# Patient Record
Sex: FEMALE | Race: WHITE | NOT HISPANIC OR LATINO | Employment: OTHER | URBAN - METROPOLITAN AREA
[De-identification: names, ages, dates, MRNs, and addresses within clinical notes are randomized per-mention and may not be internally consistent; named-entity substitution may affect disease eponyms.]

---

## 2017-01-05 RX ORDER — METOPROLOL SUCCINATE 50 MG/1
TABLET, EXTENDED RELEASE ORAL
Qty: 180 TABLET | Refills: 0 | Status: SHIPPED | OUTPATIENT
Start: 2017-01-05 | End: 2017-04-04 | Stop reason: SDUPTHER

## 2017-04-04 RX ORDER — METOPROLOL SUCCINATE 50 MG/1
TABLET, EXTENDED RELEASE ORAL
Qty: 180 TABLET | Refills: 0 | Status: SHIPPED | OUTPATIENT
Start: 2017-04-04 | End: 2017-07-13 | Stop reason: SDUPTHER

## 2017-04-04 RX ORDER — WARFARIN SODIUM 5 MG/1
TABLET ORAL
Qty: 45 TABLET | Refills: 1 | Status: SHIPPED | OUTPATIENT
Start: 2017-04-04 | End: 2017-06-07 | Stop reason: SDUPTHER

## 2017-06-07 ENCOUNTER — OFFICE VISIT (OUTPATIENT)
Dept: CARDIOLOGY | Facility: CLINIC | Age: 62
End: 2017-06-07

## 2017-06-07 VITALS
HEART RATE: 81 BPM | HEIGHT: 64 IN | SYSTOLIC BLOOD PRESSURE: 118 MMHG | WEIGHT: 197 LBS | DIASTOLIC BLOOD PRESSURE: 62 MMHG | BODY MASS INDEX: 33.63 KG/M2

## 2017-06-07 DIAGNOSIS — I10 ESSENTIAL HYPERTENSION: ICD-10-CM

## 2017-06-07 DIAGNOSIS — I35.0 NONRHEUMATIC AORTIC VALVE STENOSIS: Primary | ICD-10-CM

## 2017-06-07 PROCEDURE — 99214 OFFICE O/P EST MOD 30 MIN: CPT | Performed by: INTERNAL MEDICINE

## 2017-06-07 PROCEDURE — 93000 ELECTROCARDIOGRAM COMPLETE: CPT | Performed by: INTERNAL MEDICINE

## 2017-06-07 RX ORDER — WARFARIN SODIUM 5 MG/1
7.5 TABLET ORAL
Qty: 45 TABLET | Refills: 1 | Status: SHIPPED | OUTPATIENT
Start: 2017-06-07 | End: 2017-08-11 | Stop reason: SDUPTHER

## 2017-06-07 NOTE — PROGRESS NOTES
Date of Office Visit: 2017  Encounter Provider: Junaid Gunderson MD  Place of Service: [facility] CARDIOLOGY  Patient Name: Jocelin Parra  :1955      Chief Complaint   Patient presents with   • Cardiac Valve Problem   • Hypertension     History of Present Illness  The patient is a 62-year-old white female with a history of aortic valve stenosis.  She is status post aortic valve replacement with a [employer] prosthesis in .  She was seen here about a year ago.  Since her last visit things have gone well.  The been no cardiac issues that she is aware of.  She denies any complaints of palpitations, shortness of breath, lightheadedness nor dizziness.  There've been no hospitalizations.  She remains anticoagulated with warfarin      Past Medical History:   Diagnosis Date   • Aortic valve stenosis    • Diabetes mellitus    • Hyperlipidemia    • Hypertension    • Hypothyroidism          Past Surgical History:   Procedure Laterality Date   • AORTIC VALVE REPAIR/REPLACEMENT      ST BETHEL PROSTHESIS   • HYSTERECTOMY             Current Outpatient Prescriptions:   •  aspirin 81 MG tablet, Take 1 tablet by mouth daily., Disp: , Rfl:   •  B Complex Vitamins (VITAMIN B COMPLEX) tablet, Take by mouth., Disp: , Rfl:   •  ezetimibe (ZETIA) 10 MG tablet, Take 1 tablet by mouth daily., Disp: , Rfl:   •  ferrous sulfate 325 (65 FE) MG tablet, Take by mouth., Disp: , Rfl:   •  furosemide (LASIX) 40 MG tablet, Take 1 tablet by mouth daily., Disp: , Rfl:   •  INVOKANA 300 MG tablet, Take 1 tablet by mouth daily., Disp: , Rfl: 0  •  levothyroxine (SYNTHROID, LEVOTHROID) 150 MCG tablet, Take 150 mcg by mouth daily. \, Disp: , Rfl:   •  Liraglutide (VICTOZA) 18 MG/3ML solution pen-injector, Inject under the skin., Disp: , Rfl:   •  metFORMIN (GLUCOPHAGE) 500 MG tablet, Take 1 tablet by mouth daily with breakfast., Disp: , Rfl:   •  metoprolol succinate XL (TOPROL-XL) 50 MG 24 hr tablet, TAKE  "ONE TABLET BY MOUTH TWICE A DAY, Disp: 180 tablet, Rfl: 0  •  Multiple Vitamin (MULTI-VITAMIN) tablet, Take 1 tablet by mouth daily., Disp: , Rfl:   •  omeprazole (PriLOSEC) 20 MG capsule, Take 1 capsule by mouth daily., Disp: , Rfl:   •  potassium chloride (KLOR-CON) 20 MEQ CR tablet, Take 1 tablet by mouth daily., Disp: , Rfl:   •  simvastatin (ZOCOR) 20 MG tablet, Take 1 tablet by mouth daily., Disp: , Rfl:   •  valsartan (DIOVAN) 80 MG tablet, Take 1 tablet by mouth daily., Disp: , Rfl:   •  warfarin (COUMADIN) 5 MG tablet, TAKE ONE AND ONE-HALF (1  1/2) TABLET BY MOUTH DAILY OR AS DIRECTED BY DOCTOR TARIQ, Disp: 45 tablet, Rfl: 1      Social History     Social History   • Marital status: Single     Spouse name: N/A   • Number of children: N/A   • Years of education: N/A     Occupational History   • Not on file.     Social History Main Topics   • Smoking status: Never Smoker   • Smokeless tobacco: Not on file   • Alcohol use Not on file   • Drug use: Not on file   • Sexual activity: Not on file     Other Topics Concern   • Not on file     Social History Narrative         Review of Systems   Constitution: Negative.   HENT: Negative.    Eyes: Negative.    Cardiovascular: Negative.    Respiratory: Negative.    Endocrine: Negative.    Skin: Negative.    Musculoskeletal: Negative.    Gastrointestinal: Negative.    Neurological: Positive for light-headedness.   Psychiatric/Behavioral: Negative.        Procedures      ECG 12 Lead  Date/Time: 6/7/2017 1:23 PM  Performed by: ASTRID POTTER  Authorized by: ASTRID POTTER   Comparison: compared with previous ECG from 3/30/2016  Similar to previous ECG  Rhythm: sinus rhythm  Rate: normal  Conduction: conduction normal  ST Segments: ST segments normal  QRS axis: normal                 Objective:    /62  Pulse 81  Ht 64\" (162.6 cm)  Wt 197 lb (89.4 kg)  BMI 33.81 kg/m2        Physical Exam   Constitutional: She is oriented to person, place, and time. " She appears well-developed and well-nourished.   HENT:   Head: Normocephalic.   Eyes: Pupils are equal, round, and reactive to light.   Neck: Normal range of motion. No JVD present. Carotid bruit is not present. No thyromegaly present.   Cardiovascular: Normal rate, regular rhythm, S1 normal, S2 normal and intact distal pulses.  Exam reveals no gallop and no friction rub.    Murmur heard.   Systolic murmur is present with a grade of 1/6  Normal mechanical prosthetic sounds  Pulmonary/Chest: Effort normal and breath sounds normal.   Abdominal: Soft. Bowel sounds are normal.   Musculoskeletal: She exhibits no edema.   Neurological: She is alert and oriented to person, place, and time.   Skin: Skin is warm, dry and intact. No erythema.   Psychiatric: She has a normal mood and affect.   Vitals reviewed.          Assessment:       Diagnosis Plan   1. Nonrheumatic aortic valve stenosis     2. Essential hypertension              Plan:       Changes in medication at this time.  I will see her back in a year

## 2017-07-13 RX ORDER — METOPROLOL SUCCINATE 50 MG/1
TABLET, EXTENDED RELEASE ORAL
Qty: 180 TABLET | Refills: 3 | Status: SHIPPED | OUTPATIENT
Start: 2017-07-13 | End: 2018-06-22 | Stop reason: SDUPTHER

## 2017-08-11 RX ORDER — WARFARIN SODIUM 5 MG/1
TABLET ORAL
Qty: 45 TABLET | Refills: 2 | Status: SHIPPED | OUTPATIENT
Start: 2017-08-11 | End: 2017-11-17 | Stop reason: SDUPTHER

## 2017-08-15 ENCOUNTER — TELEPHONE (OUTPATIENT)
Dept: CARDIOLOGY | Facility: CLINIC | Age: 62
End: 2017-08-15

## 2017-08-15 NOTE — TELEPHONE ENCOUNTER
Pt called. She states she has knee surgery scheduled for 9/6. She needs you to advise re clearance and instructions on how to proceed with her coumadin and aspirin.     Pt is aware you are unavailable until 8/21.    Pt can be reached at [phone]

## 2017-08-21 NOTE — TELEPHONE ENCOUNTER
She is cleared.  No ASA for 1 week, warfarin for 3 days before.  If she needs to be off warfarin longer than 3 days I will need to know and bridge with lovenox.  Called her.  rfl

## 2017-11-17 RX ORDER — WARFARIN SODIUM 5 MG/1
TABLET ORAL
Qty: 45 TABLET | Refills: 2 | Status: SHIPPED | OUTPATIENT
Start: 2017-11-17 | End: 2018-02-18 | Stop reason: SDUPTHER

## 2017-12-12 ENCOUNTER — TELEPHONE (OUTPATIENT)
Dept: CARDIOLOGY | Facility: HOSPITAL | Age: 62
End: 2017-12-12

## 2018-02-19 RX ORDER — WARFARIN SODIUM 5 MG/1
TABLET ORAL
Qty: 45 TABLET | Refills: 1 | Status: SHIPPED | OUTPATIENT
Start: 2018-02-19 | End: 2018-04-27 | Stop reason: SDUPTHER

## 2018-04-27 RX ORDER — WARFARIN SODIUM 5 MG/1
TABLET ORAL
Qty: 45 TABLET | Refills: 2 | Status: SHIPPED | OUTPATIENT
Start: 2018-04-27 | End: 2019-06-26 | Stop reason: SDUPTHER

## 2018-05-21 ENCOUNTER — CONVERSION ENCOUNTER (OUTPATIENT)
Dept: CARDIOLOGY | Facility: CLINIC | Age: 63
End: 2018-05-21

## 2018-05-24 ENCOUNTER — CONVERSION ENCOUNTER (OUTPATIENT)
Dept: CARDIOLOGY | Facility: CLINIC | Age: 63
End: 2018-05-24

## 2018-06-22 RX ORDER — METOPROLOL SUCCINATE 50 MG/1
50 TABLET, EXTENDED RELEASE ORAL 2 TIMES DAILY
Qty: 180 TABLET | Refills: 0 | Status: SHIPPED | OUTPATIENT
Start: 2018-06-22 | End: 2018-06-25 | Stop reason: DRUGHIGH

## 2018-06-25 ENCOUNTER — TELEPHONE (OUTPATIENT)
Dept: CARDIOLOGY | Facility: CLINIC | Age: 63
End: 2018-06-25

## 2018-06-25 RX ORDER — METOPROLOL SUCCINATE 100 MG/1
100 TABLET, EXTENDED RELEASE ORAL DAILY
Qty: 90 TABLET | Refills: 1 | Status: SHIPPED | OUTPATIENT
Start: 2018-06-25 | End: 2019-10-21 | Stop reason: SDUPTHER

## 2018-06-25 NOTE — TELEPHONE ENCOUNTER
Received a PA request for pts Metoprolol Succ     This was sent in as 50mg twice daily.  Ins would erin for it to stay 1 tablet daily and increase the strength of medication to 100mg    Is this ok to send in as Metoprolol Succ 100mg daily?

## 2018-07-09 RX ORDER — METOPROLOL SUCCINATE 100 MG/1
100 TABLET, EXTENDED RELEASE ORAL DAILY
Qty: 90 TABLET | Refills: 1 | Status: SHIPPED | OUTPATIENT
Start: 2018-07-09 | End: 2019-10-21 | Stop reason: SDUPTHER

## 2018-07-18 ENCOUNTER — CONVERSION ENCOUNTER (OUTPATIENT)
Dept: CARDIOLOGY | Facility: CLINIC | Age: 63
End: 2018-07-18

## 2018-08-15 ENCOUNTER — CONVERSION ENCOUNTER (OUTPATIENT)
Dept: CARDIOLOGY | Facility: CLINIC | Age: 63
End: 2018-08-15

## 2018-08-31 ENCOUNTER — CONVERSION ENCOUNTER (OUTPATIENT)
Dept: CARDIOLOGY | Facility: CLINIC | Age: 63
End: 2018-08-31

## 2018-09-18 RX ORDER — WARFARIN SODIUM 5 MG/1
TABLET ORAL
Qty: 45 TABLET | Refills: 1 | OUTPATIENT
Start: 2018-09-18

## 2018-09-28 ENCOUNTER — CONVERSION ENCOUNTER (OUTPATIENT)
Dept: CARDIOLOGY | Facility: CLINIC | Age: 63
End: 2018-09-28

## 2018-10-26 ENCOUNTER — CONVERSION ENCOUNTER (OUTPATIENT)
Dept: CARDIOLOGY | Facility: CLINIC | Age: 63
End: 2018-10-26

## 2018-11-29 ENCOUNTER — HOSPITAL ENCOUNTER (OUTPATIENT)
Dept: LAB | Facility: HOSPITAL | Age: 63
Discharge: HOME | End: 2018-11-29
Attending: INTERNAL MEDICINE | Admitting: INTERNAL MEDICINE

## 2018-11-29 LAB
INR PPP: 2.6 (ref 2–3)
PROTHROMBIN TIME: 25.4 SEC (ref 19.4–28.5)

## 2019-01-15 ENCOUNTER — CONVERSION ENCOUNTER (OUTPATIENT)
Dept: CARDIOLOGY | Facility: CLINIC | Age: 64
End: 2019-01-15

## 2019-01-21 ENCOUNTER — HOSPITAL ENCOUNTER (OUTPATIENT)
Dept: LAB | Facility: HOSPITAL | Age: 64
Discharge: HOME | End: 2019-01-21
Attending: INTERNAL MEDICINE | Admitting: INTERNAL MEDICINE

## 2019-01-21 LAB
ANION GAP SERPL CALC-SCNC: 15.3 MMOL/L (ref 10–20)
BASOPHILS # BLD AUTO: 0.1 10*3/UL (ref 0–0.2)
BASOPHILS NFR BLD AUTO: 1 % (ref 0–2)
BUN SERPL-MCNC: 27 MG/DL (ref 8–20)
BUN/CREAT SERPL: 19.3 (ref 5.4–26.2)
CALCIUM SERPL-MCNC: 9.8 MG/DL (ref 8.9–10.3)
CHLORIDE SERPL-SCNC: 101 MMOL/L (ref 101–111)
CONV CO2: 26 MMOL/L (ref 22–32)
CREAT UR-MCNC: 1.4 MG/DL (ref 0.4–1)
DIFFERENTIAL METHOD BLD: (no result)
EOSINOPHIL # BLD AUTO: 0.3 10*3/UL (ref 0–0.3)
EOSINOPHIL # BLD AUTO: 3 % (ref 0–3)
ERYTHROCYTE [DISTWIDTH] IN BLOOD BY AUTOMATED COUNT: 15.5 % (ref 11.5–14.5)
GLUCOSE SERPL-MCNC: 282 MG/DL (ref 65–99)
HCT VFR BLD AUTO: 39.4 % (ref 35–49)
HGB BLD-MCNC: 12.8 G/DL (ref 12–15)
LYMPHOCYTES # BLD AUTO: 1.5 10*3/UL (ref 0.8–4.8)
LYMPHOCYTES NFR BLD AUTO: 15 % (ref 18–42)
MCH RBC QN AUTO: 28.2 PG (ref 26–32)
MCHC RBC AUTO-ENTMCNC: 32.5 G/DL (ref 32–36)
MCV RBC AUTO: 86.7 FL (ref 80–94)
MONOCYTES # BLD AUTO: 0.7 10*3/UL (ref 0.1–1.3)
MONOCYTES NFR BLD AUTO: 7 % (ref 2–11)
NEUTROPHILS # BLD AUTO: 7.7 10*3/UL (ref 2.3–8.6)
NEUTROPHILS NFR BLD AUTO: 74 % (ref 50–75)
NRBC BLD AUTO-RTO: 0 /100{WBCS}
NRBC/RBC NFR BLD MANUAL: 0 10*3/UL
PLATELET # BLD AUTO: 331 10*3/UL (ref 150–450)
PMV BLD AUTO: 8 FL (ref 7.4–10.4)
POTASSIUM SERPL-SCNC: 4.3 MMOL/L (ref 3.6–5.1)
RBC # BLD AUTO: 4.55 10*6/UL (ref 4–5.4)
SODIUM SERPL-SCNC: 138 MMOL/L (ref 136–144)
WBC # BLD AUTO: 10.2 10*3/UL (ref 4.5–11.5)

## 2019-02-14 ENCOUNTER — CONVERSION ENCOUNTER (OUTPATIENT)
Dept: CARDIOLOGY | Facility: CLINIC | Age: 64
End: 2019-02-14

## 2019-02-25 ENCOUNTER — HOSPITAL ENCOUNTER (OUTPATIENT)
Dept: CARDIOLOGY | Facility: HOSPITAL | Age: 64
Discharge: HOME | End: 2019-02-25
Attending: INTERNAL MEDICINE | Admitting: INTERNAL MEDICINE

## 2019-02-25 ENCOUNTER — CONVERSION ENCOUNTER (OUTPATIENT)
Dept: CARDIOLOGY | Facility: CLINIC | Age: 64
End: 2019-02-25

## 2019-03-19 ENCOUNTER — CONVERSION ENCOUNTER (OUTPATIENT)
Dept: CARDIOLOGY | Facility: CLINIC | Age: 64
End: 2019-03-19

## 2019-04-18 ENCOUNTER — TELEPHONE (OUTPATIENT)
Dept: CARDIAC SURGERY | Facility: CLINIC | Age: 64
End: 2019-04-18

## 2019-04-18 NOTE — TELEPHONE ENCOUNTER
Spoke to Mrs. Parra and explained the process for her PAT and surgery. [facility] will contact hers as to when to come in for her PAT and her arrival time for surgery. She will also be contacted as to when to stop her FISH OIL and COUMADIN.  She expressed a verbal understanding of all. She was instructed to call the office with any further questions.

## 2019-04-23 ENCOUNTER — CONVERSION ENCOUNTER (OUTPATIENT)
Dept: CARDIOLOGY | Facility: CLINIC | Age: 64
End: 2019-04-23

## 2019-05-06 ENCOUNTER — TELEPHONE (OUTPATIENT)
Dept: CARDIAC SURGERY | Facility: CLINIC | Age: 64
End: 2019-05-06

## 2019-05-06 NOTE — TELEPHONE ENCOUNTER
After speaking with Kristal DUMONT I called and spoke with patient. She verifies her weight is 197lbs. We discussed the lovenox injections and she verified that she has stopped her coumadin. She will begin lovenox 90mg injections the morning of 5/7/19 and continue them every 12 hours with the last dose to be administered the morning of 5/8/19.This is a total of 3 doses. She repeated these instructions to me. I have also called her pharmacy [facility] in [place] and placed the order for the medication

## 2019-05-09 ENCOUNTER — OUTSIDE FACILITY SERVICE (OUTPATIENT)
Dept: CARDIAC SURGERY | Facility: CLINIC | Age: 64
End: 2019-05-09

## 2019-05-09 PROCEDURE — 33508 ENDOSCOPIC VEIN HARVEST: CPT | Performed by: THORACIC SURGERY (CARDIOTHORACIC VASCULAR SURGERY)

## 2019-05-09 PROCEDURE — 33259 ABLATE ATRIA W/BYPASS ADD-ON: CPT | Performed by: THORACIC SURGERY (CARDIOTHORACIC VASCULAR SURGERY)

## 2019-05-09 PROCEDURE — 33464 VALVULOPLASTY TRICUSPID: CPT | Performed by: THORACIC SURGERY (CARDIOTHORACIC VASCULAR SURGERY)

## 2019-05-09 PROCEDURE — 33533 CABG ARTERIAL SINGLE: CPT | Performed by: THORACIC SURGERY (CARDIOTHORACIC VASCULAR SURGERY)

## 2019-05-09 PROCEDURE — 33517 CABG ARTERY-VEIN SINGLE: CPT | Performed by: THORACIC SURGERY (CARDIOTHORACIC VASCULAR SURGERY)

## 2019-05-09 PROCEDURE — 33430 REPLACEMENT OF MITRAL VALVE: CPT | Performed by: THORACIC SURGERY (CARDIOTHORACIC VASCULAR SURGERY)

## 2019-06-04 VITALS
DIASTOLIC BLOOD PRESSURE: 67 MMHG | WEIGHT: 206 LBS | HEART RATE: 91 BPM | HEART RATE: 77 BPM | BODY MASS INDEX: 34.54 KG/M2 | BODY MASS INDEX: 35.51 KG/M2 | HEART RATE: 74 BPM | BODY MASS INDEX: 34.63 KG/M2 | SYSTOLIC BLOOD PRESSURE: 124 MMHG | SYSTOLIC BLOOD PRESSURE: 106 MMHG | BODY MASS INDEX: 37.51 KG/M2 | BODY MASS INDEX: 35.19 KG/M2 | BODY MASS INDEX: 35.06 KG/M2 | SYSTOLIC BLOOD PRESSURE: 132 MMHG | BODY MASS INDEX: 35.02 KG/M2 | HEART RATE: 75 BPM | HEART RATE: 80 BPM | HEART RATE: 122 BPM | WEIGHT: 201.75 LBS | WEIGHT: 209 LBS | DIASTOLIC BLOOD PRESSURE: 80 MMHG | OXYGEN SATURATION: 95 % | WEIGHT: 223 LBS | SYSTOLIC BLOOD PRESSURE: 90 MMHG | BODY MASS INDEX: 34.37 KG/M2 | BODY MASS INDEX: 34.93 KG/M2 | BODY MASS INDEX: 35.87 KG/M2 | BODY MASS INDEX: 35.36 KG/M2 | DIASTOLIC BLOOD PRESSURE: 72 MMHG | WEIGHT: 201.25 LBS | DIASTOLIC BLOOD PRESSURE: 79 MMHG | DIASTOLIC BLOOD PRESSURE: 58 MMHG | WEIGHT: 205 LBS | WEIGHT: 200.25 LBS | DIASTOLIC BLOOD PRESSURE: 69 MMHG | HEIGHT: 64 IN | HEART RATE: 73 BPM | SYSTOLIC BLOOD PRESSURE: 101 MMHG | SYSTOLIC BLOOD PRESSURE: 94 MMHG | DIASTOLIC BLOOD PRESSURE: 67 MMHG | SYSTOLIC BLOOD PRESSURE: 97 MMHG | SYSTOLIC BLOOD PRESSURE: 160 MMHG | WEIGHT: 218.5 LBS | OXYGEN SATURATION: 100 % | SYSTOLIC BLOOD PRESSURE: 102 MMHG | HEART RATE: 68 BPM | DIASTOLIC BLOOD PRESSURE: 59 MMHG | DIASTOLIC BLOOD PRESSURE: 68 MMHG | DIASTOLIC BLOOD PRESSURE: 80 MMHG | HEART RATE: 128 BPM | SYSTOLIC BLOOD PRESSURE: 111 MMHG | HEART RATE: 64 BPM | WEIGHT: 203.5 LBS | WEIGHT: 204 LBS | DIASTOLIC BLOOD PRESSURE: 84 MMHG | WEIGHT: 204.25 LBS | BODY MASS INDEX: 38.28 KG/M2 | HEART RATE: 70 BPM | WEIGHT: 208 LBS | SYSTOLIC BLOOD PRESSURE: 113 MMHG

## 2019-06-17 ENCOUNTER — OFFICE VISIT (OUTPATIENT)
Dept: CARDIAC SURGERY | Facility: CLINIC | Age: 64
End: 2019-06-17

## 2019-06-17 VITALS
HEART RATE: 112 BPM | WEIGHT: 179.4 LBS | DIASTOLIC BLOOD PRESSURE: 78 MMHG | TEMPERATURE: 98.2 F | BODY MASS INDEX: 30.79 KG/M2 | SYSTOLIC BLOOD PRESSURE: 122 MMHG

## 2019-06-17 DIAGNOSIS — Z86.79 S/P MAZE OPERATION FOR ATRIAL FIBRILLATION: ICD-10-CM

## 2019-06-17 DIAGNOSIS — Z95.2 S/P AVR: ICD-10-CM

## 2019-06-17 DIAGNOSIS — Z95.2 S/P MVR (MITRAL VALVE REPLACEMENT): Primary | ICD-10-CM

## 2019-06-17 DIAGNOSIS — Z95.1 S/P CABG X 2: ICD-10-CM

## 2019-06-17 DIAGNOSIS — Z98.890 S/P TVR (TRICUSPID VALVE REPAIR): ICD-10-CM

## 2019-06-17 DIAGNOSIS — Z98.890 S/P MAZE OPERATION FOR ATRIAL FIBRILLATION: ICD-10-CM

## 2019-06-17 PROCEDURE — 99024 POSTOP FOLLOW-UP VISIT: CPT | Performed by: NURSE PRACTITIONER

## 2019-06-17 NOTE — PROGRESS NOTES
CARDIOVASCULAR SURGERY FOLLOW-UP PROGRESS NOTE  Chief Complaint: Post-op Follow Up        HPI:   Dear Emilie Kern APRN and colleagues:    It was nice to see Jocelin Parra in follow up today after cardiac surgery.  As you know, she is a 64 y.o. female with severe mitral regurgitation, mod to severe tricuspid regurgitation, CAD, persistent atrial fib, HF, and prior mechanical AVR per Dr. Alvarado who underwent CABG, mechanical MVR, tricuspid valve repair, Maze procedure and redo sternotomy at [facility] by Dr. Alvarado on 5/9/2019. She did well postoperatively and continues to do well. She did go to rehab d/t living alone but has returned to home.  Her latest INR was 4 per her report.  She comes in today complaining of occasionally feeling dizzy.  She reports she has lost 30 pounds since surgery.  She is also in atrial fib and Dr. Solano is aware from her last office visit.  She does have a firm hematoma in the left thigh area but no drainage or erythema.  Her activity level has been good.   She is alone for her appt today.    Physical Exam:         /78   Pulse 112   Temp 98.2 °F (36.8 °C) (Oral)   Wt 81.4 kg (179 lb 6.4 oz)   BMI 30.79 kg/m²   Heart:  irregularly irregular rhythm  Lungs:  clear to auscultation bilaterally  Extremities:  trace lower extremity edema bilaterally  Incision(s):  mid chest healing well,sternum stable, chest tube exit sites with thick scabbing, left leg slow healing, hematoma noted in thigh area, stitch removed from incision of LLE.    Assessment/Plan:     S/P reop sternotomy, mechanical MVR, prior mechanical AVR, TV repair, CABG x2, Maze procedure. Overall, she is doing well.  I did discontinue her Bumex/KCL.  Hopefully, that will allow for more beta blockade to control her heart rate better.  She is going to see renal next week and will have labs prior to that appt as well.  She knows to restart her diuretics if increased edema/SOA.    Post-op atrial fibrillation--per   Xiomara, she is on warfarin for her mechanical heart valves.    OK to drive if not taking narcotic pain medicine.    OK to begin cardiac rehab.  I will place order for cardiac rehab.    Follow-up with CT surgery prn.    No restrictions of activity.      Thank you for allowing me to participate in the care of your patient.    Regards,  Kristal Mcpherson, APRN

## 2019-06-18 ENCOUNTER — APPOINTMENT (OUTPATIENT)
Dept: PULMONOLOGY | Facility: HOSPITAL | Age: 64
End: 2019-06-18

## 2019-06-20 ENCOUNTER — LAB (OUTPATIENT)
Dept: LAB | Facility: HOSPITAL | Age: 64
End: 2019-06-20

## 2019-06-20 ENCOUNTER — TRANSCRIBE ORDERS (OUTPATIENT)
Dept: ADMINISTRATIVE | Facility: HOSPITAL | Age: 64
End: 2019-06-20

## 2019-06-20 DIAGNOSIS — N18.30 CHRONIC KIDNEY DISEASE, STAGE III (MODERATE) (HCC): Primary | ICD-10-CM

## 2019-06-20 DIAGNOSIS — N18.30 CHRONIC KIDNEY DISEASE, STAGE III (MODERATE) (HCC): ICD-10-CM

## 2019-06-20 LAB
ANION GAP SERPL CALCULATED.3IONS-SCNC: 10 MMOL/L (ref 10–20)
BUN BLD-MCNC: 21 MG/DL (ref 8–20)
BUN/CREAT SERPL: 16.2 (ref 5.4–26.2)
CALCIUM SPEC-SCNC: 9.7 MG/DL (ref 8.9–10.3)
CHLORIDE SERPL-SCNC: 101 MMOL/L (ref 101–111)
CO2 SERPL-SCNC: 27 MMOL/L (ref 22–32)
CREAT BLD-MCNC: 1.3 MG/DL (ref 0.4–1)
GFR SERPL CREATININE-BSD FRML MDRD: 41 ML/MIN/1.73
GLUCOSE BLD-MCNC: 99 MG/DL (ref 65–99)
POTASSIUM BLD-SCNC: 3.3 MMOL/L (ref 3.6–5.1)
SODIUM BLD-SCNC: 138 MMOL/L (ref 136–144)

## 2019-06-20 PROCEDURE — 80048 BASIC METABOLIC PNL TOTAL CA: CPT

## 2019-06-20 PROCEDURE — 36415 COLL VENOUS BLD VENIPUNCTURE: CPT

## 2019-06-26 ENCOUNTER — TELEPHONE (OUTPATIENT)
Dept: CARDIAC REHAB | Facility: HOSPITAL | Age: 64
End: 2019-06-26

## 2019-06-26 DIAGNOSIS — Z95.2 S/P AVR: Primary | ICD-10-CM

## 2019-06-26 DIAGNOSIS — Z79.01 LONG TERM (CURRENT) USE OF ANTICOAGULANTS: ICD-10-CM

## 2019-06-26 RX ORDER — WARFARIN SODIUM 5 MG/1
TABLET ORAL
Qty: 36 TABLET | Refills: 0 | Status: SHIPPED | OUTPATIENT
Start: 2019-06-26 | End: 2019-07-23 | Stop reason: SDUPTHER

## 2019-06-27 ENCOUNTER — ANTICOAGULATION VISIT (OUTPATIENT)
Dept: CARDIOLOGY | Facility: CLINIC | Age: 64
End: 2019-06-27

## 2019-06-27 VITALS
WEIGHT: 186 LBS | HEART RATE: 108 BPM | DIASTOLIC BLOOD PRESSURE: 66 MMHG | SYSTOLIC BLOOD PRESSURE: 111 MMHG | BODY MASS INDEX: 31.93 KG/M2

## 2019-06-27 DIAGNOSIS — I48.21 PERMANENT ATRIAL FIBRILLATION (HCC): ICD-10-CM

## 2019-06-27 DIAGNOSIS — Z95.3 HISTORY OF MITRAL VALVE REPLACEMENT WITH BIOPROSTHETIC VALVE: ICD-10-CM

## 2019-06-27 DIAGNOSIS — Z79.01 LONG TERM (CURRENT) USE OF ANTICOAGULANTS: ICD-10-CM

## 2019-06-27 DIAGNOSIS — Z95.3 HISTORY OF AORTIC VALVE REPLACEMENT WITH BIOPROSTHETIC VALVE: ICD-10-CM

## 2019-06-27 PROBLEM — I48.91 ATRIAL FIBRILLATION (HCC): Status: ACTIVE | Noted: 2019-06-27

## 2019-06-27 LAB — INR PPP: 3.3 (ref 2.5–3.5)

## 2019-06-27 PROCEDURE — 36416 COLLJ CAPILLARY BLOOD SPEC: CPT | Performed by: INTERNAL MEDICINE

## 2019-06-27 PROCEDURE — 85610 PROTHROMBIN TIME: CPT | Performed by: INTERNAL MEDICINE

## 2019-06-28 ENCOUNTER — TELEPHONE (OUTPATIENT)
Dept: CARDIAC REHAB | Facility: HOSPITAL | Age: 64
End: 2019-06-28

## 2019-07-02 ENCOUNTER — HOSPITAL ENCOUNTER (EMERGENCY)
Facility: HOSPITAL | Age: 64
Discharge: HOME | End: 2019-07-02
Attending: EMERGENCY MEDICINE | Admitting: EMERGENCY MEDICINE

## 2019-07-02 ENCOUNTER — APPOINTMENT (OUTPATIENT)
Dept: GENERAL RADIOLOGY | Facility: HOSPITAL | Age: 64
End: 2019-07-02

## 2019-07-02 ENCOUNTER — TELEPHONE (OUTPATIENT)
Dept: CARDIAC SURGERY | Facility: CLINIC | Age: 64
End: 2019-07-02

## 2019-07-02 VITALS
SYSTOLIC BLOOD PRESSURE: 115 MMHG | OXYGEN SATURATION: 100 % | HEART RATE: 108 BPM | WEIGHT: 188.49 LBS | RESPIRATION RATE: 16 BRPM | HEIGHT: 64 IN | DIASTOLIC BLOOD PRESSURE: 51 MMHG | TEMPERATURE: 98.4 F | BODY MASS INDEX: 32.18 KG/M2

## 2019-07-02 DIAGNOSIS — R06.00 DYSPNEA, UNSPECIFIED TYPE: Primary | ICD-10-CM

## 2019-07-02 DIAGNOSIS — J06.9 UPPER RESPIRATORY TRACT INFECTION, UNSPECIFIED TYPE: ICD-10-CM

## 2019-07-02 DIAGNOSIS — R04.0 EPISTAXIS: ICD-10-CM

## 2019-07-02 LAB
INR PPP: 3.79 (ref 2–3)
PROTHROMBIN TIME: 36.9 SECONDS (ref 19.4–28.5)

## 2019-07-02 PROCEDURE — 71045 X-RAY EXAM CHEST 1 VIEW: CPT

## 2019-07-02 PROCEDURE — 99284 EMERGENCY DEPT VISIT MOD MDM: CPT

## 2019-07-02 PROCEDURE — 85610 PROTHROMBIN TIME: CPT | Performed by: EMERGENCY MEDICINE

## 2019-07-02 NOTE — TELEPHONE ENCOUNTER
"Ms. Parra called stating that her b/p has been running high (156/93) and that she is very SOA, \"blood clots coming out of her nose\" and her incisions are still bleeding. She is concerned about possible infection and her b/p being so high. She states that even 30minutes after taking b/p medication her b/p was still around 154/90. Advised patient that we were not in the office today in [place] and she agreed to have her neighbor take her to the ER today.   "

## 2019-07-02 NOTE — ED PROVIDER NOTES
Subjective   Patient is a 64-year-old female clinic cough congestion shortness of breath and a nosebleed today.  She states the cough is nonproductive.  She states that shortness of breath is mild.  She denies fever chest pain vomiting or other associated complaints.            Review of Systems   Constitutional: Negative for activity change, appetite change, diaphoresis and fever.   HENT: Positive for nosebleeds. Negative for ear pain, sinus pressure, sinus pain and sore throat.    Respiratory: Positive for cough and shortness of breath. Negative for chest tightness.    Cardiovascular: Negative for chest pain and palpitations.   Gastrointestinal: Negative for abdominal pain, diarrhea, nausea and vomiting.   Genitourinary: Negative for dysuria and flank pain.   Musculoskeletal: Negative for back pain and myalgias.   Skin: Negative for rash.   Neurological: Negative for dizziness, weakness and headaches.   All other systems reviewed and are negative.      Past Medical History:   Diagnosis Date   • A-fib (CMS/[facility]) Dx 2018    S/p Cardioversion by Dr. Spencer on 04/10/19   • Acute renal failure syndrome (CMS/[facility]) 4/2019-[facility] IP   • Biatrial enlargement 04/10/2019    Noted on SHERIE   • Calcified granuloma of lung (CMS/[facility]) 01/04/2019    Noted on Chest XR   • Cardiomegaly 01/04/2019    Borderline--Noted on Chest XR   • CHF (congestive heart failure) (CMS/[facility])    • CKD (chronic kidney disease), stage III (CMS/[facility])     Does Not See a Nephrologist   • Diabetes mellitus (CMS/[facility])    • DM (diabetes mellitus) (CMS/[facility])     T2   • Dysphagia 08/15-[facility] IP    Due to Pharyngitis   • GERD (gastroesophageal reflux disease)    • History of chest x-ray 8/28/15-[facility]    Normal   • History of chest x-ray 01/19/2019    Mild Pulmonary Edema w/Congestive Failure Noted   • History of chest x-ray 01/04/2019    Borderline Cardiomegaly    • History of EKG 2014/2018/2019-[facility]   • Hx of diabetic neuropathy    • Hx of dizziness     w/Lightheadedness   • Hx  of echocardiogram 4/16/18-[facility]    EF 55-60%; Mild MVR; Mild TVR; Normal Root/No Effusion   • Hyperlipidemia     Controlled w/Meds   • Hypertension     Controlled w/Meds   • Hypokalemia 4/11/19-[facility] IP   • Hypothyroidism     Controlled w/Meds   • Iron deficiency anemia    • Left posterior fascicular block 04/2018 & 4/19    Noted on EKG   • Lower extremity edema 03/2019   • Mild mitral valve regurgitation 04/16/2018    Noted on Echo   • Mild tricuspid valve regurgitation 04/16/2018    Noted on Echo   • Moderate mitral valve regurgitation 2008    S/p MVR in 08'   • Moderate tricuspid insufficiency 04/10/2019    Noted on SHERIE   • Osteoarthritis     Knees w/Hx Knee Repl   • Pulmonary edema 01/19/2019    Mild--Noted on Chest XR   • Rapid palpitations 04/15/18 & 8/9/18-[facility] ER   • Renal dysfunction    • Right axis deviation 08/2018 & 4/19    Noted on EKG   • Severe aortic valve stenosis Since 2008    Hx AVR on 12/11/08 by Dr. Alvarado   • Severe mitral insufficiency 04/10/2019    Noted on SHERIE   • Sinus tachycardia 08/2018    Noted on EKG   • SOB (shortness of breath) chronic   • Torticollis Chronic   • Valvular heart disease     S/p AVR & MVR in 08'       No Known Allergies    Past Surgical History:   Procedure Laterality Date   • AORTIC VALVE REPAIR/REPLACEMENT  12/11/08-[facility]    Using a #20 Eight Yr Mechanical [employer] Prosthesis--Dewey Alvarado MD   • CARDIOVERSION  4/10/19-[facility]    Dr. Spencer--For A-Fib   • HYSTERECTOMY     • MITRAL VALVE REPLACEMENT  2008   • MITRAL VALVE REPLACEMENT  05/09/2019    Dr Alvarado   • SHERIE  4/10/19-[facility]    EF 40%; Moderate TVR; Severe Eccentric MI; Biatrial Enlargement   • TOTAL KNEE ARTHROPLASTY  2017   • TRICUSPID VALVE SURGERY  05/09/2019    Dr Alvarado       Family History   Problem Relation Age of Onset   • Heart disease Father    • Hypertension Father    • Stroke Father    • Diabetes Father    • Lung cancer Mother        Social History     Socioeconomic History   • Marital status: Single     Spouse  name: Not on file   • Number of children: Not on file   • Years of education: Not on file   • Highest education level: Not on file   Tobacco Use   • Smoking status: Never Smoker   • Smokeless tobacco: Never Used   Substance and Sexual Activity   • Alcohol use: No   • Drug use: No   • Sexual activity: Defer     Birth control/protection: Surgical     Comment: Hyst           Objective   Physical Exam  HEENT exam shows the patient have dried blood in both sides of her nose.  TMs are clear.  Oropharynx clear.  Neck has no adenopathy JVD or bruits.  Lungs are clear.  Heart is regular rhythm without murmur gallop.  Chest is nontender.  M soft nontender.  Patient has normal loss of the finger.  Back has no tenderness.  Procedures           ED Course                  MDM  Number of Diagnoses or Management Options  Diagnosis management comments: Patient has no evidence of infectious process though she may have an upper respiratory viral infection.  Patient had no active nasal bleeding while in the ED.  Her INR is approximately 3.6.  She does have multiple valves replaced and she states they are trying to keep her INR at 3.5.  Patient will be discharged and will follow with me for recheck.  No evidence of a significant or severe infectious process or metabolic abnormality.    Risk of Complications, Morbidity, and/or Mortality  Presenting problems: moderate  Diagnostic procedures: moderate  Management options: moderate    Patient Progress  Patient progress: stable        Final diagnoses:   Dyspnea, unspecified type   Upper respiratory tract infection, unspecified type   Epistaxis            Timi Montgomery MD  07/02/19 [id number]

## 2019-07-15 ENCOUNTER — LAB (OUTPATIENT)
Dept: LAB | Facility: HOSPITAL | Age: 64
End: 2019-07-15

## 2019-07-15 ENCOUNTER — TRANSCRIBE ORDERS (OUTPATIENT)
Dept: ADMINISTRATIVE | Facility: HOSPITAL | Age: 64
End: 2019-07-15

## 2019-07-15 DIAGNOSIS — N18.30 CHRONIC KIDNEY DISEASE, STAGE III (MODERATE) (HCC): Primary | ICD-10-CM

## 2019-07-15 DIAGNOSIS — N18.30 CHRONIC KIDNEY DISEASE, STAGE III (MODERATE) (HCC): ICD-10-CM

## 2019-07-15 LAB
ANION GAP SERPL CALCULATED.3IONS-SCNC: 13.2 MMOL/L (ref 5–15)
BUN BLD-MCNC: 23 MG/DL (ref 8–20)
BUN/CREAT SERPL: 16.4 (ref 5.4–26.2)
CALCIUM SPEC-SCNC: 9.2 MG/DL (ref 8.9–10.3)
CHLORIDE SERPL-SCNC: 105 MMOL/L (ref 101–111)
CO2 SERPL-SCNC: 24 MMOL/L (ref 22–32)
CREAT BLD-MCNC: 1.4 MG/DL (ref 0.4–1)
GFR SERPL CREATININE-BSD FRML MDRD: 38 ML/MIN/1.73
GLUCOSE BLD-MCNC: 130 MG/DL (ref 65–99)
POTASSIUM BLD-SCNC: 4.2 MMOL/L (ref 3.6–5.1)
SODIUM BLD-SCNC: 138 MMOL/L (ref 136–144)

## 2019-07-15 PROCEDURE — 36415 COLL VENOUS BLD VENIPUNCTURE: CPT

## 2019-07-15 PROCEDURE — 80048 BASIC METABOLIC PNL TOTAL CA: CPT

## 2019-07-23 DIAGNOSIS — Z95.2 S/P AVR: ICD-10-CM

## 2019-07-23 DIAGNOSIS — Z79.01 LONG TERM (CURRENT) USE OF ANTICOAGULANTS: ICD-10-CM

## 2019-07-23 RX ORDER — WARFARIN SODIUM 5 MG/1
TABLET ORAL
Qty: 36 TABLET | Refills: 0 | Status: SHIPPED | OUTPATIENT
Start: 2019-07-23 | End: 2019-08-17 | Stop reason: SDUPTHER

## 2019-07-25 ENCOUNTER — ANTICOAGULATION VISIT (OUTPATIENT)
Dept: CARDIOLOGY | Facility: CLINIC | Age: 64
End: 2019-07-25

## 2019-07-25 VITALS
BODY MASS INDEX: 31.76 KG/M2 | HEART RATE: 105 BPM | WEIGHT: 185 LBS | DIASTOLIC BLOOD PRESSURE: 62 MMHG | SYSTOLIC BLOOD PRESSURE: 100 MMHG

## 2019-07-25 DIAGNOSIS — Z95.3 HISTORY OF MITRAL VALVE REPLACEMENT WITH BIOPROSTHETIC VALVE: ICD-10-CM

## 2019-07-25 DIAGNOSIS — Z79.01 LONG TERM (CURRENT) USE OF ANTICOAGULANTS: ICD-10-CM

## 2019-07-25 DIAGNOSIS — I48.20 CHRONIC ATRIAL FIBRILLATION (HCC): ICD-10-CM

## 2019-07-25 DIAGNOSIS — Z95.3 HISTORY OF AORTIC VALVE REPLACEMENT WITH BIOPROSTHETIC VALVE: ICD-10-CM

## 2019-07-25 LAB — INR PPP: 2.5 (ref 2.5–3.5)

## 2019-07-25 PROCEDURE — 36416 COLLJ CAPILLARY BLOOD SPEC: CPT | Performed by: INTERNAL MEDICINE

## 2019-07-25 PROCEDURE — 85610 PROTHROMBIN TIME: CPT | Performed by: INTERNAL MEDICINE

## 2019-08-17 DIAGNOSIS — Z79.01 LONG TERM (CURRENT) USE OF ANTICOAGULANTS: ICD-10-CM

## 2019-08-17 DIAGNOSIS — Z95.2 S/P AVR: ICD-10-CM

## 2019-08-19 RX ORDER — WARFARIN SODIUM 5 MG/1
TABLET ORAL
Qty: 36 TABLET | Refills: 0 | Status: SHIPPED | OUTPATIENT
Start: 2019-08-19 | End: 2019-09-13 | Stop reason: SDUPTHER

## 2019-08-29 ENCOUNTER — ANTICOAGULATION VISIT (OUTPATIENT)
Dept: CARDIOLOGY | Facility: CLINIC | Age: 64
End: 2019-08-29

## 2019-08-29 VITALS
HEART RATE: 100 BPM | WEIGHT: 184 LBS | SYSTOLIC BLOOD PRESSURE: 117 MMHG | BODY MASS INDEX: 31.58 KG/M2 | DIASTOLIC BLOOD PRESSURE: 67 MMHG

## 2019-08-29 DIAGNOSIS — Z79.01 LONG TERM (CURRENT) USE OF ANTICOAGULANTS: ICD-10-CM

## 2019-08-29 DIAGNOSIS — Z95.3 HISTORY OF AORTIC VALVE REPLACEMENT WITH BIOPROSTHETIC VALVE: ICD-10-CM

## 2019-08-29 DIAGNOSIS — I48.20 CHRONIC ATRIAL FIBRILLATION (HCC): ICD-10-CM

## 2019-08-29 DIAGNOSIS — Z95.3 HISTORY OF MITRAL VALVE REPLACEMENT WITH BIOPROSTHETIC VALVE: ICD-10-CM

## 2019-08-29 LAB — INR PPP: 2.3 (ref 0.9–1.1)

## 2019-08-29 PROCEDURE — 36416 COLLJ CAPILLARY BLOOD SPEC: CPT | Performed by: INTERNAL MEDICINE

## 2019-08-29 PROCEDURE — 85610 PROTHROMBIN TIME: CPT | Performed by: INTERNAL MEDICINE

## 2019-09-09 ENCOUNTER — TELEPHONE (OUTPATIENT)
Dept: CARDIOLOGY | Facility: CLINIC | Age: 64
End: 2019-09-09

## 2019-09-10 ENCOUNTER — TELEPHONE (OUTPATIENT)
Dept: CARDIOLOGY | Facility: CLINIC | Age: 64
End: 2019-09-10

## 2019-09-10 NOTE — TELEPHONE ENCOUNTER
Per Dr. Solano, patient can have her teeth cleaned, she should take Amox 1,000 mg prior to procedure.     The patient was contacted and made aware. Her dentist sent in Amox with the correct dosing.

## 2019-09-13 DIAGNOSIS — Z79.01 LONG TERM (CURRENT) USE OF ANTICOAGULANTS: ICD-10-CM

## 2019-09-13 DIAGNOSIS — Z95.2 S/P AVR: ICD-10-CM

## 2019-09-13 RX ORDER — WARFARIN SODIUM 5 MG/1
TABLET ORAL
Qty: 36 TABLET | Refills: 2 | Status: SHIPPED | OUTPATIENT
Start: 2019-09-13 | End: 2019-12-03 | Stop reason: SDUPTHER

## 2019-10-01 ENCOUNTER — ANTICOAGULATION VISIT (OUTPATIENT)
Dept: CARDIOLOGY | Facility: CLINIC | Age: 64
End: 2019-10-01

## 2019-10-01 VITALS
BODY MASS INDEX: 32.44 KG/M2 | SYSTOLIC BLOOD PRESSURE: 111 MMHG | HEART RATE: 101 BPM | WEIGHT: 189 LBS | DIASTOLIC BLOOD PRESSURE: 68 MMHG

## 2019-10-01 DIAGNOSIS — Z95.3 HISTORY OF AORTIC VALVE REPLACEMENT WITH BIOPROSTHETIC VALVE: ICD-10-CM

## 2019-10-01 DIAGNOSIS — Z95.3 HISTORY OF MITRAL VALVE REPLACEMENT WITH BIOPROSTHETIC VALVE: ICD-10-CM

## 2019-10-01 DIAGNOSIS — I48.21 PERMANENT ATRIAL FIBRILLATION (HCC): ICD-10-CM

## 2019-10-01 DIAGNOSIS — Z79.01 LONG TERM (CURRENT) USE OF ANTICOAGULANTS: ICD-10-CM

## 2019-10-01 LAB — INR PPP: 2.9 (ref 0.9–1.1)

## 2019-10-01 PROCEDURE — 36416 COLLJ CAPILLARY BLOOD SPEC: CPT | Performed by: INTERNAL MEDICINE

## 2019-10-01 PROCEDURE — 85610 PROTHROMBIN TIME: CPT | Performed by: INTERNAL MEDICINE

## 2019-10-09 ENCOUNTER — TELEPHONE (OUTPATIENT)
Dept: CARDIOLOGY | Facility: CLINIC | Age: 64
End: 2019-10-09

## 2019-10-09 NOTE — TELEPHONE ENCOUNTER
Copied from [employer] message:   I take my bp every morning & evening using a wrist monitor. My bp normally runs below the 120/80. The last 10 days or so it has been running higher, especially the top number. The highest reading has been 163/85. Several other readings have been in the 130-150/70's-low 80's range. While I know these #s  aren't too extreme, I don't want to wait until they are or until something happens before I get advice on what I need to do, if anything at this time. I have not had any other symptoms, such as chest pains, numbness or tingling. I do get a little short of breath with exertion sometimes.  If you could just let me know if I have cause for concern and what needs to be done about it, I would appreciate it.  Helen Parra      TOPROL XL 50 MG,one a day    CARTIA  MG, 1 capsule by mouth daily    Valsartan 80 mg, 1 daily

## 2019-10-11 DIAGNOSIS — I10 ESSENTIAL HYPERTENSION: Primary | ICD-10-CM

## 2019-10-17 ENCOUNTER — LAB (OUTPATIENT)
Dept: LAB | Facility: HOSPITAL | Age: 64
End: 2019-10-17

## 2019-10-17 DIAGNOSIS — I10 ESSENTIAL HYPERTENSION: ICD-10-CM

## 2019-10-17 LAB
ANION GAP SERPL CALCULATED.3IONS-SCNC: 13.4 MMOL/L (ref 5–15)
BUN BLD-MCNC: 40 MG/DL (ref 8–23)
BUN/CREAT SERPL: 22.1 (ref 7–25)
CALCIUM SPEC-SCNC: 9.5 MG/DL (ref 8.6–10.5)
CHLORIDE SERPL-SCNC: 102 MMOL/L (ref 98–107)
CO2 SERPL-SCNC: 23.6 MMOL/L (ref 22–29)
CREAT BLD-MCNC: 1.81 MG/DL (ref 0.57–1)
GFR SERPL CREATININE-BSD FRML MDRD: 28 ML/MIN/1.73
GLUCOSE BLD-MCNC: 128 MG/DL (ref 65–99)
POTASSIUM BLD-SCNC: 4.7 MMOL/L (ref 3.5–5.2)
SODIUM BLD-SCNC: 139 MMOL/L (ref 136–145)

## 2019-10-17 PROCEDURE — 80048 BASIC METABOLIC PNL TOTAL CA: CPT

## 2019-10-17 PROCEDURE — 36415 COLL VENOUS BLD VENIPUNCTURE: CPT

## 2019-10-21 RX ORDER — METOPROLOL SUCCINATE 100 MG/1
100 TABLET, EXTENDED RELEASE ORAL DAILY
Qty: 45 TABLET | Refills: 1 | Status: SHIPPED | OUTPATIENT
Start: 2019-10-21 | End: 2019-10-22 | Stop reason: SDUPTHER

## 2019-10-22 RX ORDER — METOPROLOL SUCCINATE 100 MG/1
TABLET, EXTENDED RELEASE ORAL
Qty: 45 TABLET | Refills: 1 | Status: SHIPPED | OUTPATIENT
Start: 2019-10-22 | End: 2019-10-23

## 2019-10-23 ENCOUNTER — TELEPHONE (OUTPATIENT)
Dept: CARDIOLOGY | Facility: CLINIC | Age: 64
End: 2019-10-23

## 2019-10-23 RX ORDER — METOPROLOL SUCCINATE 25 MG/1
TABLET, EXTENDED RELEASE ORAL
Qty: 45 TABLET | Refills: 1 | Status: SHIPPED | OUTPATIENT
Start: 2019-10-23 | End: 2019-12-03

## 2019-10-23 NOTE — TELEPHONE ENCOUNTER
Pt called stating that while in the hospital she was advised to take 25 mg 1/2 tablet daily. This has been corrected in her chart.

## 2019-11-05 ENCOUNTER — ANTICOAGULATION VISIT (OUTPATIENT)
Dept: CARDIOLOGY | Facility: CLINIC | Age: 64
End: 2019-11-05

## 2019-11-05 VITALS
HEART RATE: 100 BPM | WEIGHT: 185 LBS | SYSTOLIC BLOOD PRESSURE: 91 MMHG | BODY MASS INDEX: 31.76 KG/M2 | DIASTOLIC BLOOD PRESSURE: 55 MMHG

## 2019-11-05 DIAGNOSIS — Z95.3 HISTORY OF AORTIC VALVE REPLACEMENT WITH BIOPROSTHETIC VALVE: ICD-10-CM

## 2019-11-05 DIAGNOSIS — Z95.3 HISTORY OF MITRAL VALVE REPLACEMENT WITH BIOPROSTHETIC VALVE: ICD-10-CM

## 2019-11-05 DIAGNOSIS — Z79.01 LONG TERM (CURRENT) USE OF ANTICOAGULANTS: ICD-10-CM

## 2019-11-05 DIAGNOSIS — I48.21 PERMANENT ATRIAL FIBRILLATION (HCC): ICD-10-CM

## 2019-11-05 LAB — INR PPP: 3.1 (ref 0.9–1.1)

## 2019-11-05 PROCEDURE — 85610 PROTHROMBIN TIME: CPT | Performed by: INTERNAL MEDICINE

## 2019-11-05 PROCEDURE — 36416 COLLJ CAPILLARY BLOOD SPEC: CPT | Performed by: INTERNAL MEDICINE

## 2019-11-25 ENCOUNTER — TELEPHONE (OUTPATIENT)
Dept: CARDIOLOGY | Facility: CLINIC | Age: 64
End: 2019-11-25

## 2019-11-25 DIAGNOSIS — R60.0 BILATERAL LEG EDEMA: ICD-10-CM

## 2019-11-25 DIAGNOSIS — R42 DIZZINESS: Primary | ICD-10-CM

## 2019-11-25 DIAGNOSIS — R06.02 SOB (SHORTNESS OF BREATH): ICD-10-CM

## 2019-11-25 NOTE — TELEPHONE ENCOUNTER
Patient called she is having SOB, dizziness, swelling in her feet and ABD, and a cough. She denies CP and tightness. She states she has had the symptoms the last week but they are worsening in the past couple of days. She states her B/p and Oxygen has been good and that she has hx of afib. She is taking 40 mg of Valsartan because when it was increased to 150mg she wasn't previously taking it. She states her b/p has dropped below 100 systolic and in the 50's diastolic. She is also taking 40mg of lasix daily. She is scheduled to be seen as a follow up 12/13/19.       Please advise.

## 2019-11-26 RX ORDER — FUROSEMIDE 40 MG/1
40 TABLET ORAL DAILY
COMMUNITY

## 2019-11-26 NOTE — TELEPHONE ENCOUNTER
Called spoke to pt ,   She said she is feeling better.    She will go to [facility] sometime after tomorrow to get labs.   Lab orders need to be put in still.     Dr Solano is out of the office this week.    I advised pt take bp everyday same time and bring in readings to her appt.   I advised ER if she needs to be evaluated or feels worse.   She understood.

## 2019-12-03 ENCOUNTER — LAB (OUTPATIENT)
Dept: LAB | Facility: HOSPITAL | Age: 64
End: 2019-12-03

## 2019-12-03 ENCOUNTER — HOSPITAL ENCOUNTER (INPATIENT)
Facility: HOSPITAL | Age: 64
LOS: 2 days | Discharge: HOME | End: 2019-12-06
Attending: INTERNAL MEDICINE | Admitting: INTERNAL MEDICINE

## 2019-12-03 ENCOUNTER — TRANSCRIBE ORDERS (OUTPATIENT)
Dept: ADMINISTRATIVE | Facility: HOSPITAL | Age: 64
End: 2019-12-03

## 2019-12-03 ENCOUNTER — APPOINTMENT (OUTPATIENT)
Dept: GENERAL RADIOLOGY | Facility: HOSPITAL | Age: 64
End: 2019-12-03

## 2019-12-03 DIAGNOSIS — R06.02 SOB (SHORTNESS OF BREATH): ICD-10-CM

## 2019-12-03 DIAGNOSIS — R42 DIZZINESS: ICD-10-CM

## 2019-12-03 DIAGNOSIS — Z79.01 LONG TERM (CURRENT) USE OF ANTICOAGULANTS: ICD-10-CM

## 2019-12-03 DIAGNOSIS — R60.0 BILATERAL LEG EDEMA: ICD-10-CM

## 2019-12-03 DIAGNOSIS — J90 PLEURAL EFFUSION ON LEFT: ICD-10-CM

## 2019-12-03 DIAGNOSIS — N18.30 ANEMIA DUE TO STAGE 3 CHRONIC KIDNEY DISEASE (HCC): ICD-10-CM

## 2019-12-03 DIAGNOSIS — N17.9 ACUTE RENAL FAILURE, UNSPECIFIED ACUTE RENAL FAILURE TYPE (HCC): Primary | ICD-10-CM

## 2019-12-03 DIAGNOSIS — D64.9 ANEMIA, UNSPECIFIED TYPE: Primary | ICD-10-CM

## 2019-12-03 DIAGNOSIS — Z95.2 S/P AVR: ICD-10-CM

## 2019-12-03 DIAGNOSIS — N17.9 ACUTE RENAL FAILURE, UNSPECIFIED ACUTE RENAL FAILURE TYPE (HCC): ICD-10-CM

## 2019-12-03 DIAGNOSIS — D63.1 ANEMIA DUE TO STAGE 3 CHRONIC KIDNEY DISEASE (HCC): ICD-10-CM

## 2019-12-03 DIAGNOSIS — R05.9 COUGH: ICD-10-CM

## 2019-12-03 DIAGNOSIS — D50.0 IRON DEFICIENCY ANEMIA DUE TO CHRONIC BLOOD LOSS: ICD-10-CM

## 2019-12-03 DIAGNOSIS — R53.1 WEAKNESS: ICD-10-CM

## 2019-12-03 LAB
ABO GROUP BLD: NORMAL
ACANTHOCYTES BLD QL SMEAR: ABNORMAL
ACANTHOCYTES BLD QL SMEAR: ABNORMAL
ALBUMIN SERPL-MCNC: 4 G/DL (ref 3.5–5.2)
ALBUMIN/GLOB SERPL: 1.4 G/DL
ALP SERPL-CCNC: 129 U/L (ref 39–117)
ALT SERPL W P-5'-P-CCNC: 19 U/L (ref 1–33)
ANION GAP SERPL CALCULATED.3IONS-SCNC: 13.5 MMOL/L (ref 5–15)
ANISOCYTOSIS BLD QL: ABNORMAL
ANISOCYTOSIS BLD QL: ABNORMAL
AST SERPL-CCNC: 24 U/L (ref 1–32)
B PERT DNA SPEC QL NAA+PROBE: NOT DETECTED
BACTERIA UR QL AUTO: NORMAL /HPF
BASOPHILS # BLD MANUAL: 0.18 10*3/MM3 (ref 0–0.2)
BASOPHILS NFR BLD AUTO: 3 % (ref 0–1.5)
BILIRUB SERPL-MCNC: 1.2 MG/DL (ref 0.2–1.2)
BILIRUB UR QL STRIP: NEGATIVE
BLD GP AB SCN SERPL QL: NEGATIVE
BUN BLD-MCNC: 40 MG/DL (ref 8–23)
BUN/CREAT SERPL: 25.5 (ref 7–25)
C PNEUM DNA NPH QL NAA+NON-PROBE: NOT DETECTED
C3 FRG RBC-MCNC: ABNORMAL
CALCIUM SPEC-SCNC: 9.6 MG/DL (ref 8.6–10.5)
CHLORIDE SERPL-SCNC: 101 MMOL/L (ref 98–107)
CHOLEST SERPL-MCNC: 70 MG/DL (ref 0–200)
CLARITY UR: CLEAR
CO2 SERPL-SCNC: 24.5 MMOL/L (ref 22–29)
COLOR UR: YELLOW
CREAT BLD-MCNC: 1.57 MG/DL (ref 0.57–1)
DEPRECATED RDW RBC AUTO: 42.2 FL (ref 37–54)
DEPRECATED RDW RBC AUTO: 44.6 FL (ref 37–54)
ELLIPTOCYTES BLD QL SMEAR: ABNORMAL
EOSINOPHIL # BLD MANUAL: 0.15 10*3/MM3 (ref 0–0.4)
EOSINOPHIL # BLD MANUAL: 0.3 10*3/MM3 (ref 0–0.4)
EOSINOPHIL NFR BLD MANUAL: 2 % (ref 0.3–6.2)
EOSINOPHIL NFR BLD MANUAL: 5 % (ref 0.3–6.2)
ERYTHROCYTE [DISTWIDTH] IN BLOOD BY AUTOMATED COUNT: 18.6 % (ref 12.3–15.4)
ERYTHROCYTE [DISTWIDTH] IN BLOOD BY AUTOMATED COUNT: 20.5 % (ref 12.3–15.4)
FLUAV H1 2009 PAND RNA NPH QL NAA+PROBE: NOT DETECTED
FLUAV H1 HA GENE NPH QL NAA+PROBE: NOT DETECTED
FLUAV H3 RNA NPH QL NAA+PROBE: NOT DETECTED
FLUAV SUBTYP SPEC NAA+PROBE: NOT DETECTED
FLUBV RNA ISLT QL NAA+PROBE: NOT DETECTED
GFR SERPL CREATININE-BSD FRML MDRD: 33 ML/MIN/1.73
GIANT PLATELETS: ABNORMAL
GLOBULIN UR ELPH-MCNC: 2.8 GM/DL
GLUCOSE BLD-MCNC: 112 MG/DL (ref 65–99)
GLUCOSE UR STRIP-MCNC: NEGATIVE MG/DL
HADV DNA SPEC NAA+PROBE: NOT DETECTED
HCOV 229E RNA SPEC QL NAA+PROBE: NOT DETECTED
HCOV HKU1 RNA SPEC QL NAA+PROBE: DETECTED
HCOV NL63 RNA SPEC QL NAA+PROBE: NOT DETECTED
HCOV OC43 RNA SPEC QL NAA+PROBE: NOT DETECTED
HCT VFR BLD AUTO: 21.3 % (ref 34–46.6)
HCT VFR BLD AUTO: 22.2 % (ref 34–46.6)
HDLC SERPL-MCNC: 34 MG/DL (ref 40–60)
HGB BLD-MCNC: 6.1 G/DL (ref 12–15.9)
HGB BLD-MCNC: 6.3 G/DL (ref 12–15.9)
HGB UR QL STRIP.AUTO: NEGATIVE
HMPV RNA NPH QL NAA+NON-PROBE: NOT DETECTED
HPIV1 RNA SPEC QL NAA+PROBE: NOT DETECTED
HPIV2 RNA SPEC QL NAA+PROBE: NOT DETECTED
HPIV3 RNA NPH QL NAA+PROBE: NOT DETECTED
HPIV4 P GENE NPH QL NAA+PROBE: NOT DETECTED
HYALINE CASTS UR QL AUTO: NORMAL /LPF
HYPOCHROMIA BLD QL: ABNORMAL
HYPOCHROMIA BLD QL: ABNORMAL
INR PPP: 2.74 (ref 2–3)
KETONES UR QL STRIP: NEGATIVE
LDLC SERPL CALC-MCNC: 23 MG/DL (ref 0–100)
LDLC/HDLC SERPL: 0.68 {RATIO}
LEUKOCYTE ESTERASE UR QL STRIP.AUTO: ABNORMAL
LYMPHOCYTES # BLD MANUAL: 0.66 10*3/MM3 (ref 0.7–3.1)
LYMPHOCYTES # BLD MANUAL: 1.08 10*3/MM3 (ref 0.7–3.1)
LYMPHOCYTES NFR BLD MANUAL: 18 % (ref 19.6–45.3)
LYMPHOCYTES NFR BLD MANUAL: 2 % (ref 5–12)
LYMPHOCYTES NFR BLD MANUAL: 5 % (ref 5–12)
LYMPHOCYTES NFR BLD MANUAL: 8.9 % (ref 19.6–45.3)
M PNEUMO IGG SER IA-ACNC: NOT DETECTED
MCH RBC QN AUTO: 17.4 PG (ref 26.6–33)
MCH RBC QN AUTO: 18.3 PG (ref 26.6–33)
MCHC RBC AUTO-ENTMCNC: 27.5 G/DL (ref 31.5–35.7)
MCHC RBC AUTO-ENTMCNC: 29.7 G/DL (ref 31.5–35.7)
MCV RBC AUTO: 61.8 FL (ref 79–97)
MCV RBC AUTO: 63.4 FL (ref 79–97)
MICROCYTES BLD QL: ABNORMAL
MICROCYTES BLD QL: ABNORMAL
MONOCYTES # BLD AUTO: 0.12 10*3/MM3 (ref 0.1–0.9)
MONOCYTES # BLD AUTO: 0.37 10*3/MM3 (ref 0.1–0.9)
NEUTROPHILS # BLD AUTO: 4.32 10*3/MM3 (ref 1.7–7)
NEUTROPHILS # BLD AUTO: 6.27 10*3/MM3 (ref 1.7–7)
NEUTROPHILS NFR BLD MANUAL: 72 % (ref 42.7–76)
NEUTROPHILS NFR BLD MANUAL: 84.2 % (ref 42.7–76)
NITRITE UR QL STRIP: NEGATIVE
NRBC SPEC MANUAL: 1 /100 WBC (ref 0–0.2)
NT-PROBNP SERPL-MCNC: 2821 PG/ML (ref 5–900)
OVALOCYTES BLD QL SMEAR: ABNORMAL
PH UR STRIP.AUTO: 6.5 [PH] (ref 5–8)
PLAT MORPH BLD: NORMAL
PLATELET # BLD AUTO: 339 10*3/MM3 (ref 140–450)
PLATELET # BLD AUTO: 343 10*3/MM3 (ref 140–450)
PMV BLD AUTO: 10 FL (ref 6–12)
PMV BLD AUTO: 8.5 FL (ref 6–12)
POIKILOCYTOSIS BLD QL SMEAR: ABNORMAL
POLYCHROMASIA BLD QL SMEAR: ABNORMAL
POTASSIUM BLD-SCNC: 5 MMOL/L (ref 3.5–5.2)
PROT SERPL-MCNC: 6.8 G/DL (ref 6–8.5)
PROT UR QL STRIP: NEGATIVE
PROTHROMBIN TIME: 25.9 SECONDS (ref 19.4–28.5)
RBC # BLD AUTO: 3.44 10*6/MM3 (ref 3.77–5.28)
RBC # BLD AUTO: 3.5 10*6/MM3 (ref 3.77–5.28)
RBC # UR: NORMAL /HPF
REF LAB TEST METHOD: NORMAL
RH BLD: POSITIVE
RHINOVIRUS RNA SPEC NAA+PROBE: NOT DETECTED
RSV RNA NPH QL NAA+NON-PROBE: NOT DETECTED
SCAN SLIDE: NORMAL
SCHISTOCYTES BLD QL SMEAR: ABNORMAL
SODIUM BLD-SCNC: 139 MMOL/L (ref 136–145)
SP GR UR STRIP: 1.01 (ref 1–1.03)
SQUAMOUS #/AREA URNS HPF: NORMAL /HPF
T&S EXPIRATION DATE: NORMAL
TRIGL SERPL-MCNC: 64 MG/DL (ref 0–150)
UROBILINOGEN UR QL STRIP: ABNORMAL
VLDLC SERPL-MCNC: 12.8 MG/DL
WBC MORPH BLD: NORMAL
WBC MORPH BLD: NORMAL
WBC NRBC COR # BLD: 6 10*3/MM3 (ref 3.4–10.8)
WBC NRBC COR # BLD: 7.45 10*3/MM3 (ref 3.4–10.8)
WBC UR QL AUTO: NORMAL /HPF

## 2019-12-03 PROCEDURE — 86923 COMPATIBILITY TEST ELECTRIC: CPT

## 2019-12-03 PROCEDURE — 85025 COMPLETE CBC W/AUTO DIFF WBC: CPT | Performed by: NURSE PRACTITIONER

## 2019-12-03 PROCEDURE — 93005 ELECTROCARDIOGRAM TRACING: CPT | Performed by: EMERGENCY MEDICINE

## 2019-12-03 PROCEDURE — 80053 COMPREHEN METABOLIC PANEL: CPT

## 2019-12-03 PROCEDURE — 0099U HC BIOFIRE FILMARRAY RESP PANEL 1: CPT | Performed by: NURSE PRACTITIONER

## 2019-12-03 PROCEDURE — G0378 HOSPITAL OBSERVATION PER HR: HCPCS

## 2019-12-03 PROCEDURE — 93005 ELECTROCARDIOGRAM TRACING: CPT | Performed by: INTERNAL MEDICINE

## 2019-12-03 PROCEDURE — 99222 1ST HOSP IP/OBS MODERATE 55: CPT | Performed by: PHYSICIAN ASSISTANT

## 2019-12-03 PROCEDURE — P9016 RBC LEUKOCYTES REDUCED: HCPCS

## 2019-12-03 PROCEDURE — 81001 URINALYSIS AUTO W/SCOPE: CPT

## 2019-12-03 PROCEDURE — 85610 PROTHROMBIN TIME: CPT | Performed by: NURSE PRACTITIONER

## 2019-12-03 PROCEDURE — 86900 BLOOD TYPING SEROLOGIC ABO: CPT | Performed by: NURSE PRACTITIONER

## 2019-12-03 PROCEDURE — 85007 BL SMEAR W/DIFF WBC COUNT: CPT | Performed by: NURSE PRACTITIONER

## 2019-12-03 PROCEDURE — 86900 BLOOD TYPING SEROLOGIC ABO: CPT

## 2019-12-03 PROCEDURE — 83880 ASSAY OF NATRIURETIC PEPTIDE: CPT

## 2019-12-03 PROCEDURE — 83010 ASSAY OF HAPTOGLOBIN QUANT: CPT | Performed by: INTERNAL MEDICINE

## 2019-12-03 PROCEDURE — 86901 BLOOD TYPING SEROLOGIC RH(D): CPT | Performed by: NURSE PRACTITIONER

## 2019-12-03 PROCEDURE — 99284 EMERGENCY DEPT VISIT MOD MDM: CPT

## 2019-12-03 PROCEDURE — 86901 BLOOD TYPING SEROLOGIC RH(D): CPT

## 2019-12-03 PROCEDURE — 80061 LIPID PANEL: CPT

## 2019-12-03 PROCEDURE — 36415 COLL VENOUS BLD VENIPUNCTURE: CPT

## 2019-12-03 PROCEDURE — 86850 RBC ANTIBODY SCREEN: CPT | Performed by: NURSE PRACTITIONER

## 2019-12-03 PROCEDURE — 36430 TRANSFUSION BLD/BLD COMPNT: CPT

## 2019-12-03 PROCEDURE — 71045 X-RAY EXAM CHEST 1 VIEW: CPT

## 2019-12-03 PROCEDURE — 85007 BL SMEAR W/DIFF WBC COUNT: CPT

## 2019-12-03 PROCEDURE — 85025 COMPLETE CBC W/AUTO DIFF WBC: CPT

## 2019-12-03 RX ORDER — WARFARIN SODIUM 5 MG/1
TABLET ORAL
Qty: 36 TABLET | Refills: 1 | Status: SHIPPED | OUTPATIENT
Start: 2019-12-03 | End: 2019-12-03

## 2019-12-03 RX ORDER — POTASSIUM CHLORIDE 20 MEQ/1
20 TABLET, EXTENDED RELEASE ORAL DAILY
COMMUNITY

## 2019-12-03 RX ORDER — METOPROLOL SUCCINATE 25 MG/1
12.5 TABLET, EXTENDED RELEASE ORAL DAILY
COMMUNITY
End: 2020-02-18

## 2019-12-03 RX ORDER — WARFARIN SODIUM 7.5 MG/1
7.5 TABLET ORAL
COMMUNITY
End: 2020-04-07 | Stop reason: DRUGHIGH

## 2019-12-03 RX ORDER — WARFARIN SODIUM 5 MG/1
5 TABLET ORAL 3 TIMES WEEKLY
COMMUNITY
End: 2020-01-29

## 2019-12-03 RX ORDER — SODIUM CHLORIDE 0.9 % (FLUSH) 0.9 %
10 SYRINGE (ML) INJECTION AS NEEDED
Status: DISCONTINUED | OUTPATIENT
Start: 2019-12-03 | End: 2019-12-06 | Stop reason: HOSPADM

## 2019-12-03 RX ORDER — ATORVASTATIN CALCIUM 40 MG/1
40 TABLET, FILM COATED ORAL DAILY
COMMUNITY

## 2019-12-03 RX ORDER — ASCORBIC ACID 500 MG
500 TABLET ORAL DAILY
COMMUNITY

## 2019-12-03 RX ORDER — LEVOTHYROXINE SODIUM 112 UG/1
112 TABLET ORAL DAILY
COMMUNITY

## 2019-12-03 RX ORDER — OMEGA-3S/DHA/EPA/FISH OIL/D3 300MG-1000
2000 CAPSULE ORAL DAILY
COMMUNITY

## 2019-12-04 ENCOUNTER — INPATIENT HOSPITAL (OUTPATIENT)
Dept: URBAN - METROPOLITAN AREA HOSPITAL 84 | Facility: HOSPITAL | Age: 64
End: 2019-12-04
Payer: MEDICARE

## 2019-12-04 DIAGNOSIS — N17.9 ACUTE KIDNEY FAILURE, UNSPECIFIED: ICD-10-CM

## 2019-12-04 DIAGNOSIS — I50.9 HEART FAILURE, UNSPECIFIED: ICD-10-CM

## 2019-12-04 DIAGNOSIS — D50.0 IRON DEFICIENCY ANEMIA SECONDARY TO BLOOD LOSS (CHRONIC): ICD-10-CM

## 2019-12-04 PROBLEM — Z95.2 PRESENCE OF PROSTHETIC HEART VALVE: Status: ACTIVE | Noted: 2019-04-29

## 2019-12-04 PROBLEM — I48.91 ATRIAL FIBRILLATION (HCC): Chronic | Status: ACTIVE | Noted: 2019-06-27

## 2019-12-04 PROBLEM — R60.9 EDEMA: Status: ACTIVE | Noted: 2019-06-27

## 2019-12-04 PROBLEM — I25.10 ATHEROSCLEROTIC HEART DISEASE OF NATIVE CORONARY ARTERY WITHOUT ANGINA PECTORIS: Chronic | Status: ACTIVE | Noted: 2019-06-27

## 2019-12-04 PROBLEM — Z95.1 S/P CABG X 2: Chronic | Status: ACTIVE | Noted: 2019-06-17

## 2019-12-04 PROBLEM — E87.6 HYPOKALEMIA: Status: ACTIVE | Noted: 2019-06-27

## 2019-12-04 PROBLEM — J32.9 SINUSITIS: Status: ACTIVE | Noted: 2019-12-04

## 2019-12-04 PROBLEM — N18.30 CHRONIC KIDNEY DISEASE, STAGE 3 (MODERATE): Chronic | Status: ACTIVE | Noted: 2019-06-27

## 2019-12-04 PROBLEM — I50.22 CHRONIC SYSTOLIC HEART FAILURE (HCC): Status: ACTIVE | Noted: 2019-06-27

## 2019-12-04 PROBLEM — E66.9 OBESITY (BMI 30-39.9): Chronic | Status: ACTIVE | Noted: 2019-12-04

## 2019-12-04 PROBLEM — I50.22 CHRONIC SYSTOLIC HEART FAILURE (HCC): Chronic | Status: ACTIVE | Noted: 2019-06-27

## 2019-12-04 PROBLEM — I38 ENDOCARDITIS: Status: ACTIVE | Noted: 2019-06-27

## 2019-12-04 PROBLEM — I48.0 PAROXYSMAL ATRIAL FIBRILLATION (HCC): Status: ACTIVE | Noted: 2018-05-21

## 2019-12-04 PROBLEM — Z79.01 LONG TERM (CURRENT) USE OF ANTICOAGULANTS: Chronic | Status: ACTIVE | Noted: 2019-06-27

## 2019-12-04 PROBLEM — I25.10 ATHEROSCLEROTIC HEART DISEASE OF NATIVE CORONARY ARTERY WITHOUT ANGINA PECTORIS: Status: ACTIVE | Noted: 2019-06-27

## 2019-12-04 PROBLEM — N18.30 ANEMIA DUE TO STAGE 3 CHRONIC KIDNEY DISEASE (HCC): Status: ACTIVE | Noted: 2019-12-03

## 2019-12-04 PROBLEM — R63.5 WEIGHT GAIN: Status: ACTIVE | Noted: 2019-02-25

## 2019-12-04 PROBLEM — D63.1 ANEMIA DUE TO STAGE 3 CHRONIC KIDNEY DISEASE (HCC): Status: ACTIVE | Noted: 2019-12-03

## 2019-12-04 PROBLEM — R06.00 DYSPNEA: Status: ACTIVE | Noted: 2019-02-14

## 2019-12-04 PROBLEM — N18.30 CHRONIC KIDNEY DISEASE, STAGE 3 (MODERATE): Status: ACTIVE | Noted: 2019-06-27

## 2019-12-04 LAB
ALBUMIN SERPL-MCNC: 3.8 G/DL (ref 3.5–5.2)
ALBUMIN/GLOB SERPL: 1.4 G/DL
ALP SERPL-CCNC: 130 U/L (ref 39–117)
ALT SERPL W P-5'-P-CCNC: 18 U/L (ref 1–33)
ANION GAP SERPL CALCULATED.3IONS-SCNC: 13 MMOL/L (ref 5–15)
ANION GAP SERPL CALCULATED.3IONS-SCNC: 16 MMOL/L (ref 5–15)
AST SERPL-CCNC: 26 U/L (ref 1–32)
BASOPHILS # BLD AUTO: 0.1 10*3/MM3 (ref 0–0.2)
BASOPHILS NFR BLD AUTO: 0.8 % (ref 0–1.5)
BILIRUB CONJ SERPL-MCNC: 0.4 MG/DL (ref 0.2–0.3)
BILIRUB INDIRECT SERPL-MCNC: 0.7 MG/DL
BILIRUB SERPL-MCNC: 1.1 MG/DL (ref 0.2–1.2)
BILIRUB SERPL-MCNC: 1.1 MG/DL (ref 0.2–1.2)
BUN BLD-MCNC: 40 MG/DL (ref 8–23)
BUN BLD-MCNC: 40 MG/DL (ref 8–23)
BUN/CREAT SERPL: 21.9 (ref 7–25)
BUN/CREAT SERPL: 22.7 (ref 7–25)
CALCIUM SPEC-SCNC: 9 MG/DL (ref 8.6–10.5)
CALCIUM SPEC-SCNC: 9.3 MG/DL (ref 8.6–10.5)
CHLORIDE SERPL-SCNC: 103 MMOL/L (ref 98–107)
CHLORIDE SERPL-SCNC: 104 MMOL/L (ref 98–107)
CO2 SERPL-SCNC: 22 MMOL/L (ref 22–29)
CO2 SERPL-SCNC: 24 MMOL/L (ref 22–29)
CREAT BLD-MCNC: 1.76 MG/DL (ref 0.57–1)
CREAT BLD-MCNC: 1.83 MG/DL (ref 0.57–1)
DEPRECATED RDW RBC AUTO: 49 FL (ref 37–54)
EOSINOPHIL # BLD AUTO: 0.2 10*3/MM3 (ref 0–0.4)
EOSINOPHIL NFR BLD AUTO: 3 % (ref 0.3–6.2)
ERYTHROCYTE [DISTWIDTH] IN BLOOD BY AUTOMATED COUNT: 21.7 % (ref 12.3–15.4)
FERRITIN SERPL-MCNC: 21.2 NG/ML (ref 13–150)
FOLATE SERPL-MCNC: 18.6 NG/ML (ref 4.78–24.2)
GFR SERPL CREATININE-BSD FRML MDRD: 28 ML/MIN/1.73
GFR SERPL CREATININE-BSD FRML MDRD: 29 ML/MIN/1.73
GLOBULIN UR ELPH-MCNC: 2.8 GM/DL
GLUCOSE BLD-MCNC: 188 MG/DL (ref 65–99)
GLUCOSE BLD-MCNC: 191 MG/DL (ref 65–99)
HAPTOGLOB SERPL-MCNC: 70 MG/DL (ref 30–200)
HCT VFR BLD AUTO: 24.6 % (ref 34–46.6)
HGB BLD-MCNC: 7.3 G/DL (ref 12–15.9)
INR PPP: 2.43 (ref 2–3)
IRON 24H UR-MRATE: 24 MCG/DL (ref 37–145)
LDH SERPL-CCNC: 353 U/L (ref 135–214)
LYMPHOCYTES # BLD AUTO: 0.7 10*3/MM3 (ref 0.7–3.1)
LYMPHOCYTES NFR BLD AUTO: 10.8 % (ref 19.6–45.3)
MCH RBC QN AUTO: 18.7 PG (ref 26.6–33)
MCHC RBC AUTO-ENTMCNC: 29.5 G/DL (ref 31.5–35.7)
MCV RBC AUTO: 63.3 FL (ref 79–97)
MONOCYTES # BLD AUTO: 0.7 10*3/MM3 (ref 0.1–0.9)
MONOCYTES NFR BLD AUTO: 10.6 % (ref 5–12)
NEUTROPHILS # BLD AUTO: 4.8 10*3/MM3 (ref 1.7–7)
NEUTROPHILS NFR BLD AUTO: 74.8 % (ref 42.7–76)
NRBC BLD AUTO-RTO: 0.1 /100 WBC (ref 0–0.2)
PLATELET # BLD AUTO: 306 10*3/MM3 (ref 140–450)
PMV BLD AUTO: 8.6 FL (ref 6–12)
POTASSIUM BLD-SCNC: 4.2 MMOL/L (ref 3.5–5.2)
POTASSIUM BLD-SCNC: 4.3 MMOL/L (ref 3.5–5.2)
PROT SERPL-MCNC: 6.6 G/DL (ref 6–8.5)
PROTHROMBIN TIME: 23 SECONDS (ref 19.4–28.5)
RBC # BLD AUTO: 3.89 10*6/MM3 (ref 3.77–5.28)
RETICS # AUTO: 0.09 10*6/MM3 (ref 0.02–0.13)
RETICS/RBC NFR AUTO: 2.33 % (ref 0.7–1.9)
SODIUM BLD-SCNC: 141 MMOL/L (ref 136–145)
SODIUM BLD-SCNC: 141 MMOL/L (ref 136–145)
VIT B12 BLD-MCNC: 947 PG/ML (ref 211–946)
WBC NRBC COR # BLD: 6.4 10*3/MM3 (ref 3.4–10.8)

## 2019-12-04 PROCEDURE — 82248 BILIRUBIN DIRECT: CPT | Performed by: INTERNAL MEDICINE

## 2019-12-04 PROCEDURE — 80048 BASIC METABOLIC PNL TOTAL CA: CPT | Performed by: PHYSICIAN ASSISTANT

## 2019-12-04 PROCEDURE — 99255 IP/OBS CONSLTJ NEW/EST HI 80: CPT | Performed by: INTERNAL MEDICINE

## 2019-12-04 PROCEDURE — G0378 HOSPITAL OBSERVATION PER HR: HCPCS

## 2019-12-04 PROCEDURE — 82247 BILIRUBIN TOTAL: CPT | Performed by: INTERNAL MEDICINE

## 2019-12-04 PROCEDURE — 99222 1ST HOSP IP/OBS MODERATE 55: CPT | Performed by: NURSE PRACTITIONER

## 2019-12-04 PROCEDURE — 83615 LACTATE (LD) (LDH) ENZYME: CPT | Performed by: INTERNAL MEDICINE

## 2019-12-04 PROCEDURE — 25010000002 IRON SUCROSE PER 1 MG: Performed by: NURSE PRACTITIONER

## 2019-12-04 PROCEDURE — 84165 PROTEIN E-PHORESIS SERUM: CPT | Performed by: INTERNAL MEDICINE

## 2019-12-04 PROCEDURE — 82728 ASSAY OF FERRITIN: CPT | Performed by: PHYSICIAN ASSISTANT

## 2019-12-04 PROCEDURE — 99232 SBSQ HOSP IP/OBS MODERATE 35: CPT | Performed by: INTERNAL MEDICINE

## 2019-12-04 PROCEDURE — 82746 ASSAY OF FOLIC ACID SERUM: CPT | Performed by: PHYSICIAN ASSISTANT

## 2019-12-04 PROCEDURE — 80053 COMPREHEN METABOLIC PANEL: CPT | Performed by: INTERNAL MEDICINE

## 2019-12-04 PROCEDURE — 85025 COMPLETE CBC W/AUTO DIFF WBC: CPT | Performed by: PHYSICIAN ASSISTANT

## 2019-12-04 PROCEDURE — 85045 AUTOMATED RETICULOCYTE COUNT: CPT | Performed by: INTERNAL MEDICINE

## 2019-12-04 PROCEDURE — 83540 ASSAY OF IRON: CPT | Performed by: PHYSICIAN ASSISTANT

## 2019-12-04 PROCEDURE — 82607 VITAMIN B-12: CPT | Performed by: PHYSICIAN ASSISTANT

## 2019-12-04 PROCEDURE — 85610 PROTHROMBIN TIME: CPT | Performed by: PHYSICIAN ASSISTANT

## 2019-12-04 RX ORDER — FUROSEMIDE 40 MG/1
40 TABLET ORAL DAILY
Status: DISCONTINUED | OUTPATIENT
Start: 2019-12-04 | End: 2019-12-04

## 2019-12-04 RX ORDER — PEG-3350, SODIUM SULFATE, SODIUM CHLORIDE, POTASSIUM CHLORIDE, SODIUM ASCORBATE AND ASCORBIC ACID 7.5-2.691G
1000 KIT ORAL 2 TIMES DAILY
Status: COMPLETED | OUTPATIENT
Start: 2019-12-04 | End: 2019-12-05

## 2019-12-04 RX ORDER — WARFARIN SODIUM 5 MG/1
5 TABLET ORAL 3 TIMES WEEKLY
Status: DISCONTINUED | OUTPATIENT
Start: 2019-12-04 | End: 2019-12-04

## 2019-12-04 RX ORDER — SODIUM CHLORIDE 0.9 % (FLUSH) 0.9 %
10 SYRINGE (ML) INJECTION AS NEEDED
Status: DISCONTINUED | OUTPATIENT
Start: 2019-12-04 | End: 2019-12-06 | Stop reason: HOSPADM

## 2019-12-04 RX ORDER — BISACODYL 10 MG
10 SUPPOSITORY, RECTAL RECTAL DAILY PRN
Status: DISCONTINUED | OUTPATIENT
Start: 2019-12-04 | End: 2019-12-06 | Stop reason: HOSPADM

## 2019-12-04 RX ORDER — LOSARTAN POTASSIUM 50 MG/1
50 TABLET ORAL
Status: DISCONTINUED | OUTPATIENT
Start: 2019-12-04 | End: 2019-12-06 | Stop reason: HOSPADM

## 2019-12-04 RX ORDER — ALUMINA, MAGNESIA, AND SIMETHICONE 2400; 2400; 240 MG/30ML; MG/30ML; MG/30ML
15 SUSPENSION ORAL EVERY 6 HOURS PRN
Status: DISCONTINUED | OUTPATIENT
Start: 2019-12-04 | End: 2019-12-06 | Stop reason: HOSPADM

## 2019-12-04 RX ORDER — ONDANSETRON 4 MG/1
4 TABLET, FILM COATED ORAL EVERY 6 HOURS PRN
Status: DISCONTINUED | OUTPATIENT
Start: 2019-12-04 | End: 2019-12-06 | Stop reason: HOSPADM

## 2019-12-04 RX ORDER — DOCUSATE SODIUM 100 MG/1
100 CAPSULE, LIQUID FILLED ORAL 2 TIMES DAILY PRN
Status: DISCONTINUED | OUTPATIENT
Start: 2019-12-04 | End: 2019-12-06 | Stop reason: HOSPADM

## 2019-12-04 RX ORDER — ATORVASTATIN CALCIUM 40 MG/1
40 TABLET, FILM COATED ORAL NIGHTLY
Status: DISCONTINUED | OUTPATIENT
Start: 2019-12-04 | End: 2019-12-06 | Stop reason: HOSPADM

## 2019-12-04 RX ORDER — POTASSIUM CHLORIDE 20 MEQ/1
20 TABLET, EXTENDED RELEASE ORAL
Status: DISCONTINUED | OUTPATIENT
Start: 2019-12-04 | End: 2019-12-04

## 2019-12-04 RX ORDER — CALCIUM CARBONATE 200(500)MG
1 TABLET,CHEWABLE ORAL 2 TIMES DAILY PRN
Status: DISCONTINUED | OUTPATIENT
Start: 2019-12-04 | End: 2019-12-06 | Stop reason: HOSPADM

## 2019-12-04 RX ORDER — LEVOTHYROXINE SODIUM 112 UG/1
112 TABLET ORAL
Status: DISCONTINUED | OUTPATIENT
Start: 2019-12-04 | End: 2019-12-06 | Stop reason: HOSPADM

## 2019-12-04 RX ORDER — CHOLECALCIFEROL (VITAMIN D3) 125 MCG
5 CAPSULE ORAL NIGHTLY PRN
Status: DISCONTINUED | OUTPATIENT
Start: 2019-12-04 | End: 2019-12-06 | Stop reason: HOSPADM

## 2019-12-04 RX ORDER — SODIUM CHLORIDE 0.9 % (FLUSH) 0.9 %
10 SYRINGE (ML) INJECTION EVERY 12 HOURS SCHEDULED
Status: DISCONTINUED | OUTPATIENT
Start: 2019-12-04 | End: 2019-12-06 | Stop reason: HOSPADM

## 2019-12-04 RX ORDER — PHYTONADIONE 2 MG/ML
2.5 INJECTION, EMULSION INTRAMUSCULAR; INTRAVENOUS; SUBCUTANEOUS ONCE
Status: COMPLETED | OUTPATIENT
Start: 2019-12-04 | End: 2019-12-04

## 2019-12-04 RX ORDER — METOPROLOL SUCCINATE 25 MG/1
12.5 TABLET, EXTENDED RELEASE ORAL DAILY
Status: DISCONTINUED | OUTPATIENT
Start: 2019-12-04 | End: 2019-12-06 | Stop reason: HOSPADM

## 2019-12-04 RX ORDER — WARFARIN SODIUM 7.5 MG/1
7.5 TABLET ORAL
Status: DISCONTINUED | OUTPATIENT
Start: 2019-12-05 | End: 2019-12-04

## 2019-12-04 RX ORDER — ACETAMINOPHEN 650 MG/1
650 SUPPOSITORY RECTAL EVERY 4 HOURS PRN
Status: DISCONTINUED | OUTPATIENT
Start: 2019-12-04 | End: 2019-12-06 | Stop reason: HOSPADM

## 2019-12-04 RX ORDER — ACETAMINOPHEN 325 MG/1
650 TABLET ORAL EVERY 4 HOURS PRN
Status: DISCONTINUED | OUTPATIENT
Start: 2019-12-04 | End: 2019-12-06 | Stop reason: HOSPADM

## 2019-12-04 RX ORDER — ASPIRIN 81 MG/1
81 TABLET ORAL DAILY
Status: DISCONTINUED | OUTPATIENT
Start: 2019-12-04 | End: 2019-12-06 | Stop reason: HOSPADM

## 2019-12-04 RX ORDER — PANTOPRAZOLE SODIUM 40 MG/1
40 TABLET, DELAYED RELEASE ORAL
Status: DISCONTINUED | OUTPATIENT
Start: 2019-12-04 | End: 2019-12-06 | Stop reason: HOSPADM

## 2019-12-04 RX ORDER — ACETAMINOPHEN 160 MG/5ML
650 SOLUTION ORAL EVERY 4 HOURS PRN
Status: DISCONTINUED | OUTPATIENT
Start: 2019-12-04 | End: 2019-12-06 | Stop reason: HOSPADM

## 2019-12-04 RX ORDER — ONDANSETRON 2 MG/ML
4 INJECTION INTRAMUSCULAR; INTRAVENOUS EVERY 6 HOURS PRN
Status: DISCONTINUED | OUTPATIENT
Start: 2019-12-04 | End: 2019-12-06 | Stop reason: HOSPADM

## 2019-12-04 RX ORDER — NITROGLYCERIN 0.4 MG/1
0.4 TABLET SUBLINGUAL
Status: DISCONTINUED | OUTPATIENT
Start: 2019-12-04 | End: 2019-12-06 | Stop reason: HOSPADM

## 2019-12-04 RX ADMIN — LOSARTAN POTASSIUM 50 MG: 50 TABLET, FILM COATED ORAL at 11:13

## 2019-12-04 RX ADMIN — POLYETHYLENE GLYCOL 3350, SODIUM SULFATE, SODIUM CHLORIDE, POTASSIUM CHLORIDE, ASCORBIC ACID, SODIUM ASCORBATE 1000 ML: KIT at 17:36

## 2019-12-04 RX ADMIN — METOPROLOL SUCCINATE 12.5 MG: 25 TABLET, EXTENDED RELEASE ORAL at 11:13

## 2019-12-04 RX ADMIN — ATORVASTATIN CALCIUM 40 MG: 40 TABLET, FILM COATED ORAL at 02:26

## 2019-12-04 RX ADMIN — Medication 10 ML: at 11:14

## 2019-12-04 RX ADMIN — IRON SUCROSE 200 MG: 20 INJECTION, SOLUTION INTRAVENOUS at 16:05

## 2019-12-04 RX ADMIN — PHYTONADIONE 2.5 MG: 10 INJECTION, EMULSION INTRAMUSCULAR; INTRAVENOUS; SUBCUTANEOUS at 17:38

## 2019-12-04 RX ADMIN — LEVOTHYROXINE SODIUM 112 MCG: 112 TABLET ORAL at 06:26

## 2019-12-04 RX ADMIN — ASPIRIN 81 MG: 81 TABLET, DELAYED RELEASE ORAL at 11:13

## 2019-12-04 RX ADMIN — ATORVASTATIN CALCIUM 40 MG: 40 TABLET, FILM COATED ORAL at 22:15

## 2019-12-04 RX ADMIN — Medication 10 ML: at 22:15

## 2019-12-04 RX ADMIN — METFORMIN HYDROCHLORIDE 500 MG: 500 TABLET, FILM COATED ORAL at 16:05

## 2019-12-04 RX ADMIN — PANTOPRAZOLE SODIUM 40 MG: 40 TABLET, DELAYED RELEASE ORAL at 06:25

## 2019-12-04 NOTE — ASSESSMENT & PLAN NOTE
- HGB 6.3 and baseline ~9  - Hemoccult negative in ED despite being on coumadin, will repeat once more  - 1 unit PRBCs ordered in the ED. Will continue to follow the H&H and transfuse if Hgb < 7.0  - B12, folate pending, added retic, hapto, SPEP, Total bilirubin (direct/indirect), LDH  - iron and ferritin resulted  - per d/w Nephro, CKD not advanced enough for this degree of anema  - Consult Hematology for additional evaluation

## 2019-12-04 NOTE — H&P
"      [facility] Medicine Services      Patient Name: Jocelin Parra  : 1955  MRN: [id number]  Primary Care Physician: Emilie Cruz APRN  Date of admission: 12/3/2019    Patient Care Team:  Emilie Cruz APRN as PCP - General  Roldan Solano MD as Consulting Physician (Cardiology)  Ney Lai MD as Consulting Physician (Nephrology)          Subjective   History Present Illness     Chief Complaint:   Chief Complaint   Patient presents with   • decrease hgb       Ms. Parra is a 64 y.o.  presents to [facility] complaining of reported decreased HGB on labs by Dr. Montalvo, Nephrologist. Patient reports that she has been having increased dyspnea on exertion for 1 month that has been gradually worsening and patient reports that she has had minimal peripheral edema for the past few days and fatigue. Patient denies anything making it better or worse. Patient denies any fever, chills, chest pain, SOA at rest, Abdominal pain, N/V/D/C, or melena. On ROS patient does report that she has orthopnea but states that it is unchanged from her normal. Patient also reports having a dry cough for 1 week without sputum.     Patient was recently hospitalized on 2019 for Sternotomy with mechanical MVR s/p failed MVr and CABG with HFrEF of 40%.     In ED, admission vitals were 98.8F, 105HR, 16RR,135/68, 100%RA. On labs, patient found to have normal INR/PT, normal WBC 6, low HGB 6.3,and normal Platelets. CXR shows \"Small left pleural effusion is suspected. Otherwise stable appearance of the chest as described above. No acute infiltrates.\" EKG shows sinus tachycardia. Respiratory panel was positive for coronavirus. 1 unit of PRBCs ordered in the ED.           History of Present Illness    Review of Systems   All other systems reviewed and are negative.          Personal History     Past Medical History:   Past Medical History:   Diagnosis Date   • A-fib (CMS/HCC) Dx 2018 "    S/p Cardioversion by Dr. Spencer on 04/10/19   • Acute renal failure syndrome (CMS/HCC) 4/2019-[facility] IP   • Biatrial enlargement 04/10/2019    Noted on SHERIE   • Calcified granuloma of lung (CMS/HCC) 01/04/2019    Noted on Chest XR   • Cardiomegaly 01/04/2019    Borderline--Noted on Chest XR   • CHF (congestive heart failure) (CMS/formerly [facility])    • CKD (chronic kidney disease), stage III (CMS/formerly [facility])     Does Not See a Nephrologist   • Diabetes mellitus (CMS/formerly [facility])    • DM (diabetes mellitus) (CMS/formerly [facility])     T2   • Dysphagia 08/15-[facility] IP    Due to Pharyngitis   • GERD (gastroesophageal reflux disease)    • History of chest x-ray 8/28/15-[facility]    Normal   • History of chest x-ray 01/19/2019    Mild Pulmonary Edema w/Congestive Failure Noted   • History of chest x-ray 01/04/2019    Borderline Cardiomegaly    • History of EKG 2014/2018/2019-[facility]   • Hx of diabetic neuropathy    • Hx of dizziness     w/Lightheadedness   • Hx of echocardiogram 4/16/18-[facility]    EF 55-60%; Mild MVR; Mild TVR; Normal Root/No Effusion   • Hyperlipidemia     Controlled w/Meds   • Hypertension     Controlled w/Meds   • Hypokalemia 4/11/19-[facility] IP   • Hypothyroidism     Controlled w/Meds   • Iron deficiency anemia    • Left posterior fascicular block 04/2018 & 4/19    Noted on EKG   • Lower extremity edema 03/2019   • Mild mitral valve regurgitation 04/16/2018    Noted on Echo   • Mild tricuspid valve regurgitation 04/16/2018    Noted on Echo   • Moderate mitral valve regurgitation 2008    S/p MVR in 08'   • Moderate tricuspid insufficiency 04/10/2019    Noted on SHERIE   • Osteoarthritis     Knees w/Hx Knee Repl   • Pulmonary edema 01/19/2019    Mild--Noted on Chest XR   • Rapid palpitations 04/15/18 & 8/9/18-[facility] ER   • Renal dysfunction    • Right axis deviation 08/2018 & 4/19    Noted on EKG   • Severe aortic valve stenosis Since 2008    Hx AVR on 12/11/08 by Dr. Alvarado   • Severe mitral insufficiency 04/10/2019    Noted on SHERIE   • Sinus tachycardia 08/2018    Noted on  EKG   • SOB (shortness of breath) chronic   • Torticollis Chronic   • Valvular heart disease     S/p AVR & MVR in 08'       Surgical History:      Past Surgical History:   Procedure Laterality Date   • AORTIC VALVE REPAIR/REPLACEMENT  12/11/08-[facility]    Using a #20 Eight Yr Mechanical [place] Prosthesis--Dewey Alvarado MD   • CARDIOVERSION  4/10/19-[facility]    Dr. Spencer--For A-Fib   • HYSTERECTOMY     • MITRAL VALVE REPLACEMENT  2008   • MITRAL VALVE REPLACEMENT  05/09/2019    Dr Alvarado   • SHERIE  4/10/19-[facility]    EF 40%; Moderate TVR; Severe Eccentric MI; Biatrial Enlargement   • TOTAL KNEE ARTHROPLASTY  2017   • TRICUSPID VALVE SURGERY  05/09/2019    Dr Alvarado           Family History: family history includes Diabetes in her father; Heart disease in her father; Hypertension in her father; Lung cancer in her mother; Stroke in her father. Otherwise pertinent FHx was reviewed and unremarkable.     Social History:  reports that she has never smoked. She has never used smokeless tobacco. She reports that she does not drink alcohol or use drugs.      Medications:  Prior to Admission medications    Medication Sig Start Date End Date Taking? Authorizing Provider   atorvastatin (LIPITOR) 40 MG tablet Take 40 mg by mouth Daily.   Yes Sandip Ramírez MD   cholecalciferol (VITAMIN D3) 10 MCG (400 UNIT) tablet Take 2,000 Units by mouth Daily.   Yes Sandip Ramírez MD   levothyroxine (SYNTHROID, LEVOTHROID) 112 MCG tablet Take 112 mcg by mouth Daily.   Yes Sandip Ramírez MD   metoprolol succinate XL (TOPROL-XL) 25 MG 24 hr tablet Take 12.5 mg by mouth Daily.   Yes Sandip Ramírez MD   potassium chloride (K-DUR,KLOR-CON) 20 MEQ CR tablet Take 20 mEq by mouth Daily.   Yes Sandip Ramírez MD   vitamin C (ASCORBIC ACID) 500 MG tablet Take 500 mg by mouth Daily.   Yes Sandip Ramírez MD   warfarin (COUMADIN) 5 MG tablet Take 5 mg by mouth 3 (Three) Times a Week. Mon, Wed, Fri   Yes Sandip Ramírez MD    warfarin (COUMADIN) 7.5 MG tablet Take 7.5 mg by mouth 4 (Four) Times a Week. Tues, Thur, Sat, Sun   Yes Sandip Ramírez MD   aspirin 81 MG tablet Take 1 tablet by mouth daily. 9/23/12   Sandip Ramírez MD   B Complex Vitamins (VITAMIN B COMPLEX) tablet Take by mouth. 3/18/15   Sandip Ramírez MD   ezetimibe (ZETIA) 10 MG tablet Take 1 tablet by mouth daily. 9/26/12   Sandip Ramírez MD   furosemide (LASIX) 40 MG tablet Take 40 mg by mouth Daily.    Sandip Ramírez MD   Liraglutide (VICTOZA) 18 MG/3ML solution pen-injector Inject 1.8 mg under the skin into the appropriate area as directed Daily. 3/18/15   Sandip Ramírez MD   metFORMIN (GLUCOPHAGE) 500 MG tablet Take 1 tablet by mouth Every Evening. 3/18/15   Sandip Ramírez MD   Multiple Vitamin (MULTI-VITAMIN) tablet Take 1 tablet by mouth daily. 9/23/12   Sandip Ramírez MD   omeprazole (PriLOSEC) 20 MG capsule Take 1 capsule by mouth daily. 3/18/15   Sandip Ramírez MD   valsartan (DIOVAN) 80 MG tablet Take 1 tablet by mouth Daily. PT TAKES 40MG DAILY  9/23/12   Sandip Ramírez MD   ferrous sulfate 325 (65 FE) MG tablet Take by mouth. 3/18/15 12/3/19  Sandip Ramírez MD   levothyroxine (SYNTHROID, LEVOTHROID) 150 MCG tablet Take 150 mcg by mouth Daily. 9/23/12 12/3/19  Sandip Ramírez MD   metoprolol succinate XL (TOPROL-XL) 25 MG 24 hr tablet 1/2 tablet daily  Patient taking differently: Take 25 mg by mouth Daily. 1/2 tablet daily 10/23/19 12/3/19  Roldan Solano MD   simvastatin (ZOCOR) 20 MG tablet Take 1 tablet by mouth daily. 9/26/12 12/3/19  Sandip Ramírez MD   warfarin (COUMADIN) 5 MG tablet TAKE ONE TABLET BY MOUTH ON MONDAY, WEDNESDAY, FRIDAY. TAKE ONE AND ONE-HALF (1.5) TABLET BY MOUTH ALL OTHER DAYS OR AS DIRECTED 9/13/19 12/3/19  Roldan Solano MD   warfarin (COUMADIN) 5 MG tablet TAKE ONE TABLET BY MOUTH ON MONDAY, WEDNESDAY, FRIDAY. TAKE ONE AND  ONE-HALF (1.5) TABLET BY MOUTH ALL OTHER DAYS OR AS DIRECTED 12/3/19 12/3/19  Roldan Solano MD       Allergies:  No Known Allergies    Objective   Objective     Vital Signs  Temp:  [98.3 °F (36.8 °C)-99.3 °F (37.4 °C)] 98.3 °F (36.8 °C)  Heart Rate:  [] 115  Resp:  [12-20] 17  BP: (119-143)/(53-82) 143/82  SpO2:  [98 %-100 %] 100 %  on   ;   Device (Oxygen Therapy): room air  Body mass index is 33.84 kg/m².    Physical Exam   Constitutional: She is oriented to person, place, and time. She appears well-developed and well-nourished. No distress.   HENT:   Head: Normocephalic and atraumatic.   Right Ear: External ear normal.   Left Ear: External ear normal.   Nose: Nose normal.   Mouth/Throat: Oropharynx is clear and moist. No oropharyngeal exudate.   Eyes: Conjunctivae and EOM are normal. Right eye exhibits no discharge. Left eye exhibits no discharge. No scleral icterus.   Neck: Neck supple.   Cardiovascular: Normal heart sounds and intact distal pulses. Exam reveals no gallop and no friction rub.   No murmur heard.  Irregular rate and rhythm that is consistent with Afib    Pulmonary/Chest: Effort normal and breath sounds normal. No stridor. No respiratory distress. She has no wheezes. She has no rales.   Abdominal: Soft. Bowel sounds are normal. She exhibits no distension and no mass. There is no tenderness. There is no rebound and no guarding.   Musculoskeletal: Normal range of motion.   Neurological: She is alert and oriented to person, place, and time. No cranial nerve deficit.   Skin: Skin is warm and dry. No rash noted. She is not diaphoretic. No erythema. No pallor.   Psychiatric: She has a normal mood and affect. Her behavior is normal. Judgment and thought content normal.   Vitals reviewed.      Results Review:  I have personally reviewed most recent cardiac tracings, lab results and radiology images and interpretations and agree with findings.    Results from last 7 days   Lab Units  12/03/19 2137 12/03/19 2056   WBC 10*3/mm3  --  6.00   HEMOGLOBIN g/dL  --  6.3*   HEMATOCRIT %  --  21.3*   PLATELETS 10*3/mm3  --  343   INR  2.74  --      Results from last 7 days   Lab Units 12/03/19  1224   SODIUM mmol/L 139   POTASSIUM mmol/L 5.0   CHLORIDE mmol/L 101   CO2 mmol/L 24.5   BUN mg/dL 40*   CREATININE mg/dL 1.57*   GLUCOSE mg/dL 112*   CALCIUM mg/dL 9.6   ALT (SGPT) U/L 19   AST (SGOT) U/L 24   PROBNP pg/mL 2,821.0*     Estimated Creatinine Clearance: 36.3 mL/min (A) (by C-G formula based on SCr of 1.57 mg/dL (H)).  Brief Urine Lab Results  (Last result in the past 365 days)      Color   Clarity   Blood   Leuk Est   Nitrite   Protein   CREAT   Urine HCG        12/03/19 1224 Yellow Clear Negative Small (1+) Negative Negative               Microbiology Results (last 10 days)     Procedure Component Value - Date/Time    Respiratory Panel, PCR - Swab, Nasopharynx [[id number]]  (Abnormal) Collected:  12/03/19 2041    Lab Status:  Final result Specimen:  Swab from Nasopharynx Updated:  12/03/19 2204     ADENOVIRUS, PCR Not Detected     Coronavirus 229E Not Detected     Coronavirus HKU1 Detected     Coronavirus NL63 Not Detected     Coronavirus OC43 Not Detected     Human Metapneumovirus Not Detected     Human Rhinovirus/Enterovirus Not Detected     Influenza B PCR Not Detected     Parainfluenza Virus 1 Not Detected     Parainfluenza Virus 2 Not Detected     Parainfluenza Virus 3 Not Detected     Parainfluenza Virus 4 Not Detected     Bordetella pertussis pcr Not Detected     Influenza A H1 2009 PCR Not Detected     Chlamydophila pneumoniae PCR Not Detected     Mycoplasma pneumo by PCR Not Detected     Influenza A PCR Not Detected     Influenza A H3 Not Detected     Influenza A H1 Not Detected     RSV, PCR Not Detected          ECG/EMG Results (most recent)     Procedure Component Value Units Date/Time    ECG 12 Lead [[id number]] Collected:  12/03/19 1909     Updated:  12/03/19 1913    Narrative:        HEART RATE= 103  bpm  RR Interval= 584  ms  ME Interval= 161  ms  P Horizontal Axis= 22  deg  P Front Axis= 67  deg  QRSD Interval= 75  ms  QT Interval= 355  ms  QRS Axis= 120  deg  T Wave Axis= 47  deg  - OTHERWISE NORMAL ECG -  Sinus tachycardia  Right axis deviation  When compared with ECG of 13-May-2019 13:13:28,  Significant rate increase  Significant repolarization change  Electronically Signed By:   Date and Time of Study: 2019 19:09:21          Results for orders placed in visit on 04/15/19   SCANNED VASCULAR STUDIES       Results for orders placed during the hospital encounter of 04/10/19   Adult Transthoracic Echo Complete W/ Cont if Necessary Per Protocol    Narrative                           Adult Echocardiogram Report          [facility]  Cardiology Department  [address] [place] [address]      Name: SUMAYA CRUZ LStudy Date: 2019 02:39 PM        BP: 150/90 mmHg  MRN: [id number]          Patient Location:   : 1955         Gender: Female                         Height: 64 in  Age: 64 yrs             Account#: [id number]                  Weight: 221 lb  Reason For Study: ATRIAL FIBRILLATION WITH RVR, HYPOKALEMIA,  EDEMA                                                          BSA: 2.0 m2  Ordering Physician:  LEONEL OWUSU  Referring Physician:  LESIA DAWSON  Performed By: JASVIR    M-Mode/2-D Measurements:   LVIDd:       (3.7-5.7)  LVPWd:      (0.8-1.2)   LVIDs:       (2.3-3.9)   ACS:         (1.6-3.7)  IVSd:       (0.7-1.2)   LA dimension:(1.9-4.0)  RVDd:       (0.7-2.4)   FS:          (21-40%)   AoRootDiam: (2.0-3.7)    Comments  Dyskinetic septum with Moderate LV dysfunction EF 40%. The right ventricle is  mildly dilated. Severe biatrial enlargement. Mitral valve thickened with  mitral valve prolapse and moderate-to-severe MR, would recommend SHERIE to better  evaluate mitral regurgitation. The tricuspid valve is not well visualized, but  is grossly  normal. There is mild tricuspid regurgitation. Right ventricular  systolic pressure is elevated at 40-50mmHg. Aortic valve prosthesis appears to  be functioning but Doppler. Mild AR seen. There is no pericardial effusion.        Interpretation      The right ventricle is mildly dilated.    Severe biatrial enlargement    Mitral valve thickened with mitral valve prolapse and moderate-to-severe MR,  would recommend SHERIE to better evaluate mitral regurgitation  Right ventricular systolic pressure is elevated at 40-50mmHg.    Dyskinetic septum with Moderate LV dysfunction EF 40%    _______________________________________________________________________________      Electronically signed by: Roldan Solano MD  on 04/10/2019 07:59 PM         Xr Chest 1 View    Result Date: 12/3/2019  Small left pleural effusion is suspected. Otherwise stable appearance of the chest as described above. No acute infiltrates.   Electronically Signed By-Darion Garces DO. On:12/3/2019 9:00 PM This report was finalized on [id number] by  Darion Garces DO..        Estimated Creatinine Clearance: 36.3 mL/min (A) (by C-G formula based on SCr of 1.57 mg/dL (H)).    Assessment/Plan   Assessment/Plan       Active Hospital Problems:  * Anemia- (present on admission)    - Hemoccult reported to be negative in ED; unknown source   - HGB 6.3 and baseline ~9  - Acute on chronic; chronic likely related to CKD  - 1 unit ordered in the ED. Will continue to follow the H&H and transfuse more as needed   - Did continue home Warfarin d/t patient's significant cardiac disease, monitor closely  - B12, folic, iron, and ferritin ordered with AM labs     Heart failure (CMS/HCC)- (present on admission)    Chronic systolic with EF of 40% on most recent ECHO.  - Continue BB and lasix       Long term (current) use of anticoagulants [Z79.01]    Continue to monitor; daily INR ordered       Atrial fibrillation (CMS/[facility]) [I48.91]- (present on admission)    CHADVas  score of 5  Chronic and unchanged  - Continue BB and coumadin      S/P CABG x 2 (reop sternotomy with lysis of adhesions) (LIMA to LAD, SVG to PDA) per Dr. Alvarado 5/9/2019    Continue ASA and statin      Chronic kidney disease, stage 3 (moderate) (CMS/HCC)- (present on admission)     Continue to monitor; blood products being transfused for volume replacement.      Atherosclerotic heart disease of native coronary artery without angina pectoris- (present on admission)     Continue home Aspirin, statin, BB, and ARB     Obesity (BMI 30-39.9)    BMI 33.84; lifestyle modifications discussed and encouraged       Diabetic peripheral neuropathy (CMS/HCC)- (present on admission)    Stable without home medication; continue to monitor      Diabetes mellitus (CMS/HCC)- (present on admission)    Continue home metformin; SSI ordered with accuchecks. CCD ordered. A1c ordered with AM labs      Hypertension- (present on admission)    Continue home lasix, BB, and valsartan                  VTE Prophylaxis - Warfarin.    CODE STATUS:    Code Status and Medical Interventions:   Ordered at: 12/04/19 0023     Code Status:    CPR     Medical Interventions (Level of Support Prior to Arrest):    Full       Admission Status:  I believe this patient meets observation criteria.      I discussed the patients findings and my recommendations with patient.        Electronically signed by Yun Chawla PA-C, 12/03/19, 11:12 PM.  [facility], [place], operated by [facility] Hospitalist Team

## 2019-12-04 NOTE — PROGRESS NOTES
Discharge Planning Assessment   [facility]     Patient Name: Jocelin Parra  MRN: [id number]  Today's Date: 12/4/2019    Admit Date: 12/3/2019    Discharge Needs Assessment     Row Name 12/04/19 1031       Living Environment    Lives With  alone    Current Living Arrangements  home/apartment/condo    Primary Care Provided by  self    Provides Primary Care For  no one    Family Caregiver if Needed  sibling(s) Brother    Able to Return to Prior Arrangements  yes       Transition Planning    Patient/Family Anticipates Transition to  home    Transportation Anticipated  family or friend will provide Brother       Discharge Needs Assessment    Readmission Within the Last 30 Days  no previous admission in last 30 days    Concerns to be Addressed  no discharge needs identified    Equipment Currently Used at Home  none    Anticipated Changes Related to Illness  none    Equipment Needed After Discharge  none    Provided post acute provider list?  Refused denies needs        Discharge Plan     Row Name 12/04/19 1028       Plan    Plan  Plan to return home            Demographic Summary     Row Name 12/04/19 1028       General Information    Admission Type  observation    Arrived From  emergency department    Referral Source  admission list;emergency department    Reason for Consult  discharge planning    Preferred Language  English        Functional Status     Row Name 12/04/19 1030       Functional Status    Usual Activity Tolerance  good    Current Activity Tolerance  good       Functional Status, IADL    Medications  independent       Mental Status    General Appearance WDL  WDL   Spoke with patient who plans to return home.  Monitor Hgb.    Socorro Palumbo RN

## 2019-12-04 NOTE — ASSESSMENT & PLAN NOTE
- Likely multifactorial due to anemia and respiratory infection  - Respiratory viral panel positive for Coronavirus HKU 1  -Treat supportively, supplemental oxygen as needed

## 2019-12-04 NOTE — PROGRESS NOTES
"      [facility] Medicine Services Daily Progress Note      Hospitalist Team  LOS 0 days      Patient Care Team:  Emilie Cruz APRN as PCP - Roldan Garcia MD as Consulting Physician (Cardiology)  Ney Lai MD as Consulting Physician (Nephrology)    Patient Location: 104/1      Subjective   Subjective     Chief Complaint / Subjective  Chief Complaint   Patient presents with   • decrease hgb       Present on Admission:  • Anemia  • Diabetes mellitus (CMS/HCC)  • Atherosclerotic heart disease of native coronary artery without angina pectoris  • Chronic kidney disease, stage 3 (moderate) (CMS/HCC)  • Diabetic peripheral neuropathy (CMS/HCC)  • Atrial fibrillation (CMS/HCC) [I48.91]  • Hypertension  • Chronic systolic heart failure (CMS/HCC)  • Obesity (BMI 30-39.9)  • Hyperlipidemia  • Hypothyroidism  • Dyspnea      Brief Synopsis of Hospital Course/HPI  Ms. Parra is a 64 y.o.  presents to [facility] complaining of reported decreased HGB on labs by Dr. Montalvo, Nephrologist. Patient reports that she has been having increased dyspnea on exertion for 1 month that has been gradually worsening and patient reports that she has had minimal peripheral edema for the past few days and fatigue. Patient denies anything making it better or worse. Patient denies any fever, chills, chest pain, SOA at rest, Abdominal pain, N/V/D/C, or melena. On ROS patient does report that she has orthopnea but states that it is unchanged from her normal. Patient also reports having a dry cough for 1 week without sputum.      Patient was recently hospitalized on 5/2019 for Sternotomy with mechanical MVR s/p failed MVr and CABG with HFrEF of 40%.      In ED, admission vitals were 98.8F, 105HR, 16RR,135/68, 100%RA. On labs, patient found to have normal INR/PT, normal WBC 6, low HGB 6.3,and normal Platelets. CXR shows \"Small left pleural effusion is suspected. Otherwise stable " "appearance of the chest as described above. No acute infiltrates.\" EKG shows sinus tachycardia. Respiratory panel was positive for coronavirus. 1 unit of PRBCs ordered in the ED.             Date::    12/4/2019: Patient having shortness of air progressively, not really much different today.      Review of Systems   Respiratory: Positive for cough and shortness of breath.    All other systems reviewed and are negative.        Objective   Objective      Vital Signs  Temp:  [98.3 °F (36.8 °C)-99.3 °F (37.4 °C)] 98.3 °F (36.8 °C)  Heart Rate:  [] 115  Resp:  [12-20] 17  BP: (119-143)/(53-82) 143/82  Oxygen Therapy  SpO2: 100 %  Pulse Oximetry Type: Continuous  Device (Oxygen Therapy): room air  Flowsheet Rows      First Filed Value   Admission Height  157.5 cm (62\") Documented at 12/03/2019 1901   Admission Weight  83.9 kg (185 lb) Documented at 12/03/2019 1901        Intake & Output (last 3 days)       12/01 0701 - 12/02 0700 12/02 0701 - 12/03 0700 12/03 0701 - 12/04 0700 12/04 0701 - 12/05 0700    Blood   600     Total Intake(mL/kg)   600 (7.2)     Net   +600             Urine Unmeasured Occurrence   400 x         Lines, Drains & Airways    Active LDAs     Name:   Placement date:   Placement time:   Site:   Days:    Peripheral IV 12/03/19 2230 Left Forearm   12/03/19 2230    Forearm   less than 1                  Physical Exam:    Physical Exam   Constitutional: She appears well-developed and well-nourished.   HENT:   Head: Normocephalic and atraumatic.   Mouth/Throat: Oropharynx is clear and moist.   Eyes: EOM are normal. Pupils are equal, round, and reactive to light.   Neck: Normal range of motion. Neck supple.   Cardiovascular: An irregularly irregular rhythm present.   No murmur heard.  Pulmonary/Chest: Effort normal and breath sounds normal. No respiratory distress. She has no wheezes.   Abdominal: Soft. Bowel sounds are normal. She exhibits no distension. There is no tenderness.   Skin: Skin is warm " and dry.         Procedures:              Results Review:     I reviewed the patient's new clinical results.      Lab Results (last 24 hours)     Procedure Component Value Units Date/Time    Bilirubin, Total & Direct [[id number]] Collected:  12/04/19 0328    Specimen:  Blood Updated:  12/04/19 1008    Comprehensive Metabolic Panel [[id number]] Collected:  12/04/19 0328    Specimen:  Blood Updated:  12/04/19 1008    Lactate Dehydrogenase [[id number]] Collected:  12/04/19 0328    Specimen:  Blood Updated:  12/04/19 1008    Reticulocytes [[id number]] Collected:  12/04/19 0328    Specimen:  Blood Updated:  12/04/19 1006    Basic Metabolic Panel [[id number]]  (Abnormal) Collected:  12/04/19 0328    Specimen:  Blood Updated:  12/04/19 0454     Glucose 188 mg/dL      Comment: Result checked         BUN 40 mg/dL      Creatinine 1.76 mg/dL      Sodium 141 mmol/L      Potassium 4.2 mmol/L      Chloride 104 mmol/L      CO2 24.0 mmol/L      Calcium 9.3 mg/dL      eGFR Non African Amer 29 mL/min/1.73      BUN/Creatinine Ratio 22.7     Anion Gap 13.0 mmol/L     Narrative:       GFR Normal >60  Chronic Kidney Disease <60  Kidney Failure <15    Ferritin [[id number]]  (Normal) Collected:  12/04/19 0328    Specimen:  Blood Updated:  12/04/19 0445     Ferritin 21.20 ng/mL     Iron [[id number]]  (Abnormal) Collected:  12/04/19 0328    Specimen:  Blood Updated:  12/04/19 0443     Iron 24 mcg/dL     Protime-INR [[id number]]  (Normal) Collected:  12/04/19 0328    Specimen:  Blood Updated:  12/04/19 0436     Protime 23.0 Seconds      INR 2.43    CBC & Differential [[id number]] Collected:  12/04/19 0328    Specimen:  Blood Updated:  12/04/19 0427    Narrative:       The following orders were created for panel order CBC & Differential.  Procedure                               Abnormality         Status                     ---------                               -----------         ------                     CBC Auto Differential[[id number]]         Abnormal            Final result                 Please view results for these tests on the individual orders.    CBC Auto Differential [[id number]]  (Abnormal) Collected:  12/04/19 0328    Specimen:  Blood Updated:  12/04/19 0427     WBC 6.40 10*3/mm3      RBC 3.89 10*6/mm3      Hemoglobin 7.3 g/dL      Hematocrit 24.6 %      MCV 63.3 fL      Comment: Parameter reviewed in the past 30 days on 12/3/2019 .         MCH 18.7 pg      MCHC 29.5 g/dL      RDW 21.7 %      RDW-SD 49.0 fl      MPV 8.6 fL      Platelets 306 10*3/mm3      Neutrophil % 74.8 %      Lymphocyte % 10.8 %      Monocyte % 10.6 %      Eosinophil % 3.0 %      Basophil % 0.8 %      Neutrophils, Absolute 4.80 10*3/mm3      Lymphocytes, Absolute 0.70 10*3/mm3      Monocytes, Absolute 0.70 10*3/mm3      Eosinophils, Absolute 0.20 10*3/mm3      Basophils, Absolute 0.10 10*3/mm3      nRBC 0.1 /100 WBC     Folate [[id number]] Collected:  12/04/19 0328    Specimen:  Blood Updated:  12/04/19 0414    Vitamin B12 [[id number]] Collected:  12/04/19 0328    Specimen:  Blood Updated:  12/04/19 0414    Respiratory Panel, PCR - Swab, Nasopharynx [[id number]]  (Abnormal) Collected:  12/03/19 2041    Specimen:  Swab from Nasopharynx Updated:  12/03/19 2204     ADENOVIRUS, PCR Not Detected     Coronavirus 229E Not Detected     Coronavirus HKU1 Detected     Coronavirus NL63 Not Detected     Coronavirus OC43 Not Detected     Human Metapneumovirus Not Detected     Human Rhinovirus/Enterovirus Not Detected     Influenza B PCR Not Detected     Parainfluenza Virus 1 Not Detected     Parainfluenza Virus 2 Not Detected     Parainfluenza Virus 3 Not Detected     Parainfluenza Virus 4 Not Detected     Bordetella pertussis pcr Not Detected     Influenza A H1 2009 PCR Not Detected     Chlamydophila pneumoniae PCR Not Detected     Mycoplasma pneumo by PCR Not Detected     Influenza A PCR Not Detected     Influenza A H3 Not Detected     Influenza A H1 Not Detected     RSV, PCR Not Detected     Protime-INR [[id number]]  (Normal) Collected:  12/03/19 2137    Specimen:  Blood Updated:  12/03/19 2150     Protime 25.9 Seconds      INR 2.74    CBC & Differential [[id number]] Collected:  12/03/19 2056    Specimen:  Blood Updated:  12/03/19 2141    Narrative:       The following orders were created for panel order CBC & Differential.  Procedure                               Abnormality         Status                     ---------                               -----------         ------                     CBC Auto Differential[[id number]]        Abnormal            Final result                 Please view results for these tests on the individual orders.    CBC Auto Differential [[id number]]  (Abnormal) Collected:  12/03/19 2056    Specimen:  Blood Updated:  12/03/19 2141     WBC 6.00 10*3/mm3      RBC 3.44 10*6/mm3      Hemoglobin 6.3 g/dL      Hematocrit 21.3 %      MCV 61.8 fL      MCH 18.3 pg      MCHC 29.7 g/dL      RDW 20.5 %      RDW-SD 44.6 fl      MPV 8.5 fL      Platelets 343 10*3/mm3     Scan Slide [[id number]] Collected:  12/03/19 2056    Specimen:  Blood Updated:  12/03/19 2141     Scan Slide --     Comment: See Manual Differential Results       Manual Differential [[id number]]  (Abnormal) Collected:  12/03/19 2056    Specimen:  Blood Updated:  12/03/19 2141     Neutrophil % 72.0 %      Lymphocyte % 18.0 %      Monocyte % 2.0 %      Eosinophil % 5.0 %      Basophil % 3.0 %      Neutrophils Absolute 4.32 10*3/mm3      Lymphocytes Absolute 1.08 10*3/mm3      Monocytes Absolute 0.12 10*3/mm3      Eosinophils Absolute 0.30 10*3/mm3      Basophils Absolute 0.18 10*3/mm3      Acanthocytes Large/3+     Anisocytosis Large/3+     Elliptocytes Mod/2+     Hypochromia Slight/1+     Microcytes Mod/2+     Schistocytes Large/3+     WBC Morphology Normal     Giant Platelets Slight/1+        No results found for: HGBA1C  Results from last 7 days   Lab Units 12/04/19  0328 12/03/19 2137   INR  2.43 2.74                Microbiology Results (last 10 days)     Procedure Component Value - Date/Time    Respiratory Panel, PCR - Swab, Nasopharynx [[id number]]  (Abnormal) Collected:  12/03/19 2041    Lab Status:  Final result Specimen:  Swab from Nasopharynx Updated:  12/03/19 2204     ADENOVIRUS, PCR Not Detected     Coronavirus 229E Not Detected     Coronavirus HKU1 Detected     Coronavirus NL63 Not Detected     Coronavirus OC43 Not Detected     Human Metapneumovirus Not Detected     Human Rhinovirus/Enterovirus Not Detected     Influenza B PCR Not Detected     Parainfluenza Virus 1 Not Detected     Parainfluenza Virus 2 Not Detected     Parainfluenza Virus 3 Not Detected     Parainfluenza Virus 4 Not Detected     Bordetella pertussis pcr Not Detected     Influenza A H1 2009 PCR Not Detected     Chlamydophila pneumoniae PCR Not Detected     Mycoplasma pneumo by PCR Not Detected     Influenza A PCR Not Detected     Influenza A H3 Not Detected     Influenza A H1 Not Detected     RSV, PCR Not Detected          ECG/EMG Results (most recent)     Procedure Component Value Units Date/Time    ECG 12 Lead [[id number]] Collected:  12/03/19 1909     Updated:  12/03/19 1913    Narrative:       HEART RATE= 103  bpm  RR Interval= 584  ms  VA Interval= 161  ms  P Horizontal Axis= 22  deg  P Front Axis= 67  deg  QRSD Interval= 75  ms  QT Interval= 355  ms  QRS Axis= 120  deg  T Wave Axis= 47  deg  - OTHERWISE NORMAL ECG -  Sinus tachycardia  Right axis deviation  When compared with ECG of 13-May-2019 13:13:28,  Significant rate increase  Significant repolarization change  Electronically Signed By:   Date and Time of Study: 2019-12-03 19:09:21          Results for orders placed in visit on 04/15/19   SCANNED VASCULAR STUDIES       Results for orders placed during the hospital encounter of 04/10/19   Adult Transthoracic Echo Complete W/ Cont if Necessary Per Protocol    Narrative                           Adult Echocardiogram Report          [facility]  Cardiology Department  [address] [place] [address]  [id number]      Name: SUMAYA CRUZ LStudy Date: 2019 02:39 PM        BP: 150/90 mmHg  MRN: [id number]          Patient Location:   : 1955         Gender: Female                         Height: 64 in  Age: 64 yrs             Account#: [id number]                  Weight: 221 lb  Reason For Study: ATRIAL FIBRILLATION WITH RVR, HYPOKALEMIA,  EDEMA                                                          BSA: 2.0 m2  Ordering Physician:  LEONEL SOLANO  Referring Physician:  LESIA DAWSON  Performed By: JASVIR    M-Mode/2-D Measurements:   LVIDd:       (3.7-5.7)  LVPWd:      (0.8-1.2)   LVIDs:       (2.3-3.9)   ACS:         (1.6-3.7)  IVSd:       (0.7-1.2)   LA dimension:(1.9-4.0)  RVDd:       (0.7-2.4)   FS:          (21-40%)   AoRootDiam: (2.0-3.7)    Comments  Dyskinetic septum with Moderate LV dysfunction EF 40%. The right ventricle is  mildly dilated. Severe biatrial enlargement. Mitral valve thickened with  mitral valve prolapse and moderate-to-severe MR, would recommend SHERIE to better  evaluate mitral regurgitation. The tricuspid valve is not well visualized, but  is grossly normal. There is mild tricuspid regurgitation. Right ventricular  systolic pressure is elevated at 40-50mmHg. Aortic valve prosthesis appears to  be functioning but Doppler. Mild AR seen. There is no pericardial effusion.        Interpretation      The right ventricle is mildly dilated.    Severe biatrial enlargement    Mitral valve thickened with mitral valve prolapse and moderate-to-severe MR,  would recommend SHERIE to better evaluate mitral regurgitation  Right ventricular systolic pressure is elevated at 40-50mmHg.    Dyskinetic septum with Moderate LV dysfunction EF 40%    _______________________________________________________________________________      Electronically signed by: Leonel Solano MD  on 04/10/2019 07:59 PM         Xr Chest 1  View    Result Date: 12/3/2019  Small left pleural effusion is suspected. Otherwise stable appearance of the chest as described above. No acute infiltrates.   Electronically Signed By-Darion Garces DO. On:12/3/2019 9:00 PM This report was finalized on [id number] by  Darion Garces DO..      Xrays, labs reviewed personally by physician.    Medication Review:   I have reviewed the patient's current medication list      Scheduled Meds    aspirin 81 mg Oral Daily   atorvastatin 40 mg Oral Nightly   furosemide 40 mg Oral Daily   levothyroxine 112 mcg Oral Q AM   losartan 50 mg Oral Q24H   metFORMIN 500 mg Oral Q PM   metoprolol succinate XL 12.5 mg Oral Daily   pantoprazole 40 mg Oral Q AM   potassium chloride 20 mEq Oral Daily With Breakfast   sodium chloride 10 mL Intravenous Q12H   warfarin 5 mg Oral Once per day on Mon Wed Fri   [START ON 12/5/2019] warfarin 7.5 mg Oral Once per day on Sun Tue Thu Sat       Meds Infusions    Pharmacy to dose warfarin        Meds PRN  •  acetaminophen **OR** acetaminophen **OR** acetaminophen  •  aluminum-magnesium hydroxide-simethicone  •  bisacodyl  •  calcium carbonate  •  docusate sodium  •  magnesium hydroxide  •  melatonin  •  nitroglycerin  •  ondansetron **OR** ondansetron  •  Pharmacy to dose warfarin  •  [COMPLETED] Insert peripheral IV **AND** sodium chloride  •  sodium chloride        Assessment/Plan   Assessment/Plan     Active Hospital Problems:  * Anemia- (present on admission)    - HGB 6.3 and baseline ~9  - Hemoccult negative in ED despite being on coumadin, will repeat once more  - 1 unit PRBCs ordered in the ED. Will continue to follow the H&H and transfuse if Hgb < 7.0  - B12, folate pending, added retic, hapto, SPEP, Total bilirubin (direct/indirect), LDH  - iron and ferritin resulted  - per d/w Nephro, CKD not advanced enough for this degree of anema  - Consult Hematology for additional evaluation       Dyspnea- (present on admission)    - Likely  multifactorial due to anemia and respiratory infection  - Respiratory viral panel positive for Coronavirus HKU 1  -Treat supportively, supplemental oxygen as needed     Chronic kidney disease, stage 3 (moderate) (CMS/LTAC, located within [facility])- (present on admission)    -Creatinine elevated from 1.5 up to 1.76 overnight  - Consult nephrology to follow     Obesity (BMI 30-39.9)- (present on admission)    BMI 33.84; lifestyle modifications discussed and encouraged       Chronic systolic heart failure (CMS/LTAC, located within [facility])- (present on admission)    Chronic systolic with EF of 40% on most recent ECHO.  - Continue BB and lasix       Diabetic peripheral neuropathy (CMS/LTAC, located within [facility])- (present on admission)    Stable without home medication; continue to monitor      Atherosclerotic heart disease of native coronary artery without angina pectoris- (present on admission)     Continue home Aspirin, statin, BB, and ARB     Long term (current) use of anticoagulants [Z79.01]    Continue to monitor; daily INR ordered       Atrial fibrillation (CMS/LTAC, located within [facility]) [I48.91]- (present on admission)    CHADVas score of 5  Chronic and unchanged  - Continue BB and coumadin      S/P CABG x 2 (reop sternotomy with lysis of adhesions) (LIMA to LAD, SVG to PDA) per Dr. Alvarado 5/9/2019    Continue ASA and statin      Hypothyroidism- (present on admission)    -Chronic; stable  -Continue levothyroxine      Diabetes mellitus (CMS/LTAC, located within [facility])- (present on admission)    Continue home metformin; SSI ordered with accuchecks. CCD ordered. A1c ordered with AM labs      Hypertension- (present on admission)    Continue home lasix, BB, and valsartan      Hyperlipidemia- (present on admission)    -Chronic; stable  -Continue atorvastatin              Resolved Hospital Problems:  No notes have been filed under this hospital service.  Service: Hospitalist            VTE Prophylaxis - SCDs.      Code Status -   Code Status and Medical Interventions:   Ordered at: 12/04/19 0023     Code Status:    [facility]     Medical  Interventions (Level of Support Prior to Arrest):    Full       Discharge Planning    Destination      No service coordination in this encounter.      Durable Medical Equipment      No service coordination in this encounter.      Dialysis/Infusion      No service coordination in this encounter.      Home Medical Care      No service coordination in this encounter.      Therapy      No service coordination in this encounter.      Community Resources      No service coordination in this encounter.            Electronically signed by Martita Bennett MD, 12/04/19, 10:13 AM.  [facility] Hospitalist Team

## 2019-12-04 NOTE — ED NOTES
Patient resting in bed watching tv. Patient states that she is not having any issues receiving blood. No questions or concerns at this time     Dodie Ramirez LPN  12/03/19 1407

## 2019-12-04 NOTE — ED NOTES
Patient had out patient lab work done and was told her hgb was 6.1 was told to come in for blood transfusion. Patient is resting in bed. Patient states that she does have SOA with exertions. Patient doesn't have any questions or concerns at this time.     Dodie Ramirez LPN  12/03/19 1927

## 2019-12-04 NOTE — CONSULTS
Hematology/Oncology Inpatient Consultation    Patient name: Jocelin Parra  : 1955  MRN: [id number]  Referring Provider: Dr. Bennett  Reason for Consultation: Anemia    Chief complaint: Anemia    History of present illness:    64 y.o. female admitted through the ED 12/3/2019 with anemia.  The patient was sent by her nephrologist after outpatient labs showed worsening anemia with hemoglobin 6.1.  She had reported some increasing dyspnea and occasional mild edema.  She reported cough and respiratory viral panel was positive for coronavirus.  CBC in the ED showed WBC 3.5, hemoglobin 6.1, MCV 63.4, RDW 18.6, and platelets 339,000. She received 1 unit pRBC.  Creatinine was 1.57.  INR was 2.74 on Coumadin for mechanical heart valve.  Posttransfusion iron studies showed iron 24 and ferritin 21.2.  Vitamin B12 was 947 and folate was 18.6.  LDH was 353 and reticulocyte count was 2.33%.  Haptoglobin, stool heme, and SPEP were pending at time of consult.    19  Hematology/Oncology was consulted for anemia.    PCP: Emilie Cruz APRN    History:  Past Medical History:   Diagnosis Date   • A-fib (CMS/[facility]) Dx 2018    S/p Cardioversion by Dr. Spencer on 04/10/19   • Acute renal failure syndrome (CMS/HCC) 2019-[facility] IP   • Biatrial enlargement 04/10/2019    Noted on SHERIE   • Calcified granuloma of lung (CMS/HCC) 2019    Noted on Chest XR   • Cardiomegaly 2019    Borderline--Noted on Chest XR   • CHF (congestive heart failure) (CMS/HCC)    • CKD (chronic kidney disease), stage III (CMS/[facility])     Does Not See a Nephrologist   • Diabetes mellitus (CMS/HCC)    • DM (diabetes mellitus) (CMS/HCC)     T2   • Dysphagia 08/15-[facility] IP    Due to Pharyngitis   • GERD (gastroesophageal reflux disease)    • History of chest x-ray 8/28/15-[facility]    Normal   • History of chest x-ray 2019    Mild Pulmonary Edema w/Congestive Failure Noted   • History of chest x-ray 2019    Borderline Cardiomegaly    • History of  EKG 2014/2018/2019-[facility]   • Hx of diabetic neuropathy    • Hx of dizziness     w/Lightheadedness   • Hx of echocardiogram 4/16/18-[facility]    EF 55-60%; Mild MVR; Mild TVR; Normal Root/No Effusion   • Hyperlipidemia     Controlled w/Meds   • Hypertension     Controlled w/Meds   • Hypokalemia 4/11/19-[facility] IP   • Hypothyroidism     Controlled w/Meds   • Iron deficiency anemia    • Left posterior fascicular block 04/2018 & 4/19    Noted on EKG   • Lower extremity edema 03/2019   • Mild mitral valve regurgitation 04/16/2018    Noted on Echo   • Mild tricuspid valve regurgitation 04/16/2018    Noted on Echo   • Moderate mitral valve regurgitation 2008    S/p MVR in 08'   • Moderate tricuspid insufficiency 04/10/2019    Noted on SHERIE   • Osteoarthritis     Knees w/Hx Knee Repl   • Pulmonary edema 01/19/2019    Mild--Noted on Chest XR   • Rapid palpitations 04/15/18 & 8/9/18-[facility] ER   • Renal dysfunction    • Right axis deviation 08/2018 & 4/19    Noted on EKG   • Severe aortic valve stenosis Since 2008    Hx AVR on 12/11/08 by Dr. Alvarado   • Severe mitral insufficiency 04/10/2019    Noted on SHERIE   • Sinus tachycardia 08/2018    Noted on EKG   • SOB (shortness of breath) chronic   • Torticollis Chronic   • Valvular heart disease     S/p AVR & MVR in 08'   , Past Surgical History:   Procedure Laterality Date   • AORTIC VALVE REPAIR/REPLACEMENT  12/11/08-[facility]    Using a #20 Eight Yr Mechanical [place] Prosthesis--Dewey Alvarado MD   • CARDIOVERSION  4/10/19-[facility]    Dr. Spencer--For A-Fib   • HYSTERECTOMY     • MITRAL VALVE REPLACEMENT  2008   • MITRAL VALVE REPLACEMENT  05/09/2019    Dr Alvarado   • SHERIE  4/10/19-[facility]    EF 40%; Moderate TVR; Severe Eccentric MI; Biatrial Enlargement   • TOTAL KNEE ARTHROPLASTY  2017   • TRICUSPID VALVE SURGERY  05/09/2019    Dr Alvarado   , Family History   Problem Relation Age of Onset   • Heart disease Father    • Hypertension Father    • Stroke Father    • Diabetes Father    • Lung cancer Mother    , Social  History     Tobacco Use   • Smoking status: Never Smoker   • Smokeless tobacco: Never Used   Substance Use Topics   • Alcohol use: No   • Drug use: No   , Medications Prior to Admission   Medication Sig Dispense Refill Last Dose   • atorvastatin (LIPITOR) 40 MG tablet Take 40 mg by mouth Daily.      • cholecalciferol (VITAMIN D3) 10 MCG (400 UNIT) tablet Take 2,000 Units by mouth Daily.      • levothyroxine (SYNTHROID, LEVOTHROID) 112 MCG tablet Take 112 mcg by mouth Daily.      • metoprolol succinate XL (TOPROL-XL) 25 MG 24 hr tablet Take 12.5 mg by mouth Daily.      • potassium chloride (K-DUR,KLOR-CON) 20 MEQ CR tablet Take 20 mEq by mouth Daily.      • vitamin C (ASCORBIC ACID) 500 MG tablet Take 500 mg by mouth Daily.      • warfarin (COUMADIN) 5 MG tablet Take 5 mg by mouth 3 (Three) Times a Week. Mon, Wed, Fri      • warfarin (COUMADIN) 7.5 MG tablet Take 7.5 mg by mouth 4 (Four) Times a Week. Tues, Thur, Sat, Sun      • aspirin 81 MG tablet Take 1 tablet by mouth daily.   Taking   • B Complex Vitamins (VITAMIN B COMPLEX) tablet Take by mouth.   Taking   • ezetimibe (ZETIA) 10 MG tablet Take 1 tablet by mouth daily.   Taking   • furosemide (LASIX) 40 MG tablet Take 40 mg by mouth Daily.      • Liraglutide (VICTOZA) 18 MG/3ML solution pen-injector Inject 1.8 mg under the skin into the appropriate area as directed Daily.   Taking   • metFORMIN (GLUCOPHAGE) 500 MG tablet Take 1 tablet by mouth Every Evening.   Taking   • Multiple Vitamin (MULTI-VITAMIN) tablet Take 1 tablet by mouth daily.   Taking   • omeprazole (PriLOSEC) 20 MG capsule Take 1 capsule by mouth daily.   Taking   • valsartan (DIOVAN) 80 MG tablet Take 1 tablet by mouth Daily. PT TAKES 40MG DAILY    Taking   , Scheduled Meds:    aspirin 81 mg Oral Daily   atorvastatin 40 mg Oral Nightly   levothyroxine 112 mcg Oral Q AM   losartan 50 mg Oral Q24H   metFORMIN 500 mg Oral Q PM   metoprolol succinate XL 12.5 mg Oral Daily   pantoprazole 40 mg Oral Q  "AM   sodium chloride 10 mL Intravenous Q12H   warfarin 5 mg Oral Once per day on Mon Wed Fri   [START ON 12/5/2019] warfarin 7.5 mg Oral Once per day on Sun Tue Thu Sat   , Continuous Infusions:    Pharmacy to dose warfarin    , PRN Meds:  •  acetaminophen **OR** acetaminophen **OR** acetaminophen  •  aluminum-magnesium hydroxide-simethicone  •  bisacodyl  •  calcium carbonate  •  docusate sodium  •  magnesium hydroxide  •  melatonin  •  nitroglycerin  •  ondansetron **OR** ondansetron  •  Pharmacy to dose warfarin  •  [COMPLETED] Insert peripheral IV **AND** sodium chloride  •  sodium chloride   Allergies:  Patient has no known allergies.    ROS:  Review of Systems   Constitutional: Negative for chills and fever.   HENT: Negative for ear pain, mouth sores, nosebleeds and sore throat.    Eyes: Negative for photophobia and visual disturbance.   Respiratory: Negative for wheezing and stridor.    Cardiovascular: Negative for chest pain and palpitations.   Gastrointestinal: Negative for abdominal pain, diarrhea, nausea and vomiting.   Endocrine: Negative for cold intolerance and heat intolerance.   Genitourinary: Negative for dysuria and hematuria.   Musculoskeletal: Negative for joint swelling and neck stiffness.   Skin: Negative for color change and rash.   Neurological: Negative for seizures and syncope.   Hematological: Negative for adenopathy.        No obvious bleeding   Psychiatric/Behavioral: Negative for agitation, confusion and hallucinations.        Objective     Vital Signs:   /82   Pulse 115   Temp 98.3 °F (36.8 °C)   Resp 17   Ht 157.5 cm (62\")   Wt 83.9 kg (185 lb)   SpO2 100%   BMI 33.84 kg/m²     Physical Exam:  Physical Exam   Constitutional: She is oriented to person, place, and time. No distress.   HENT:   Head: Normocephalic and atraumatic.   Eyes: Conjunctivae and EOM are normal. Right eye exhibits no discharge. Left eye exhibits no discharge. No scleral icterus.   Neck: Normal range " of motion. Neck supple. No thyromegaly present.   Cardiovascular: Normal rate, regular rhythm and normal heart sounds. Exam reveals no gallop and no friction rub.   Pulmonary/Chest: Effort normal. No stridor. No respiratory distress. She has no wheezes.   Abdominal: Soft. Bowel sounds are normal. She exhibits no mass. There is no tenderness. There is no rebound and no guarding.   Musculoskeletal: Normal range of motion. She exhibits no tenderness.   Lymphadenopathy:     She has no cervical adenopathy.   Neurological: She is alert and oriented to person, place, and time. She exhibits normal muscle tone.   Skin: Skin is warm. No rash noted. She is not diaphoretic. No erythema.   Psychiatric: She has a normal mood and affect. Her behavior is normal.   Nursing note and vitals reviewed.       Results Review:  Lab Results (last 48 hours)     Procedure Component Value Units Date/Time    Lactate Dehydrogenase [[id number]]  (Abnormal) Collected:  12/04/19 0328    Specimen:  Blood Updated:  12/04/19 1218      U/L     Folate [[id number]]  (Normal) Collected:  12/04/19 0328    Specimen:  Blood Updated:  12/04/19 1125     Folate 18.60 ng/mL     Vitamin B12 [[id number]]  (Abnormal) Collected:  12/04/19 0328    Specimen:  Blood Updated:  12/04/19 1125     Vitamin B-12 947 pg/mL     Reticulocytes [[id number]]  (Abnormal) Collected:  12/04/19 0328    Specimen:  Blood Updated:  12/04/19 1037     Reticulocyte % 2.33 %      Reticulocyte Absolute 0.0883 10*6/mm3     Comprehensive Metabolic Panel [[id number]]  (Abnormal) Collected:  12/04/19 0328    Specimen:  Blood Updated:  12/04/19 1030     Glucose 191 mg/dL      BUN 40 mg/dL      Creatinine 1.83 mg/dL      Sodium 141 mmol/L      Potassium 4.3 mmol/L      Chloride 103 mmol/L      CO2 22.0 mmol/L      Calcium 9.0 mg/dL      Total Protein 6.6 g/dL      Albumin 3.80 g/dL      ALT (SGPT) 18 U/L      AST (SGOT) 26 U/L      Alkaline Phosphatase 130 U/L      Total Bilirubin 1.1 mg/dL       eGFR Non African Amer 28 mL/min/1.73      Globulin 2.8 gm/dL      A/G Ratio 1.4 g/dL      BUN/Creatinine Ratio 21.9     Anion Gap 16.0 mmol/L     Narrative:       GFR Normal >60  Chronic Kidney Disease <60  Kidney Failure <15    Bilirubin, Total & Direct [[id number]]  (Abnormal) Collected:  12/04/19 0328    Specimen:  Blood Updated:  12/04/19 1030     Total Bilirubin 1.1 mg/dL      Bilirubin, Direct 0.4 mg/dL      Bilirubin, Indirect 0.7 mg/dL     Haptoglobin [[id number]] Collected:  12/03/19 1224    Specimen:  Blood Updated:  12/04/19 1027    Protein Electrophoresis, Total [[id number]] Collected:  12/04/19 1006    Specimen:  Blood Updated:  12/04/19 1015    Basic Metabolic Panel [[id number]]  (Abnormal) Collected:  12/04/19 0328    Specimen:  Blood Updated:  12/04/19 0454     Glucose 188 mg/dL      Comment: Result checked         BUN 40 mg/dL      Creatinine 1.76 mg/dL      Sodium 141 mmol/L      Potassium 4.2 mmol/L      Chloride 104 mmol/L      CO2 24.0 mmol/L      Calcium 9.3 mg/dL      eGFR Non African Amer 29 mL/min/1.73      BUN/Creatinine Ratio 22.7     Anion Gap 13.0 mmol/L     Narrative:       GFR Normal >60  Chronic Kidney Disease <60  Kidney Failure <15    Ferritin [[id number]]  (Normal) Collected:  12/04/19 0328    Specimen:  Blood Updated:  12/04/19 0445     Ferritin 21.20 ng/mL     Iron [[id number]]  (Abnormal) Collected:  12/04/19 0328    Specimen:  Blood Updated:  12/04/19 0443     Iron 24 mcg/dL     Protime-INR [[id number]]  (Normal) Collected:  12/04/19 0328    Specimen:  Blood Updated:  12/04/19 0436     Protime 23.0 Seconds      INR 2.43    CBC & Differential [[id number]] Collected:  12/04/19 0328    Specimen:  Blood Updated:  12/04/19 0427    Narrative:       The following orders were created for panel order CBC & Differential.  Procedure                               Abnormality         Status                     ---------                               -----------         ------                      CBC Auto Differential[[id number]]        Abnormal            Final result                 Please view results for these tests on the individual orders.    CBC Auto Differential [[id number]]  (Abnormal) Collected:  12/04/19 0328    Specimen:  Blood Updated:  12/04/19 0427     WBC 6.40 10*3/mm3      RBC 3.89 10*6/mm3      Hemoglobin 7.3 g/dL      Hematocrit 24.6 %      MCV 63.3 fL      Comment: Parameter reviewed in the past 30 days on 12/3/2019 .         MCH 18.7 pg      MCHC 29.5 g/dL      RDW 21.7 %      RDW-SD 49.0 fl      MPV 8.6 fL      Platelets 306 10*3/mm3      Neutrophil % 74.8 %      Lymphocyte % 10.8 %      Monocyte % 10.6 %      Eosinophil % 3.0 %      Basophil % 0.8 %      Neutrophils, Absolute 4.80 10*3/mm3      Lymphocytes, Absolute 0.70 10*3/mm3      Monocytes, Absolute 0.70 10*3/mm3      Eosinophils, Absolute 0.20 10*3/mm3      Basophils, Absolute 0.10 10*3/mm3      nRBC 0.1 /100 WBC     Respiratory Panel, PCR - Swab, Nasopharynx [[id number]]  (Abnormal) Collected:  12/03/19 2041    Specimen:  Swab from Nasopharynx Updated:  12/03/19 2204     ADENOVIRUS, PCR Not Detected     Coronavirus 229E Not Detected     Coronavirus HKU1 Detected     Coronavirus NL63 Not Detected     Coronavirus OC43 Not Detected     Human Metapneumovirus Not Detected     Human Rhinovirus/Enterovirus Not Detected     Influenza B PCR Not Detected     Parainfluenza Virus 1 Not Detected     Parainfluenza Virus 2 Not Detected     Parainfluenza Virus 3 Not Detected     Parainfluenza Virus 4 Not Detected     Bordetella pertussis pcr Not Detected     Influenza A H1 2009 PCR Not Detected     Chlamydophila pneumoniae PCR Not Detected     Mycoplasma pneumo by PCR Not Detected     Influenza A PCR Not Detected     Influenza A H3 Not Detected     Influenza A H1 Not Detected     RSV, PCR Not Detected    Protime-INR [[id number]]  (Normal) Collected:  12/03/19 2137    Specimen:  Blood Updated:  12/03/19 2150     Protime 25.9 Seconds      INR 2.74     CBC & Differential [[id number]] Collected:  12/03/19 2056    Specimen:  Blood Updated:  12/03/19 2141    Narrative:       The following orders were created for panel order CBC & Differential.  Procedure                               Abnormality         Status                     ---------                               -----------         ------                     CBC Auto Differential[[id number]]        Abnormal            Final result                 Please view results for these tests on the individual orders.    CBC Auto Differential [[id number]]  (Abnormal) Collected:  12/03/19 2056    Specimen:  Blood Updated:  12/03/19 2141     WBC 6.00 10*3/mm3      RBC 3.44 10*6/mm3      Hemoglobin 6.3 g/dL      Hematocrit 21.3 %      MCV 61.8 fL      MCH 18.3 pg      MCHC 29.7 g/dL      RDW 20.5 %      RDW-SD 44.6 fl      MPV 8.5 fL      Platelets 343 10*3/mm3     Scan Slide [[id number]] Collected:  12/03/19 2056    Specimen:  Blood Updated:  12/03/19 2141     Scan Slide --     Comment: See Manual Differential Results       Manual Differential [[id number]]  (Abnormal) Collected:  12/03/19 2056    Specimen:  Blood Updated:  12/03/19 2141     Neutrophil % 72.0 %      Lymphocyte % 18.0 %      Monocyte % 2.0 %      Eosinophil % 5.0 %      Basophil % 3.0 %      Neutrophils Absolute 4.32 10*3/mm3      Lymphocytes Absolute 1.08 10*3/mm3      Monocytes Absolute 0.12 10*3/mm3      Eosinophils Absolute 0.30 10*3/mm3      Basophils Absolute 0.18 10*3/mm3      Acanthocytes Large/3+     Anisocytosis Large/3+     Elliptocytes Mod/2+     Hypochromia Slight/1+     Microcytes Mod/2+     Schistocytes Large/3+     WBC Morphology Normal     Giant Platelets Slight/1+           Pending Results: hapto, stool heme, SPEP    Imaging Reviewed:   Xr Chest 1 View    Result Date: 12/3/2019  Small left pleural effusion is suspected. Otherwise stable appearance of the chest as described above. No acute infiltrates.   Electronically Signed By-Darion Garces   On:12/3/2019 9:00 PM This report was finalized on [id number] by  Darion Garces DO..      I have reviewed the patient's labs, imaging, reports, and other clinician documentation.         Assessment/Plan   ASSESSMENT  1. Microcytic anemia- receiving pRBC transfusions PRN.  Post transfusion ferritin was 21.  No vitamin B12 or folate deficiencies.  Recheck and LDH mildly elevated with hepato-pending-less likely hemolysis.  SPEP and stool heme pending. On warfarin for mechanical heart valve likely increasing bleeding risk.  GI bleed in differential. Last EGD/Colonoscopy at least 5 years ago by Dr. Worley with patient unaware of results.  2. Coronavirus/cough-supportive care per primary team.  3. ODETTE on CKD stage III- followed by Dr. Lai  4. CAD/mechanical aortic valve/HTN-history of CABG in past.  Per primary team.  5. DM2-per primary team.    PLAN  1. Venofer daily x3.  2. Continue to transfuse PRN hemoglobin less than 7.5.  3. Await remaining anemia work-up.  4. GI consult.  5. Recommend outpatient Procrit if hemoglobin remains below 10 post discharge.    Note updated by PATRICIA Gtz.  Patient seen and examined by Dr. Martínez  Electronically signed by DELFINO Rene, 12/04/19, 1:12 PM.    I have personally performed a face-to-face diagnostic evaluation on this patient.  I have reviewed and agreed with the care plan.  Patient seen and examined nurse practitioner note reviewed updated discussed with nurse practitioner.  Continue Venofer infusions for 2 days.  GI work-up in the morning.  She reports she never had colonoscopy in the past  Daily CBC and transfusions if needed  Patient will benefit from Procrit injections if hemoglobin stays below 10 given her renal failure  I discussed the patients findings and my recommendations with patient.    Thank you for this consult.  We will be happy to follow along in the care of this patient.     Nav Rizzo MD  12/04/19  12:44 PM

## 2019-12-04 NOTE — PLAN OF CARE
Problem: Fall Risk (Adult)  Goal: Identify Related Risk Factors and Signs and Symptoms  Outcome: Outcome(s) achieved Date Met: 12/04/19    Goal: Absence of Fall  Outcome: Ongoing (interventions implemented as appropriate)      Problem: Anemia (Adult)  Goal: Identify Related Risk Factors and Signs and Symptoms  Outcome: Outcome(s) achieved Date Met: 12/04/19    Goal: Symptom Improvement  Outcome: Ongoing (interventions implemented as appropriate)

## 2019-12-04 NOTE — CONSULTS
Referring Provider: Dr. Bennett  Reason for Consultation: ODETTE/CKD    Subjective     Chief complaint: Severe anemia, SOA, Weakness    History of present illness:  Patient is a 63 yo WF who has a h/o CKD3 followed by me in the office.  She had CABG and mechanical aortic valve surgery earlier this year.  Cr had been 1.7 and stabilized at 1.3-1.4 by the time of discharge.  She was seen in follow-up in June.  Renal function was stable and her Lasix and KCl were resumed.  In October her Cr bumped to 1.8.  It was better at 1.5 yesterday and is 1.76 today.  Her CBC yesterday showed a Hgb of 6.1.  She was contacted and stated she was having worsening SOA and fatigue.  She was directed to the ER and has been admitted and transfused.  She denies any dark or tarry stools.  No hemoptysis.  No CP.  SOA stable.  She has been found to be positive for Coronavirus.  No edema.  No other complaints.    History  Past Medical History:   Diagnosis Date   • A-fib (CMS/[facility]) Dx 2018    S/p Cardioversion by Dr. Spencer on 04/10/19   • Acute renal failure syndrome (CMS/HCC) 4/2019-F IP   • Biatrial enlargement 04/10/2019    Noted on SHERIE   • Calcified granuloma of lung (CMS/[facility]) 01/04/2019    Noted on Chest XR   • Cardiomegaly 01/04/2019    Borderline--Noted on Chest XR   • CHF (congestive heart failure) (CMS/[facility])    • CKD (chronic kidney disease), stage III (CMS/[facility])     Does Not See a Nephrologist   • Diabetes mellitus (CMS/[facility])    • DM (diabetes mellitus) (CMS/[facility])     T2   • Dysphagia 08/15-F IP    Due to Pharyngitis   • GERD (gastroesophageal reflux disease)    • History of chest x-ray 8/28/15-F    Normal   • History of chest x-ray 01/19/2019    Mild Pulmonary Edema w/Congestive Failure Noted   • History of chest x-ray 01/04/2019    Borderline Cardiomegaly    • History of EKG 2014/2018/2019-F   • Hx of diabetic neuropathy    • Hx of dizziness     w/Lightheadedness   • Hx of echocardiogram 4/16/18-[facility]    EF 55-60%; Mild MVR; Mild TVR;  Normal Root/No Effusion   • Hyperlipidemia     Controlled w/Meds   • Hypertension     Controlled w/Meds   • Hypokalemia 4/11/19-[facility] IP   • Hypothyroidism     Controlled w/Meds   • Iron deficiency anemia    • Left posterior fascicular block 04/2018 & 4/19    Noted on EKG   • Lower extremity edema 03/2019   • Mild mitral valve regurgitation 04/16/2018    Noted on Echo   • Mild tricuspid valve regurgitation 04/16/2018    Noted on Echo   • Moderate mitral valve regurgitation 2008    S/p MVR in 08'   • Moderate tricuspid insufficiency 04/10/2019    Noted on SHERIE   • Osteoarthritis     Knees w/Hx Knee Repl   • Pulmonary edema 01/19/2019    Mild--Noted on Chest XR   • Rapid palpitations 04/15/18 & 8/9/18-[facility] ER   • Renal dysfunction    • Right axis deviation 08/2018 & 4/19    Noted on EKG   • Severe aortic valve stenosis Since 2008    Hx AVR on 12/11/08 by Dr. Alvarado   • Severe mitral insufficiency 04/10/2019    Noted on SHERIE   • Sinus tachycardia 08/2018    Noted on EKG   • SOB (shortness of breath) chronic   • Torticollis Chronic   • Valvular heart disease     S/p AVR & MVR in 08'   ,   Past Surgical History:   Procedure Laterality Date   • AORTIC VALVE REPAIR/REPLACEMENT  12/11/08-[facility]    Using a #20 Eight Yr Mechanical [place] Prosthesis--Dewey Alvarado MD   • CARDIOVERSION  4/10/19-[facility]    Dr. Spencer--For A-Fib   • HYSTERECTOMY     • MITRAL VALVE REPLACEMENT  2008   • MITRAL VALVE REPLACEMENT  05/09/2019    Dr Alvarado   • SHERIE  4/10/19-[facility]    EF 40%; Moderate TVR; Severe Eccentric MI; Biatrial Enlargement   • TOTAL KNEE ARTHROPLASTY  2017   • TRICUSPID VALVE SURGERY  05/09/2019    Dr Alvarado   ,   Family History   Problem Relation Age of Onset   • Heart disease Father    • Hypertension Father    • Stroke Father    • Diabetes Father    • Lung cancer Mother    ,   Social History     Tobacco Use   • Smoking status: Never Smoker   • Smokeless tobacco: Never Used   Substance Use Topics   • Alcohol use: No   • Drug use: No   ,    Medications Prior to Admission   Medication Sig Dispense Refill Last Dose   • atorvastatin (LIPITOR) 40 MG tablet Take 40 mg by mouth Daily.      • cholecalciferol (VITAMIN D3) 10 MCG (400 UNIT) tablet Take 2,000 Units by mouth Daily.      • levothyroxine (SYNTHROID, LEVOTHROID) 112 MCG tablet Take 112 mcg by mouth Daily.      • metoprolol succinate XL (TOPROL-XL) 25 MG 24 hr tablet Take 12.5 mg by mouth Daily.      • potassium chloride (K-DUR,KLOR-CON) 20 MEQ CR tablet Take 20 mEq by mouth Daily.      • vitamin C (ASCORBIC ACID) 500 MG tablet Take 500 mg by mouth Daily.      • warfarin (COUMADIN) 5 MG tablet Take 5 mg by mouth 3 (Three) Times a Week. Mon, Wed, Fri      • warfarin (COUMADIN) 7.5 MG tablet Take 7.5 mg by mouth 4 (Four) Times a Week. Tues, Thur, Sat, Sun      • aspirin 81 MG tablet Take 1 tablet by mouth daily.   Taking   • B Complex Vitamins (VITAMIN B COMPLEX) tablet Take by mouth.   Taking   • ezetimibe (ZETIA) 10 MG tablet Take 1 tablet by mouth daily.   Taking   • furosemide (LASIX) 40 MG tablet Take 40 mg by mouth Daily.      • Liraglutide (VICTOZA) 18 MG/3ML solution pen-injector Inject 1.8 mg under the skin into the appropriate area as directed Daily.   Taking   • metFORMIN (GLUCOPHAGE) 500 MG tablet Take 1 tablet by mouth Every Evening.   Taking   • Multiple Vitamin (MULTI-VITAMIN) tablet Take 1 tablet by mouth daily.   Taking   • omeprazole (PriLOSEC) 20 MG capsule Take 1 capsule by mouth daily.   Taking   • valsartan (DIOVAN) 80 MG tablet Take 1 tablet by mouth Daily. PT TAKES 40MG DAILY    Taking   , Scheduled Meds:    aspirin 81 mg Oral Daily   atorvastatin 40 mg Oral Nightly   furosemide 40 mg Oral Daily   levothyroxine 112 mcg Oral Q AM   losartan 50 mg Oral Q24H   metFORMIN 500 mg Oral Q PM   metoprolol succinate XL 12.5 mg Oral Daily   pantoprazole 40 mg Oral Q AM   potassium chloride 20 mEq Oral Daily With Breakfast   sodium chloride 10 mL Intravenous Q12H   warfarin 5 mg Oral Once  per day on Mon Wed Fri   [START ON 12/5/2019] warfarin 7.5 mg Oral Once per day on Sun Tue Thu Sat   , Continuous Infusions:    Pharmacy to dose warfarin    , PRN Meds:  •  acetaminophen **OR** acetaminophen **OR** acetaminophen  •  aluminum-magnesium hydroxide-simethicone  •  bisacodyl  •  calcium carbonate  •  docusate sodium  •  magnesium hydroxide  •  melatonin  •  nitroglycerin  •  ondansetron **OR** ondansetron  •  Pharmacy to dose warfarin  •  [COMPLETED] Insert peripheral IV **AND** sodium chloride  •  sodium chloride and Allergies:  Patient has no known allergies.    Review of Systems  Pertinent items are noted in HPI    Objective     Vital Signs  Temp:  [98.3 °F (36.8 °C)-99.3 °F (37.4 °C)] 98.3 °F (36.8 °C)  Heart Rate:  [] 115  Resp:  [12-20] 17  BP: (119-143)/(53-82) 143/82    No intake/output data recorded.  I/O last 3 completed shifts:  In: 600 [Blood:600]  Out: -     Physical Exam:  GEN: Awake, NAD  ENT: PERRL, EOMI, MMM  NECK: Supple, no JVD  CHEST: CTAB, no W/R/C  CV: RRR, no M/G/R, no edema  ABD: Soft, NT, +BS  SKIN: Warm and Dry  NEURO: CN's intact    Results Review:   I reviewed the patient's new clinical results.    WBC WBC   Date Value Ref Range Status   12/04/2019 6.40 3.40 - 10.80 10*3/mm3 Final   12/03/2019 6.00 3.40 - 10.80 10*3/mm3 Final   12/03/2019 7.45 3.40 - 10.80 10*3/mm3 Final      HGB Hemoglobin   Date Value Ref Range Status   12/04/2019 7.3 (L) 12.0 - 15.9 g/dL Final   12/03/2019 6.3 (C) 12.0 - 15.9 g/dL Final   12/03/2019 6.1 (C) 12.0 - 15.9 g/dL Final      HCT Hematocrit   Date Value Ref Range Status   12/04/2019 24.6 (L) 34.0 - 46.6 % Final   12/03/2019 21.3 (L) 34.0 - 46.6 % Final   12/03/2019 22.2 (L) 34.0 - 46.6 % Final      Platlets No results found for: LABPLAT   MCV MCV   Date Value Ref Range Status   12/04/2019 63.3 (L) 79.0 - 97.0 fL Final     Comment:     Parameter reviewed in the past 30 days on 12/3/2019 .    12/03/2019 61.8 (L) 79.0 - 97.0 fL Final    12/03/2019 63.4 (L) 79.0 - 97.0 fL Final          Sodium Sodium   Date Value Ref Range Status   12/04/2019 141 136 - 145 mmol/L Final   12/03/2019 139 136 - 145 mmol/L Final      Potassium Potassium   Date Value Ref Range Status   12/04/2019 4.2 3.5 - 5.2 mmol/L Final   12/03/2019 5.0 3.5 - 5.2 mmol/L Final      Chloride Chloride   Date Value Ref Range Status   12/04/2019 104 98 - 107 mmol/L Final   12/03/2019 101 98 - 107 mmol/L Final      CO2 CO2   Date Value Ref Range Status   12/04/2019 24.0 22.0 - 29.0 mmol/L Final   12/03/2019 24.5 22.0 - 29.0 mmol/L Final      BUN BUN   Date Value Ref Range Status   12/04/2019 40 (H) 8 - 23 mg/dL Final   12/03/2019 40 (H) 8 - 23 mg/dL Final      Creatinine Creatinine   Date Value Ref Range Status   12/04/2019 1.76 (H) 0.57 - 1.00 mg/dL Final   12/03/2019 1.57 (H) 0.57 - 1.00 mg/dL Final      Calcium Calcium   Date Value Ref Range Status   12/04/2019 9.3 8.6 - 10.5 mg/dL Final   12/03/2019 9.6 8.6 - 10.5 mg/dL Final      PO4 No results found for: CAPO4   Albumin Albumin   Date Value Ref Range Status   12/03/2019 4.00 3.50 - 5.20 g/dL Final      Magnesium No results found for: MG   Uric Acid No results found for: URICACID           aspirin 81 mg Oral Daily   atorvastatin 40 mg Oral Nightly   furosemide 40 mg Oral Daily   levothyroxine 112 mcg Oral Q AM   losartan 50 mg Oral Q24H   metFORMIN 500 mg Oral Q PM   metoprolol succinate XL 12.5 mg Oral Daily   pantoprazole 40 mg Oral Q AM   potassium chloride 20 mEq Oral Daily With Breakfast   sodium chloride 10 mL Intravenous Q12H   warfarin 5 mg Oral Once per day on Mon Wed Fri   [START ON 12/5/2019] warfarin 7.5 mg Oral Once per day on Sun Tue Thu Sat       Pharmacy to dose warfarin        Assessment/Plan     1) ODETTE - Mild, likely pre-renal from severe anemia  2) CKD3 - Baseline Cr 1.3-1.4  3) Severe anemia - CKD vs ?hemolysis from mechanical valve vs GI losses on AC  4) Coronavirus  5) CAD - s/p CABG  6) S/P Mechanical Aortic  valve  7) HTN  8) DM2    PLAN: Cr 1.7 this AM.  Would hold Lasix and KCl for now.  S/P transfusion.  BP stable.  OK to continue ARB.  Anemia work-up per primary and heme (differential includes CKD, possible hemolysis from mechanical valve, possible GI loss in setting of chronic anticoagulation).  Continue to transfuse as indicated.  Will follow.        Ney Lai MD   [facility]  12/04/19  @NOW

## 2019-12-04 NOTE — NURSING NOTE
LAURY Chawla was called on the 7.3 hemoglobin and wants to let the doctor decide on another unit of prbc's.

## 2019-12-04 NOTE — CONSULTS
"GI CONSULT  NOTE:    Referring Provider:  Dr. Bennett    Chief complaint: Anemia    Subjective . \"  I have had worsening shortness of breath for 1 month\"    History of present illness: Patient is a pleasant 64-year-old female with a history of A. fib with cardioversion, chronic kidney disease, CHF and diabetes.  She presents with increased shortness of breath and edema for 1 month.  She is had gradually worsening dyspnea with activity and intermittent cough that worsened over the last few days.  She was seen by nephrology with reported hemoglobin of 6.1 and was admitted for further evaluation.  No chest pain, fevers or chills.  No nausea, vomiting, heartburn or difficulty swallowing.  She has had some recent increased gas discomfort.  She had some abdominal tenderness when her dog jumped on her abdomen recently but no tenderness at this time.  She takes stool softeners without much constipation but will skip a few days between bowel movements.  No diarrhea, melena or rectal bleeding.  She takes warfarin and INR 2.74 on admission, NSAID use is rare.  No unintentional weight loss.  She does report a history of anemia with occasional oral iron use at home.    Endo History:  Patient reports EGD and colonoscopy over 5 years ago by Dr. Worley with polyp but otherwise unremarkable    Past Medical History:  Past Medical History:   Diagnosis Date   • A-fib (CMS/HCC) Dx 2018    S/p Cardioversion by Dr. Spencer on 04/10/19   • Acute renal failure syndrome (CMS/HCC) 4/2019-[facility] IP   • Biatrial enlargement 04/10/2019    Noted on SHERIE   • Calcified granuloma of lung (CMS/HCC) 01/04/2019    Noted on Chest XR   • Cardiomegaly 01/04/2019    Borderline--Noted on Chest XR   • CHF (congestive heart failure) (CMS/HCC)    • CKD (chronic kidney disease), stage III (CMS/HCC)     Does Not See a Nephrologist   • Diabetes mellitus (CMS/HCC)    • DM (diabetes mellitus) (CMS/HCC)     T2   • Dysphagia 08/15-[facility] IP    Due to Pharyngitis   • GERD " (gastroesophageal reflux disease)    • History of chest x-ray 8/28/15-Located within [facility]    Normal   • History of chest x-ray 01/19/2019    Mild Pulmonary Edema w/Congestive Failure Noted   • History of chest x-ray 01/04/2019    Borderline Cardiomegaly    • History of EKG 2014/2018/2019-Located within [facility]   • Hx of diabetic neuropathy    • Hx of dizziness     w/Lightheadedness   • Hx of echocardiogram 4/16/18-Located within [facility]    EF 55-60%; Mild MVR; Mild TVR; Normal Root/No Effusion   • Hyperlipidemia     Controlled w/Meds   • Hypertension     Controlled w/Meds   • Hypokalemia 4/11/19-Located within [facility] IP   • Hypothyroidism     Controlled w/Meds   • Iron deficiency anemia    • Left posterior fascicular block 04/2018 & 4/19    Noted on EKG   • Lower extremity edema 03/2019   • Mild mitral valve regurgitation 04/16/2018    Noted on Echo   • Mild tricuspid valve regurgitation 04/16/2018    Noted on Echo   • Moderate mitral valve regurgitation 2008    S/p MVR in 08'   • Moderate tricuspid insufficiency 04/10/2019    Noted on SHERIE   • Osteoarthritis     Knees w/Hx Knee Repl   • Pulmonary edema 01/19/2019    Mild--Noted on Chest XR   • Rapid palpitations 04/15/18 & 8/9/18-Located within [facility] ER   • Renal dysfunction    • Right axis deviation 08/2018 & 4/19    Noted on EKG   • Severe aortic valve stenosis Since 2008    Hx AVR on 12/11/08 by Dr. Alvarado   • Severe mitral insufficiency 04/10/2019    Noted on SHERIE   • Sinus tachycardia 08/2018    Noted on EKG   • SOB (shortness of breath) chronic   • Torticollis Chronic   • Valvular heart disease     S/p AVR & MVR in 08'       Past Surgical History:  Past Surgical History:   Procedure Laterality Date   • AORTIC VALVE REPAIR/REPLACEMENT  12/11/08-[facility]    Using a #20 Eight Yr Mechanical [place] Prosthesis--Dewey Alvarado MD   • CARDIOVERSION  4/10/19-Located within [facility]    Dr. Spencer--For A-Fib   • HYSTERECTOMY     • MITRAL VALVE REPLACEMENT  2008   • MITRAL VALVE REPLACEMENT  05/09/2019    Dr Alvarado   • SHERIE  4/10/19-Located within [facility]    EF 40%; Moderate TVR; Severe Eccentric MI;  Biatrial Enlargement   • TOTAL KNEE ARTHROPLASTY  2017   • TRICUSPID VALVE SURGERY  05/09/2019    Dr Alvarado       Social History:  Social History     Tobacco Use   • Smoking status: Never Smoker   • Smokeless tobacco: Never Used   Substance Use Topics   • Alcohol use: No   • Drug use: No   Tobacco or alcohol use    Family History:  Family History   Problem Relation Age of Onset   • Heart disease Father    • Hypertension Father    • Stroke Father    • Diabetes Father    • Lung cancer Mother    No family history of esophagus, stomach or colon cancer    Medications:  Medications Prior to Admission   Medication Sig Dispense Refill Last Dose   • atorvastatin (LIPITOR) 40 MG tablet Take 40 mg by mouth Daily.      • cholecalciferol (VITAMIN D3) 10 MCG (400 UNIT) tablet Take 2,000 Units by mouth Daily.      • levothyroxine (SYNTHROID, LEVOTHROID) 112 MCG tablet Take 112 mcg by mouth Daily.      • metoprolol succinate XL (TOPROL-XL) 25 MG 24 hr tablet Take 12.5 mg by mouth Daily.      • potassium chloride (K-DUR,KLOR-CON) 20 MEQ CR tablet Take 20 mEq by mouth Daily.      • vitamin C (ASCORBIC ACID) 500 MG tablet Take 500 mg by mouth Daily.      • warfarin (COUMADIN) 5 MG tablet Take 5 mg by mouth 3 (Three) Times a Week. Mon, Wed, Fri      • warfarin (COUMADIN) 7.5 MG tablet Take 7.5 mg by mouth 4 (Four) Times a Week. Tues, Thur, Sat, Sun      • aspirin 81 MG tablet Take 1 tablet by mouth daily.   Taking   • B Complex Vitamins (VITAMIN B COMPLEX) tablet Take by mouth.   Taking   • ezetimibe (ZETIA) 10 MG tablet Take 1 tablet by mouth daily.   Taking   • furosemide (LASIX) 40 MG tablet Take 40 mg by mouth Daily.      • Liraglutide (VICTOZA) 18 MG/3ML solution pen-injector Inject 1.8 mg under the skin into the appropriate area as directed Daily.   Taking   • metFORMIN (GLUCOPHAGE) 500 MG tablet Take 1 tablet by mouth Every Evening.   Taking   • Multiple Vitamin (MULTI-VITAMIN) tablet Take 1 tablet by mouth daily.   Taking   •  omeprazole (PriLOSEC) 20 MG capsule Take 1 capsule by mouth daily.   Taking   • valsartan (DIOVAN) 80 MG tablet Take 1 tablet by mouth Daily. PT TAKES 40MG DAILY    Taking       Scheduled Meds:  aspirin 81 mg Oral Daily   atorvastatin 40 mg Oral Nightly   iron sucrose 200 mg Intravenous Daily   levothyroxine 112 mcg Oral Q AM   losartan 50 mg Oral Q24H   metFORMIN 500 mg Oral Q PM   metoprolol succinate XL 12.5 mg Oral Daily   pantoprazole 40 mg Oral Q AM   PEG-KCl-NaCl-NaSulf-Na Asc-C 1,000 mL Oral Once   sodium chloride 10 mL Intravenous Q12H   vitamin K1 2.5 mg Oral Once     Continuous Infusions:  Pharmacy to dose warfarin      PRN Meds:.•  acetaminophen **OR** acetaminophen **OR** acetaminophen  •  aluminum-magnesium hydroxide-simethicone  •  bisacodyl  •  calcium carbonate  •  docusate sodium  •  magnesium hydroxide  •  melatonin  •  nitroglycerin  •  ondansetron **OR** ondansetron  •  Pharmacy to dose warfarin  •  [COMPLETED] Insert peripheral IV **AND** sodium chloride  •  sodium chloride    ALLERGIES:  Patient has no known allergies.    ROS:  Review of Systems   Constitutional: Negative for chills, fever and unexpected weight change.   HENT: Negative for nosebleeds and trouble swallowing.    Respiratory: Positive for cough and shortness of breath.    Cardiovascular: Negative for chest pain and palpitations.   Gastrointestinal: Negative for abdominal pain, blood in stool, constipation, diarrhea, nausea and vomiting.   Genitourinary: Negative for dysuria and hematuria.   Musculoskeletal: Negative for gait problem and joint swelling.   Skin: Negative for color change and rash.   Neurological: Negative for syncope and headaches.   Psychiatric/Behavioral: Negative for agitation and confusion.       Objective Resting in bed, no acute distress, no family present    Vital Signs:   Temp:  [98.3 °F (36.8 °C)-99.3 °F (37.4 °C)] 98.4 °F (36.9 °C)  Heart Rate:  [] 88  Resp:  [12-20] 16  BP: (119-143)/(53-82)  120/76    Physical Exam:      General Appearance:    Awake and alert, in no acute distress, obese   Head:    Normocephalic, without obvious abnormality, atraumatic   Eyes:            Conjunctivae normal, anicteric sclera   Ears:    Ears appear intact with no abnormalities noted   Throat:   No oral lesions, no thrush, oral mucosa moist   Neck:   supple,  no JVD   Lungs:     Clear to auscultation bilaterally, respirations regular, even and unlabored, nonproductive cough noted    Heart:    Regular rhythm and normal rate, normal S1 and S2   Chest Wall:    No abnormalities observed   Abdomen:     Normal bowel sounds, soft, non-tender, no rebound or guarding, non-distended   Rectal:     Deferred   Extremities:   Moves all extremities well, no edema, no cyanosis, no             redness   Pulses:   Pulses palpable and equal bilaterally   Skin:   No bleeding, bruising or rash, no jaundice       Neurologic:   Cranial nerves 2 - 12 grossly intact, no asterixis, sensation intact       Results Review:   I reviewed the patient's labs and imaging.  Lab Results (last 24 hours)     Procedure Component Value Units Date/Time    Lactate Dehydrogenase [[id number]]  (Abnormal) Collected:  12/04/19 0328    Specimen:  Blood Updated:  12/04/19 1218      U/L     Folate [[id number]]  (Normal) Collected:  12/04/19 0328    Specimen:  Blood Updated:  12/04/19 1125     Folate 18.60 ng/mL     Vitamin B12 [[id number]]  (Abnormal) Collected:  12/04/19 0328    Specimen:  Blood Updated:  12/04/19 1125     Vitamin B-12 947 pg/mL     Reticulocytes [[id number]]  (Abnormal) Collected:  12/04/19 0328    Specimen:  Blood Updated:  12/04/19 1037     Reticulocyte % 2.33 %      Reticulocyte Absolute 0.0883 10*6/mm3     Comprehensive Metabolic Panel [[id number]]  (Abnormal) Collected:  12/04/19 0328    Specimen:  Blood Updated:  12/04/19 1030     Glucose 191 mg/dL      BUN 40 mg/dL      Creatinine 1.83 mg/dL      Sodium 141 mmol/L      Potassium 4.3 mmol/L       Chloride 103 mmol/L      CO2 22.0 mmol/L      Calcium 9.0 mg/dL      Total Protein 6.6 g/dL      Albumin 3.80 g/dL      ALT (SGPT) 18 U/L      AST (SGOT) 26 U/L      Alkaline Phosphatase 130 U/L      Total Bilirubin 1.1 mg/dL      eGFR Non African Amer 28 mL/min/1.73      Globulin 2.8 gm/dL      A/G Ratio 1.4 g/dL      BUN/Creatinine Ratio 21.9     Anion Gap 16.0 mmol/L     Narrative:       GFR Normal >60  Chronic Kidney Disease <60  Kidney Failure <15    Bilirubin, Total & Direct [[id number]]  (Abnormal) Collected:  12/04/19 0328    Specimen:  Blood Updated:  12/04/19 1030     Total Bilirubin 1.1 mg/dL      Bilirubin, Direct 0.4 mg/dL      Bilirubin, Indirect 0.7 mg/dL     Haptoglobin [[id number]] Collected:  12/03/19 1224    Specimen:  Blood Updated:  12/04/19 1027    Protein Electrophoresis, Total [[id number]] Collected:  12/04/19 1006    Specimen:  Blood Updated:  12/04/19 1015    Basic Metabolic Panel [[id number]]  (Abnormal) Collected:  12/04/19 0328    Specimen:  Blood Updated:  12/04/19 0454     Glucose 188 mg/dL      Comment: Result checked         BUN 40 mg/dL      Creatinine 1.76 mg/dL      Sodium 141 mmol/L      Potassium 4.2 mmol/L      Chloride 104 mmol/L      CO2 24.0 mmol/L      Calcium 9.3 mg/dL      eGFR Non African Amer 29 mL/min/1.73      BUN/Creatinine Ratio 22.7     Anion Gap 13.0 mmol/L     Narrative:       GFR Normal >60  Chronic Kidney Disease <60  Kidney Failure <15    Ferritin [[id number]]  (Normal) Collected:  12/04/19 0328    Specimen:  Blood Updated:  12/04/19 0445     Ferritin 21.20 ng/mL     Iron [[id number]]  (Abnormal) Collected:  12/04/19 0328    Specimen:  Blood Updated:  12/04/19 0443     Iron 24 mcg/dL     Protime-INR [[id number]]  (Normal) Collected:  12/04/19 0328    Specimen:  Blood Updated:  12/04/19 0436     Protime 23.0 Seconds      INR 2.43    CBC & Differential [[id number]] Collected:  12/04/19 0328    Specimen:  Blood Updated:  12/04/19 0427    Narrative:       The  following orders were created for panel order CBC & Differential.  Procedure                               Abnormality         Status                     ---------                               -----------         ------                     CBC Auto Differential[[id number]]        Abnormal            Final result                 Please view results for these tests on the individual orders.    CBC Auto Differential [[id number]]  (Abnormal) Collected:  12/04/19 0328    Specimen:  Blood Updated:  12/04/19 0427     WBC 6.40 10*3/mm3      RBC 3.89 10*6/mm3      Hemoglobin 7.3 g/dL      Hematocrit 24.6 %      MCV 63.3 fL      Comment: Parameter reviewed in the past 30 days on 12/3/2019 .         MCH 18.7 pg      MCHC 29.5 g/dL      RDW 21.7 %      RDW-SD 49.0 fl      MPV 8.6 fL      Platelets 306 10*3/mm3      Neutrophil % 74.8 %      Lymphocyte % 10.8 %      Monocyte % 10.6 %      Eosinophil % 3.0 %      Basophil % 0.8 %      Neutrophils, Absolute 4.80 10*3/mm3      Lymphocytes, Absolute 0.70 10*3/mm3      Monocytes, Absolute 0.70 10*3/mm3      Eosinophils, Absolute 0.20 10*3/mm3      Basophils, Absolute 0.10 10*3/mm3      nRBC 0.1 /100 WBC     Respiratory Panel, PCR - Swab, Nasopharynx [[id number]]  (Abnormal) Collected:  12/03/19 2041    Specimen:  Swab from Nasopharynx Updated:  12/03/19 2204     ADENOVIRUS, PCR Not Detected     Coronavirus 229E Not Detected     Coronavirus HKU1 Detected     Coronavirus NL63 Not Detected     Coronavirus OC43 Not Detected     Human Metapneumovirus Not Detected     Human Rhinovirus/Enterovirus Not Detected     Influenza B PCR Not Detected     Parainfluenza Virus 1 Not Detected     Parainfluenza Virus 2 Not Detected     Parainfluenza Virus 3 Not Detected     Parainfluenza Virus 4 Not Detected     Bordetella pertussis pcr Not Detected     Influenza A H1 2009 PCR Not Detected     Chlamydophila pneumoniae PCR Not Detected     Mycoplasma pneumo by PCR Not Detected     Influenza A PCR Not  Detected     Influenza A H3 Not Detected     Influenza A H1 Not Detected     RSV, PCR Not Detected    Protime-INR [[id number]]  (Normal) Collected:  12/03/19 2137    Specimen:  Blood Updated:  12/03/19 2150     Protime 25.9 Seconds      INR 2.74    CBC & Differential [[id number]] Collected:  12/03/19 2056    Specimen:  Blood Updated:  12/03/19 2141    Narrative:       The following orders were created for panel order CBC & Differential.  Procedure                               Abnormality         Status                     ---------                               -----------         ------                     CBC Auto Differential[[id number]]        Abnormal            Final result                 Please view results for these tests on the individual orders.    CBC Auto Differential [[id number]]  (Abnormal) Collected:  12/03/19 2056    Specimen:  Blood Updated:  12/03/19 2141     WBC 6.00 10*3/mm3      RBC 3.44 10*6/mm3      Hemoglobin 6.3 g/dL      Hematocrit 21.3 %      MCV 61.8 fL      MCH 18.3 pg      MCHC 29.7 g/dL      RDW 20.5 %      RDW-SD 44.6 fl      MPV 8.5 fL      Platelets 343 10*3/mm3     Scan Slide [[id number]] Collected:  12/03/19 2056    Specimen:  Blood Updated:  12/03/19 2141     Scan Slide --     Comment: See Manual Differential Results       Manual Differential [[id number]]  (Abnormal) Collected:  12/03/19 2056    Specimen:  Blood Updated:  12/03/19 2141     Neutrophil % 72.0 %      Lymphocyte % 18.0 %      Monocyte % 2.0 %      Eosinophil % 5.0 %      Basophil % 3.0 %      Neutrophils Absolute 4.32 10*3/mm3      Lymphocytes Absolute 1.08 10*3/mm3      Monocytes Absolute 0.12 10*3/mm3      Eosinophils Absolute 0.30 10*3/mm3      Basophils Absolute 0.18 10*3/mm3      Acanthocytes Large/3+     Anisocytosis Large/3+     Elliptocytes Mod/2+     Hypochromia Slight/1+     Microcytes Mod/2+     Schistocytes Large/3+     WBC Morphology Normal     Giant Platelets Slight/1+          Imaging Results (Last 24  Hours)     Procedure Component Value Units Date/Time    XR Chest 1 View [[id number]] Collected:  12/03/19 2046     Updated:  12/03/19 2102    Narrative:       XR CHEST 1 VW-     Date of Exam: 12/3/2019 8:22 PM     Indication: cough; D64.9-Anemia, unspecified     Comparison: None available.     Technique: 1 view(s) of the chest were obtained.     FINDINGS: The lungs are well expanded. Cardiac, hilar and  mediastinal silhouettes are stable with cardiomegaly, prosthetic cardiac  valves and sternotomy wires. No pneumothorax or focal pulmonary  parenchymal opacity. There is blunting of the left costophrenic angle  suggesting a small pleural effusion Scattered calcified granulomas are  present. Pulmonary vascularity is within normal limits. The trachea is  midline. Visualized bony structures are intact.       Impression:       Small left pleural effusion is suspected. Otherwise stable appearance of  the chest as described above. No acute infiltrates.        Electronically Signed By-Darion Garces DO. On:12/3/2019 9:00 PM  This report was finalized on [id number] by  Darion Garces DO..             ASSESSMENT:    Microcytic anemia/iron deficiency  Mechanical (AVR/MVR) valves -on warfarin  Coronavirus  ODETTE on chronic kidney disease  Diabetes with neuropathy  History of CHF/A. fib with cardioversion/CABG    PLAN:    Patient admitted with anemia on warfarin.  Hemoglobin 7.3 after 1 unit.  INR 2.4.  Hematology following and she was given Venofer.  We will plan EGD and colonoscopy evaluation in a.m. to rule out GI source for anemia.  Give FFP and vitamin K x1.  Will need anticoagulation restarted secondary to mechanical valve.  Mildly elevated alk phos noted, repeat in a.m. and consider evaluation if remains elevated.  Continue supportive care and further recommendations to follow endoscopy findings.    I discussed the patients findings and my recommendations with the patient.  Breanna Helms, TIM  12/04/19  3:54  PM

## 2019-12-04 NOTE — ED PROVIDER NOTES
Subjective   Context:   64-year-old female patient presents the ER with complaint of decreased hemoglobin; patient reports she was sent in by her renal doctor for evaluation of decreased hemoglobin; the patient reports that she has had no unusual bruising, denies trauma or injury, no recent surgery, reports no dark or tarry stools.  The patient states that he has had a cough over the past 2 weeks but denies fever or chills, reports no anginal equivalent chest pain tachycardia or irregular heartbeat.  Patient states that she has had some weakness but denies dizziness, syncope or near syncopal episode.  No abdominal pain nausea or vomiting.      Location:  Denies, no complaints of pain  Quality:  Timing:  Duration:   Aggravating:  Alleviating:    Renal:  Selvin            Review of Systems   Constitutional: Negative for chills, fatigue and fever.   HENT: Negative for congestion, ear discharge, ear pain, mouth sores, sore throat, trouble swallowing and voice change.    Eyes: Negative for photophobia, pain, discharge and visual disturbance.   Respiratory: Negative for cough, chest tightness and shortness of breath.    Cardiovascular: Negative for chest pain, palpitations and leg swelling.   Gastrointestinal: Negative for abdominal pain, diarrhea, nausea and vomiting.   Genitourinary: Negative for decreased urine volume, difficulty urinating, dysuria, flank pain, hematuria and urgency.   Musculoskeletal: Negative for arthralgias, back pain, myalgias, neck pain and neck stiffness.   Skin: Negative for color change, pallor, rash and wound.   Neurological: Negative for dizziness, weakness, light-headedness, numbness and headaches.   Hematological: Negative for adenopathy. Bruises/bleeds easily.        Coumadin therapy     Prior to Admission medications    Medication Sig Start Date End Date Taking? Authorizing Provider   aspirin 81 MG tablet Take 1 tablet by mouth daily. 9/23/12   Provider, Historical, MD   B Complex  Vitamins (VITAMIN B COMPLEX) tablet Take by mouth. 3/18/15   Sandip Ramírez MD   ezetimibe (ZETIA) 10 MG tablet Take 1 tablet by mouth daily. 9/26/12   Sandip Ramírez MD   ferrous sulfate 325 (65 FE) MG tablet Take by mouth. 3/18/15   Sandip Ramírez MD   furosemide (LASIX) 40 MG tablet Take 40 mg by mouth Daily.    Sandip Ramírez MD   levothyroxine (SYNTHROID, LEVOTHROID) 150 MCG tablet Take 150 mcg by mouth daily. \ 9/23/12   Sandip Ramírez MD   Liraglutide (VICTOZA) 18 MG/3ML solution pen-injector Inject under the skin. 3/18/15   Sandip Ramírez MD   metFORMIN (GLUCOPHAGE) 500 MG tablet Take 1 tablet by mouth daily with breakfast. 3/18/15   Sandip Ramírez MD   metoprolol succinate XL (TOPROL-XL) 25 MG 24 hr tablet 1/2 tablet daily 10/23/19   Roldan Solano MD   Multiple Vitamin (MULTI-VITAMIN) tablet Take 1 tablet by mouth daily. 9/23/12   Sandip Ramírez MD   omeprazole (PriLOSEC) 20 MG capsule Take 1 capsule by mouth daily. 3/18/15   Sandip Ramírez MD   simvastatin (ZOCOR) 20 MG tablet Take 1 tablet by mouth daily. 9/26/12   Sandip Ramírez MD   valsartan (DIOVAN) 80 MG tablet Take 1 tablet by mouth Daily. PT TAKES 40MG DAILY  9/23/12   Sandip Ramírez MD   warfarin (COUMADIN) 5 MG tablet TAKE ONE TABLET BY MOUTH ON MONDAY, WEDNESDAY, FRIDAY. TAKE ONE AND ONE-HALF (1.5) TABLET BY MOUTH ALL OTHER DAYS OR AS DIRECTED 12/3/19   Roldan Solano MD   warfarin (COUMADIN) 5 MG tablet TAKE ONE TABLET BY MOUTH ON MONDAY, WEDNESDAY, FRIDAY. TAKE ONE AND ONE-HALF (1.5) TABLET BY MOUTH ALL OTHER DAYS OR AS DIRECTED 9/13/19 12/3/19  Roldan Solano MD         Past Medical History:   Diagnosis Date   • A-fib (CMS/Formerly [facility]) Dx 2018    S/p Cardioversion by Dr. Spencer on 04/10/19   • Acute renal failure syndrome (CMS/Formerly [facility]) 4/2019-[facility] IP   • Biatrial enlargement 04/10/2019    Noted on SHERIE   • Calcified granuloma of lung (CMS/HCC)  01/04/2019    Noted on Chest XR   • Cardiomegaly 01/04/2019    Borderline--Noted on Chest XR   • CHF (congestive heart failure) (CMS/Formerly [facility])    • CKD (chronic kidney disease), stage III (CMS/Formerly [facility])     Does Not See a Nephrologist   • Diabetes mellitus (CMS/Formerly [facility])    • DM (diabetes mellitus) (CMS/Formerly [facility])     T2   • Dysphagia 08/15-[facility] IP    Due to Pharyngitis   • GERD (gastroesophageal reflux disease)    • History of chest x-ray 8/28/15-[facility]    Normal   • History of chest x-ray 01/19/2019    Mild Pulmonary Edema w/Congestive Failure Noted   • History of chest x-ray 01/04/2019    Borderline Cardiomegaly    • History of EKG 2014/2018/2019-[facility]   • Hx of diabetic neuropathy    • Hx of dizziness     w/Lightheadedness   • Hx of echocardiogram 4/16/18-[facility]    EF 55-60%; Mild MVR; Mild TVR; Normal Root/No Effusion   • Hyperlipidemia     Controlled w/Meds   • Hypertension     Controlled w/Meds   • Hypokalemia 4/11/19-[facility] IP   • Hypothyroidism     Controlled w/Meds   • Iron deficiency anemia    • Left posterior fascicular block 04/2018 & 4/19    Noted on EKG   • Lower extremity edema 03/2019   • Mild mitral valve regurgitation 04/16/2018    Noted on Echo   • Mild tricuspid valve regurgitation 04/16/2018    Noted on Echo   • Moderate mitral valve regurgitation 2008    S/p MVR in 08'   • Moderate tricuspid insufficiency 04/10/2019    Noted on SHERIE   • Osteoarthritis     Knees w/Hx Knee Repl   • Pulmonary edema 01/19/2019    Mild--Noted on Chest XR   • Rapid palpitations 04/15/18 & 8/9/18-[facility] ER   • Renal dysfunction    • Right axis deviation 08/2018 & 4/19    Noted on EKG   • Severe aortic valve stenosis Since 2008    Hx AVR on 12/11/08 by Dr. Alvaraod   • Severe mitral insufficiency 04/10/2019    Noted on SHERIE   • Sinus tachycardia 08/2018    Noted on EKG   • SOB (shortness of breath) chronic   • Torticollis Chronic   • Valvular heart disease     S/p AVR & MVR in 08'       No Known Allergies    Past Surgical History:   Procedure Laterality Date    • AORTIC VALVE REPAIR/REPLACEMENT  12/11/08-[facility]    Using a #20 Eight Yr Mechanical [place] Prosthesis--Dewey Alvarado MD   • CARDIOVERSION  4/10/19-[facility]    Dr. Spencer--For A-Fib   • HYSTERECTOMY     • MITRAL VALVE REPLACEMENT  2008   • MITRAL VALVE REPLACEMENT  05/09/2019    Dr Alvarado   • SHERIE  4/10/19-[facility]    EF 40%; Moderate TVR; Severe Eccentric MI; Biatrial Enlargement   • TOTAL KNEE ARTHROPLASTY  2017   • TRICUSPID VALVE SURGERY  05/09/2019    Dr Alvarado       Family History   Problem Relation Age of Onset   • Heart disease Father    • Hypertension Father    • Stroke Father    • Diabetes Father    • Lung cancer Mother        Social History     Socioeconomic History   • Marital status: Single     Spouse name: Not on file   • Number of children: Not on file   • Years of education: Not on file   • Highest education level: Not on file   Tobacco Use   • Smoking status: Never Smoker   • Smokeless tobacco: Never Used   Substance and Sexual Activity   • Alcohol use: No   • Drug use: No   • Sexual activity: Defer     Birth control/protection: Surgical     Comment: [facility]           Objective   Physical Exam       Vital signs and triage nurse note reviewed.   Constitutional: Awake, alert; older but healthy appearing 64-year-old female patient who is well-developed and well-nourished. No acute distress is noted.   HEENT: Normocephalic, atraumatic; pupils are PERRL with intact EOM; oropharynx is pink and moist without exudate or erythema.   Neck: Supple, full range of motion without pain; no cervical lymphadenopathy.   Cardiovascular: Regular rate and rhythm, normal S1-S2.  Pulses are symmetrical   Pulmonary: Respiratory effort regular nonlabored, breath sounds clear to auscultation all fields.   Abdomen: Soft, nontender, rounded but nondistended with normoactive bowel sounds; no rebound or guarding.  Anal folds are flat with no erythema no active discharge or bleeding, digital examination is negative for palpable masses, stool  is heme negative   Musculoskeletal: Independent range of motion of all extremities with no palpable tenderness or edema.   Neuro: Alert oriented x3, speech is clear and appropriate, GCS 15   Skin:  Fleshtone warm, dry, intact; no erythematous or petechial rash or lesion; no unusual or patterned bruising    Procedures         ECG 12 Lead   Preliminary Result   HEART RATE= 103  bpm   RR Interval= 584  ms   LA Interval= 161  ms   P Horizontal Axis= 22  deg   P Front Axis= 67  deg   QRSD Interval= 75  ms   QT Interval= 355  ms   QRS Axis= 120  deg   T Wave Axis= 47  deg   - OTHERWISE NORMAL ECG -   Sinus tachycardia   Right axis deviation   When compared with ECG of 13-May-2019 13:13:28,   Significant rate increase   Significant repolarization change   Electronically Signed By:    Date and Time of Study: 2019-12-03 19:09:21        Viewed by me, viewed and interpreted by Dr Bobo: Agree with above    ED Course        Xr Chest 1 View    Result Date: 12/3/2019  Small left pleural effusion is suspected. Otherwise stable appearance of the chest as described above. No acute infiltrates.   Electronically Signed By-Darion Garces DO. On:12/3/2019 9:00 PM This report was finalized on [id number] by  Darion Garces DO..    Results for orders placed or performed during the hospital encounter of 12/03/19   Respiratory Panel, PCR - Swab, Nasopharynx   Result Value Ref Range    ADENOVIRUS, PCR Not Detected Not Detected    Coronavirus 229E Not Detected Not Detected    Coronavirus HKU1 Detected (A) Not Detected    Coronavirus NL63 Not Detected Not Detected    Coronavirus OC43 Not Detected Not Detected    Human Metapneumovirus Not Detected Not Detected    Human Rhinovirus/Enterovirus Not Detected Not Detected    Influenza B PCR Not Detected Not Detected    Parainfluenza Virus 1 Not Detected Not Detected    Parainfluenza Virus 2 Not Detected Not Detected    Parainfluenza Virus 3 Not Detected Not Detected    Parainfluenza Virus 4 Not  Detected Not Detected    Bordetella pertussis pcr Not Detected Not Detected    Influenza A H1 2009 PCR Not Detected Not Detected    Chlamydophila pneumoniae PCR Not Detected Not Detected    Mycoplasma pneumo by PCR Not Detected Not Detected    Influenza A PCR Not Detected Not Detected    Influenza A H3 Not Detected Not Detected    Influenza A H1 Not Detected Not Detected    RSV, PCR Not Detected Not Detected   Protime-INR   Result Value Ref Range    Protime 25.9 19.4 - 28.5 Seconds    INR 2.74 2.00 - 3.00   CBC Auto Differential   Result Value Ref Range    WBC 6.00 3.40 - 10.80 10*3/mm3    RBC 3.44 (L) 3.77 - 5.28 10*6/mm3    Hemoglobin 6.3 (C) 12.0 - 15.9 g/dL    Hematocrit 21.3 (L) 34.0 - 46.6 %    MCV 61.8 (L) 79.0 - 97.0 fL    MCH 18.3 (L) 26.6 - 33.0 pg    MCHC 29.7 (L) 31.5 - 35.7 g/dL    RDW 20.5 (H) 12.3 - 15.4 %    RDW-SD 44.6 37.0 - 54.0 fl    MPV 8.5 6.0 - 12.0 fL    Platelets 343 140 - 450 10*3/mm3   Scan Slide   Result Value Ref Range    Scan Slide     Type & Screen   Result Value Ref Range    ABO Type O     RH type Positive     Antibody Screen Negative     T&S Expiration Date 12/6/2019 11:59:59 PM    Prepare RBC, 1 Units   Result Value Ref Range    Product Code [id number]     Unit Number [id number]     UNIT  ABO O     UNIT  RH POS     Dispense Status IS     Blood Type \[facility]\""     Blood Expiration Date [id number]     Blood Type Barcode 5100    Manual Differential   Result Value Ref Range    Neutrophil % 72.0 42.7 - 76.0 %    Lymphocyte % 18.0 (L) 19.6 - 45.3 %    Monocyte % 2.0 (L) 5.0 - 12.0 %    Eosinophil % 5.0 0.3 - 6.2 %    Basophil % 3.0 (H) 0.0 - 1.5 %    Neutrophils Absolute 4.32 1.70 - 7.00 10*3/mm3    Lymphocytes Absolute 1.08 0.70 - 3.10 10*3/mm3    Monocytes Absolute 0.12 0.10 - 0.90 10*3/mm3    Eosinophils Absolute 0.30 0.00 - 0.40 10*3/mm3    Basophils Absolute 0.18 0.00 - 0.20 10*3/mm3    Acanthocytes Large/3+ None Seen    Anisocytosis Large/3+ None Seen    Elliptocytes Mod/2+ None Seen  "   Hypochromia Slight/1+ None Seen    Microcytes Mod/2+ None Seen    Schistocytes Large/3+ None Seen    WBC Morphology Normal Normal    Giant Platelets Slight/1+ None Seen     Medications   sodium chloride 0.9 % flush 10 mL (not administered)     /58   Pulse 100   Temp 99.3 °F (37.4 °C) (Oral)   Resp 20   Ht 157.5 cm (62\")   Wt 83.9 kg (185 lb)   SpO2 99%   BMI 33.84 kg/m²             MDM  Number of Diagnoses or Management Options  Anemia, unspecified type:      Amount and/or Complexity of Data Reviewed  Clinical lab tests: ordered and reviewed  Tests in the radiology section of CPT®: ordered and reviewed  Review and summarize past medical records: yes (I reviewed labs obtained today; patient's BUN and creatinine is 40/1.5, earlier hemoglobin hematocrit 6/22 with WBC 7.4, platelets 339; prior EKG reviewed)  Discuss the patient with other providers: (Hospitalist for admission)    Risk of Complications, Morbidity, and/or Mortality  General comments: Comorbidities:  Past medical history, allergies, current medications family history social history noted above    Review and summarization of lab specimens, radiology:  ED tests reviewed by me    Differentials: Specified anemia, GI bleeding, anemia of chronic disease, electrolyte imbalance, dehydration, Coumadin toxicity; this list is not all inclusive and does not constitute the entirety of considered causes              Repeat examination, findings are reviewed with patient with recommendation made for admission the hospital for 1 unit PRBCs which was ordered in the ED.  She voices understanding and agrees.  Admitted to the hospitalist service per hospitalist APRN for further evaluation and treatment of anemia.    Final diagnoses:   Anemia, unspecified type   Weakness   Cough   Pleural effusion on left              Ana Betancur NP  12/03/19 2328    "

## 2019-12-04 NOTE — ED NOTES
Patient is being transferred by nurse with blood still infusing. Floor nurse notified     Dodie Ramirez LPN  12/04/19 0112

## 2019-12-05 ENCOUNTER — ANESTHESIA EVENT (OUTPATIENT)
Dept: GASTROENTEROLOGY | Facility: HOSPITAL | Age: 64
End: 2019-12-05

## 2019-12-05 ENCOUNTER — ANESTHESIA (OUTPATIENT)
Dept: GASTROENTEROLOGY | Facility: HOSPITAL | Age: 64
End: 2019-12-05

## 2019-12-05 ENCOUNTER — INPATIENT HOSPITAL (OUTPATIENT)
Dept: URBAN - METROPOLITAN AREA HOSPITAL 84 | Facility: HOSPITAL | Age: 64
End: 2019-12-05
Payer: MEDICARE

## 2019-12-05 VITALS — SYSTOLIC BLOOD PRESSURE: 82 MMHG | HEART RATE: 82 BPM | OXYGEN SATURATION: 88 % | DIASTOLIC BLOOD PRESSURE: 39 MMHG

## 2019-12-05 DIAGNOSIS — N18.3 CHRONIC KIDNEY DISEASE, STAGE 3 (MODERATE): ICD-10-CM

## 2019-12-05 DIAGNOSIS — K57.30 DIVERTICULOSIS OF LARGE INTESTINE WITHOUT PERFORATION OR ABS: ICD-10-CM

## 2019-12-05 DIAGNOSIS — D63.1 ANEMIA IN CHRONIC KIDNEY DISEASE: ICD-10-CM

## 2019-12-05 LAB
ABO + RH BLD: NORMAL
ALBUMIN SERPL-MCNC: 3.2 G/DL (ref 2.9–4.4)
ALBUMIN SERPL-MCNC: 3.5 G/DL (ref 3.5–5.2)
ALBUMIN/GLOB SERPL: 1.1 {RATIO} (ref 0.7–1.7)
ALBUMIN/GLOB SERPL: 1.3 G/DL
ALP SERPL-CCNC: 117 U/L (ref 39–117)
ALPHA1 GLOB FLD ELPH-MCNC: 0.3 G/DL (ref 0–0.4)
ALPHA2 GLOB SERPL ELPH-MCNC: 0.6 G/DL (ref 0.4–1)
ALT SERPL W P-5'-P-CCNC: 18 U/L (ref 1–33)
ANION GAP SERPL CALCULATED.3IONS-SCNC: 14 MMOL/L (ref 5–15)
AST SERPL-CCNC: 26 U/L (ref 1–32)
B-GLOBULIN SERPL ELPH-MCNC: 1 G/DL (ref 0.7–1.3)
BASOPHILS # BLD AUTO: 0 10*3/MM3 (ref 0–0.2)
BASOPHILS NFR BLD AUTO: 0.7 % (ref 0–1.5)
BH BB BLOOD EXPIRATION DATE: NORMAL
BH BB BLOOD TYPE BARCODE: 5100
BH BB DISPENSE STATUS: NORMAL
BH BB PRODUCT CODE: NORMAL
BH BB UNIT NUMBER: NORMAL
BILIRUB SERPL-MCNC: 1.5 MG/DL (ref 0.2–1.2)
BUN BLD-MCNC: 32 MG/DL (ref 8–23)
BUN/CREAT SERPL: 18.7 (ref 7–25)
CALCIUM SPEC-SCNC: 9.1 MG/DL (ref 8.6–10.5)
CHLORIDE SERPL-SCNC: 104 MMOL/L (ref 98–107)
CO2 SERPL-SCNC: 23 MMOL/L (ref 22–29)
CREAT BLD-MCNC: 1.71 MG/DL (ref 0.57–1)
DEPRECATED RDW RBC AUTO: 50.3 FL (ref 37–54)
EOSINOPHIL # BLD AUTO: 0.2 10*3/MM3 (ref 0–0.4)
EOSINOPHIL NFR BLD AUTO: 4 % (ref 0.3–6.2)
ERYTHROCYTE [DISTWIDTH] IN BLOOD BY AUTOMATED COUNT: 22.1 % (ref 12.3–15.4)
GAMMA GLOB SERPL ELPH-MCNC: 0.8 G/DL (ref 0.4–1.8)
GFR SERPL CREATININE-BSD FRML MDRD: 30 ML/MIN/1.73
GLOBULIN SER CALC-MCNC: 2.8 G/DL (ref 2.2–3.9)
GLOBULIN UR ELPH-MCNC: 2.8 GM/DL
GLUCOSE BLD-MCNC: 136 MG/DL (ref 65–99)
GLUCOSE BLDC GLUCOMTR-MCNC: 120 MG/DL (ref 70–105)
HCT VFR BLD AUTO: 22.3 % (ref 34–46.6)
HCT VFR BLD AUTO: 26.9 % (ref 34–46.6)
HGB BLD-MCNC: 6.6 G/DL (ref 12–15.9)
HGB BLD-MCNC: 8.1 G/DL (ref 12–15.9)
INR PPP: 1.66 (ref 2–3)
LYMPHOCYTES # BLD AUTO: 0.8 10*3/MM3 (ref 0.7–3.1)
LYMPHOCYTES NFR BLD AUTO: 12.8 % (ref 19.6–45.3)
Lab: NORMAL
M-SPIKE: NORMAL G/DL
MAGNESIUM SERPL-MCNC: 2.1 MG/DL (ref 1.6–2.4)
MCH RBC QN AUTO: 18.9 PG (ref 26.6–33)
MCHC RBC AUTO-ENTMCNC: 29.5 G/DL (ref 31.5–35.7)
MCV RBC AUTO: 64.2 FL (ref 79–97)
MONOCYTES # BLD AUTO: 0.8 10*3/MM3 (ref 0.1–0.9)
MONOCYTES NFR BLD AUTO: 12.6 % (ref 5–12)
NEUTROPHILS # BLD AUTO: 4.2 10*3/MM3 (ref 1.7–7)
NEUTROPHILS NFR BLD AUTO: 69.9 % (ref 42.7–76)
NRBC BLD AUTO-RTO: 0.1 /100 WBC (ref 0–0.2)
PHOSPHATE SERPL-MCNC: 2.9 MG/DL (ref 2.5–4.5)
PLATELET # BLD AUTO: 272 10*3/MM3 (ref 140–450)
PMV BLD AUTO: 8.5 FL (ref 6–12)
POTASSIUM BLD-SCNC: 4.2 MMOL/L (ref 3.5–5.2)
PROT SERPL-MCNC: 6 G/DL (ref 6–8.5)
PROT SERPL-MCNC: 6.3 G/DL (ref 6–8.5)
PROTHROMBIN TIME: 16.2 SECONDS (ref 19.4–28.5)
RBC # BLD AUTO: 3.48 10*6/MM3 (ref 3.77–5.28)
SODIUM BLD-SCNC: 141 MMOL/L (ref 136–145)
UNIT  ABO: NORMAL
UNIT  RH: NORMAL
WBC NRBC COR # BLD: 6.1 10*3/MM3 (ref 3.4–10.8)

## 2019-12-05 PROCEDURE — 85014 HEMATOCRIT: CPT | Performed by: INTERNAL MEDICINE

## 2019-12-05 PROCEDURE — 85018 HEMOGLOBIN: CPT | Performed by: INTERNAL MEDICINE

## 2019-12-05 PROCEDURE — 99233 SBSQ HOSP IP/OBS HIGH 50: CPT | Performed by: INTERNAL MEDICINE

## 2019-12-05 PROCEDURE — 25010000002 PROPOFOL 10 MG/ML EMULSION: Performed by: ANESTHESIOLOGY

## 2019-12-05 PROCEDURE — 43235 EGD DIAGNOSTIC BRUSH WASH: CPT | Performed by: INTERNAL MEDICINE

## 2019-12-05 PROCEDURE — 0DJD8ZZ INSPECTION OF LOWER INTESTINAL TRACT, VIA NATURAL OR ARTIFICIAL OPENING ENDOSCOPIC: ICD-10-PCS | Performed by: INTERNAL MEDICINE

## 2019-12-05 PROCEDURE — 83735 ASSAY OF MAGNESIUM: CPT | Performed by: INTERNAL MEDICINE

## 2019-12-05 PROCEDURE — 80053 COMPREHEN METABOLIC PANEL: CPT | Performed by: NURSE PRACTITIONER

## 2019-12-05 PROCEDURE — 0DJ08ZZ INSPECTION OF UPPER INTESTINAL TRACT, VIA NATURAL OR ARTIFICIAL OPENING ENDOSCOPIC: ICD-10-PCS | Performed by: INTERNAL MEDICINE

## 2019-12-05 PROCEDURE — P9016 RBC LEUKOCYTES REDUCED: HCPCS

## 2019-12-05 PROCEDURE — 25010000002 IRON SUCROSE PER 1 MG: Performed by: NURSE PRACTITIONER

## 2019-12-05 PROCEDURE — P9017 PLASMA 1 DONOR FRZ W/IN 8 HR: HCPCS

## 2019-12-05 PROCEDURE — 45378 DIAGNOSTIC COLONOSCOPY: CPT | Performed by: INTERNAL MEDICINE

## 2019-12-05 PROCEDURE — 85025 COMPLETE CBC W/AUTO DIFF WBC: CPT | Performed by: PHYSICIAN ASSISTANT

## 2019-12-05 PROCEDURE — 86927 PLASMA FRESH FROZEN: CPT

## 2019-12-05 PROCEDURE — 36430 TRANSFUSION BLD/BLD COMPNT: CPT

## 2019-12-05 PROCEDURE — 84100 ASSAY OF PHOSPHORUS: CPT | Performed by: INTERNAL MEDICINE

## 2019-12-05 PROCEDURE — 82962 GLUCOSE BLOOD TEST: CPT

## 2019-12-05 PROCEDURE — 99232 SBSQ HOSP IP/OBS MODERATE 35: CPT | Performed by: INTERNAL MEDICINE

## 2019-12-05 PROCEDURE — 86900 BLOOD TYPING SEROLOGIC ABO: CPT

## 2019-12-05 PROCEDURE — 85610 PROTHROMBIN TIME: CPT | Performed by: PHYSICIAN ASSISTANT

## 2019-12-05 RX ORDER — SODIUM CHLORIDE 0.9 % (FLUSH) 0.9 %
3 SYRINGE (ML) INJECTION EVERY 12 HOURS SCHEDULED
Status: DISCONTINUED | OUTPATIENT
Start: 2019-12-05 | End: 2019-12-05

## 2019-12-05 RX ORDER — PROPOFOL 10 MG/ML
VIAL (ML) INTRAVENOUS AS NEEDED
Status: DISCONTINUED | OUTPATIENT
Start: 2019-12-05 | End: 2019-12-05 | Stop reason: SURG

## 2019-12-05 RX ORDER — SODIUM CHLORIDE 9 MG/ML
9 INJECTION, SOLUTION INTRAVENOUS CONTINUOUS PRN
Status: DISCONTINUED | OUTPATIENT
Start: 2019-12-05 | End: 2019-12-06 | Stop reason: HOSPADM

## 2019-12-05 RX ORDER — ONDANSETRON 4 MG/1
4 TABLET, FILM COATED ORAL EVERY 6 HOURS PRN
Status: DISCONTINUED | OUTPATIENT
Start: 2019-12-05 | End: 2019-12-05

## 2019-12-05 RX ORDER — SODIUM CHLORIDE 0.9 % (FLUSH) 0.9 %
10 SYRINGE (ML) INJECTION AS NEEDED
Status: DISCONTINUED | OUTPATIENT
Start: 2019-12-05 | End: 2019-12-05

## 2019-12-05 RX ORDER — SODIUM CHLORIDE 0.9 % (FLUSH) 0.9 %
3-10 SYRINGE (ML) INJECTION AS NEEDED
Status: DISCONTINUED | OUTPATIENT
Start: 2019-12-05 | End: 2019-12-05

## 2019-12-05 RX ORDER — ONDANSETRON 2 MG/ML
4 INJECTION INTRAMUSCULAR; INTRAVENOUS EVERY 6 HOURS PRN
Status: DISCONTINUED | OUTPATIENT
Start: 2019-12-05 | End: 2019-12-05

## 2019-12-05 RX ADMIN — METFORMIN HYDROCHLORIDE 500 MG: 500 TABLET, FILM COATED ORAL at 17:14

## 2019-12-05 RX ADMIN — POLYETHYLENE GLYCOL 3350, SODIUM SULFATE, SODIUM CHLORIDE, POTASSIUM CHLORIDE, ASCORBIC ACID, SODIUM ASCORBATE 1000 ML: KIT at 04:41

## 2019-12-05 RX ADMIN — PROPOFOL 50 MG: 10 INJECTION, EMULSION INTRAVENOUS at 15:25

## 2019-12-05 RX ADMIN — PROPOFOL 100 MG: 10 INJECTION, EMULSION INTRAVENOUS at 15:15

## 2019-12-05 RX ADMIN — LOSARTAN POTASSIUM 50 MG: 50 TABLET, FILM COATED ORAL at 09:35

## 2019-12-05 RX ADMIN — PROPOFOL 30 MG: 10 INJECTION, EMULSION INTRAVENOUS at 15:20

## 2019-12-05 RX ADMIN — SODIUM CHLORIDE 9 ML/HR: 900 INJECTION, SOLUTION INTRAVENOUS at 12:35

## 2019-12-05 RX ADMIN — Medication 10 ML: at 08:46

## 2019-12-05 RX ADMIN — ATORVASTATIN CALCIUM 40 MG: 40 TABLET, FILM COATED ORAL at 20:28

## 2019-12-05 RX ADMIN — IRON SUCROSE 200 MG: 20 INJECTION, SOLUTION INTRAVENOUS at 09:35

## 2019-12-05 RX ADMIN — LEVOTHYROXINE SODIUM 112 MCG: 112 TABLET ORAL at 05:43

## 2019-12-05 RX ADMIN — PANTOPRAZOLE SODIUM 40 MG: 40 TABLET, DELAYED RELEASE ORAL at 05:43

## 2019-12-05 RX ADMIN — Medication 10 ML: at 20:28

## 2019-12-05 RX ADMIN — METOPROLOL SUCCINATE 12.5 MG: 25 TABLET, EXTENDED RELEASE ORAL at 09:35

## 2019-12-05 RX ADMIN — ASPIRIN 81 MG: 81 TABLET, DELAYED RELEASE ORAL at 09:35

## 2019-12-05 NOTE — OP NOTE
COLONOSCOPY, ESOPHAGOGASTRODUODENOSCOPY Procedure Report    Patient Name:  Jocelin Parra  YOB: 1955    Date of Surgery:  12/5/2019     Pre-Op Diagnosis:  Anemia due to stage 3 chronic kidney disease (CMS/HCC) [N18.3, D63.1]         Procedure/CPT® Codes:      Procedure(s):  COLONOSCOPY  ESOPHAGOGASTRODUODENOSCOPY    Staff:  Surgeon(s):  Dustin Castellanos MD      Anesthesia: Monitored Anesthesia Care    Description of Procedure:  A description of the procedure as well as risks, benefits and alternative methods were explained to the patient who voiced understanding and signed the corresponding consent form. A physical exam was performed and vital signs were monitored throughout the procedure.    An upper GI endoscope was placed into the mouth and proceeded through the esophagus, stomach and second portion of the duodenum without difficulty. The scope was then retroflexed and the fundus was visualized. The procedure was not difficult and there were no immediate complications.   scope was advanced equalization from rectum to cecum and terminal ileum.  Prep was excellent.    Upper endoscopy examination showed normal esophageal mucosa mid and upper lobe of the esophagus.  Gastric mucosa looks normal fundus body antrum area no evidence of any AVM any other bleeding source was noticed.  Dual mucosa looks normal up to D2 without any evidence of bleeding.  No AVM was seen in the small bowel area.    Colonoscopy examination showed prep was excellent.  Diverticulosis descending sigmoid colon was noted with a moderate-sized internal/external hemorrhoid.  Patient tolerated procedure very well no immediate complication was noticed.      Impression:    1.  Dermal upper endoscopy examination.  2.  Diverticulosis  3.  Hemorrhoids    Recommendations:  No evidence of any active source of bleeding noticed in the upper at the lower GI tract except diverticulosis and hemorrhoids.  Possibility of a small bowel AVM or  lesion leading to blood loss anemia is in the differential however patient denies any overt GI bleeding.  May consider small bowel evaluation with a PillCam if needed.  Follow up with GI clinic as needed  Okay to start anticoagulation.  Follow-up with a hemoglobin.      Dustin Castellanos MD     Date: 12/5/2019    Time: 3:43 PM

## 2019-12-05 NOTE — PLAN OF CARE
Problem: Anemia (Adult)  Goal: Symptom Improvement  Outcome: Ongoing (interventions implemented as appropriate)   12/05/19 0631   Anemia (Adult)   Symptom Improvement making progress toward outcome       Problem: Patient Care Overview  Goal: Plan of Care Review  Outcome: Ongoing (interventions implemented as appropriate)   12/05/19 0631   Coping/Psychosocial   Plan of Care Reviewed With patient   Plan of Care Review   Progress no change   OTHER   Outcome Summary Pt still required FFP and another unit of PRBC, hemoglobin 6.6, supposed to get EGD/colonoscopy today, will monitor     Goal: Discharge Needs Assessment  Outcome: Ongoing (interventions implemented as appropriate)   12/05/19 0631   Discharge Needs Assessment   Readmission Within the Last 30 Days no previous admission in last 30 days   Concerns to be Addressed no discharge needs identified;denies needs/concerns at this time   Patient/Family Anticipates Transition to home;home with family   Patient/Family Anticipated Services at Transition none   Transportation Concerns car, none   Transportation Anticipated family or friend will provide   Anticipated Changes Related to Illness none   Equipment Needed After Discharge none   Disability   Equipment Currently Used at Home none

## 2019-12-05 NOTE — ANESTHESIA PREPROCEDURE EVALUATION
Anesthesia Evaluation     Patient summary reviewed and Nursing notes reviewed                Airway   Mallampati: III  TM distance: >3 FB  Neck ROM: full  No difficulty expected  Dental      Pulmonary    (+) shortness of breath,   Cardiovascular     (+) hypertension, valvular problems/murmurs, CAD, dysrhythmias Atrial Fib, CHF , hyperlipidemia,       Neuro/Psych  GI/Hepatic/Renal/Endo    (+) obesity,  GERD,  renal disease, diabetes mellitus,     Musculoskeletal     Abdominal    Substance History      OB/GYN          Other                        Anesthesia Plan    ASA 4     MAC     intravenous induction     Anesthetic plan, all risks, benefits, and alternatives have been provided, discussed and informed consent has been obtained with: patient.

## 2019-12-05 NOTE — PROGRESS NOTES
Hematology/Oncology Inpatient Progress Note    PATIENT NAME: Jocelin Parra  : 1955  MRN: [id number]    CHIEF COMPLAINT: Anemia    HISTORY OF PRESENT ILLNESS:    64 y.o. female admitted through the ED 12/3/2019 with anemia.  The patient was sent by her nephrologist after outpatient labs showed worsening anemia with hemoglobin 6.1.  She had reported some increasing dyspnea and occasional mild edema.  She reported cough and respiratory viral panel was positive for coronavirus.  CBC in the ED showed WBC 3.5, hemoglobin 6.1, MCV 63.4, RDW 18.6, and platelets 339,000. She received 1 unit pRBC.  Creatinine was 1.57.  INR was 2.74 on Coumadin for mechanical heart valve.  Posttransfusion iron studies showed iron 24 and ferritin 21.2.  Vitamin B12 was 947 and folate was 18.6.  LDH was 353 and reticulocyte count was 2.33%.  Haptoglobin, stool heme, and SPEP were pending at time of consult.     19  Hematology/Oncology was consulted for anemia.     PCP: Emilie Cruz APRN    Subjective     ROS:  Review of Systems     MEDICATIONS:    Scheduled Meds:      aspirin 81 mg Oral Daily   atorvastatin 40 mg Oral Nightly   iron sucrose 200 mg Intravenous Daily   levothyroxine 112 mcg Oral Q AM   losartan 50 mg Oral Q24H   metFORMIN 500 mg Oral Q PM   metoprolol succinate XL 12.5 mg Oral Daily   pantoprazole 40 mg Oral Q AM   sodium chloride 10 mL Intravenous Q12H      Continuous Infusions:      Pharmacy to dose warfarin       PRN Meds:  •  acetaminophen **OR** acetaminophen **OR** acetaminophen  •  aluminum-magnesium hydroxide-simethicone  •  bisacodyl  •  calcium carbonate  •  docusate sodium  •  magnesium hydroxide  •  melatonin  •  nitroglycerin  •  ondansetron **OR** ondansetron  •  Pharmacy to dose warfarin  •  [COMPLETED] Insert peripheral IV **AND** sodium chloride  •  sodium chloride  •  sodium chloride     ALLERGIES:  No Known Allergies    Objective    VITALS:   /81   Pulse 88   Temp 98 °F (36.7 °C)  "(Oral)   Resp 15   Ht 157.5 cm (62\")   Wt 88.3 kg (194 lb 10.7 oz)   SpO2 100%   BMI 35.60 kg/m²     PHYSICAL EXAM:  Physical Exam      RECENT LABS:  Lab Results (last 24 hours)     Procedure Component Value Units Date/Time    CBC & Differential [[id number]] Collected:  12/05/19 0343    Specimen:  Blood Updated:  12/05/19 0505    Narrative:       The following orders were created for panel order CBC & Differential.  Procedure                               Abnormality         Status                     ---------                               -----------         ------                     CBC Auto Differential[[id number]]        Abnormal            Final result                 Please view results for these tests on the individual orders.    CBC Auto Differential [[id number]]  (Abnormal) Collected:  12/05/19 0343    Specimen:  Blood Updated:  12/05/19 0505     WBC 6.10 10*3/mm3      RBC 3.48 10*6/mm3      Hemoglobin 6.6 g/dL      Hematocrit 22.3 %      MCV 64.2 fL      Comment: Result checked Parameter reviewed in the past 30 days on 12.03.19.         MCH 18.9 pg      MCHC 29.5 g/dL      RDW 22.1 %      RDW-SD 50.3 fl      MPV 8.5 fL      Platelets 272 10*3/mm3      Neutrophil % 69.9 %      Lymphocyte % 12.8 %      Monocyte % 12.6 %      Eosinophil % 4.0 %      Basophil % 0.7 %      Neutrophils, Absolute 4.20 10*3/mm3      Lymphocytes, Absolute 0.80 10*3/mm3      Monocytes, Absolute 0.80 10*3/mm3      Eosinophils, Absolute 0.20 10*3/mm3      Basophils, Absolute 0.00 10*3/mm3      nRBC 0.1 /100 WBC     Narrative:       Appended report. These results have been appended to a previously verified report.    Phosphorus [[id number]]  (Normal) Collected:  12/05/19 0343    Specimen:  Blood Updated:  12/05/19 0438     Phosphorus 2.9 mg/dL     Comprehensive Metabolic Panel [[id number]]  (Abnormal) Collected:  12/05/19 0343    Specimen:  Blood Updated:  12/05/19 0438     Glucose 136 mg/dL      BUN 32 mg/dL      Creatinine 1.71 " mg/dL      Sodium 141 mmol/L      Potassium 4.2 mmol/L      Chloride 104 mmol/L      CO2 23.0 mmol/L      Calcium 9.1 mg/dL      Total Protein 6.3 g/dL      Albumin 3.50 g/dL      ALT (SGPT) 18 U/L      AST (SGOT) 26 U/L      Alkaline Phosphatase 117 U/L      Total Bilirubin 1.5 mg/dL      eGFR Non African Amer 30 mL/min/1.73      Globulin 2.8 gm/dL      A/G Ratio 1.3 g/dL      BUN/Creatinine Ratio 18.7     Anion Gap 14.0 mmol/L     Narrative:       GFR Normal >60  Chronic Kidney Disease <60  Kidney Failure <15    Magnesium [[id number]]  (Normal) Collected:  12/05/19 0343    Specimen:  Blood Updated:  12/05/19 0438     Magnesium 2.1 mg/dL     Protime-INR [[id number]]  (Abnormal) Collected:  12/05/19 0343    Specimen:  Blood Updated:  12/05/19 0436     Protime 16.2 Seconds      Comment: Result checked         INR 1.66    Haptoglobin [[id number]]  (Normal) Collected:  12/03/19 1224    Specimen:  Blood Updated:  12/04/19 1907     Haptoglobin 70 mg/dL     Lactate Dehydrogenase [[id number]]  (Abnormal) Collected:  12/04/19 0328    Specimen:  Blood Updated:  12/04/19 1218      U/L     Folate [[id number]]  (Normal) Collected:  12/04/19 0328    Specimen:  Blood Updated:  12/04/19 1125     Folate 18.60 ng/mL     Vitamin B12 [[id number]]  (Abnormal) Collected:  12/04/19 0328    Specimen:  Blood Updated:  12/04/19 1125     Vitamin B-12 947 pg/mL     Reticulocytes [[id number]]  (Abnormal) Collected:  12/04/19 0328    Specimen:  Blood Updated:  12/04/19 1037     Reticulocyte % 2.33 %      Reticulocyte Absolute 0.0883 10*6/mm3     Comprehensive Metabolic Panel [[id number]]  (Abnormal) Collected:  12/04/19 0328    Specimen:  Blood Updated:  12/04/19 1030     Glucose 191 mg/dL      BUN 40 mg/dL      Creatinine 1.83 mg/dL      Sodium 141 mmol/L      Potassium 4.3 mmol/L      Chloride 103 mmol/L      CO2 22.0 mmol/L      Calcium 9.0 mg/dL      Total Protein 6.6 g/dL      Albumin 3.80 g/dL      ALT (SGPT) 18 U/L      AST (SGOT) 26 U/L       Alkaline Phosphatase 130 U/L      Total Bilirubin 1.1 mg/dL      eGFR Non African Amer 28 mL/min/1.73      Globulin 2.8 gm/dL      A/G Ratio 1.4 g/dL      BUN/Creatinine Ratio 21.9     Anion Gap 16.0 mmol/L     Narrative:       GFR Normal >60  Chronic Kidney Disease <60  Kidney Failure <15    Bilirubin, Total & Direct [[id number]]  (Abnormal) Collected:  12/04/19 0328    Specimen:  Blood Updated:  12/04/19 1030     Total Bilirubin 1.1 mg/dL      Bilirubin, Direct 0.4 mg/dL      Bilirubin, Indirect 0.7 mg/dL     Protein Electrophoresis, Total [[id number]] Collected:  12/04/19 1006    Specimen:  Blood Updated:  12/04/19 1015          PENDING RESULTS: stool heme, SPEP    IMAGING REVIEWED:  Xr Chest 1 View    Result Date: 12/3/2019  Small left pleural effusion is suspected. Otherwise stable appearance of the chest as described above. No acute infiltrates.   Electronically Signed By-Darion Garces DO. On:12/3/2019 9:00 PM This report was finalized on [id number] by  Darion Garces DO..      I have reviewed the patient's labs, imaging, reports, and other clinician documentation.    Assessment/Plan   ASSESSMENT:  1. Microcytic anemia- receiving pRBC transfusions PRN.  Post transfusion ferritin was 21.  No vitamin B12 or folate deficiencies.  No hemolysis.  SPEP and stool heme pending. On warfarin for mechanical heart valve likely increasing bleeding risk.  GI bleed in differential. Last EGD/Colonoscopy at least 5 years ago by Dr. Worley with patient unaware of results. GI planning scopes and s/p Venofer x2.  2. Coronavirus/cough-supportive care per primary team.  3. ODETTE on CKD stage III- followed by Dr. Lai  4. CAD/mechanical aortic valve/HTN-history of CABG in past.  Per primary team.  5. DM2-per primary team.    PLAN:  1. Await endoscopies per GI  2. Continue to transfuse PRN hemoglobin less than 7.5  3. Await remaining anemia work-up.  4. Recommend outpatient Procrit if hemoglobin remains below 10 given CKD         Note updated by PATRICIA Gtz.  Patient seen and examined by Dr. Martínez  Electronically signed by DELFINO Rene, 12/05/19, 9:48 AM.    I have personally performed a face-to-face diagnostic evaluation on this patient.  I have reviewed and agree with the care plan.  Patient seen and examined nurse practitioner note reviewed updated discussed with nurse practitioner continue supportive transfusion care.  She is scheduled for endoscopies.  Procrit will be administered to the hemoglobin is below 10  I discussed the patients findings and my recommendations with patient    Nav Rizzo MD  12/05/19  9:38 AM

## 2019-12-05 NOTE — PAYOR COMM NOTE
"ATTN : YANNI    REQUEST FOR INPATIENT PRECERT  ADMITTED AS OBSERVATION 12/3/2019 AND CONVERTED TO INPATIENT 12/5/2019  ------------------------------------    UTILIZATION REVIEW  RETURN CONTACT:  LISA MARTINI RN [facility]  PH: [phone]  FAX: [phone]  [facility]  NPI# [id number]  TAX ID # [id number]    --------------------------    Obs f/u - meets for inpt - sent message to MD for inpt order- INPT ORDER GIVEN   Dx: anemia D64.9    [place]: anemia  M-35:  Admission is indicated for 1 or more of the following(1)(2)(3)(4)(5)(6):   • Active hemolysis (eg, progressive anemia)[A](3)(7)(8)   ----------------------------  PER NURSING TODAY 12/5:  Pt still required FFP and another unit of PRBC, hemoglobin 6.6, supposed to get EGD/colonoscopy today, will monitor      Jocelin Parra (64 y.o. Female)     Date of Birth Social Security Number Address Home Phone MRN    1955  [id number] [place] [id number] [phone] [id number]    Oriental orthodox Marital Status          Non-Lutheran Single       Admission Date Admission Type Admitting Provider Attending Provider Department, Room/Bed    12/3/19 Emergency Martita Bennett MD Kapad[facility]    Discharge Date Discharge Disposition Discharge Destination                       Attending Provider:  Martita Bennett MD    Allergies:  No Known Allergies    Isolation:  Droplet   Infection:  None   Code Status:  CPR    Ht:  157.5 cm (62\")   Wt:  88.3 kg (194 lb 10.7 oz)    Admission Cmt:  None   Principal Problem:  Anemia [D64.9] More...                 Active Insurance as of 12/3/2019     Primary Coverage     Payor Plan Insurance Group Employer/Plan Group    [employer] IN [facility]     Payor Plan Address Payor Plan Phone Number Payor Plan Fax Number Effective Dates    PO    1/1/2018 - None Entered    [facility] [id number]       Subscriber Name Subscriber Birth Date Member ID "       JOCELIN CRUZ 1955 [id number]                 Emergency Contacts      (Rel.) Home Phone Work Phone Mobile Phone    CORBIN HARLEY (Friend) -- -- [phone]               History & Physical      Yun Chawla PA-C at 19 2311     Attestation signed by Martita Bennett MD at 19 0922    Attending Physician Attestation    I have reviewed the mid-level provider documentation, agree with the documentation, medical decision making and treatment plan as outlined by the mid-level provider.                         [facility] Medicine Services      Patient Name: Jocelin Cruz  : 1955  MRN: [id number]  Primary Care Physician: Emilie Cruz APRN  Date of admission: 12/3/2019    Patient Care Team:  Emilie Cruz APRN as PCP - Roldan Garcia MD as Consulting Physician (Cardiology)  Ney Lai MD as Consulting Physician (Nephrology)          Subjective   History Present Illness     Chief Complaint:   Chief Complaint   Patient presents with   • decrease hgb       Ms. Cruz is a 64 y.o.  presents to [facility] complaining of reported decreased HGB on labs by Dr. Montalvo, Nephrologist. Patient reports that she has been having increased dyspnea on exertion for 1 month that has been gradually worsening and patient reports that she has had minimal peripheral edema for the past few days and fatigue. Patient denies anything making it better or worse. Patient denies any fever, chills, chest pain, SOA at rest, Abdominal pain, N/V/D/C, or melena. On ROS patient does report that she has orthopnea but states that it is unchanged from her normal. Patient also reports having a dry cough for 1 week without sputum.     Patient was recently hospitalized on 2019 for Sternotomy with mechanical MVR s/p failed MVr and CABG with HFrEF of 40%.     In ED, admission vitals were 98.8F, 105HR, 16RR,135/68, 100%RA. On labs, patient  "found to have normal INR/PT, normal WBC 6, low HGB 6.3,and normal Platelets. CXR shows \"Small left pleural effusion is suspected. Otherwise stable appearance of the chest as described above. No acute infiltrates.\" EKG shows sinus tachycardia. Respiratory panel was positive for coronavirus. 1 unit of PRBCs ordered in the ED.           History of Present Illness    Review of Systems   All other systems reviewed and are negative.          Personal History     Past Medical History:   Past Medical History:   Diagnosis Date   • A-fib (CMS/HCC) Dx 2018    S/p Cardioversion by Dr. Spencer on 04/10/19   • Acute renal failure syndrome (CMS/HCC) 4/2019-[facility] IP   • Biatrial enlargement 04/10/2019    Noted on SHERIE   • Calcified granuloma of lung (CMS/HCC) 01/04/2019    Noted on Chest XR   • Cardiomegaly 01/04/2019    Borderline--Noted on Chest XR   • CHF (congestive heart failure) (CMS/[facility])    • CKD (chronic kidney disease), stage III (CMS/[facility])     Does Not See a Nephrologist   • Diabetes mellitus (CMS/[facility])    • DM (diabetes mellitus) (CMS/[facility])     T2   • Dysphagia 08/15-[facility] IP    Due to Pharyngitis   • GERD (gastroesophageal reflux disease)    • History of chest x-ray 8/28/15-[facility]    Normal   • History of chest x-ray 01/19/2019    Mild Pulmonary Edema w/Congestive Failure Noted   • History of chest x-ray 01/04/2019    Borderline Cardiomegaly    • History of EKG 2014/2018/2019-[facility]   • Hx of diabetic neuropathy    • Hx of dizziness     w/Lightheadedness   • Hx of echocardiogram 4/16/18-[facility]    EF 55-60%; Mild MVR; Mild TVR; Normal Root/No Effusion   • Hyperlipidemia     Controlled w/Meds   • Hypertension     Controlled w/Meds   • Hypokalemia 4/11/19-[facility] IP   • Hypothyroidism     Controlled w/Meds   • Iron deficiency anemia    • Left posterior fascicular block 04/2018 & 4/19    Noted on EKG   • Lower extremity edema 03/2019   • Mild mitral valve regurgitation 04/16/2018    Noted on Echo   • Mild tricuspid valve regurgitation 04/16/2018    " Noted on Echo   • Moderate mitral valve regurgitation 2008    S/p MVR in 08'   • Moderate tricuspid insufficiency 04/10/2019    Noted on SHERIE   • Osteoarthritis     Knees w/Hx Knee Repl   • Pulmonary edema 01/19/2019    Mild--Noted on Chest XR   • Rapid palpitations 04/15/18 & 8/9/18-[facility]   • Renal dysfunction    • Right axis deviation 08/2018 & 4/19    Noted on EKG   • Severe aortic valve stenosis Since 2008    Hx AVR on 12/11/08 by Dr. Alvarado   • Severe mitral insufficiency 04/10/2019    Noted on SHERIE   • Sinus tachycardia 08/2018    Noted on EKG   • SOB (shortness of breath) chronic   • Torticollis Chronic   • Valvular heart disease     S/p AVR & MVR in 08'       Surgical History:      Past Surgical History:   Procedure Laterality Date   • AORTIC VALVE REPAIR/REPLACEMENT  12/11/08-[facility]    Using a #20 Eight Yr Mechanical [place] Prosthesis--Dewey Alvarado MD   • CARDIOVERSION  4/10/19-[facility]    Dr. Spencer--For A-Fib   • HYSTERECTOMY     • MITRAL VALVE REPLACEMENT  2008   • MITRAL VALVE REPLACEMENT  05/09/2019    Dr Alvarado   • SHERIE  4/10/19-[facility]    EF 40%; Moderate TVR; Severe Eccentric MI; Biatrial Enlargement   • TOTAL KNEE ARTHROPLASTY  2017   • TRICUSPID VALVE SURGERY  05/09/2019    Dr Alvarado           Family History: family history includes Diabetes in her father; Heart disease in her father; Hypertension in her father; Lung cancer in her mother; Stroke in her father. Otherwise pertinent FHx was reviewed and unremarkable.     Social History:  reports that she has never smoked. She has never used smokeless tobacco. She reports that she does not drink alcohol or use drugs.      Medications:  Prior to Admission medications    Medication Sig Start Date End Date Taking? Authorizing Provider   atorvastatin (LIPITOR) 40 MG tablet Take 40 mg by mouth Daily.   Yes ProviderSandip MD   cholecalciferol (VITAMIN D3) 10 MCG (400 UNIT) tablet Take 2,000 Units by mouth Daily.   Yes ProviderSandip MD   levothyroxine  (SYNTHROID, LEVOTHROID) 112 MCG tablet Take 112 mcg by mouth Daily.   Yes Sandip Ramírez MD   metoprolol succinate XL (TOPROL-XL) 25 MG 24 hr tablet Take 12.5 mg by mouth Daily.   Yes Sandip Ramírez MD   potassium chloride (K-DUR,KLOR-CON) 20 MEQ CR tablet Take 20 mEq by mouth Daily.   Yes Sandip Ramírez MD   vitamin C (ASCORBIC ACID) 500 MG tablet Take 500 mg by mouth Daily.   Yes Sandip Ramírez MD   warfarin (COUMADIN) 5 MG tablet Take 5 mg by mouth 3 (Three) Times a Week. Mon, Wed, Fri   Yes Sandip Ramírez MD   warfarin (COUMADIN) 7.5 MG tablet Take 7.5 mg by mouth 4 (Four) Times a Week. Tues, Thur, Sat, Sun   Yes Sandip Ramírez MD   aspirin 81 MG tablet Take 1 tablet by mouth daily. 9/23/12   Sandip Ramírez MD   B Complex Vitamins (VITAMIN B COMPLEX) tablet Take by mouth. 3/18/15   Sandip Ramírez MD   ezetimibe (ZETIA) 10 MG tablet Take 1 tablet by mouth daily. 9/26/12   Sandip Ramírez MD   furosemide (LASIX) 40 MG tablet Take 40 mg by mouth Daily.    Sandip Ramírez MD   Liraglutide (VICTOZA) 18 MG/3ML solution pen-injector Inject 1.8 mg under the skin into the appropriate area as directed Daily. 3/18/15   Sandip Ramírez MD   metFORMIN (GLUCOPHAGE) 500 MG tablet Take 1 tablet by mouth Every Evening. 3/18/15   Sandip Ramírez MD   Multiple Vitamin (MULTI-VITAMIN) tablet Take 1 tablet by mouth daily. 9/23/12   Sandip Ramírez MD   omeprazole (PriLOSEC) 20 MG capsule Take 1 capsule by mouth daily. 3/18/15   Sandip Ramírez MD   valsartan (DIOVAN) 80 MG tablet Take 1 tablet by mouth Daily. PT TAKES 40MG DAILY  9/23/12   Sandip Ramírez MD   ferrous sulfate 325 (65 FE) MG tablet Take by mouth. 3/18/15 12/3/19  Sandip Ramírez MD   levothyroxine (SYNTHROID, LEVOTHROID) 150 MCG tablet Take 150 mcg by mouth Daily. 9/23/12 12/3/19  Sandip Ramírez MD   metoprolol succinate XL (TOPROL-XL) 25 MG 24 hr tablet 1/2  tablet daily  Patient taking differently: Take 25 mg by mouth Daily. 1/2 tablet daily 10/23/19 12/3/19  Roldan Solano MD   simvastatin (ZOCOR) 20 MG tablet Take 1 tablet by mouth daily. 9/26/12 12/3/19  Provider, MD Sandip   warfarin (COUMADIN) 5 MG tablet TAKE ONE TABLET BY MOUTH ON MONDAY, WEDNESDAY, FRIDAY. TAKE ONE AND ONE-HALF (1.5) TABLET BY MOUTH ALL OTHER DAYS OR AS DIRECTED 9/13/19 12/3/19  Roldan Solano MD   warfarin (COUMADIN) 5 MG tablet TAKE ONE TABLET BY MOUTH ON MONDAY, WEDNESDAY, FRIDAY. TAKE ONE AND ONE-HALF (1.5) TABLET BY MOUTH ALL OTHER DAYS OR AS DIRECTED 12/3/19 12/3/19  Roldan Solano MD       Allergies:  No Known Allergies    Objective   Objective     Vital Signs  Temp:  [98.3 °F (36.8 °C)-99.3 °F (37.4 °C)] 98.3 °F (36.8 °C)  Heart Rate:  [] 115  Resp:  [12-20] 17  BP: (119-143)/(53-82) 143/82  SpO2:  [98 %-100 %] 100 %  on   ;   Device (Oxygen Therapy): room air  Body mass index is 33.84 kg/m².    Physical Exam   Constitutional: She is oriented to person, place, and time. She appears well-developed and well-nourished. No distress.   HENT:   Head: Normocephalic and atraumatic.   Right Ear: External ear normal.   Left Ear: External ear normal.   Nose: Nose normal.   Mouth/Throat: Oropharynx is clear and moist. No oropharyngeal exudate.   Eyes: Conjunctivae and EOM are normal. Right eye exhibits no discharge. Left eye exhibits no discharge. No scleral icterus.   Neck: Neck supple.   Cardiovascular: Normal heart sounds and intact distal pulses. Exam reveals no gallop and no friction rub.   No murmur heard.  Irregular rate and rhythm that is consistent with Afib    Pulmonary/Chest: Effort normal and breath sounds normal. No stridor. No respiratory distress. She has no wheezes. She has no rales.   Abdominal: Soft. Bowel sounds are normal. She exhibits no distension and no mass. There is no tenderness. There is no rebound and no guarding.    Musculoskeletal: Normal range of motion.   Neurological: She is alert and oriented to person, place, and time. No cranial nerve deficit.   Skin: Skin is warm and dry. No rash noted. She is not diaphoretic. No erythema. No pallor.   Psychiatric: She has a normal mood and affect. Her behavior is normal. Judgment and thought content normal.   Vitals reviewed.      Results Review:  I have personally reviewed most recent cardiac tracings, lab results and radiology images and interpretations and agree with findings.    Results from last 7 days   Lab Units 12/03/19 2137 12/03/19 2056   WBC 10*3/mm3  --  6.00   HEMOGLOBIN g/dL  --  6.3*   HEMATOCRIT %  --  21.3*   PLATELETS 10*3/mm3  --  343   INR  2.74  --      Results from last 7 days   Lab Units 12/03/19  1224   SODIUM mmol/L 139   POTASSIUM mmol/L 5.0   CHLORIDE mmol/L 101   CO2 mmol/L 24.5   BUN mg/dL 40*   CREATININE mg/dL 1.57*   GLUCOSE mg/dL 112*   CALCIUM mg/dL 9.6   ALT (SGPT) U/L 19   AST (SGOT) U/L 24   PROBNP pg/mL 2,821.0*     Estimated Creatinine Clearance: 36.3 mL/min (A) (by C-G formula based on SCr of 1.57 mg/dL (H)).  Brief Urine Lab Results  (Last result in the past 365 days)      Color   Clarity   Blood   Leuk Est   Nitrite   Protein   CREAT   Urine HCG        12/03/19 1224 Yellow Clear Negative Small (1+) Negative Negative               Microbiology Results (last 10 days)     Procedure Component Value - Date/Time    Respiratory Panel, PCR - Swab, Nasopharynx [[id number]]  (Abnormal) Collected:  12/03/19 2041    Lab Status:  Final result Specimen:  Swab from Nasopharynx Updated:  12/03/19 2204     ADENOVIRUS, PCR Not Detected     Coronavirus 229E Not Detected     Coronavirus HKU1 Detected     Coronavirus NL63 Not Detected     Coronavirus OC43 Not Detected     Human Metapneumovirus Not Detected     Human Rhinovirus/Enterovirus Not Detected     Influenza B PCR Not Detected     Parainfluenza Virus 1 Not Detected     Parainfluenza Virus 2 Not Detected      Parainfluenza Virus 3 Not Detected     Parainfluenza Virus 4 Not Detected     Bordetella pertussis pcr Not Detected     Influenza A H1  PCR Not Detected     Chlamydophila pneumoniae PCR Not Detected     Mycoplasma pneumo by PCR Not Detected     Influenza A PCR Not Detected     Influenza A H3 Not Detected     Influenza A H1 Not Detected     RSV, PCR Not Detected          ECG/EMG Results (most recent)     Procedure Component Value Units Date/Time    ECG 12 Lead [[id number]] Collected:  19     Updated:  19    Narrative:       HEART RATE= 103  bpm  RR Interval= 584  ms  RI Interval= 161  ms  P Horizontal Axis= 22  deg  P Front Axis= 67  deg  QRSD Interval= 75  ms  QT Interval= 355  ms  QRS Axis= 120  deg  T Wave Axis= 47  deg  - OTHERWISE NORMAL ECG -  Sinus tachycardia  Right axis deviation  When compared with ECG of 13-May-2019 13:13:28,  Significant rate increase  Significant repolarization change  Electronically Signed By:   Date and Time of Study: 2019 19:09:21          Results for orders placed in visit on 04/15/19   SCANNED VASCULAR STUDIES       Results for orders placed during the hospital encounter of 04/10/19   Adult Transthoracic Echo Complete W/ Cont if Necessary Per Protocol    Narrative                           Adult Echocardiogram Report          [facility]  Cardiology Department  [address] [place] [address]      Name: SUMAYA CRUZ LStudy Date: 2019 02:39 PM        BP: 150/90 mmHg  MRN: [id number]          Patient Location:   : 1955         Gender: Female                         Height: 64 in  Age: 64 yrs             Account#: [id number]                  Weight: 221 lb  Reason For Study: ATRIAL FIBRILLATION WITH RVR, HYPOKALEMIA,  EDEMA                                                          BSA: 2.0 m2  Ordering Physician:  LEONEL OWUSU  Referring Physician:  ELSIA DAWSON  Performed By: JASVIR    M-Mode/2-D  Measurements:   LVIDd:       (3.7-5.7)  LVPWd:      (0.8-1.2)   LVIDs:       (2.3-3.9)   ACS:         (1.6-3.7)  IVSd:       (0.7-1.2)   LA dimension:(1.9-4.0)  RVDd:       (0.7-2.4)   FS:          (21-40%)   AoRootDiam: (2.0-3.7)    Comments  Dyskinetic septum with Moderate LV dysfunction EF 40%. The right ventricle is  mildly dilated. Severe biatrial enlargement. Mitral valve thickened with  mitral valve prolapse and moderate-to-severe MR, would recommend SHERIE to better  evaluate mitral regurgitation. The tricuspid valve is not well visualized, but  is grossly normal. There is mild tricuspid regurgitation. Right ventricular  systolic pressure is elevated at 40-50mmHg. Aortic valve prosthesis appears to  be functioning but Doppler. Mild AR seen. There is no pericardial effusion.        Interpretation      The right ventricle is mildly dilated.    Severe biatrial enlargement    Mitral valve thickened with mitral valve prolapse and moderate-to-severe MR,  would recommend SHERIE to better evaluate mitral regurgitation  Right ventricular systolic pressure is elevated at 40-50mmHg.    Dyskinetic septum with Moderate LV dysfunction EF 40%    _______________________________________________________________________________      Electronically signed by: Roldan Solano MD  on 04/10/2019 07:59 PM         Xr Chest 1 View    Result Date: 12/3/2019  Small left pleural effusion is suspected. Otherwise stable appearance of the chest as described above. No acute infiltrates.   Electronically Signed By-Darion Garces DO. On:12/3/2019 9:00 PM This report was finalized on [id number] by  Darion Garces DO..        Estimated Creatinine Clearance: 36.3 mL/min (A) (by C-G formula based on SCr of 1.57 mg/dL (H)).    Assessment/Plan   Assessment/Plan       Active Hospital Problems:  * Anemia- (present on admission)    - Hemoccult reported to be negative in ED; unknown source   - HGB 6.3 and baseline ~9  - Acute on chronic; chronic  likely related to CKD  - 1 unit ordered in the ED. Will continue to follow the H&H and transfuse more as needed   - Did continue home Warfarin d/t patient's significant cardiac disease, monitor closely  - B12, folic, iron, and ferritin ordered with AM labs     Heart failure (CMS/[facility])- (present on admission)    Chronic systolic with EF of 40% on most recent ECHO.  - Continue BB and lasix       Long term (current) use of anticoagulants [Z79.01]    Continue to monitor; daily INR ordered       Atrial fibrillation (CMS/[facility]) [I48.91]- (present on admission)    CHADVas score of 5  Chronic and unchanged  - Continue BB and coumadin      S/P CABG x 2 (reop sternotomy with lysis of adhesions) (LIMA to LAD, SVG to PDA) per Dr. Alvarado 5/9/2019    Continue ASA and statin      Chronic kidney disease, stage 3 (moderate) (CMS/[facility])- (present on admission)     Continue to monitor; blood products being transfused for volume replacement.      Atherosclerotic heart disease of native coronary artery without angina pectoris- (present on admission)     Continue home Aspirin, statin, BB, and ARB     Obesity (BMI 30-39.9)    BMI 33.84; lifestyle modifications discussed and encouraged       Diabetic peripheral neuropathy (CMS/[facility])- (present on admission)    Stable without home medication; continue to monitor      Diabetes mellitus (CMS/[facility])- (present on admission)    Continue home metformin; SSI ordered with accuchecks. CCD ordered. A1c ordered with AM labs      Hypertension- (present on admission)    Continue home lasix, BB, and valsartan                  VTE Prophylaxis - Warfarin.    CODE STATUS:    Code Status and Medical Interventions:   Ordered at: 12/04/19 0023     Code Status:    CPR     Medical Interventions (Level of Support Prior to Arrest):    Full       Admission Status:  I believe this patient meets observation criteria.      I discussed the patients findings and my recommendations with patient.        Electronically signed by  Yun Chawla PA-C, 12/03/19, 11:12 PM.  [facility] [place] Hospitalist Team            Electronically signed by Martita Bennett MD at 12/04/19 0953       Blood Administration Record (From admission, onward)    Completed transfusions     Ordered     Start    12/05/19 0519  Transfuse RBC, 1 Units Infuse Each Unit Over: 3.5H  Transfusion     Released Time Blood Unit Number Status   12/05/19 0546   19  [id number]  [id number] Completed 12/05/19 0845       12/05/19 0518    12/04/19 1554  Transfuse Fresh Frozen Plasma, 1 Units  Transfusion     Released Time Blood Unit Number Status   12/05/19 0118   19  [id number]  [id number] Completed 12/05/19 0234       12/04/19 1553    12/03/19 2229  Transfuse RBC, 1 Units Infuse Each Unit Over: 2H  Transfusion     Released Time Blood Unit Number Status   12/03/19 2248   19  [id number]  [id number] Completed 12/04/19 0200       12/03/19 2229                  Lab Results (last 24 hours)     Procedure Component Value Units Date/Time    Hemoglobin & Hematocrit, Blood [[id number]]  (Abnormal) Collected:  12/05/19 0924    Specimen:  Blood Updated:  12/05/19 0946     Hemoglobin 8.1 g/dL      Hematocrit 26.9 %     CBC & Differential [[id number]] Collected:  12/05/19 0343    Specimen:  Blood Updated:  12/05/19 0505    Narrative:       The following orders were created for panel order CBC & Differential.  Procedure                               Abnormality         Status                     ---------                               -----------         ------                     CBC Auto Differential[[id number]]        Abnormal            Final result                 Please view results for these tests on the individual orders.    CBC Auto Differential [[id number]]  (Abnormal) Collected:  12/05/19 0343    Specimen:  Blood Updated:  12/05/19 0505     WBC 6.10 10*3/mm3      RBC 3.48 10*6/mm3      Hemoglobin 6.6 g/dL      Hematocrit 22.3 %      MCV 64.2 fL      Comment: Result checked Parameter  reviewed in the past 30 days on 12.03.19.         MCH 18.9 pg      MCHC 29.5 g/dL      RDW 22.1 %      RDW-SD 50.3 fl      MPV 8.5 fL      Platelets 272 10*3/mm3      Neutrophil % 69.9 %      Lymphocyte % 12.8 %      Monocyte % 12.6 %      Eosinophil % 4.0 %      Basophil % 0.7 %      Neutrophils, Absolute 4.20 10*3/mm3      Lymphocytes, Absolute 0.80 10*3/mm3      Monocytes, Absolute 0.80 10*3/mm3      Eosinophils, Absolute 0.20 10*3/mm3      Basophils, Absolute 0.00 10*3/mm3      nRBC 0.1 /100 WBC     Narrative:       Appended report. These results have been appended to a previously verified report.    Phosphorus [[id number]]  (Normal) Collected:  12/05/19 0343    Specimen:  Blood Updated:  12/05/19 0438     Phosphorus 2.9 mg/dL     Comprehensive Metabolic Panel [[id number]]  (Abnormal) Collected:  12/05/19 0343    Specimen:  Blood Updated:  12/05/19 0438     Glucose 136 mg/dL      BUN 32 mg/dL      Creatinine 1.71 mg/dL      Sodium 141 mmol/L      Potassium 4.2 mmol/L      Chloride 104 mmol/L      CO2 23.0 mmol/L      Calcium 9.1 mg/dL      Total Protein 6.3 g/dL      Albumin 3.50 g/dL      ALT (SGPT) 18 U/L      AST (SGOT) 26 U/L      Alkaline Phosphatase 117 U/L      Total Bilirubin 1.5 mg/dL      eGFR Non African Amer 30 mL/min/1.73      Globulin 2.8 gm/dL      A/G Ratio 1.3 g/dL      BUN/Creatinine Ratio 18.7     Anion Gap 14.0 mmol/L     Narrative:       GFR Normal >60  Chronic Kidney Disease <60  Kidney Failure <15    Magnesium [[id number]]  (Normal) Collected:  12/05/19 0343    Specimen:  Blood Updated:  12/05/19 0438     Magnesium 2.1 mg/dL     Protime-INR [[id number]]  (Abnormal) Collected:  12/05/19 0343    Specimen:  Blood Updated:  12/05/19 0436     Protime 16.2 Seconds      Comment: Result checked         INR 1.66    Haptoglobin [[id number]]  (Normal) Collected:  12/03/19 1224    Specimen:  Blood Updated:  12/04/19 1907     Haptoglobin 70 mg/dL     Lactate Dehydrogenase [[id number]]  (Abnormal) Collected:   19 0328    Specimen:  Blood Updated:  19 1218      U/L            Physician Progress Notes (last 24 hours) (Notes from 19 1211 through 19 1211)      Lesa Vang FNP at 19 0935              Hematology/Oncology Inpatient Progress Note    PATIENT NAME: Jocelin Parra  : 1955  MRN: [id number]    CHIEF COMPLAINT: Anemia    HISTORY OF PRESENT ILLNESS:    64 y.o. female admitted through the ED 12/3/2019 with anemia.  The patient was sent by her nephrologist after outpatient labs showed worsening anemia with hemoglobin 6.1.  She had reported some increasing dyspnea and occasional mild edema.  She reported cough and respiratory viral panel was positive for coronavirus.  CBC in the ED showed WBC 3.5, hemoglobin 6.1, MCV 63.4, RDW 18.6, and platelets 339,000. She received 1 unit pRBC.  Creatinine was 1.57.  INR was 2.74 on Coumadin for mechanical heart valve.  Posttransfusion iron studies showed iron 24 and ferritin 21.2.  Vitamin B12 was 947 and folate was 18.6.  LDH was 353 and reticulocyte count was 2.33%.  Haptoglobin, stool heme, and SPEP were pending at time of consult.     19  Hematology/Oncology was consulted for anemia.     PCP: Emilie Cruz APRN    Subjective     ROS:  Review of Systems     MEDICATIONS:    Scheduled Meds:      aspirin 81 mg Oral Daily   atorvastatin 40 mg Oral Nightly   iron sucrose 200 mg Intravenous Daily   levothyroxine 112 mcg Oral Q AM   losartan 50 mg Oral Q24H   metFORMIN 500 mg Oral Q PM   metoprolol succinate XL 12.5 mg Oral Daily   pantoprazole 40 mg Oral Q AM   sodium chloride 10 mL Intravenous Q12H      Continuous Infusions:      Pharmacy to dose warfarin       PRN Meds:  •  acetaminophen **OR** acetaminophen **OR** acetaminophen  •  aluminum-magnesium hydroxide-simethicone  •  bisacodyl  •  calcium carbonate  •  docusate sodium  •  magnesium hydroxide  •  melatonin  •  nitroglycerin  •  ondansetron **OR** ondansetron  •   "Pharmacy to dose warfarin  •  [COMPLETED] Insert peripheral IV **AND** sodium chloride  •  sodium chloride  •  sodium chloride     ALLERGIES:  No Known Allergies    Objective    VITALS:   /81   Pulse 88   Temp 98 °F (36.7 °C) (Oral)   Resp 15   Ht 157.5 cm (62\")   Wt 88.3 kg (194 lb 10.7 oz)   SpO2 100%   BMI 35.60 kg/m²      PHYSICAL EXAM:  Physical Exam      RECENT LABS:  Lab Results (last 24 hours)     Procedure Component Value Units Date/Time    CBC & Differential [[id number]] Collected:  12/05/19 0343    Specimen:  Blood Updated:  12/05/19 0505    Narrative:       The following orders were created for panel order CBC & Differential.  Procedure                               Abnormality         Status                     ---------                               -----------         ------                     CBC Auto Differential[[id number]]        Abnormal            Final result                 Please view results for these tests on the individual orders.    CBC Auto Differential [[id number]]  (Abnormal) Collected:  12/05/19 0343    Specimen:  Blood Updated:  12/05/19 0505     WBC 6.10 10*3/mm3      RBC 3.48 10*6/mm3      Hemoglobin 6.6 g/dL      Hematocrit 22.3 %      MCV 64.2 fL      Comment: Result checked Parameter reviewed in the past 30 days on 12.03.19.         MCH 18.9 pg      MCHC 29.5 g/dL      RDW 22.1 %      RDW-SD 50.3 fl      MPV 8.5 fL      Platelets 272 10*3/mm3      Neutrophil % 69.9 %      Lymphocyte % 12.8 %      Monocyte % 12.6 %      Eosinophil % 4.0 %      Basophil % 0.7 %      Neutrophils, Absolute 4.20 10*3/mm3      Lymphocytes, Absolute 0.80 10*3/mm3      Monocytes, Absolute 0.80 10*3/mm3      Eosinophils, Absolute 0.20 10*3/mm3      Basophils, Absolute 0.00 10*3/mm3      nRBC 0.1 /100 WBC     Narrative:       Appended report. These results have been appended to a previously verified report.    Phosphorus [[id number]]  (Normal) Collected:  12/05/19 0343    Specimen:  Blood " Updated:  12/05/19 0438     Phosphorus 2.9 mg/dL     Comprehensive Metabolic Panel [[id number]]  (Abnormal) Collected:  12/05/19 0343    Specimen:  Blood Updated:  12/05/19 0438     Glucose 136 mg/dL      BUN 32 mg/dL      Creatinine 1.71 mg/dL      Sodium 141 mmol/L      Potassium 4.2 mmol/L      Chloride 104 mmol/L      CO2 23.0 mmol/L      Calcium 9.1 mg/dL      Total Protein 6.3 g/dL      Albumin 3.50 g/dL      ALT (SGPT) 18 U/L      AST (SGOT) 26 U/L      Alkaline Phosphatase 117 U/L      Total Bilirubin 1.5 mg/dL      eGFR Non African Amer 30 mL/min/1.73      Globulin 2.8 gm/dL      A/G Ratio 1.3 g/dL      BUN/Creatinine Ratio 18.7     Anion Gap 14.0 mmol/L     Narrative:       GFR Normal >60  Chronic Kidney Disease <60  Kidney Failure <15    Magnesium [[id number]]  (Normal) Collected:  12/05/19 0343    Specimen:  Blood Updated:  12/05/19 0438     Magnesium 2.1 mg/dL     Protime-INR [[id number]]  (Abnormal) Collected:  12/05/19 0343    Specimen:  Blood Updated:  12/05/19 0436     Protime 16.2 Seconds      Comment: Result checked         INR 1.66    Haptoglobin [[id number]]  (Normal) Collected:  12/03/19 1224    Specimen:  Blood Updated:  12/04/19 1907     Haptoglobin 70 mg/dL     Lactate Dehydrogenase [[id number]]  (Abnormal) Collected:  12/04/19 0328    Specimen:  Blood Updated:  12/04/19 1218      U/L     Folate [[id number]]  (Normal) Collected:  12/04/19 0328    Specimen:  Blood Updated:  12/04/19 1125     Folate 18.60 ng/mL     Vitamin B12 [[id number]]  (Abnormal) Collected:  12/04/19 0328    Specimen:  Blood Updated:  12/04/19 1125     Vitamin B-12 947 pg/mL     Reticulocytes [[id number]]  (Abnormal) Collected:  12/04/19 0328    Specimen:  Blood Updated:  12/04/19 1037     Reticulocyte % 2.33 %      Reticulocyte Absolute 0.0883 10*6/mm3     Comprehensive Metabolic Panel [[id number]]  (Abnormal) Collected:  12/04/19 0328    Specimen:  Blood Updated:  12/04/19 1030     Glucose 191 mg/dL      BUN 40 mg/dL       Creatinine 1.83 mg/dL      Sodium 141 mmol/L      Potassium 4.3 mmol/L      Chloride 103 mmol/L      CO2 22.0 mmol/L      Calcium 9.0 mg/dL      Total Protein 6.6 g/dL      Albumin 3.80 g/dL      ALT (SGPT) 18 U/L      AST (SGOT) 26 U/L      Alkaline Phosphatase 130 U/L      Total Bilirubin 1.1 mg/dL      eGFR Non African Amer 28 mL/min/1.73      Globulin 2.8 gm/dL      A/G Ratio 1.4 g/dL      BUN/Creatinine Ratio 21.9     Anion Gap 16.0 mmol/L     Narrative:       GFR Normal >60  Chronic Kidney Disease <60  Kidney Failure <15    Bilirubin, Total & Direct [[id number]]  (Abnormal) Collected:  12/04/19 0328    Specimen:  Blood Updated:  12/04/19 1030     Total Bilirubin 1.1 mg/dL      Bilirubin, Direct 0.4 mg/dL      Bilirubin, Indirect 0.7 mg/dL     Protein Electrophoresis, Total [[id number]] Collected:  12/04/19 1006    Specimen:  Blood Updated:  12/04/19 1015          PENDING RESULTS: stool heme, SPEP    IMAGING REVIEWED:  Xr Chest 1 View    Result Date: 12/3/2019  Small left pleural effusion is suspected. Otherwise stable appearance of the chest as described above. No acute infiltrates.   Electronically Signed By-Darion Garces DO. On:12/3/2019 9:00 PM This report was finalized on [id number] by  Darion Garces DO..      I have reviewed the patient's labs, imaging, reports, and other clinician documentation.    Assessment/Plan   ASSESSMENT:  1. Microcytic anemia- receiving pRBC transfusions PRN.  Post transfusion ferritin was 21.  No vitamin B12 or folate deficiencies.   No hemolysis.  SPEP and stool heme pending. On warfarin for mechanical heart valve likely increasing bleeding risk.  GI bleed in differential. Last EGD/Colonoscopy at least 5 years ago by Dr. Worley with patient unaware of results. GI planning scopes and s/p Venofer x2.  2. Coronavirus/cough-supportive care per primary team.  3. ODETTE on CKD stage III- followed by Dr. Lai  4. CAD/mechanical aortic valve/HTN-history of CABG in past.  Per primary  team.  5. DM2-per primary team.    PLAN:  1. Await endoscopies per GI  2. Continue to transfuse PRN hemoglobin less than 7.5  3. Await remaining anemia work-up.  4. Recommend outpatient Procrit if hemoglobin remains below 10 given CKD       Note updated by PATRICIA Gtz.  Patient seen and examined by Dr. Martínez  Electronically signed by DELFINO Rene, 12/05/19, 9:48 AM.    I have personally performed a face-to-face diagnostic evaluation on this patient.  I have reviewed and agree with the care plan.    I discussed the patients findings and my recommendations with patient    Geovanydexter MD So  12/05/19  9:38 AM      Electronically signed by Lesa Vang FNP at 12/05/19 0949          Consult Notes (last 24 hours) (Notes from 12/04/19 1211 through 12/05/19 1211)      Breanna Helms, TIM at 12/04/19 1554      Consult Orders    1. Inpatient Gastroenterology Consult [[id number]] ordered by Lesa Vang FNP at 12/04/19 1402           Attestation signed by Dustin Castellanos MD at 12/04/19 1905    I have reviewed the documentation above and agree.  Patient seen and examined.  Nurse practitioner findings verified.  64 years old female who got admitted because of shortness of breath exertional dyspnea as well as weakness fatigue and tiredness with intermittent cough.  Noticed to be profoundly anemic with hemoglobin 7.  She does have a history of atrial fibrillation along with mechanical aortic and mitral valve on warfarin.  On examination, she is very pale, abdominal exam is benign without any tenderness or rebound tenderness.  INR is 2.4 hemoglobin 7.3.    Severe microcytic anemia being on anticoagulation, need to rule out GI source of bleeding given no recent endoscopy for more than 5 years.  Possible differential diagnosis may either include erosive gastritis rosaline's lesion AVM neoplastic lesion etc.  Peptic ulcer disease would be less likely on PPI.  We will proceed with a EGD colonoscopy examination.   "Check PT/INR in the morning.                    GI CONSULT  NOTE:    Referring Provider:  Dr. Bennett    Chief complaint: Anemia    Subjective . \"  I have had worsening shortness of breath for 1 month\"    History of present illness: Patient is a pleasant 64-year-old female with a history of A. fib with cardioversion, chronic kidney disease, CHF and diabetes.  She presents with increased shortness of breath and edema for 1 month.  She is had gradually worsening dyspnea with activity and intermittent cough that worsened over the last few days.  She was seen by nephrology with reported hemoglobin of 6.1 and was admitted for further evaluation.  No chest pain, fevers or chills.  No nausea, vomiting, heartburn or difficulty swallowing.  She has had some recent increased gas discomfort.  She had some abdominal tenderness when her dog jumped on her abdomen recently but no tenderness at this time.  She takes stool softeners without much constipation but will skip a few days between bowel movements.  No diarrhea, melena or rectal bleeding.  She takes warfarin and INR 2.74 on admission, NSAID use is rare.  No unintentional weight loss.  She does report a history of anemia with occasional oral iron use at home.    Endo History:  Patient reports EGD and colonoscopy over 5 years ago by Dr. Worley with polyp but otherwise unremarkable    Past Medical History:  Past Medical History:   Diagnosis Date   • A-fib (CMS/HCC) Dx 2018    S/p Cardioversion by Dr. Spencer on 04/10/19   • Acute renal failure syndrome (CMS/HCC) 4/2019-[facility] IP   • Biatrial enlargement 04/10/2019    Noted on SHERIE   • Calcified granuloma of lung (CMS/HCC) 01/04/2019    Noted on Chest XR   • Cardiomegaly 01/04/2019    Borderline--Noted on Chest XR   • CHF (congestive heart failure) (CMS/HCC)    • CKD (chronic kidney disease), stage III (CMS/HCC)     Does Not See a Nephrologist   • Diabetes mellitus (CMS/HCC)    • DM (diabetes mellitus) (CMS/HCC)     T2   • Dysphagia " 08/15-[facility] IP    Due to Pharyngitis   • GERD (gastroesophageal reflux disease)    • History of chest x-ray 8/28/15-[facility]    Normal   • History of chest x-ray 01/19/2019    Mild Pulmonary Edema w/Congestive Failure Noted   • History of chest x-ray 01/04/2019    Borderline Cardiomegaly    • History of EKG 2014/2018/2019-[facility]   • Hx of diabetic neuropathy    • Hx of dizziness     w/Lightheadedness   • Hx of echocardiogram 4/16/18-[facility]    EF 55-60%; Mild MVR; Mild TVR; Normal Root/No Effusion   • Hyperlipidemia     Controlled w/Meds   • Hypertension     Controlled w/Meds   • Hypokalemia 4/11/19-[facility] IP   • Hypothyroidism     Controlled w/Meds   • Iron deficiency anemia    • Left posterior fascicular block 04/2018 & 4/19    Noted on EKG   • Lower extremity edema 03/2019   • Mild mitral valve regurgitation 04/16/2018    Noted on Echo   • Mild tricuspid valve regurgitation 04/16/2018    Noted on Echo   • Moderate mitral valve regurgitation 2008    S/p MVR in 08'   • Moderate tricuspid insufficiency 04/10/2019    Noted on SHERIE   • Osteoarthritis     Knees w/Hx Knee Repl   • Pulmonary edema 01/19/2019    Mild--Noted on Chest XR   • Rapid palpitations 04/15/18 & 8/9/18-[facility] ER   • Renal dysfunction    • Right axis deviation 08/2018 & 4/19    Noted on EKG   • Severe aortic valve stenosis Since 2008    Hx AVR on 12/11/08 by Dr. Alvarado   • Severe mitral insufficiency 04/10/2019    Noted on SHERIE   • Sinus tachycardia 08/2018    Noted on EKG   • SOB (shortness of breath) chronic   • Torticollis Chronic   • Valvular heart disease     S/p AVR & MVR in 08'       Past Surgical History:  Past Surgical History:   Procedure Laterality Date   • AORTIC VALVE REPAIR/REPLACEMENT  12/11/08-[facility]    Using a #20 Eight Yr Mechanical [place] Prosthesis--Dewey Alvarado MD   • CARDIOVERSION  4/10/19-[facility]    Dr. Spencer--For A-Fib   • HYSTERECTOMY     • MITRAL VALVE REPLACEMENT  2008   • MITRAL VALVE REPLACEMENT  05/09/2019    Dr Alvarado   • SHERIE  4/10/19-[facility]    EF  40%; Moderate TVR; Severe Eccentric MI; Biatrial Enlargement   • TOTAL KNEE ARTHROPLASTY  2017   • TRICUSPID VALVE SURGERY  05/09/2019    Dr Alvarado       Social History:  Social History     Tobacco Use   • Smoking status: Never Smoker   • Smokeless tobacco: Never Used   Substance Use Topics   • Alcohol use: No   • Drug use: No   Tobacco or alcohol use    Family History:  Family History   Problem Relation Age of Onset   • Heart disease Father    • Hypertension Father    • Stroke Father    • Diabetes Father    • Lung cancer Mother    No family history of esophagus, stomach or colon cancer    Medications:  Medications Prior to Admission   Medication Sig Dispense Refill Last Dose   • atorvastatin (LIPITOR) 40 MG tablet Take 40 mg by mouth Daily.      • cholecalciferol (VITAMIN D3) 10 MCG (400 UNIT) tablet Take 2,000 Units by mouth Daily.      • levothyroxine (SYNTHROID, LEVOTHROID) 112 MCG tablet Take 112 mcg by mouth Daily.      • metoprolol succinate XL (TOPROL-XL) 25 MG 24 hr tablet Take 12.5 mg by mouth Daily.      • potassium chloride (K-DUR,KLOR-CON) 20 MEQ CR tablet Take 20 mEq by mouth Daily.      • vitamin C (ASCORBIC ACID) 500 MG tablet Take 500 mg by mouth Daily.      • warfarin (COUMADIN) 5 MG tablet Take 5 mg by mouth 3 (Three) Times a Week. Mon, Wed, Fri      • warfarin (COUMADIN) 7.5 MG tablet Take 7.5 mg by mouth 4 (Four) Times a Week. Tues, Thur, Sat, Sun      • aspirin 81 MG tablet Take 1 tablet by mouth daily.   Taking   • B Complex Vitamins (VITAMIN B COMPLEX) tablet Take by mouth.   Taking   • ezetimibe (ZETIA) 10 MG tablet Take 1 tablet by mouth daily.   Taking   • furosemide (LASIX) 40 MG tablet Take 40 mg by mouth Daily.      • Liraglutide (VICTOZA) 18 MG/3ML solution pen-injector Inject 1.8 mg under the skin into the appropriate area as directed Daily.   Taking   • metFORMIN (GLUCOPHAGE) 500 MG tablet Take 1 tablet by mouth Every Evening.   Taking   • Multiple Vitamin (MULTI-VITAMIN) tablet Take  1 tablet by mouth daily.   Taking   • omeprazole (PriLOSEC) 20 MG capsule Take 1 capsule by mouth daily.   Taking   • valsartan (DIOVAN) 80 MG tablet Take 1 tablet by mouth Daily. PT TAKES 40MG DAILY    Taking       Scheduled Meds:  aspirin 81 mg Oral Daily   atorvastatin 40 mg Oral Nightly   iron sucrose 200 mg Intravenous Daily   levothyroxine 112 mcg Oral Q AM   losartan 50 mg Oral Q24H   metFORMIN 500 mg Oral Q PM   metoprolol succinate XL 12.5 mg Oral Daily   pantoprazole 40 mg Oral Q AM   PEG-KCl-NaCl-NaSulf-Na Asc-C 1,000 mL Oral Once   sodium chloride 10 mL Intravenous Q12H   vitamin K1 2.5 mg Oral Once     Continuous Infusions:  Pharmacy to dose warfarin      PRN Meds:.•  acetaminophen **OR** acetaminophen **OR** acetaminophen  •  aluminum-magnesium hydroxide-simethicone  •  bisacodyl  •  calcium carbonate  •  docusate sodium  •  magnesium hydroxide  •  melatonin  •  nitroglycerin  •  ondansetron **OR** ondansetron  •  Pharmacy to dose warfarin  •  [COMPLETED] Insert peripheral IV **AND** sodium chloride  •  sodium chloride    ALLERGIES:  Patient has no known allergies.    ROS:  Review of Systems   Constitutional: Negative for chills, fever and unexpected weight change.   HENT: Negative for nosebleeds and trouble swallowing.    Respiratory: Positive for cough and shortness of breath.    Cardiovascular: Negative for chest pain and palpitations.   Gastrointestinal: Negative for abdominal pain, blood in stool, constipation, diarrhea, nausea and vomiting.   Genitourinary: Negative for dysuria and hematuria.   Musculoskeletal: Negative for gait problem and joint swelling.   Skin: Negative for color change and rash.   Neurological: Negative for syncope and headaches.   Psychiatric/Behavioral: Negative for agitation and confusion.       Objective Resting in bed, no acute distress, no family present    Vital Signs:   Temp:  [98.3 °F (36.8 °C)-99.3 °F (37.4 °C)] 98.4 °F (36.9 °C)  Heart Rate:  [] 88  Resp:   [12-20] 16  BP: (119-143)/(53-82) 120/76    Physical Exam:      General Appearance:    Awake and alert, in no acute distress, obese   Head:    Normocephalic, without obvious abnormality, atraumatic   Eyes:            Conjunctivae normal, anicteric sclera   Ears:    Ears appear intact with no abnormalities noted   Throat:   No oral lesions, no thrush, oral mucosa moist   Neck:   supple,  no JVD   Lungs:     Clear to auscultation bilaterally, respirations regular, even and unlabored, nonproductive cough noted    Heart:    Regular rhythm and normal rate, normal S1 and S2   Chest Wall:    No abnormalities observed   Abdomen:     Normal bowel sounds, soft, non-tender, no rebound or guarding, non-distended   Rectal:     Deferred   Extremities:   Moves all extremities well, no edema, no cyanosis, no             redness   Pulses:   Pulses palpable and equal bilaterally   Skin:   No bleeding, bruising or rash, no jaundice       Neurologic:   Cranial nerves 2 - 12 grossly intact, no asterixis, sensation intact       Results Review:   I reviewed the patient's labs and imaging.  Lab Results (last 24 hours)     Procedure Component Value Units Date/Time    Lactate Dehydrogenase [[id number]]  (Abnormal) Collected:  12/04/19 0328    Specimen:  Blood Updated:  12/04/19 1218      U/L     Folate [[id number]]  (Normal) Collected:  12/04/19 0328    Specimen:  Blood Updated:  12/04/19 1125     Folate 18.60 ng/mL     Vitamin B12 [[id number]]  (Abnormal) Collected:  12/04/19 0328    Specimen:  Blood Updated:  12/04/19 1125     Vitamin B-12 947 pg/mL     Reticulocytes [[id number]]  (Abnormal) Collected:  12/04/19 0328    Specimen:  Blood Updated:  12/04/19 1037     Reticulocyte % 2.33 %      Reticulocyte Absolute 0.0883 10*6/mm3     Comprehensive Metabolic Panel [[id number]]  (Abnormal) Collected:  12/04/19 0328    Specimen:  Blood Updated:  12/04/19 1030     Glucose 191 mg/dL      BUN 40 mg/dL      Creatinine 1.83 mg/dL      Sodium 141  mmol/L      Potassium 4.3 mmol/L      Chloride 103 mmol/L      CO2 22.0 mmol/L      Calcium 9.0 mg/dL      Total Protein 6.6 g/dL      Albumin 3.80 g/dL      ALT (SGPT) 18 U/L      AST (SGOT) 26 U/L      Alkaline Phosphatase 130 U/L      Total Bilirubin 1.1 mg/dL      eGFR Non African Amer 28 mL/min/1.73      Globulin 2.8 gm/dL      A/G Ratio 1.4 g/dL      BUN/Creatinine Ratio 21.9     Anion Gap 16.0 mmol/L     Narrative:       GFR Normal >60  Chronic Kidney Disease <60  Kidney Failure <15    Bilirubin, Total & Direct [[id number]]  (Abnormal) Collected:  12/04/19 0328    Specimen:  Blood Updated:  12/04/19 1030     Total Bilirubin 1.1 mg/dL      Bilirubin, Direct 0.4 mg/dL      Bilirubin, Indirect 0.7 mg/dL     Haptoglobin [[id number]] Collected:  12/03/19 1224    Specimen:  Blood Updated:  12/04/19 1027    Protein Electrophoresis, Total [[id number]] Collected:  12/04/19 1006    Specimen:  Blood Updated:  12/04/19 1015    Basic Metabolic Panel [[id number]]  (Abnormal) Collected:  12/04/19 0328    Specimen:  Blood Updated:  12/04/19 0454     Glucose 188 mg/dL      Comment: Result checked         BUN 40 mg/dL      Creatinine 1.76 mg/dL      Sodium 141 mmol/L      Potassium 4.2 mmol/L      Chloride 104 mmol/L      CO2 24.0 mmol/L      Calcium 9.3 mg/dL      eGFR Non African Amer 29 mL/min/1.73      BUN/Creatinine Ratio 22.7     Anion Gap 13.0 mmol/L     Narrative:       GFR Normal >60  Chronic Kidney Disease <60  Kidney Failure <15    Ferritin [[id number]]  (Normal) Collected:  12/04/19 0328    Specimen:  Blood Updated:  12/04/19 0445     Ferritin 21.20 ng/mL     Iron [[id number]]  (Abnormal) Collected:  12/04/19 0328    Specimen:  Blood Updated:  12/04/19 0443     Iron 24 mcg/dL     Protime-INR [[id number]]  (Normal) Collected:  12/04/19 0328    Specimen:  Blood Updated:  12/04/19 0436     Protime 23.0 Seconds      INR 2.43    CBC & Differential [[id number]] Collected:  12/04/19 0328    Specimen:  Blood Updated:  12/04/19  0427    Narrative:       The following orders were created for panel order CBC & Differential.  Procedure                               Abnormality         Status                     ---------                               -----------         ------                     CBC Auto Differential[[id number]]        Abnormal            Final result                 Please view results for these tests on the individual orders.    CBC Auto Differential [[id number]]  (Abnormal) Collected:  12/04/19 0328    Specimen:  Blood Updated:  12/04/19 0427     WBC 6.40 10*3/mm3      RBC 3.89 10*6/mm3      Hemoglobin 7.3 g/dL      Hematocrit 24.6 %      MCV 63.3 fL      Comment: Parameter reviewed in the past 30 days on 12/3/2019 .         MCH 18.7 pg      MCHC 29.5 g/dL      RDW 21.7 %      RDW-SD 49.0 fl      MPV 8.6 fL      Platelets 306 10*3/mm3      Neutrophil % 74.8 %      Lymphocyte % 10.8 %      Monocyte % 10.6 %      Eosinophil % 3.0 %      Basophil % 0.8 %      Neutrophils, Absolute 4.80 10*3/mm3      Lymphocytes, Absolute 0.70 10*3/mm3      Monocytes, Absolute 0.70 10*3/mm3      Eosinophils, Absolute 0.20 10*3/mm3      Basophils, Absolute 0.10 10*3/mm3      nRBC 0.1 /100 WBC     Respiratory Panel, PCR - Swab, Nasopharynx [[id number]]  (Abnormal) Collected:  12/03/19 2041    Specimen:  Swab from Nasopharynx Updated:  12/03/19 2204     ADENOVIRUS, PCR Not Detected     Coronavirus 229E Not Detected     Coronavirus HKU1 Detected     Coronavirus NL63 Not Detected     Coronavirus OC43 Not Detected     Human Metapneumovirus Not Detected     Human Rhinovirus/Enterovirus Not Detected     Influenza B PCR Not Detected     Parainfluenza Virus 1 Not Detected     Parainfluenza Virus 2 Not Detected     Parainfluenza Virus 3 Not Detected     Parainfluenza Virus 4 Not Detected     Bordetella pertussis pcr Not Detected     Influenza A H1 2009 PCR Not Detected     Chlamydophila pneumoniae PCR Not Detected     Mycoplasma pneumo by PCR Not Detected      Influenza A PCR Not Detected     Influenza A H3 Not Detected     Influenza A H1 Not Detected     RSV, PCR Not Detected    Protime-INR [[id number]]  (Normal) Collected:  12/03/19 2137    Specimen:  Blood Updated:  12/03/19 2150     Protime 25.9 Seconds      INR 2.74    CBC & Differential [[id number]] Collected:  12/03/19 2056    Specimen:  Blood Updated:  12/03/19 2141    Narrative:       The following orders were created for panel order CBC & Differential.  Procedure                               Abnormality         Status                     ---------                               -----------         ------                     CBC Auto Differential[[id number]]        Abnormal            Final result                 Please view results for these tests on the individual orders.    CBC Auto Differential [[id number]]  (Abnormal) Collected:  12/03/19 2056    Specimen:  Blood Updated:  12/03/19 2141     WBC 6.00 10*3/mm3      RBC 3.44 10*6/mm3      Hemoglobin 6.3 g/dL      Hematocrit 21.3 %      MCV 61.8 fL      MCH 18.3 pg      MCHC 29.7 g/dL      RDW 20.5 %      RDW-SD 44.6 fl      MPV 8.5 fL      Platelets 343 10*3/mm3     Scan Slide [[id number]] Collected:  12/03/19 2056    Specimen:  Blood Updated:  12/03/19 2141     Scan Slide --     Comment: See Manual Differential Results       Manual Differential [[id number]]  (Abnormal) Collected:  12/03/19 2056    Specimen:  Blood Updated:  12/03/19 2141     Neutrophil % 72.0 %      Lymphocyte % 18.0 %      Monocyte % 2.0 %      Eosinophil % 5.0 %      Basophil % 3.0 %      Neutrophils Absolute 4.32 10*3/mm3      Lymphocytes Absolute 1.08 10*3/mm3      Monocytes Absolute 0.12 10*3/mm3      Eosinophils Absolute 0.30 10*3/mm3      Basophils Absolute 0.18 10*3/mm3      Acanthocytes Large/3+     Anisocytosis Large/3+     Elliptocytes Mod/2+     Hypochromia Slight/1+     Microcytes Mod/2+     Schistocytes Large/3+     WBC Morphology Normal     Giant Platelets Slight/1+           Imaging Results (Last 24 Hours)     Procedure Component Value Units Date/Time    XR Chest 1 View [[id number]] Collected:  12/03/19 2046     Updated:  12/03/19 2102    Narrative:       XR CHEST 1 VW-     Date of Exam: 12/3/2019 8:22 PM     Indication: cough; D64.9-Anemia, unspecified     Comparison: None available.     Technique: 1 view(s) of the chest were obtained.     FINDINGS: The lungs are well expanded. Cardiac, hilar and  mediastinal silhouettes are stable with cardiomegaly, prosthetic cardiac  valves and sternotomy wires. No pneumothorax or focal pulmonary  parenchymal opacity. There is blunting of the left costophrenic angle  suggesting a small pleural effusion Scattered calcified granulomas are  present. Pulmonary vascularity is within normal limits. The trachea is  midline. Visualized bony structures are intact.       Impression:       Small left pleural effusion is suspected. Otherwise stable appearance of  the chest as described above. No acute infiltrates.        Electronically Signed By-Darion Garces DO. On:12/3/2019 9:00 PM  This report was finalized on [id number] by  Darion Garces DO..             ASSESSMENT:    Microcytic anemia/iron deficiency  Mechanical (AVR/MVR) valves -on warfarin  Coronavirus  ODETTE on chronic kidney disease  Diabetes with neuropathy  History of CHF/A. fib with cardioversion/CABG    PLAN:    Patient admitted with anemia on warfarin.  Hemoglobin 7.3 after 1 unit.  INR 2.4.  Hematology following and she was given Venofer.  We will plan EGD and colonoscopy evaluation in a.m. to rule out GI source for anemia.  Give FFP and vitamin K x1.  Will need anticoagulation restarted secondary to mechanical valve.  Mildly elevated alk phos noted, repeat in a.m. and consider evaluation if remains elevated.  Continue supportive care and further recommendations to follow endoscopy findings.    I discussed the patients findings and my recommendations with the patient.  Breanna Helms,  APRN  19  3:54 PM                Electronically signed by Dustin Castellanos MD at 19 1903     Nav Rizzo MD at 19 1244      Consult Orders    1. Hematology & Oncology Inpatient Consult [[id number]] ordered by Martita Bennett MD at 19 0952                  Hematology/Oncology Inpatient Consultation    Patient name: Jocelin Parra  : 1955  MRN: [id number]  Referring Provider: Dr. Bennett  Reason for Consultation: Anemia    Chief complaint: Anemia    History of present illness:    64 y.o. female admitted through the ED 12/3/2019 with anemia.  The patient was sent by her nephrologist after outpatient labs showed worsening anemia with hemoglobin 6.1.  She had reported some increasing dyspnea and occasional mild edema.  She reported cough and respiratory viral panel was positive for coronavirus.  CBC in the ED showed WBC 3.5, hemoglobin 6.1, MCV 63.4, RDW 18.6, and platelets 339,000. She received 1 unit pRBC.  Creatinine was 1.57.  INR was 2.74 on Coumadin for mechanical heart valve.  Posttransfusion iron studies showed iron 24 and ferritin 21.2.  Vitamin B12 was 947 and folate was 18.6.  LDH was 353 and reticulocyte count was 2.33%.  Haptoglobin, stool heme, and SPEP were pending at time of consult.    19  Hematology/Oncology was consulted for anemia.    PCP: Emilie Cruz APRN    History:  Past Medical History:   Diagnosis Date   • A-fib (CMS/HCC) Dx 2018    S/p Cardioversion by Dr. Spencer on 04/10/19   • Acute renal failure syndrome (CMS/HCC) 2019-F IP   • Biatrial enlargement 04/10/2019    Noted on SHERIE   • Calcified granuloma of lung (CMS/HCC) 2019    Noted on Chest XR   • Cardiomegaly 2019    Borderline--Noted on Chest XR   • CHF (congestive heart failure) (CMS/HCC)    • CKD (chronic kidney disease), stage III (CMS/HCC)     Does Not See a Nephrologist   • Diabetes mellitus (CMS/HCC)    • DM (diabetes mellitus) (CMS/HCC)     T2   • Dysphagia 08/15-[facility]  IP    Due to Pharyngitis   • GERD (gastroesophageal reflux disease)    • History of chest x-ray 8/28/15-[facility]    Normal   • History of chest x-ray 01/19/2019    Mild Pulmonary Edema w/Congestive Failure Noted   • History of chest x-ray 01/04/2019    Borderline Cardiomegaly    • History of EKG 2014/2018/2019-[facility]   • Hx of diabetic neuropathy    • Hx of dizziness     w/Lightheadedness   • Hx of echocardiogram 4/16/18-[facility]    EF 55-60%; Mild MVR; Mild TVR; Normal Root/No Effusion   • Hyperlipidemia     Controlled w/Meds   • Hypertension     Controlled w/Meds   • Hypokalemia 4/11/19-[facility] IP   • Hypothyroidism     Controlled w/Meds   • Iron deficiency anemia    • Left posterior fascicular block 04/2018 & 4/19    Noted on EKG   • Lower extremity edema 03/2019   • Mild mitral valve regurgitation 04/16/2018    Noted on Echo   • Mild tricuspid valve regurgitation 04/16/2018    Noted on Echo   • Moderate mitral valve regurgitation 2008    S/p MVR in 08'   • Moderate tricuspid insufficiency 04/10/2019    Noted on SHERIE   • Osteoarthritis     Knees w/Hx Knee Repl   • Pulmonary edema 01/19/2019    Mild--Noted on Chest XR   • Rapid palpitations 04/15/18 & 8/9/18-[facility] ER   • Renal dysfunction    • Right axis deviation 08/2018 & 4/19    Noted on EKG   • Severe aortic valve stenosis Since 2008    Hx AVR on 12/11/08 by Dr. Alvarado   • Severe mitral insufficiency 04/10/2019    Noted on SHERIE   • Sinus tachycardia 08/2018    Noted on EKG   • SOB (shortness of breath) chronic   • Torticollis Chronic   • Valvular heart disease     S/p AVR & MVR in 08'   , Past Surgical History:   Procedure Laterality Date   • AORTIC VALVE REPAIR/REPLACEMENT  12/11/08-[facility]    Using a #20 Eight Yr Mechanical [place] Prosthesis--Dewey Alvarado MD   • CARDIOVERSION  4/10/19-[facility]    Dr. Spencer--For A-Fib   • HYSTERECTOMY     • MITRAL VALVE REPLACEMENT  2008   • MITRAL VALVE REPLACEMENT  05/09/2019    Dr Alvarado   • SHERIE  4/10/19-[facility]    EF 40%; Moderate TVR; Severe Eccentric  MI; Biatrial Enlargement   • TOTAL KNEE ARTHROPLASTY  2017   • TRICUSPID VALVE SURGERY  05/09/2019    Dr Alvarado   , Family History   Problem Relation Age of Onset   • Heart disease Father    • Hypertension Father    • Stroke Father    • Diabetes Father    • Lung cancer Mother    , Social History     Tobacco Use   • Smoking status: Never Smoker   • Smokeless tobacco: Never Used   Substance Use Topics   • Alcohol use: No   • Drug use: No   , Medications Prior to Admission   Medication Sig Dispense Refill Last Dose   • atorvastatin (LIPITOR) 40 MG tablet Take 40 mg by mouth Daily.      • cholecalciferol (VITAMIN D3) 10 MCG (400 UNIT) tablet Take 2,000 Units by mouth Daily.      • levothyroxine (SYNTHROID, LEVOTHROID) 112 MCG tablet Take 112 mcg by mouth Daily.      • metoprolol succinate XL (TOPROL-XL) 25 MG 24 hr tablet Take 12.5 mg by mouth Daily.      • potassium chloride (K-DUR,KLOR-CON) 20 MEQ CR tablet Take 20 mEq by mouth Daily.      • vitamin C (ASCORBIC ACID) 500 MG tablet Take 500 mg by mouth Daily.      • warfarin (COUMADIN) 5 MG tablet Take 5 mg by mouth 3 (Three) Times a Week. Mon, Wed, Fri      • warfarin (COUMADIN) 7.5 MG tablet Take 7.5 mg by mouth 4 (Four) Times a Week. Tues, Thur, Sat, Sun      • aspirin 81 MG tablet Take 1 tablet by mouth daily.   Taking   • B Complex Vitamins (VITAMIN B COMPLEX) tablet Take by mouth.   Taking   • ezetimibe (ZETIA) 10 MG tablet Take 1 tablet by mouth daily.   Taking   • furosemide (LASIX) 40 MG tablet Take 40 mg by mouth Daily.      • Liraglutide (VICTOZA) 18 MG/3ML solution pen-injector Inject 1.8 mg under the skin into the appropriate area as directed Daily.   Taking   • metFORMIN (GLUCOPHAGE) 500 MG tablet Take 1 tablet by mouth Every Evening.   Taking   • Multiple Vitamin (MULTI-VITAMIN) tablet Take 1 tablet by mouth daily.   Taking   • omeprazole (PriLOSEC) 20 MG capsule Take 1 capsule by mouth daily.   Taking   • valsartan (DIOVAN) 80 MG tablet Take 1 tablet by  "mouth Daily. PT TAKES 40MG DAILY    Taking   , Scheduled Meds:    aspirin 81 mg Oral Daily   atorvastatin 40 mg Oral Nightly   levothyroxine 112 mcg Oral Q AM   losartan 50 mg Oral Q24H   metFORMIN 500 mg Oral Q PM   metoprolol succinate XL 12.5 mg Oral Daily   pantoprazole 40 mg Oral Q AM   sodium chloride 10 mL Intravenous Q12H   warfarin 5 mg Oral Once per day on Mon Wed Fri   [START ON 12/5/2019] warfarin 7.5 mg Oral Once per day on Sun Tue Thu Sat   , Continuous Infusions:    Pharmacy to dose warfarin    , PRN Meds:  •  acetaminophen **OR** acetaminophen **OR** acetaminophen  •  aluminum-magnesium hydroxide-simethicone  •  bisacodyl  •  calcium carbonate  •  docusate sodium  •  magnesium hydroxide  •  melatonin  •  nitroglycerin  •  ondansetron **OR** ondansetron  •  Pharmacy to dose warfarin  •  [COMPLETED] Insert peripheral IV **AND** sodium chloride  •  sodium chloride   Allergies:  Patient has no known allergies.    ROS:  Review of Systems   Constitutional: Negative for chills and fever.   HENT: Negative for ear pain, mouth sores, nosebleeds and sore throat.    Eyes: Negative for photophobia and visual disturbance.   Respiratory: Negative for wheezing and stridor.    Cardiovascular: Negative for chest pain and palpitations.   Gastrointestinal: Negative for abdominal pain, diarrhea, nausea and vomiting.   Endocrine: Negative for cold intolerance and heat intolerance.   Genitourinary: Negative for dysuria and hematuria.   Musculoskeletal: Negative for joint swelling and neck stiffness.   Skin: Negative for color change and rash.   Neurological: Negative for seizures and syncope.   Hematological: Negative for adenopathy.        No obvious bleeding   Psychiatric/Behavioral: Negative for agitation, confusion and hallucinations.        Objective     Vital Signs:   /82   Pulse 115   Temp 98.3 °F (36.8 °C)   Resp 17   Ht 157.5 cm (62\")   Wt 83.9 kg (185 lb)   SpO2 100%   BMI 33.84 kg/m²      Physical " Exam:  Physical Exam   Constitutional: She is oriented to person, place, and time. No distress.   HENT:   Head: Normocephalic and atraumatic.   Eyes: Conjunctivae and EOM are normal. Right eye exhibits no discharge. Left eye exhibits no discharge. No scleral icterus.   Neck: Normal range of motion. Neck supple. No thyromegaly present.   Cardiovascular: Normal rate, regular rhythm and normal heart sounds. Exam reveals no gallop and no friction rub.   Pulmonary/Chest: Effort normal. No stridor. No respiratory distress. She has no wheezes.   Abdominal: Soft. Bowel sounds are normal. She exhibits no mass. There is no tenderness. There is no rebound and no guarding.   Musculoskeletal: Normal range of motion. She exhibits no tenderness.   Lymphadenopathy:     She has no cervical adenopathy.   Neurological: She is alert and oriented to person, place, and time. She exhibits normal muscle tone.   Skin: Skin is warm. No rash noted. She is not diaphoretic. No erythema.   Psychiatric: She has a normal mood and affect. Her behavior is normal.   Nursing note and vitals reviewed.       Results Review:  Lab Results (last 48 hours)     Procedure Component Value Units Date/Time    Lactate Dehydrogenase [[id number]]  (Abnormal) Collected:  12/04/19 0328    Specimen:  Blood Updated:  12/04/19 1218      U/L     Folate [[id number]]  (Normal) Collected:  12/04/19 0328    Specimen:  Blood Updated:  12/04/19 1125     Folate 18.60 ng/mL     Vitamin B12 [[id number]]  (Abnormal) Collected:  12/04/19 0328    Specimen:  Blood Updated:  12/04/19 1125     Vitamin B-12 947 pg/mL     Reticulocytes [[id number]]  (Abnormal) Collected:  12/04/19 0328    Specimen:  Blood Updated:  12/04/19 1037     Reticulocyte % 2.33 %      Reticulocyte Absolute 0.0883 10*6/mm3     Comprehensive Metabolic Panel [[id number]]  (Abnormal) Collected:  12/04/19 0328    Specimen:  Blood Updated:  12/04/19 1030     Glucose 191 mg/dL      BUN 40 mg/dL      Creatinine 1.83  mg/dL      Sodium 141 mmol/L      Potassium 4.3 mmol/L      Chloride 103 mmol/L      CO2 22.0 mmol/L      Calcium 9.0 mg/dL      Total Protein 6.6 g/dL      Albumin 3.80 g/dL      ALT (SGPT) 18 U/L      AST (SGOT) 26 U/L      Alkaline Phosphatase 130 U/L      Total Bilirubin 1.1 mg/dL      eGFR Non African Amer 28 mL/min/1.73      Globulin 2.8 gm/dL      A/G Ratio 1.4 g/dL      BUN/Creatinine Ratio 21.9     Anion Gap 16.0 mmol/L     Narrative:       GFR Normal >60  Chronic Kidney Disease <60  Kidney Failure <15    Bilirubin, Total & Direct [[id number]]  (Abnormal) Collected:  12/04/19 0328    Specimen:  Blood Updated:  12/04/19 1030     Total Bilirubin 1.1 mg/dL      Bilirubin, Direct 0.4 mg/dL      Bilirubin, Indirect 0.7 mg/dL     Haptoglobin [[id number]] Collected:  12/03/19 1224    Specimen:  Blood Updated:  12/04/19 1027    Protein Electrophoresis, Total [[id number]] Collected:  12/04/19 1006    Specimen:  Blood Updated:  12/04/19 1015    Basic Metabolic Panel [[id number]]  (Abnormal) Collected:  12/04/19 0328    Specimen:  Blood Updated:  12/04/19 0454     Glucose 188 mg/dL      Comment: Result checked         BUN 40 mg/dL      Creatinine 1.76 mg/dL      Sodium 141 mmol/L      Potassium 4.2 mmol/L      Chloride 104 mmol/L      CO2 24.0 mmol/L      Calcium 9.3 mg/dL      eGFR Non African Amer 29 mL/min/1.73      BUN/Creatinine Ratio 22.7     Anion Gap 13.0 mmol/L     Narrative:       GFR Normal >60  Chronic Kidney Disease <60  Kidney Failure <15    Ferritin [[id number]]  (Normal) Collected:  12/04/19 0328    Specimen:  Blood Updated:  12/04/19 0445     Ferritin 21.20 ng/mL     Iron [[id number]]  (Abnormal) Collected:  12/04/19 0328    Specimen:  Blood Updated:  12/04/19 0443     Iron 24 mcg/dL     Protime-INR [[id number]]  (Normal) Collected:  12/04/19 0328    Specimen:  Blood Updated:  12/04/19 0436     Protime 23.0 Seconds      INR 2.43    CBC & Differential [[id number]] Collected:  12/04/19 0328    Specimen:  Blood  Updated:  12/04/19 0427    Narrative:       The following orders were created for panel order CBC & Differential.  Procedure                               Abnormality         Status                     ---------                               -----------         ------                     CBC Auto Differential[[id number]]        Abnormal            Final result                 Please view results for these tests on the individual orders.    CBC Auto Differential [[id number]]  (Abnormal) Collected:  12/04/19 0328    Specimen:  Blood Updated:  12/04/19 0427     WBC 6.40 10*3/mm3      RBC 3.89 10*6/mm3      Hemoglobin 7.3 g/dL      Hematocrit 24.6 %      MCV 63.3 fL      Comment: Parameter reviewed in the past 30 days on 12/3/2019 .         MCH 18.7 pg      MCHC 29.5 g/dL      RDW 21.7 %      RDW-SD 49.0 fl      MPV 8.6 fL      Platelets 306 10*3/mm3      Neutrophil % 74.8 %      Lymphocyte % 10.8 %      Monocyte % 10.6 %      Eosinophil % 3.0 %      Basophil % 0.8 %      Neutrophils, Absolute 4.80 10*3/mm3      Lymphocytes, Absolute 0.70 10*3/mm3      Monocytes, Absolute 0.70 10*3/mm3      Eosinophils, Absolute 0.20 10*3/mm3      Basophils, Absolute 0.10 10*3/mm3      nRBC 0.1 /100 WBC     Respiratory Panel, PCR - Swab, Nasopharynx [[id number]]  (Abnormal) Collected:  12/03/19 2041    Specimen:  Swab from Nasopharynx Updated:  12/03/19 2204     ADENOVIRUS, PCR Not Detected     Coronavirus 229E Not Detected     Coronavirus HKU1 Detected     Coronavirus NL63 Not Detected     Coronavirus OC43 Not Detected     Human Metapneumovirus Not Detected     Human Rhinovirus/Enterovirus Not Detected     Influenza B PCR Not Detected     Parainfluenza Virus 1 Not Detected     Parainfluenza Virus 2 Not Detected     Parainfluenza Virus 3 Not Detected     Parainfluenza Virus 4 Not Detected     Bordetella pertussis pcr Not Detected     Influenza A H1 2009 PCR Not Detected     Chlamydophila pneumoniae PCR Not Detected     Mycoplasma pneumo by  PCR Not Detected     Influenza A PCR Not Detected     Influenza A H3 Not Detected     Influenza A H1 Not Detected     RSV, PCR Not Detected    Protime-INR [[id number]]  (Normal) Collected:  12/03/19 2137    Specimen:  Blood Updated:  12/03/19 2150     Protime 25.9 Seconds      INR 2.74    CBC & Differential [[id number]] Collected:  12/03/19 2056    Specimen:  Blood Updated:  12/03/19 2141    Narrative:       The following orders were created for panel order CBC & Differential.  Procedure                               Abnormality         Status                     ---------                               -----------         ------                     CBC Auto Differential[[id number]]        Abnormal            Final result                 Please view results for these tests on the individual orders.    CBC Auto Differential [[id number]]  (Abnormal) Collected:  12/03/19 2056    Specimen:  Blood Updated:  12/03/19 2141     WBC 6.00 10*3/mm3      RBC 3.44 10*6/mm3      Hemoglobin 6.3 g/dL      Hematocrit 21.3 %      MCV 61.8 fL      MCH 18.3 pg      MCHC 29.7 g/dL      RDW 20.5 %      RDW-SD 44.6 fl      MPV 8.5 fL      Platelets 343 10*3/mm3     Scan Slide [[id number]] Collected:  12/03/19 2056    Specimen:  Blood Updated:  12/03/19 2141     Scan Slide --     Comment: See Manual Differential Results       Manual Differential [[id number]]  (Abnormal) Collected:  12/03/19 2056    Specimen:  Blood Updated:  12/03/19 2141     Neutrophil % 72.0 %      Lymphocyte % 18.0 %      Monocyte % 2.0 %      Eosinophil % 5.0 %      Basophil % 3.0 %      Neutrophils Absolute 4.32 10*3/mm3      Lymphocytes Absolute 1.08 10*3/mm3      Monocytes Absolute 0.12 10*3/mm3      Eosinophils Absolute 0.30 10*3/mm3      Basophils Absolute 0.18 10*3/mm3      Acanthocytes Large/3+     Anisocytosis Large/3+     Elliptocytes Mod/2+     Hypochromia Slight/1+     Microcytes Mod/2+     Schistocytes Large/3+     WBC Morphology Normal     Giant Platelets  Slight/1+           Pending Results: hapto, stool heme, SPEP    Imaging Reviewed:   Xr Chest 1 View    Result Date: 12/3/2019  Small left pleural effusion is suspected. Otherwise stable appearance of the chest as described above. No acute infiltrates.   Electronically Signed By-Darion Garces DO. On:12/3/2019 9:00 PM This report was finalized on [id number] by  Darion Garces DO..      I have reviewed the patient's labs, imaging, reports, and other clinician documentation.        Assessment/Plan   ASSESSMENT  1. Microcytic anemia- receiving pRBC transfusions PRN.  Post transfusion ferritin was 21.  No vitamin B12 or folate deficiencies.  Recheck and LDH mildly elevated with hepato-pending-less likely hemolysis.  SPEP and stool heme pending. On warfarin for mechanical heart valve likely increasing bleeding risk.  GI bleed in differential. Last EGD/Colonoscopy at least 5 years ago by Dr. Worley with patient unaware of results.  2. Coronavirus/cough-supportive care per primary team.  3. ODETTE on CKD stage III- followed by Dr. Lai  4. CAD/mechanical aortic valve/HTN-history of CABG in past.  Per primary team.  5. DM2-per primary team.    PLAN  1. Venofer daily x3.  2. Continue to transfuse PRN hemoglobin less than 7.5.  3. Await remaining anemia work-up.  4. GI consult.  5. Recommend outpatient Procrit if hemoglobin remains below 10 post discharge.    Note updated by PATRICIA Gtz.  Patient seen and examined by Dr. Martínez  Electronically signed by DELFINO Rene, 12/04/19, 1:12 PM.    I have personally performed a face-to-face diagnostic evaluation on this patient.  I have reviewed and agreed with the care plan.  Patient seen and examined nurse practitioner note reviewed updated discussed with nurse practitioner.  Continue Venofer infusions for 2 days.  GI work-up in the morning.  She reports she never had colonoscopy in the past  Daily CBC and transfusions if needed  Patient will benefit from Procrit  injections if hemoglobin stays below 10 given her renal failure  I discussed the patients findings and my recommendations with patient.    Thank you for this consult.  We will be happy to follow along in the care of this patient.     Nav Rizzo MD  12/04/19  12:44 PM      Electronically signed by Nav Rizzo MD at 12/04/19 5393

## 2019-12-05 NOTE — PROGRESS NOTES
"   LOS: 0 days    Patient Care Team:  Emilie Cruz APRN as PCP - General  Roldan Solano MD as Consulting Physician (Cardiology)  Ney Lai MD as Consulting Physician (Nephrology)    Chief Complaint:  Anemia, SOA    Subjective     Interval History:   Chart reviewed.  For endoscopies today.    Objective     Vital Sign Min/Max for last 24 hours  Temp  Min: 97.5 °F (36.4 °C)  Max: 98.4 °F (36.9 °C)   BP  Min: 108/61  Max: 133/76   Pulse  Min: 78  Max: 99   Resp  Min: 14  Max: 20   SpO2  Min: 95 %  Max: 100 %   No Data Recorded   Weight  Min: 88.3 kg (194 lb 10.7 oz)  Max: 88.3 kg (194 lb 10.7 oz)     Flowsheet Rows      First Filed Value   Admission Height  157.5 cm (62\") Documented at 12/03/2019 1901   Admission Weight  83.9 kg (185 lb) Documented at 12/03/2019 1901          I/O this shift:  In: 350 [Blood:350]  Out: -   I/O last 3 completed shifts:  In: 1902 [P.O.:960; Blood:942]  Out: -     Physical Exam:      WBC WBC   Date Value Ref Range Status   12/05/2019 6.10 3.40 - 10.80 10*3/mm3 Final   12/04/2019 6.40 3.40 - 10.80 10*3/mm3 Final   12/03/2019 6.00 3.40 - 10.80 10*3/mm3 Final   12/03/2019 7.45 3.40 - 10.80 10*3/mm3 Final      HGB Hemoglobin   Date Value Ref Range Status   12/05/2019 8.1 (L) 12.0 - 15.9 g/dL Final   12/05/2019 6.6 (C) 12.0 - 15.9 g/dL Final   12/04/2019 7.3 (L) 12.0 - 15.9 g/dL Final   12/03/2019 6.3 (C) 12.0 - 15.9 g/dL Final   12/03/2019 6.1 (C) 12.0 - 15.9 g/dL Final      HCT Hematocrit   Date Value Ref Range Status   12/05/2019 26.9 (L) 34.0 - 46.6 % Final   12/05/2019 22.3 (L) 34.0 - 46.6 % Final   12/04/2019 24.6 (L) 34.0 - 46.6 % Final   12/03/2019 21.3 (L) 34.0 - 46.6 % Final   12/03/2019 22.2 (L) 34.0 - 46.6 % Final      Platlets No results found for: LABPLAT   MCV MCV   Date Value Ref Range Status   12/05/2019 64.2 (L) 79.0 - 97.0 fL Final     Comment:     Result checked Parameter reviewed in the past 30 days on 12.03.19.    12/04/2019 63.3 (L) 79.0 - " 97.0 fL Final     Comment:     Parameter reviewed in the past 30 days on 12/3/2019 .    12/03/2019 61.8 (L) 79.0 - 97.0 fL Final   12/03/2019 63.4 (L) 79.0 - 97.0 fL Final          Sodium Sodium   Date Value Ref Range Status   12/05/2019 141 136 - 145 mmol/L Final   12/04/2019 141 136 - 145 mmol/L Final   12/04/2019 141 136 - 145 mmol/L Final   12/03/2019 139 136 - 145 mmol/L Final      Potassium Potassium   Date Value Ref Range Status   12/05/2019 4.2 3.5 - 5.2 mmol/L Final   12/04/2019 4.2 3.5 - 5.2 mmol/L Final   12/04/2019 4.3 3.5 - 5.2 mmol/L Final   12/03/2019 5.0 3.5 - 5.2 mmol/L Final      Chloride Chloride   Date Value Ref Range Status   12/05/2019 104 98 - 107 mmol/L Final   12/04/2019 104 98 - 107 mmol/L Final   12/04/2019 103 98 - 107 mmol/L Final   12/03/2019 101 98 - 107 mmol/L Final      CO2 CO2   Date Value Ref Range Status   12/05/2019 23.0 22.0 - 29.0 mmol/L Final   12/04/2019 24.0 22.0 - 29.0 mmol/L Final   12/04/2019 22.0 22.0 - 29.0 mmol/L Final   12/03/2019 24.5 22.0 - 29.0 mmol/L Final      BUN BUN   Date Value Ref Range Status   12/05/2019 32 (H) 8 - 23 mg/dL Final   12/04/2019 40 (H) 8 - 23 mg/dL Final   12/04/2019 40 (H) 8 - 23 mg/dL Final   12/03/2019 40 (H) 8 - 23 mg/dL Final      Creatinine Creatinine   Date Value Ref Range Status   12/05/2019 1.71 (H) 0.57 - 1.00 mg/dL Final   12/04/2019 1.76 (H) 0.57 - 1.00 mg/dL Final   12/04/2019 1.83 (H) 0.57 - 1.00 mg/dL Final   12/03/2019 1.57 (H) 0.57 - 1.00 mg/dL Final      Calcium Calcium   Date Value Ref Range Status   12/05/2019 9.1 8.6 - 10.5 mg/dL Final   12/04/2019 9.3 8.6 - 10.5 mg/dL Final   12/04/2019 9.0 8.6 - 10.5 mg/dL Final   12/03/2019 9.6 8.6 - 10.5 mg/dL Final      PO4 No results found for: CAPO4   Albumin Albumin   Date Value Ref Range Status   12/05/2019 3.50 3.50 - 5.20 g/dL Final   12/04/2019 3.80 3.50 - 5.20 g/dL Final   12/03/2019 4.00 3.50 - 5.20 g/dL Final      Magnesium Magnesium   Date Value Ref Range Status    12/05/2019 2.1 1.6 - 2.4 mg/dL Final      Uric Acid No results found for: URICACID        Results Review:    GEN: Awake, NAD  ENT: PERRL, EOMI, MMM  NECK: Supple, no JVD  CHEST: CTAB, no W/R/C  CV: RRR, no M/G/R  ABD: Soft, NT, +BS  SKIN: Warm and Dry  NEURO: CN's intact      aspirin 81 mg Oral Daily   atorvastatin 40 mg Oral Nightly   iron sucrose 200 mg Intravenous Daily   levothyroxine 112 mcg Oral Q AM   losartan 50 mg Oral Q24H   metFORMIN 500 mg Oral Q PM   metoprolol succinate XL 12.5 mg Oral Daily   pantoprazole 40 mg Oral Q AM   sodium chloride 10 mL Intravenous Q12H       Pharmacy to dose warfarin        Medication Review: Reviewed    Assessment/Plan     1) ODETTE - Mild, likely pre-renal from severe anemia  2) CKD3 - Baseline Cr 1.3-1.4  3) Severe anemia - CKD vs ?hemolysis from mechanical valve vs GI losses on AC  4) Coronavirus  5) CAD - s/p CABG  6) S/P Mechanical Aortic valve  7) HTN  8) DM2     PLAN: Cr stable at 1.7 this AM.  Continue to hold Lasix and KCl for now.  S/P transfusion.  BP stable.  OK to continue ARB.  Anemia work-up per primary, heme and GI (differential includes CKD, possible hemolysis from mechanical valve, possible GI loss in setting of chronic anticoagulation).  Will follow.        Ney Lai MD   [facility]  12/05/19  12:27 PM

## 2019-12-05 NOTE — ANESTHESIA POSTPROCEDURE EVALUATION
Patient: Jocelin Parra    Procedure Summary     Date:  12/05/19 Room / Location:  [facility] ENDOSCOPY 1 / [facility] ENDOSCOPY    Anesthesia Start:  1514 Anesthesia Stop:  1549    Procedures:       COLONOSCOPY (N/A Rectum)      ESOPHAGOGASTRODUODENOSCOPY (N/A ) Diagnosis:       Anemia due to stage 3 chronic kidney disease (CMS/HCC)      (Anemia due to stage 3 chronic kidney disease (CMS/HCC) [N18.3, D63.1])    Surgeon:  Dustin Castellanos MD Provider:  Wilbert Brumfield MD    Anesthesia Type:  MAC ASA Status:  4          Anesthesia Type: MAC  Last vitals  BP   111/73 (12/05/19 1635)   Temp   97.8 °F (36.6 °C) (12/05/19 1635)   Pulse   79 (12/05/19 1635)   Resp   16 (12/05/19 1635)     SpO2   97 % (12/05/19 1635)     Post Anesthesia Care and Evaluation    Patient location during evaluation: PACU  Patient participation: complete - patient participated  Level of consciousness: awake  Pain scale: See nurse's notes for pain score.  Pain management: adequate  Airway patency: patent  Anesthetic complications: No anesthetic complications  PONV Status: none  Cardiovascular status: acceptable  Respiratory status: acceptable  Hydration status: acceptable    Comments: Patient seen and examined postoperatively; vital signs stable; SpO2 greater than or equal to 90%; cardiopulmonary status stable; nausea/vomiting adequately controlled; pain adequately controlled; no apparent anesthesia complications; patient discharged from anesthesia care when discharge criteria were met

## 2019-12-05 NOTE — PLAN OF CARE
Problem: Anemia (Adult)  Goal: Symptom Improvement  Outcome: Ongoing (interventions implemented as appropriate)      Problem: Patient Care Overview  Goal: Plan of Care Review  Outcome: Ongoing (interventions implemented as appropriate)   12/05/19 1608   Coping/Psychosocial   Plan of Care Reviewed With patient   Plan of Care Review   Progress improving   OTHER   Outcome Summary got 1 U PRBC, Hgb 8.1, in endo now for EGD/colonoscopy, VSS, cont to monitor     Goal: Discharge Needs Assessment  Outcome: Ongoing (interventions implemented as appropriate)    Goal: Interprofessional Rounds/Family Conf  Outcome: Ongoing (interventions implemented as appropriate)

## 2019-12-06 VITALS
BODY MASS INDEX: 35.54 KG/M2 | HEART RATE: 70 BPM | OXYGEN SATURATION: 96 % | SYSTOLIC BLOOD PRESSURE: 133 MMHG | DIASTOLIC BLOOD PRESSURE: 78 MMHG | WEIGHT: 193.12 LBS | HEIGHT: 62 IN | TEMPERATURE: 98 F | RESPIRATION RATE: 20 BRPM

## 2019-12-06 PROBLEM — R06.00 DYSPNEA: Status: RESOLVED | Noted: 2019-02-14 | Resolved: 2019-12-06

## 2019-12-06 LAB
ABO + RH BLD: NORMAL
ABO + RH BLD: NORMAL
ANION GAP SERPL CALCULATED.3IONS-SCNC: 13 MMOL/L (ref 5–15)
ANISOCYTOSIS BLD QL: ABNORMAL
BH BB BLOOD EXPIRATION DATE: NORMAL
BH BB BLOOD EXPIRATION DATE: NORMAL
BH BB BLOOD TYPE BARCODE: 5100
BH BB BLOOD TYPE BARCODE: 5100
BH BB DISPENSE STATUS: NORMAL
BH BB DISPENSE STATUS: NORMAL
BH BB PRODUCT CODE: NORMAL
BH BB PRODUCT CODE: NORMAL
BH BB UNIT NUMBER: NORMAL
BH BB UNIT NUMBER: NORMAL
BUN BLD-MCNC: 24 MG/DL (ref 8–23)
BUN/CREAT SERPL: 16.2 (ref 7–25)
CALCIUM SPEC-SCNC: 9.6 MG/DL (ref 8.6–10.5)
CHLORIDE SERPL-SCNC: 103 MMOL/L (ref 98–107)
CO2 SERPL-SCNC: 24 MMOL/L (ref 22–29)
CREAT BLD-MCNC: 1.48 MG/DL (ref 0.57–1)
DEPRECATED RDW RBC AUTO: 60.4 FL (ref 37–54)
EOSINOPHIL # BLD MANUAL: 0.41 10*3/MM3 (ref 0–0.4)
EOSINOPHIL NFR BLD MANUAL: 5 % (ref 0.3–6.2)
ERYTHROCYTE [DISTWIDTH] IN BLOOD BY AUTOMATED COUNT: 25.7 % (ref 12.3–15.4)
GFR SERPL CREATININE-BSD FRML MDRD: 36 ML/MIN/1.73
GIANT PLATELETS: ABNORMAL
GLUCOSE BLD-MCNC: 137 MG/DL (ref 65–99)
HCT VFR BLD AUTO: 27.2 % (ref 34–46.6)
HGB BLD-MCNC: 8.3 G/DL (ref 12–15.9)
INR PPP: 1.31 (ref 2–3)
LYMPHOCYTES # BLD MANUAL: 0.73 10*3/MM3 (ref 0.7–3.1)
LYMPHOCYTES NFR BLD MANUAL: 2 % (ref 5–12)
LYMPHOCYTES NFR BLD MANUAL: 9 % (ref 19.6–45.3)
MCH RBC QN AUTO: 20.7 PG (ref 26.6–33)
MCHC RBC AUTO-ENTMCNC: 30.6 G/DL (ref 31.5–35.7)
MCV RBC AUTO: 67.6 FL (ref 79–97)
MONOCYTES # BLD AUTO: 0.16 10*3/MM3 (ref 0.1–0.9)
NEUTROPHILS # BLD AUTO: 6.8 10*3/MM3 (ref 1.7–7)
NEUTROPHILS NFR BLD MANUAL: 72 % (ref 42.7–76)
NEUTS BAND NFR BLD MANUAL: 12 % (ref 0–5)
NRBC SPEC MANUAL: 1 /100 WBC (ref 0–0.2)
PLATELET # BLD AUTO: 289 10*3/MM3 (ref 140–450)
PMV BLD AUTO: 8.5 FL (ref 6–12)
POIKILOCYTOSIS BLD QL SMEAR: ABNORMAL
POTASSIUM BLD-SCNC: 4.2 MMOL/L (ref 3.5–5.2)
PROTHROMBIN TIME: 13.2 SECONDS (ref 19.4–28.5)
RBC # BLD AUTO: 4.02 10*6/MM3 (ref 3.77–5.28)
SCAN SLIDE: NORMAL
SODIUM BLD-SCNC: 140 MMOL/L (ref 136–145)
UNIT  ABO: NORMAL
UNIT  ABO: NORMAL
UNIT  RH: NORMAL
UNIT  RH: NORMAL
WBC MORPH BLD: NORMAL
WBC NRBC COR # BLD: 8.1 10*3/MM3 (ref 3.4–10.8)

## 2019-12-06 PROCEDURE — 25010000002 IRON SUCROSE PER 1 MG: Performed by: NURSE PRACTITIONER

## 2019-12-06 PROCEDURE — 80048 BASIC METABOLIC PNL TOTAL CA: CPT | Performed by: PHYSICIAN ASSISTANT

## 2019-12-06 PROCEDURE — 85610 PROTHROMBIN TIME: CPT | Performed by: PHYSICIAN ASSISTANT

## 2019-12-06 PROCEDURE — 85025 COMPLETE CBC W/AUTO DIFF WBC: CPT | Performed by: PHYSICIAN ASSISTANT

## 2019-12-06 PROCEDURE — 85007 BL SMEAR W/DIFF WBC COUNT: CPT | Performed by: PHYSICIAN ASSISTANT

## 2019-12-06 PROCEDURE — 99239 HOSP IP/OBS DSCHRG MGMT >30: CPT | Performed by: INTERNAL MEDICINE

## 2019-12-06 PROCEDURE — 99233 SBSQ HOSP IP/OBS HIGH 50: CPT | Performed by: INTERNAL MEDICINE

## 2019-12-06 RX ORDER — FUROSEMIDE 40 MG/1
40 TABLET ORAL DAILY
Status: DISCONTINUED | OUTPATIENT
Start: 2019-12-06 | End: 2019-12-06 | Stop reason: HOSPADM

## 2019-12-06 RX ORDER — POTASSIUM CHLORIDE 20 MEQ/1
20 TABLET, EXTENDED RELEASE ORAL DAILY
Status: DISCONTINUED | OUTPATIENT
Start: 2019-12-06 | End: 2019-12-06 | Stop reason: HOSPADM

## 2019-12-06 RX ADMIN — Medication 10 ML: at 09:00

## 2019-12-06 RX ADMIN — LOSARTAN POTASSIUM 50 MG: 50 TABLET, FILM COATED ORAL at 09:00

## 2019-12-06 RX ADMIN — LEVOTHYROXINE SODIUM 112 MCG: 112 TABLET ORAL at 05:48

## 2019-12-06 RX ADMIN — IRON SUCROSE 200 MG: 20 INJECTION, SOLUTION INTRAVENOUS at 09:34

## 2019-12-06 RX ADMIN — FUROSEMIDE 40 MG: 40 TABLET ORAL at 11:35

## 2019-12-06 RX ADMIN — POTASSIUM CHLORIDE 20 MEQ: 1500 TABLET, EXTENDED RELEASE ORAL at 11:35

## 2019-12-06 RX ADMIN — METOPROLOL SUCCINATE 12.5 MG: 25 TABLET, EXTENDED RELEASE ORAL at 09:00

## 2019-12-06 RX ADMIN — ASPIRIN 81 MG: 81 TABLET, DELAYED RELEASE ORAL at 09:00

## 2019-12-06 RX ADMIN — PANTOPRAZOLE SODIUM 40 MG: 40 TABLET, DELAYED RELEASE ORAL at 05:48

## 2019-12-06 NOTE — PROGRESS NOTES
"   LOS: 1 day    Patient Care Team:  Emilie Cruz APRN as PCP - General  Roldan Solano MD as Consulting Physician (Cardiology)  Ney Lai MD as Consulting Physician (Nephrology)    Chief Complaint:  Anemia, SOA    Subjective     Interval History:   Chart reviewed.  Feels well.  No complaints.    Objective     Vital Sign Min/Max for last 24 hours  Temp  Min: 97.8 °F (36.6 °C)  Max: 98.2 °F (36.8 °C)   BP  Min: 82/39  Max: 133/65   Pulse  Min: 76  Max: 99   Resp  Min: 12  Max: 20   SpO2  Min: 88 %  Max: 100 %   No Data Recorded   Weight  Min: 87.6 kg (193 lb 2 oz)  Max: 87.6 kg (193 lb 2 oz)     Flowsheet Rows      First Filed Value   Admission Height  157.5 cm (62\") Documented at 12/03/2019 1901   Admission Weight  83.9 kg (185 lb) Documented at 12/03/2019 1901          I/O this shift:  In: 240 [P.O.:240]  Out: -   I/O last 3 completed shifts:  In: 2172 [P.O.:480; I.V.:1000; Blood:692]  Out: 450 [Urine:450]    Physical Exam:      WBC WBC   Date Value Ref Range Status   12/06/2019 8.10 3.40 - 10.80 10*3/mm3 Final   12/05/2019 6.10 3.40 - 10.80 10*3/mm3 Final   12/04/2019 6.40 3.40 - 10.80 10*3/mm3 Final   12/03/2019 6.00 3.40 - 10.80 10*3/mm3 Final   12/03/2019 7.45 3.40 - 10.80 10*3/mm3 Final      HGB Hemoglobin   Date Value Ref Range Status   12/06/2019 8.3 (L) 12.0 - 15.9 g/dL Final   12/05/2019 8.1 (L) 12.0 - 15.9 g/dL Final   12/05/2019 6.6 (C) 12.0 - 15.9 g/dL Final   12/04/2019 7.3 (L) 12.0 - 15.9 g/dL Final   12/03/2019 6.3 (C) 12.0 - 15.9 g/dL Final   12/03/2019 6.1 (C) 12.0 - 15.9 g/dL Final      HCT Hematocrit   Date Value Ref Range Status   12/06/2019 27.2 (L) 34.0 - 46.6 % Final   12/05/2019 26.9 (L) 34.0 - 46.6 % Final   12/05/2019 22.3 (L) 34.0 - 46.6 % Final   12/04/2019 24.6 (L) 34.0 - 46.6 % Final   12/03/2019 21.3 (L) 34.0 - 46.6 % Final   12/03/2019 22.2 (L) 34.0 - 46.6 % Final      Platlets No results found for: LABPLAT   MCV MCV   Date Value Ref Range Status "   12/06/2019 67.6 (L) 79.0 - 97.0 fL Final   12/05/2019 64.2 (L) 79.0 - 97.0 fL Final     Comment:     Result checked Parameter reviewed in the past 30 days on 12.03.19.    12/04/2019 63.3 (L) 79.0 - 97.0 fL Final     Comment:     Parameter reviewed in the past 30 days on 12/3/2019 .    12/03/2019 61.8 (L) 79.0 - 97.0 fL Final   12/03/2019 63.4 (L) 79.0 - 97.0 fL Final          Sodium Sodium   Date Value Ref Range Status   12/06/2019 140 136 - 145 mmol/L Final   12/05/2019 141 136 - 145 mmol/L Final   12/04/2019 141 136 - 145 mmol/L Final   12/04/2019 141 136 - 145 mmol/L Final   12/03/2019 139 136 - 145 mmol/L Final      Potassium Potassium   Date Value Ref Range Status   12/06/2019 4.2 3.5 - 5.2 mmol/L Final   12/05/2019 4.2 3.5 - 5.2 mmol/L Final   12/04/2019 4.2 3.5 - 5.2 mmol/L Final   12/04/2019 4.3 3.5 - 5.2 mmol/L Final   12/03/2019 5.0 3.5 - 5.2 mmol/L Final      Chloride Chloride   Date Value Ref Range Status   12/06/2019 103 98 - 107 mmol/L Final   12/05/2019 104 98 - 107 mmol/L Final   12/04/2019 104 98 - 107 mmol/L Final   12/04/2019 103 98 - 107 mmol/L Final   12/03/2019 101 98 - 107 mmol/L Final      CO2 CO2   Date Value Ref Range Status   12/06/2019 24.0 22.0 - 29.0 mmol/L Final   12/05/2019 23.0 22.0 - 29.0 mmol/L Final   12/04/2019 24.0 22.0 - 29.0 mmol/L Final   12/04/2019 22.0 22.0 - 29.0 mmol/L Final   12/03/2019 24.5 22.0 - 29.0 mmol/L Final      BUN BUN   Date Value Ref Range Status   12/06/2019 24 (H) 8 - 23 mg/dL Final   12/05/2019 32 (H) 8 - 23 mg/dL Final   12/04/2019 40 (H) 8 - 23 mg/dL Final   12/04/2019 40 (H) 8 - 23 mg/dL Final   12/03/2019 40 (H) 8 - 23 mg/dL Final      Creatinine Creatinine   Date Value Ref Range Status   12/06/2019 1.48 (H) 0.57 - 1.00 mg/dL Final   12/05/2019 1.71 (H) 0.57 - 1.00 mg/dL Final   12/04/2019 1.76 (H) 0.57 - 1.00 mg/dL Final   12/04/2019 1.83 (H) 0.57 - 1.00 mg/dL Final   12/03/2019 1.57 (H) 0.57 - 1.00 mg/dL Final      Calcium Calcium   Date Value Ref  Range Status   12/06/2019 9.6 8.6 - 10.5 mg/dL Final   12/05/2019 9.1 8.6 - 10.5 mg/dL Final   12/04/2019 9.3 8.6 - 10.5 mg/dL Final   12/04/2019 9.0 8.6 - 10.5 mg/dL Final   12/03/2019 9.6 8.6 - 10.5 mg/dL Final      PO4 No results found for: CAPO4   Albumin Albumin   Date Value Ref Range Status   12/05/2019 3.50 3.50 - 5.20 g/dL Final   12/04/2019 3.2 2.9 - 4.4 g/dL Final   12/04/2019 3.80 3.50 - 5.20 g/dL Final   12/03/2019 4.00 3.50 - 5.20 g/dL Final      Magnesium Magnesium   Date Value Ref Range Status   12/05/2019 2.1 1.6 - 2.4 mg/dL Final      Uric Acid No results found for: URICACID        Results Review:    GEN: Awake, NAD  ENT: PERRL, EOMI, MMM  NECK: Supple, no JVD  CHEST: CTAB, no W/R/C  CV: RRR, no M/G/R  ABD: Soft, NT, +BS  SKIN: Warm and Dry  NEURO: CN's intact      aspirin 81 mg Oral Daily   atorvastatin 40 mg Oral Nightly   furosemide 40 mg Oral Daily   levothyroxine 112 mcg Oral Q AM   losartan 50 mg Oral Q24H   metFORMIN 500 mg Oral Q PM   metoprolol succinate XL 12.5 mg Oral Daily   pantoprazole 40 mg Oral Q AM   potassium chloride 20 mEq Oral Daily   sodium chloride 10 mL Intravenous Q12H       Pharmacy to dose warfarin     sodium chloride 9 mL/hr Last Rate: 9 mL/hr (12/05/19 1235)       Medication Review: Reviewed    Assessment/Plan     1) ODETTE - Mild, likely pre-renal from severe anemia  2) CKD3 - Baseline Cr 1.3-1.4  3) Severe anemia - CKD vs ?hemolysis from mechanical valve vs GI losses on AC  4) Coronavirus  5) CAD - s/p CABG  6) S/P Mechanical Aortic valve  7) HTN  8) DM2     PLAN: Cr improved to 1.48.  Continue resume home Lasix and KCl.  S/P endoscopies with no obvious source of blood loss.  BP stable.  OK to continue ARB.  Heme still following for anemia work-up.  Will follow.        Ney Lai MD   [facility]  12/06/19  9:56 AM

## 2019-12-06 NOTE — PROGRESS NOTES
"      [facility] Medicine Services Daily Progress Note      Hospitalist Team  LOS 0 days      Patient Care Team:  Emilie Cruz APRN as PCP - Roldan Garcia MD as Consulting Physician (Cardiology)  Ney Lai MD as Consulting Physician (Nephrology)    Patient Location: 104/1      Subjective   Subjective     Chief Complaint / Subjective  Chief Complaint   Patient presents with   • decrease hgb       Present on Admission:  • Anemia  • Diabetes mellitus (CMS/HCC)  • Atherosclerotic heart disease of native coronary artery without angina pectoris  • Chronic kidney disease, stage 3 (moderate) (CMS/HCC)  • Diabetic peripheral neuropathy (CMS/HCC)  • Atrial fibrillation (CMS/HCC) [I48.91]  • Hypertension  • Chronic systolic heart failure (CMS/HCC)  • Obesity (BMI 30-39.9)  • Hyperlipidemia  • Hypothyroidism  • Dyspnea      Brief Synopsis of Hospital Course/HPI  Ms. Parra is a 64 y.o.  presents to [facility] complaining of reported decreased HGB on labs by Dr. Montalvo, Nephrologist. Patient reports that she has been having increased dyspnea on exertion for 1 month that has been gradually worsening and patient reports that she has had minimal peripheral edema for the past few days and fatigue. Patient denies anything making it better or worse. Patient denies any fever, chills, chest pain, SOA at rest, Abdominal pain, N/V/D/C, or melena. On ROS patient does report that she has orthopnea but states that it is unchanged from her normal. Patient also reports having a dry cough for 1 week without sputum.      Patient was recently hospitalized on 5/2019 for Sternotomy with mechanical MVR s/p failed MVr and CABG with HFrEF of 40%.      In ED, admission vitals were 98.8F, 105HR, 16RR,135/68, 100%RA. On labs, patient found to have normal INR/PT, normal WBC 6, low HGB 6.3,and normal Platelets. CXR shows \"Small left pleural effusion is suspected. Otherwise stable " "appearance of the chest as described above. No acute infiltrates.\" EKG shows sinus tachycardia. Respiratory panel was positive for coronavirus. 1 unit of PRBCs ordered in the ED.             Date::    12/4/2019: Patient having shortness of air progressively, not really much different today.  12/5/2019: Hg 6.6 this morning, 1 unit pRBCs tranfused.  Patient feels a little better, but still some SOA.      Review of Systems   Respiratory: Positive for cough and shortness of breath.    All other systems reviewed and are negative.        Objective   Objective      Vital Signs  Temp:  [97.5 °F (36.4 °C)-98.4 °F (36.9 °C)] 97.9 °F (36.6 °C)  Heart Rate:  [76-99] 89  Resp:  [14-20] 16  BP: ()/(39-81) 111/73  Oxygen Therapy  SpO2: 97 %  Pulse Oximetry Type: Intermittent  Device (Oxygen Therapy): room air  Flow (L/min): 0  Oximetry Probe Site Changed: No  Flowsheet Rows      First Filed Value   Admission Height  157.5 cm (62\") Documented at 12/03/2019 1901   Admission Weight  83.9 kg (185 lb) Documented at 12/03/2019 1901        Intake & Output (last 3 days)       12/03 0701 - 12/04 0700 12/04 0701 - 12/05 0700 12/05 0701 - 12/06 0700    P.O.  960 240    I.V. (mL/kg)   1000 (11.3)    Blood 600 342 350    Total Intake(mL/kg) 600 (7.2) 1302 (14.7) 1590 (18)    Net +600 +1302 +1590           Urine Unmeasured Occurrence 400 x 3 x 1 x    Stool Unmeasured Occurrence  1 x 2 x        Lines, Drains & Airways    Active LDAs     Name:   Placement date:   Placement time:   Site:   Days:    Peripheral IV 12/03/19 2230 Left Forearm   12/03/19 2230    Forearm   less than 1                  Physical Exam:    Physical Exam   Constitutional: She appears well-developed and well-nourished.   HENT:   Head: Normocephalic and atraumatic.   Mouth/Throat: Oropharynx is clear and moist.   Eyes: EOM are normal. Pupils are equal, round, and reactive to light.   Neck: Normal range of motion. Neck supple.   Cardiovascular: An irregularly irregular " rhythm present.   No murmur heard.  Pulmonary/Chest: Effort normal and breath sounds normal. No respiratory distress. She has no wheezes.   Abdominal: Soft. Bowel sounds are normal. She exhibits no distension. There is no tenderness.   Skin: Skin is warm and dry.         Procedures:              Results Review:     I reviewed the patient's new clinical results.      Lab Results (last 24 hours)     Procedure Component Value Units Date/Time    Protein Electrophoresis, Total [[id number]] Collected:  12/04/19 1006    Specimen:  Blood Updated:  12/05/19 1409     Total Protein 6.0 g/dL      Albumin 3.2 g/dL      Alpha-1-Globulin 0.3 g/dL      Alpha-2-Globulin 0.6 g/dL      Beta Globulin 1.0 g/dL      Gamma Globulin 0.8 g/dL      M-José Luis Not Observed g/dL      Globulin 2.8 g/dL      A/G Ratio 1.1     Please note Comment     Comment: Protein electrophoresis scan will follow via computer, mail, or   delivery.       Narrative:       Performed at:  [address] [place] [address]  [id number]  : Erick Collins PhD, Phone:  [phone]    POC Glucose Once [[id number]]  (Abnormal) Collected:  12/05/19 1220    Specimen:  Blood Updated:  12/05/19 1234     Glucose 120 mg/dL      Comment: Serial Number: [id number]:  [id number]       Hemoglobin & Hematocrit, Blood [[id number]]  (Abnormal) Collected:  12/05/19 0924    Specimen:  Blood Updated:  12/05/19 0946     Hemoglobin 8.1 g/dL      Hematocrit 26.9 %     CBC & Differential [[id number]] Collected:  12/05/19 0343    Specimen:  Blood Updated:  12/05/19 0505    Narrative:       The following orders were created for panel order CBC & Differential.  Procedure                               Abnormality         Status                     ---------                               -----------         ------                     CBC Auto Differential[[id number]]        Abnormal            Final result                 Please view results for these tests on  the individual orders.    CBC Auto Differential [[id number]]  (Abnormal) Collected:  12/05/19 0343    Specimen:  Blood Updated:  12/05/19 0505     WBC 6.10 10*3/mm3      RBC 3.48 10*6/mm3      Hemoglobin 6.6 g/dL      Hematocrit 22.3 %      MCV 64.2 fL      Comment: Result checked Parameter reviewed in the past 30 days on 12.03.19.         MCH 18.9 pg      MCHC 29.5 g/dL      RDW 22.1 %      RDW-SD 50.3 fl      MPV 8.5 fL      Platelets 272 10*3/mm3      Neutrophil % 69.9 %      Lymphocyte % 12.8 %      Monocyte % 12.6 %      Eosinophil % 4.0 %      Basophil % 0.7 %      Neutrophils, Absolute 4.20 10*3/mm3      Lymphocytes, Absolute 0.80 10*3/mm3      Monocytes, Absolute 0.80 10*3/mm3      Eosinophils, Absolute 0.20 10*3/mm3      Basophils, Absolute 0.00 10*3/mm3      nRBC 0.1 /100 WBC     Narrative:       Appended report. These results have been appended to a previously verified report.    Phosphorus [[id number]]  (Normal) Collected:  12/05/19 0343    Specimen:  Blood Updated:  12/05/19 0438     Phosphorus 2.9 mg/dL     Comprehensive Metabolic Panel [[id number]]  (Abnormal) Collected:  12/05/19 0343    Specimen:  Blood Updated:  12/05/19 0438     Glucose 136 mg/dL      BUN 32 mg/dL      Creatinine 1.71 mg/dL      Sodium 141 mmol/L      Potassium 4.2 mmol/L      Chloride 104 mmol/L      CO2 23.0 mmol/L      Calcium 9.1 mg/dL      Total Protein 6.3 g/dL      Albumin 3.50 g/dL      ALT (SGPT) 18 U/L      AST (SGOT) 26 U/L      Alkaline Phosphatase 117 U/L      Total Bilirubin 1.5 mg/dL      eGFR Non African Amer 30 mL/min/1.73      Globulin 2.8 gm/dL      A/G Ratio 1.3 g/dL      BUN/Creatinine Ratio 18.7     Anion Gap 14.0 mmol/L     Narrative:       GFR Normal >60  Chronic Kidney Disease <60  Kidney Failure <15    Magnesium [[id number]]  (Normal) Collected:  12/05/19 0343    Specimen:  Blood Updated:  12/05/19 0438     Magnesium 2.1 mg/dL     Protime-INR [[id number]]  (Abnormal) Collected:  12/05/19 0343    Specimen:   Blood Updated:  12/05/19 0436     Protime 16.2 Seconds      Comment: Result checked         INR 1.66        No results found for: HGBA1C  Results from last 7 days   Lab Units 12/05/19  0343 12/04/19  0328 12/03/19  2137   INR  1.66* 2.43 2.74               Microbiology Results (last 10 days)     Procedure Component Value - Date/Time    Respiratory Panel, PCR - Swab, Nasopharynx [[id number]]  (Abnormal) Collected:  12/03/19 2041    Lab Status:  Final result Specimen:  Swab from Nasopharynx Updated:  12/03/19 2204     ADENOVIRUS, PCR Not Detected     Coronavirus 229E Not Detected     Coronavirus HKU1 Detected     Coronavirus NL63 Not Detected     Coronavirus OC43 Not Detected     Human Metapneumovirus Not Detected     Human Rhinovirus/Enterovirus Not Detected     Influenza B PCR Not Detected     Parainfluenza Virus 1 Not Detected     Parainfluenza Virus 2 Not Detected     Parainfluenza Virus 3 Not Detected     Parainfluenza Virus 4 Not Detected     Bordetella pertussis pcr Not Detected     Influenza A H1 2009 PCR Not Detected     Chlamydophila pneumoniae PCR Not Detected     Mycoplasma pneumo by PCR Not Detected     Influenza A PCR Not Detected     Influenza A H3 Not Detected     Influenza A H1 Not Detected     RSV, PCR Not Detected          ECG/EMG Results (most recent)     Procedure Component Value Units Date/Time    ECG 12 Lead [[id number]] Collected:  12/03/19 1909     Updated:  12/04/19 1748    Narrative:       HEART RATE= 103  bpm  RR Interval= 584  ms  TN Interval= 161  ms  P Horizontal Axis= 22  deg  P Front Axis= 67  deg  QRSD Interval= 75  ms  QT Interval= 355  ms  QRS Axis= 120  deg  T Wave Axis= 47  deg  - OTHERWISE NORMAL ECG -  Sinus tachycardia  Right axis deviation  When compared with ECG of 13-May-2019 13:13:28,  Significant rate increase  Significant repolarization change  Electronically Signed By: Andres Bobo (DARIANA) 04-Dec-2019 17:48:35  Date and Time of Study: 2019-12-03 19:09:21          Results for  orders placed in visit on 04/15/19   SCANNED VASCULAR STUDIES       Results for orders placed during the hospital encounter of 04/10/19   Adult Transthoracic Echo Complete W/ Cont if Necessary Per Protocol    Narrative                           Adult Echocardiogram Report          [facility]  Cardiology Department  [address] [place] [address]  [id number]      Name: SUMAYA CRUZ LStudy Date: 2019 02:39 PM        BP: 150/90 mmHg  MRN: [id number]          Patient Location:   : 1955         Gender: Female                         Height: 64 in  Age: 64 yrs             Account#: [id number]                  Weight: 221 lb  Reason For Study: ATRIAL FIBRILLATION WITH RVR, HYPOKALEMIA,  EDEMA                                                          BSA: 2.0 m2  Ordering Physician:  LEONEL OWUSU  Referring Physician:  LESIA DAWSON  Performed By: JASVIR    M-Mode/2-D Measurements:   LVIDd:       (3.7-5.7)  LVPWd:      (0.8-1.2)   LVIDs:       (2.3-3.9)   ACS:         (1.6-3.7)  IVSd:       (0.7-1.2)   LA dimension:(1.9-4.0)  RVDd:       (0.7-2.4)   FS:          (21-40%)   AoRootDiam: (2.0-3.7)    Comments  Dyskinetic septum with Moderate LV dysfunction EF 40%. The right ventricle is  mildly dilated. Severe biatrial enlargement. Mitral valve thickened with  mitral valve prolapse and moderate-to-severe MR, would recommend SHERIE to better  evaluate mitral regurgitation. The tricuspid valve is not well visualized, but  is grossly normal. There is mild tricuspid regurgitation. Right ventricular  systolic pressure is elevated at 40-50mmHg. Aortic valve prosthesis appears to  be functioning but Doppler. Mild AR seen. There is no pericardial effusion.        Interpretation      The right ventricle is mildly dilated.    Severe biatrial enlargement    Mitral valve thickened with mitral valve prolapse and moderate-to-severe MR,  would recommend SHERIE to better evaluate mitral regurgitation  Right  ventricular systolic pressure is elevated at 40-50mmHg.    Dyskinetic septum with Moderate LV dysfunction EF 40%    _______________________________________________________________________________      Electronically signed by: Roldan Solano MD  on 04/10/2019 07:59 PM         Xr Chest 1 View    Result Date: 12/3/2019  Small left pleural effusion is suspected. Otherwise stable appearance of the chest as described above. No acute infiltrates.   Electronically Signed By-Darion Garces DO. On:12/3/2019 9:00 PM This report was finalized on [id number] by  Darion Garces DO..      Xrays, labs reviewed personally by physician.    Medication Review:   I have reviewed the patient's current medication list      Scheduled Meds    aspirin 81 mg Oral Daily   atorvastatin 40 mg Oral Nightly   iron sucrose 200 mg Intravenous Daily   levothyroxine 112 mcg Oral Q AM   losartan 50 mg Oral Q24H   metFORMIN 500 mg Oral Q PM   metoprolol succinate XL 12.5 mg Oral Daily   pantoprazole 40 mg Oral Q AM   sodium chloride 10 mL Intravenous Q12H       Meds Infusions    Pharmacy to dose warfarin     sodium chloride 9 mL/hr Last Rate: 9 mL/hr (12/05/19 1235)       Meds PRN  •  acetaminophen **OR** acetaminophen **OR** acetaminophen  •  aluminum-magnesium hydroxide-simethicone  •  bisacodyl  •  calcium carbonate  •  docusate sodium  •  magnesium hydroxide  •  melatonin  •  nitroglycerin  •  ondansetron **OR** ondansetron  •  Pharmacy to dose warfarin  •  [COMPLETED] Insert peripheral IV **AND** sodium chloride  •  sodium chloride  •  sodium chloride        Assessment/Plan   Assessment/Plan     Active Hospital Problems:  * Anemia- (present on admission)    - HGB 6.3 and baseline ~9  - Hemoccult negative in ED despite being on coumadin, will repeat once more  - 1 unit PRBCs ordered in the ED. Will continue to follow the H&H and transfuse if Hgb < 7.0  - B12, folate pending, added retic, hapto, SPEP, Total bilirubin (direct/indirect), LDH  -  iron and ferritin resulted  - per d/w Nephro, CKD not advanced enough for this degree of anema  - Consult Hematology for additional evaluation       Dyspnea- (present on admission)    - Likely multifactorial due to anemia and respiratory infection  - Respiratory viral panel positive for Coronavirus HKU 1  -Treat supportively, supplemental oxygen as needed     Chronic kidney disease, stage 3 (moderate) (CMS/HCC)- (present on admission)    -Creatinine elevated from 1.5 up to 1.76 overnight  - Consult nephrology to follow     Obesity (BMI 30-39.9)- (present on admission)    BMI 33.84; lifestyle modifications discussed and encouraged       Chronic systolic heart failure (CMS/HCC)- (present on admission)    Chronic systolic with EF of 40% on most recent ECHO.  - Continue BB and lasix       Diabetic peripheral neuropathy (CMS/HCC)- (present on admission)    Stable without home medication; continue to monitor      Atherosclerotic heart disease of native coronary artery without angina pectoris- (present on admission)     Continue home Aspirin, statin, BB, and ARB     Long term (current) use of anticoagulants [Z79.01]    Continue to monitor; daily INR ordered       Atrial fibrillation (CMS/[facility]) [I48.91]- (present on admission)    CHADVas score of 5  Chronic and unchanged  - Continue BB and coumadin      S/P CABG x 2 (reop sternotomy with lysis of adhesions) (LIMA to LAD, SVG to PDA) per Dr. Alvarado 5/9/2019    Continue ASA and statin      Hypothyroidism- (present on admission)    -Chronic; stable  -Continue levothyroxine      Diabetes mellitus (CMS/HCC)- (present on admission)    Continue home metformin; SSI ordered with accuchecks. CCD ordered. A1c ordered with AM labs      Hypertension- (present on admission)    Continue home lasix, BB, and valsartan      Hyperlipidemia- (present on admission)    -Chronic; stable  -Continue atorvastatin              Resolved Hospital Problems:  No notes have been filed under this hospital  service.  Service: Hospitalist            VTE Prophylaxis - SCDs.      Code Status -   Code Status and Medical Interventions:   Ordered at: 12/04/19 0023     Code Status:    CPR     Medical Interventions (Level of Support Prior to Arrest):    Full       Discharge Planning    Destination      No service coordination in this encounter.      Durable Medical Equipment      No service coordination in this encounter.      Dialysis/Infusion      No service coordination in this encounter.      Home Medical Care      No service coordination in this encounter.      Therapy      No service coordination in this encounter.      Community Resources      No service coordination in this encounter.            Electronically signed by Martita Bennett MD, 12/05/19, 8:27 PM.  [facility] Hospitalist Team

## 2019-12-06 NOTE — DISCHARGE SUMMARY
[facility] Medicine Services  DISCHARGE SUMMARY        Prepared For PCP:  Emilie Cruz APRN    Patient Name: Jocelin Parra  : 1955  MRN: [id number]      Date of Admission:   12/3/2019    Date of Discharge:  2019    Length of stay:  LOS: 1 day     Hospital Course     Presenting Problem:   Cough [R05]  Weakness [R53.1]  Pleural effusion on left [J90]  Anemia, unspecified type [D64.9]  Anemia, unspecified type [D64.9]    Active Hospital Problems:  * Anemia- (present on admission)    - HGB 6.3 and baseline ~9  - Hemoccult negative in ED despite being on coumadin, will repeat once more  - 1 unit PRBCs ordered in the ED. Will continue to follow the H&H and transfuse if Hgb < 7.0  - B12, folate pending, added retic, hapto, SPEP, Total bilirubin (direct/indirect), LDH  - iron and ferritin resulted  - per d/w Nephro, CKD not advanced enough for this degree of anema  - Consult Hematology for additional evaluation       Dyspnea- (present on admission)    - Likely multifactorial due to anemia and respiratory infection  - Respiratory viral panel positive for Coronavirus HKU 1  -Treat supportively, supplemental oxygen as needed     Chronic kidney disease, stage 3 (moderate) (CMS/HCC)- (present on admission)    -Creatinine elevated from 1.5 up to 1.76 overnight  - Consult nephrology to follow     Obesity (BMI 30-39.9)- (present on admission)    BMI 33.84; lifestyle modifications discussed and encouraged       Chronic systolic heart failure (CMS/HCC)- (present on admission)    Chronic systolic with EF of 40% on most recent ECHO.  - Continue BB and lasix       Diabetic peripheral neuropathy (CMS/HCC)- (present on admission)    Stable without home medication; continue to monitor      Atherosclerotic heart disease of native coronary artery without angina pectoris- (present on admission)     Continue home Aspirin, statin, BB, and ARB     Long term (current) use of anticoagulants [Z79.01]     "Continue to monitor; daily INR ordered       Atrial fibrillation (CMS/[facility]) [I48.91]- (present on admission)    CHADVas score of 5  Chronic and unchanged  - Continue BB and coumadin      S/P CABG x 2 (reop sternotomy with lysis of adhesions) (LIMA to LAD, SVG to PDA) per Dr. Alvarado 5/9/2019    Continue ASA and statin      Hypothyroidism- (present on admission)    -Chronic; stable  -Continue levothyroxine      Diabetes mellitus (CMS/[facility])- (present on admission)    Continue home metformin; SSI ordered with accuchecks. CCD ordered. A1c ordered with AM labs      Hypertension- (present on admission)    Continue home lasix, BB, and valsartan      Hyperlipidemia- (present on admission)    -Chronic; stable  -Continue atorvastatin          Resolved Hospital Problems:  No notes have been filed under this hospital service.  Service: Hospitalist        Hospital Course:  Jocelin Parra is a 64 y.o. female presents to [facility] complaining of reported decreased HGB on labs by Dr. Montalvo, Nephrologist. Patient reports that she has been having increased dyspnea on exertion for 1 month that has been gradually worsening and patient reports that she has had minimal peripheral edema for the past few days and fatigue. Patient denies anything making it better or worse. Patient denies any fever, chills, chest pain, SOA at rest, Abdominal pain, N/V/D/C, or melena. On ROS patient does report that she has orthopnea but states that it is unchanged from her normal. Patient also reports having a dry cough for 1 week without sputum.      Patient was recently hospitalized on 5/2019 for Sternotomy with mechanical MVR s/p failed MVr and CABG with HFrEF of 40%.      In ED, admission vitals were 98.8F, 105HR, 16RR,135/68, 100%RA. On labs, patient found to have normal INR/PT, normal WBC 6, low HGB 6.3,and normal Platelets. CXR shows \"Small left pleural effusion is suspected. Otherwise stable appearance of the chest as described above. No " "acute infiltrates.\" EKG shows sinus tachycardia. Respiratory panel was positive for coronavirus. 1 unit of PRBCs ordered in the ED.           Per discussion with Nephrology, the patient's kidney disease was not advanced enough to cause this level of anemia, so Hematology was consulted for additional work up and Gastroenterology was consulted to evaluate for any possible GI source of blood loss.  EGD and colonoscopy showed no active source of bleeding except for diverticulosis and hemorrhoids.  The possibility of a small bowel AVM or lesion leading to blood loss is to be investigated in the outpatient setting with a small bowel evaluation with PillCam if needed.  Patient was discharged in good condition to follow-up with hematology oncology to find out the results of her anemia work-up in the outpatient setting.        Recommendation for Outpatient Providers:             Reasons For Change In Medications and Indications for New Medications:        Day of Discharge     HPI: No acute events. No new complaints.      Vital Signs:   Temp:  [97.8 °F (36.6 °C)-98.2 °F (36.8 °C)] 98 °F (36.7 °C)  Heart Rate:  [] 114  Resp:  [12-20] 20  BP: ()/(39-77) 117/65     Physical Exam:  Physical Exam   Constitutional: She is oriented to person, place, and time. She appears well-developed and well-nourished.   HENT:   Head: Normocephalic and atraumatic.   Eyes: Pupils are equal, round, and reactive to light. EOM are normal.   Neck: Normal range of motion. Neck supple.   Cardiovascular: An irregularly irregular rhythm present.   No murmur heard.  Pulmonary/Chest: Effort normal and breath sounds normal. No respiratory distress. She has no wheezes. She has no rales.   Abdominal: Soft. Bowel sounds are normal. She exhibits no distension. There is no tenderness.   Musculoskeletal: She exhibits no edema.   Neurological: She is alert and oriented to person, place, and time.   Skin: Skin is warm and dry. No rash noted.   Vitals " reviewed.      Pertinent  and/or Most Recent Results     Results from last 7 days   Lab Units 12/06/19  0231 12/05/19  0924 12/05/19  0343 12/04/19 0328 12/03/19 2056 12/03/19  1224   WBC 10*3/mm3 8.10  --  6.10 6.40 6.00 7.45   HEMOGLOBIN g/dL 8.3* 8.1* 6.6* 7.3* 6.3* 6.1*   HEMATOCRIT % 27.2* 26.9* 22.3* 24.6* 21.3* 22.2*   PLATELETS 10*3/mm3 289  --  272 306 343 339   SODIUM mmol/L 140  --  141 141  141  --  139   POTASSIUM mmol/L 4.2  --  4.2 4.3  4.2  --  5.0   CHLORIDE mmol/L 103  --  104 103  104  --  101   CO2 mmol/L 24.0  --  23.0 22.0  24.0  --  24.5   BUN mg/dL 24*  --  32* 40*  40*  --  40*   CREATININE mg/dL 1.48*  --  1.71* 1.83*  1.76*  --  1.57*   GLUCOSE mg/dL 137*  --  136* 191*  188*  --  112*   CALCIUM mg/dL 9.6  --  9.1 9.0  9.3  --  9.6     Results from last 7 days   Lab Units 12/06/19  0231 12/05/19 0343 12/04/19 0328 12/03/19 2137 12/03/19  1224   BILIRUBIN mg/dL  --  1.5* 1.1  1.1  --  1.2   ALK PHOS U/L  --  117 130*  --  129*   ALT (SGPT) U/L  --  18 18  --  19   AST (SGOT) U/L  --  26 26  --  24   PROTIME Seconds 13.2* 16.2* 23.0 25.9  --    INR  1.31* 1.66* 2.43 2.74  --      Results from last 7 days   Lab Units 12/03/19  1224   CHOLESTEROL mg/dL 70   TRIGLYCERIDES mg/dL 64   HDL CHOL mg/dL 34*     Results from last 7 days   Lab Units 12/03/19  1224   PROBNP pg/mL 2,821.0*       Brief Urine Lab Results  (Last result in the past 365 days)      Color   Clarity   Blood   Leuk Est   Nitrite   Protein   CREAT   Urine HCG        12/03/19 1224 Yellow Clear Negative Small (1+) Negative Negative               Microbiology Results Abnormal     Procedure Component Value - Date/Time    Respiratory Panel, PCR - Swab, Nasopharynx [[id number]]  (Abnormal) Collected:  12/03/19 2041    Lab Status:  Final result Specimen:  Swab from Nasopharynx Updated:  12/03/19 2204     ADENOVIRUS, PCR Not Detected     Coronavirus 229E Not Detected     Coronavirus HKU1 Detected     Coronavirus NL63 Not  Detected     Coronavirus OC43 Not Detected     Human Metapneumovirus Not Detected     Human Rhinovirus/Enterovirus Not Detected     Influenza B PCR Not Detected     Parainfluenza Virus 1 Not Detected     Parainfluenza Virus 2 Not Detected     Parainfluenza Virus 3 Not Detected     Parainfluenza Virus 4 Not Detected     Bordetella pertussis pcr Not Detected     Influenza A H1 2009 PCR Not Detected     Chlamydophila pneumoniae PCR Not Detected     Mycoplasma pneumo by PCR Not Detected     Influenza A PCR Not Detected     Influenza A H3 Not Detected     Influenza A H1 Not Detected     RSV, PCR Not Detected          Xr Chest 1 View    Result Date: 12/3/2019  Impression: Small left pleural effusion is suspected. Otherwise stable appearance of the chest as described above. No acute infiltrates.   Electronically Signed By-Darion Garces DO. On:12/3/2019 9:00 PM This report was finalized on [id number] by  Darion Garces DO..      Results for orders placed in visit on 04/15/19   SCANNED VASCULAR STUDIES       Results for orders placed in visit on 04/15/19   SCANNED VASCULAR STUDIES       Results for orders placed during the hospital encounter of 04/10/19   Adult Transthoracic Echo Complete W/ Cont if Necessary Per Protocol    Narrative                           Adult Echocardiogram Report          [facility]  Cardiology Department  [address] [place] [address]      Name: SUMAYA CRUZ LStudy Date: 2019 02:39 PM        BP: 150/90 mmHg  MRN: [id number]          Patient Location:   : 1955         Gender: Female                         Height: 64 in  Age: 64 yrs             Account#: [id number]                  Weight: 221 lb  Reason For Study: ATRIAL FIBRILLATION WITH RVR, HYPOKALEMIA,  EDEMA                                                          BSA: 2.0 m2  Ordering Physician:  LEONEL OWUSU  Referring Physician:  LESIA DAWSON  Performed By: JASVIR    M-Mode/2-D  Measurements:   LVIDd:       (3.7-5.7)  LVPWd:      (0.8-1.2)   LVIDs:       (2.3-3.9)   ACS:         (1.6-3.7)  IVSd:       (0.7-1.2)   LA dimension:(1.9-4.0)  RVDd:       (0.7-2.4)   FS:          (21-40%)   AoRootDiam: (2.0-3.7)    Comments  Dyskinetic septum with Moderate LV dysfunction EF 40%. The right ventricle is  mildly dilated. Severe biatrial enlargement. Mitral valve thickened with  mitral valve prolapse and moderate-to-severe MR, would recommend SHERIE to better  evaluate mitral regurgitation. The tricuspid valve is not well visualized, but  is grossly normal. There is mild tricuspid regurgitation. Right ventricular  systolic pressure is elevated at 40-50mmHg. Aortic valve prosthesis appears to  be functioning but Doppler. Mild AR seen. There is no pericardial effusion.        Interpretation      The right ventricle is mildly dilated.    Severe biatrial enlargement    Mitral valve thickened with mitral valve prolapse and moderate-to-severe MR,  would recommend SHERIE to better evaluate mitral regurgitation  Right ventricular systolic pressure is elevated at 40-50mmHg.    Dyskinetic septum with Moderate LV dysfunction EF 40%    _______________________________________________________________________________      Electronically signed by: Roldan Solano MD  on 04/10/2019 07:59 PM                 Test Results Pending at Discharge        Procedures Performed  Procedure(s):  COLONOSCOPY  ESOPHAGOGASTRODUODENOSCOPY         Consults:   Consults     Date and Time Order Name Status Description    12/4/2019 1403 Inpatient Gastroenterology Consult Completed     12/4/2019 0952 Hematology & Oncology Inpatient Consult Completed     12/4/2019 0926 Inpatient Nephrology Consult Completed     12/3/2019 5531 Hospitalist (on-call MD unless specified) Completed             Discharge Details        Discharge Medications      Continue These Medications      Instructions Start Date   aspirin 81 MG tablet   1 tablet, Oral, Daily       atorvastatin 40 MG tablet  Commonly known as:  LIPITOR   40 mg, Oral, Daily      cholecalciferol 10 MCG (400 UNIT) tablet  Commonly known as:  VITAMIN D3   2,000 Units, Oral, Daily      DIOVAN 80 MG tablet  Generic drug:  valsartan   1 tablet, Oral, Daily, PT TAKES 40MG DAILY       furosemide 40 MG tablet  Commonly known as:  LASIX   40 mg, Oral, Daily      GLUCOPHAGE 500 MG tablet  Generic drug:  metFORMIN   1 tablet, Oral, Every Evening      levothyroxine 112 MCG tablet  Commonly known as:  SYNTHROID, LEVOTHROID   112 mcg, Oral, Daily      metoprolol succinate XL 25 MG 24 hr tablet  Commonly known as:  TOPROL-XL   12.5 mg, Oral, Daily      MULTI-VITAMIN tablet   1 tablet, Oral, Daily      omeprazole 20 MG capsule  Commonly known as:  priLOSEC   1 capsule, Oral, Daily      potassium chloride 20 MEQ CR tablet  Commonly known as:  K-DUR,KLOR-CON   20 mEq, Oral, Daily      VICTOZA 18 MG/3ML solution pen-injector injection  Generic drug:  Liraglutide   1.8 mg, Subcutaneous, Daily      Vitamin B Complex tablet   Oral      vitamin C 500 MG tablet  Commonly known as:  ASCORBIC ACID   500 mg, Oral, Daily      warfarin 5 MG tablet  Commonly known as:  COUMADIN   5 mg, Oral, 3 Times Weekly, Mon, Wed, Fri       warfarin 7.5 MG tablet  Commonly known as:  COUMADIN   7.5 mg, Oral, 4 Times Weekly, Tues, Thur, Sat, Sun       ZETIA 10 MG tablet  Generic drug:  ezetimibe   1 tablet, Oral, Daily             No Known Allergies      Discharge Disposition:  Home or Self Care    Diet:  Hospital:  Diet Order   Procedures   • Diet Regular         Discharge Activity:   Activity Instructions     Activity as Tolerated              CODE STATUS:    Code Status and Medical Interventions:   Ordered at: 12/04/19 0023     Code Status:    CPR     Medical Interventions (Level of Support Prior to Arrest):    Full         Follow-up Appointments  Future Appointments   Date Time Provider Department Center   12/10/2019 10:30 AM Nav Rizzo MD  [facility] None   12/10/2019 10:30 AM LAB MD [facility] None   12/13/2019 11:45 AM JOSE ALEJANDRO, [facility] [place] [facility]   12/13/2019 12:10 PM Roldan Solano MD [facility]       Additional Instructions for the Follow-ups that You Need to Schedule     Call MD With Problems / Concerns   As directed      Instructions: Call [phone] or email [email or web address] for problems or concerns.    Order Comments:  Instructions: Call [phone] or email [email or web address] for problems or concerns.          Discharge Follow-up with PCP   As directed       Currently Documented PCP:    Emilie Cruz APRN    PCP Phone Number:    [phone]     Follow Up Details:  1-2 weeks         Discharge Follow-up with Specialty: Gastroenterology; 2 Weeks   As directed      Specialty:  Gastroenterology    Follow Up:  2 Weeks         Discharge Follow-up with Specified Provider: [facility]   As directed      To:  [facility]    Follow Up Details:  Tuesday, 12/10/19, 10:30am         Discharge Follow-up with Specified Provider: Nephrology - Dr. Lai   As directed      To:  Nephrology - Dr. Lai    Follow Up Details:  as previously scheduled         CBC & Differential    Dec 10, 2019 (Approximate)      Will be done with Dr. Spencer office visit Tuesday    Order Comments:  Will be done with Dr. Spencer office visit Tuesday     Manual Differential:  No                 Condition on Discharge:      Stable          Electronically signed by Martita Bennett MD, 12/06/19, 2:35 PM.    Time: I spent  35  minutes on this discharge activity which included face-to-face encounter with the patient/reviewing the data in the system/coordination of the care with the nursing staff as well as consultants/documentation/entering orders.

## 2019-12-06 NOTE — PROGRESS NOTES
Hematology/Oncology Inpatient Progress Note    PATIENT NAME: Jocelin Parra  : 1955  MRN: [id number]    CHIEF COMPLAINT: Anemia    HISTORY OF PRESENT ILLNESS:    64 y.o. female admitted through the ED 12/3/2019 with anemia.  The patient was sent by her nephrologist after outpatient labs showed worsening anemia with hemoglobin 6.1.  She had reported some increasing dyspnea and occasional mild edema.  She reported cough and respiratory viral panel was positive for coronavirus.  CBC in the ED showed WBC 3.5, hemoglobin 6.1, MCV 63.4, RDW 18.6, and platelets 339,000. She received 1 unit pRBC.  Creatinine was 1.57.  INR was 2.74 on Coumadin for mechanical heart valve.  Posttransfusion iron studies showed iron 24 and ferritin 21.2.  Vitamin B12 was 947 and folate was 18.6.  LDH was 353 and reticulocyte count was 2.33%.  Haptoglobin, stool heme, and SPEP were pending at time of consult.     19  Hematology/Oncology was consulted for anemia.     PCP: Emilie Cruz APRN     Subjective   Feeling better after transfusion.  Denies chest pain or shortness of air  ROS:  Review of Systems   Constitutional: Negative for chills and fever.   HENT: Negative for ear pain, mouth sores, nosebleeds and sore throat.    Eyes: Negative for photophobia and visual disturbance.   Respiratory: Negative for wheezing and stridor.    Cardiovascular: Negative for chest pain and palpitations.   Gastrointestinal: Negative for abdominal pain, diarrhea, nausea and vomiting.   Endocrine: Negative for cold intolerance and heat intolerance.   Genitourinary: Negative for dysuria and hematuria.   Musculoskeletal: Negative for joint swelling and neck stiffness.   Skin: Negative for color change and rash.   Neurological: Negative for seizures and syncope.   Hematological: Negative for adenopathy.        No obvious bleeding   Psychiatric/Behavioral: Negative for agitation, confusion and hallucinations.        MEDICATIONS:    Scheduled Meds:  "     aspirin 81 mg Oral Daily   atorvastatin 40 mg Oral Nightly   furosemide 40 mg Oral Daily   levothyroxine 112 mcg Oral Q AM   losartan 50 mg Oral Q24H   metFORMIN 500 mg Oral Q PM   metoprolol succinate XL 12.5 mg Oral Daily   pantoprazole 40 mg Oral Q AM   potassium chloride 20 mEq Oral Daily   sodium chloride 10 mL Intravenous Q12H      Continuous Infusions:      Pharmacy to dose warfarin     sodium chloride 9 mL/hr Last Rate: 9 mL/hr (12/05/19 1235)      PRN Meds:  •  acetaminophen **OR** acetaminophen **OR** acetaminophen  •  aluminum-magnesium hydroxide-simethicone  •  bisacodyl  •  calcium carbonate  •  docusate sodium  •  magnesium hydroxide  •  melatonin  •  nitroglycerin  •  ondansetron **OR** ondansetron  •  Pharmacy to dose warfarin  •  [COMPLETED] Insert peripheral IV **AND** sodium chloride  •  sodium chloride  •  sodium chloride     ALLERGIES:  No Known Allergies    Objective    VITALS:   /65 (BP Location: Left arm, Patient Position: Lying)   Pulse 114   Temp 98 °F (36.7 °C) (Oral)   Resp 20   Ht 157.5 cm (62\")   Wt 87.6 kg (193 lb 2 oz)   SpO2 96%   BMI 35.32 kg/m²     PHYSICAL EXAM:  Physical Exam   Constitutional: She is oriented to person, place, and time. No distress.   HENT:   Head: Normocephalic and atraumatic.   Eyes: Conjunctivae and EOM are normal. Right eye exhibits no discharge. Left eye exhibits no discharge. No scleral icterus.   Neck: Normal range of motion. Neck supple. No thyromegaly present.   Cardiovascular: Normal rate, regular rhythm and normal heart sounds. Exam reveals no gallop and no friction rub.   Pulmonary/Chest: Effort normal. No stridor. No respiratory distress. She has no wheezes.   Abdominal: Soft. Bowel sounds are normal. She exhibits no mass. There is no tenderness. There is no rebound and no guarding.   Musculoskeletal: Normal range of motion. She exhibits no tenderness.   Lymphadenopathy:     She has no cervical adenopathy.   Neurological: She is " alert and oriented to person, place, and time. She exhibits normal muscle tone.   Skin: Skin is warm. No rash noted. She is not diaphoretic. No erythema.   Psychiatric: She has a normal mood and affect. Her behavior is normal.   Nursing note and vitals reviewed.        RECENT LABS:  Lab Results (last 24 hours)     Procedure Component Value Units Date/Time    CBC & Differential [[id number]] Collected:  12/06/19 0231    Specimen:  Blood Updated:  12/06/19 0358    Narrative:       The following orders were created for panel order CBC & Differential.  Procedure                               Abnormality         Status                     ---------                               -----------         ------                     CBC Auto Differential[[id number]]        Abnormal            Final result                 Please view results for these tests on the individual orders.    CBC Auto Differential [[id number]]  (Abnormal) Collected:  12/06/19 0231    Specimen:  Blood Updated:  12/06/19 0358     WBC 8.10 10*3/mm3      RBC 4.02 10*6/mm3      Hemoglobin 8.3 g/dL      Hematocrit 27.2 %      MCV 67.6 fL      MCH 20.7 pg      MCHC 30.6 g/dL      RDW 25.7 %      RDW-SD 60.4 fl      MPV 8.5 fL      Platelets 289 10*3/mm3     Scan Slide [[id number]] Collected:  12/06/19 0231    Specimen:  Blood Updated:  12/06/19 0358     Scan Slide --     Comment: See Manual Differential Results       Manual Differential [[id number]]  (Abnormal) Collected:  12/06/19 0231    Specimen:  Blood Updated:  12/06/19 0358     Neutrophil % 72.0 %      Lymphocyte % 9.0 %      Monocyte % 2.0 %      Eosinophil % 5.0 %      Bands %  12.0 %      Neutrophils Absolute 6.80 10*3/mm3      Lymphocytes Absolute 0.73 10*3/mm3      Monocytes Absolute 0.16 10*3/mm3      Eosinophils Absolute 0.41 10*3/mm3      nRBC 1.0 /100 WBC      Anisocytosis Slight/1+     Poikilocytes Slight/1+     WBC Morphology Normal     Giant Platelets Slight/1+    Basic Metabolic Panel [[id number]]   (Abnormal) Collected:  12/06/19 0231    Specimen:  Blood Updated:  12/06/19 0345     Glucose 137 mg/dL      BUN 24 mg/dL      Creatinine 1.48 mg/dL      Sodium 140 mmol/L      Potassium 4.2 mmol/L      Chloride 103 mmol/L      CO2 24.0 mmol/L      Calcium 9.6 mg/dL      eGFR Non African Amer 36 mL/min/1.73      BUN/Creatinine Ratio 16.2     Anion Gap 13.0 mmol/L     Narrative:       GFR Normal >60  Chronic Kidney Disease <60  Kidney Failure <15    Protime-INR [[id number]]  (Abnormal) Collected:  12/06/19 0231    Specimen:  Blood Updated:  12/06/19 0343     Protime 13.2 Seconds      INR 1.31    Protein Electrophoresis, Total [[id number]] Collected:  12/04/19 1006    Specimen:  Blood Updated:  12/05/19 1409     Total Protein 6.0 g/dL      Albumin 3.2 g/dL      Alpha-1-Globulin 0.3 g/dL      Alpha-2-Globulin 0.6 g/dL      Beta Globulin 1.0 g/dL      Gamma Globulin 0.8 g/dL      M-José Luis Not Observed g/dL      Globulin 2.8 g/dL      A/G Ratio 1.1     Please note Comment     Comment: Protein electrophoresis scan will follow via computer, mail, or   delivery.       Narrative:       Performed at:  [address] [place] [address]  [id number]  : Erick Collins PhD, Phone:  [phone]          PENDING RESULTS: stool heme, SPEP    IMAGING REVIEWED:  No radiology results for the last day    I have reviewed the patient's labs, imaging, reports, and other clinician documentation.    Assessment/Plan   ASSESSMENT:  1. Microcytic anemia- receiving pRBC transfusions PRN.  Post transfusion ferritin was 21.  No vitamin B12 or folate deficiencies.  No hemolysis.  SPEP and stool heme pending. On warfarin for mechanical heart valve likely increasing bleeding risk.  GI bleed in differential. Last EGD/Colonoscopy at least 5 years ago by Dr. Worley with patient unaware of results. S/p Venofer x2. EGD and colonoscopy 12/5- for source of bleeding and recommended pill endoscopy if  needed.  2. Coronavirus/cough-supportive care per primary team.  3. ODETTE on CKD stage III- followed by Dr. Lai  4. CAD/mechanical aortic valve/HTN-history of CABG in past.  Per primary team.  5. DM2-per primary team.    PLAN:  1. Ok to discharge from heme perspective. Follow up with Dr. Spencer in our office Tuesday 12/10/19 at 10:30am. A CBC will be done at that time.  2. Transfuse PRN hemoglobin less than 7.5 as outpatient.  3. Await remaining anemia work-up.  4. GI considering pill endoscopy as outpatient if remains anemic.  5. Recommend outpatient Procrit if hemoglobin remains below 10 given CKD        Note updated by PATRICIA Gtz.  Patient seen and examined by Dr. Spencer.  Electronically signed by DELFINO Rene, 12/06/19, 2:04 PM.    I have personally performed a face-to-face diagnostic evaluation on this patient.  I have reviewed and agree with the care plan.  Patient seen and examined nurse practitioner note reviewed updated reviewed the GI notes.  Patient will receive Venofer today also okay to discharge patient home follow-up with me in the cancer care center.  In 2 weeks  I discussed the patients findings and my recommendations with patient    Nav Rizzo MD  12/06/19  12:51 PM

## 2019-12-07 ENCOUNTER — READMISSION MANAGEMENT (OUTPATIENT)
Dept: CALL CENTER | Facility: HOSPITAL | Age: 64
End: 2019-12-07

## 2019-12-08 NOTE — OUTREACH NOTE
Prep Survey      Responses   Facility patient discharged from?  [facility]   Is patient eligible?  Yes   Discharge diagnosis  Anemia    Does the patient have one of the following disease processes/diagnoses(primary or secondary)?  Other   Does the patient have Home health ordered?  No   Is there a DME ordered?  No   Prep survey completed?  Yes          Priya Blanc RN

## 2019-12-09 ENCOUNTER — READMISSION MANAGEMENT (OUTPATIENT)
Dept: CALL CENTER | Facility: HOSPITAL | Age: 64
End: 2019-12-09

## 2019-12-09 NOTE — PAYOR COMM NOTE
"PATIENT HAS DISCHARGED ON 12/6/2019. PLEASE CONFIRM IF INPATIENT AUTH HAS BEEN APPROVED. THANK YOU.           Gloria Davis RN  Utilization Review          [address] [place]  [address] [phone] Office  [phone] Fax  [email or web address]   [facility]       Jocelin Cruz (64 y.o. Female)     Date of Birth Social Security Number Address Home Phone MRN    1955  [id number] [facility] [place] [address] [phone] [id number]    Yazidi Marital Status          Non-Confucianism Single       Admission Date Admission Type Admitting Provider Attending Provider Department, Room/Bed    12/3/19 Emergency Martita Bennett MD  [facility] OBSERVATION, 104/1    Discharge Date Discharge Disposition Discharge Destination        12/6/2019 Home or Self Care              Attending Provider:  (none)   Allergies:  No Known Allergies    Isolation:  Droplet   Infection:  None   Code Status:  Prior    Ht:  157.5 cm (62\")   Wt:  87.6 kg (193 lb 2 oz)    Admission Cmt:  None   Principal Problem:  Anemia [D64.9] More...                 Active Insurance as of 12/3/2019     Primary Coverage     Payor Plan Insurance Group Employer/Plan Group    [employer]     Payor Plan Address Payor Plan Phone Number Payor Plan Fax Number Effective Dates    PO    1/1/2018 - None Entered    [facility] [id number]       Subscriber Name Subscriber Birth Date Member ID       JOCELIN CRUZ 1955 [id number]                 Emergency Contacts      (Rel.) Home Phone Work Phone Mobile Phone    CORBIN HARLEY (Friend) -- -- [phone]            Discharge Summary    No notes of this type exist for this encounter.         "

## 2019-12-09 NOTE — OUTREACH NOTE
Medical Week 1 Survey      Responses   Facility patient discharged from?  [facility]   Does the patient have one of the following disease processes/diagnoses(primary or secondary)?  Other   Is there a successful TCM telephone encounter documented?  No   Week 1 attempt successful?  No   Unsuccessful attempts  Attempt 4 [No answer/Left voicemail ]          Jeimy Domínguez LPN

## 2019-12-10 ENCOUNTER — OFFICE VISIT (OUTPATIENT)
Dept: ONCOLOGY | Facility: CLINIC | Age: 64
End: 2019-12-10

## 2019-12-10 ENCOUNTER — APPOINTMENT (OUTPATIENT)
Dept: LAB | Facility: HOSPITAL | Age: 64
End: 2019-12-10

## 2019-12-10 VITALS
RESPIRATION RATE: 18 BRPM | TEMPERATURE: 98.2 F | HEIGHT: 64 IN | HEART RATE: 96 BPM | BODY MASS INDEX: 32.47 KG/M2 | DIASTOLIC BLOOD PRESSURE: 79 MMHG | WEIGHT: 190.2 LBS | SYSTOLIC BLOOD PRESSURE: 127 MMHG

## 2019-12-10 DIAGNOSIS — D63.1 ANEMIA DUE TO STAGE 3 CHRONIC KIDNEY DISEASE (HCC): Primary | ICD-10-CM

## 2019-12-10 DIAGNOSIS — D50.8 OTHER IRON DEFICIENCY ANEMIA: ICD-10-CM

## 2019-12-10 DIAGNOSIS — N18.30 ANEMIA DUE TO STAGE 3 CHRONIC KIDNEY DISEASE (HCC): Primary | ICD-10-CM

## 2019-12-10 PROBLEM — K90.9 MALABSORPTION: Status: ACTIVE | Noted: 2019-12-10

## 2019-12-10 LAB
BASOPHILS # BLD AUTO: 0.05 10*3/MM3 (ref 0–0.2)
BASOPHILS NFR BLD AUTO: 0.7 % (ref 0–1.5)
DEPRECATED RDW RBC AUTO: 70.8 FL (ref 37–54)
EOSINOPHIL # BLD AUTO: 0.37 10*3/MM3 (ref 0–0.4)
EOSINOPHIL NFR BLD AUTO: 5 % (ref 0.3–6.2)
ERYTHROCYTE [DISTWIDTH] IN BLOOD BY AUTOMATED COUNT: 29.3 % (ref 12.3–15.4)
HCT VFR BLD AUTO: 30.7 % (ref 34–46.6)
HGB BLD-MCNC: 8.7 G/DL (ref 12–15.9)
LYMPHOCYTES # BLD AUTO: 0.84 10*3/MM3 (ref 0.7–3.1)
LYMPHOCYTES NFR BLD AUTO: 11.4 % (ref 19.6–45.3)
MCH RBC QN AUTO: 21.1 PG (ref 26.6–33)
MCHC RBC AUTO-ENTMCNC: 28.3 G/DL (ref 31.5–35.7)
MCV RBC AUTO: 74.5 FL (ref 79–97)
MONOCYTES # BLD AUTO: 0.67 10*3/MM3 (ref 0.1–0.9)
MONOCYTES NFR BLD AUTO: 9.1 % (ref 5–12)
NEUTROPHILS # BLD AUTO: 5.41 10*3/MM3 (ref 1.7–7)
NEUTROPHILS NFR BLD AUTO: 73.8 % (ref 42.7–76)
PLATELET # BLD AUTO: 278 10*3/MM3 (ref 140–450)
PMV BLD AUTO: 10 FL (ref 6–12)
RBC # BLD AUTO: 4.12 10*6/MM3 (ref 3.77–5.28)
WBC NRBC COR # BLD: 7.34 10*3/MM3 (ref 3.4–10.8)

## 2019-12-10 PROCEDURE — 85025 COMPLETE CBC W/AUTO DIFF WBC: CPT | Performed by: INTERNAL MEDICINE

## 2019-12-10 PROCEDURE — 99215 OFFICE O/P EST HI 40 MIN: CPT | Performed by: INTERNAL MEDICINE

## 2019-12-10 RX ORDER — SODIUM CHLORIDE 9 MG/ML
250 INJECTION, SOLUTION INTRAVENOUS ONCE
Status: CANCELLED | OUTPATIENT
Start: 2019-12-30

## 2019-12-10 RX ORDER — SODIUM CHLORIDE 9 MG/ML
250 INJECTION, SOLUTION INTRAVENOUS ONCE
Status: CANCELLED | OUTPATIENT
Start: 2019-12-23

## 2019-12-10 NOTE — PROGRESS NOTES
Hematology/Oncology Outpatient Follow Up    Jocelin Parra  1955    Primary Care Physician: Emilie Cruz APRN  Referring Physician: Emilie Cruz APRN  Chief Complaint:  Anemia  Chronic renal failure stage II  History of Present Illness:   · I initially saw Ms. De Los Santos in consultation at [facility] when she was admitted through the emergency room on 12/3/2019 with severe anemia.  Patient required blood transfusion.  Patient has chronic history of kidney disease.    · CBC in the ED showed WBC 3.5, hemoglobin 6.1, MCV 63.4, RDW 18.6, and platelets 339,000. She received 1 unit pRBC.  Creatinine was 1.57.   · Patient is on chronic Coumadin for mechanical cardiac valve INR was 2.74 on admission on 12/3/2019    · Posttransfusion iron studies showed iron 24 and ferritin 21.2.  Vitamin B12 was 947 and folate was 18.6.    · 12/4/2019 SPEP negative for monoclonal gammopathy.  Serum creatinine 1.7 with EGFR of 30  · 12/5/2019 patient underwent colonoscopy and EGD at [facility]- No evidence of any active source of bleeding noticed in the upper at the lower GI tract except diverticulosis and hemorrhoids.  Possibility of a small bowel AVM or lesion leading to blood loss anemia is in the differential however patient denies any overt GI bleeding.  May consider small bowel evaluation with a PillCam if needed.    Past Medical History:   Diagnosis Date   • A-fib (CMS/HCC) Dx 2018    S/p Cardioversion by Dr. Spencer on 04/10/19   • Acute renal failure syndrome (CMS/HCC) 4/2019-[facility] IP   • Biatrial enlargement 04/10/2019    Noted on SHERIE   • Calcified granuloma of lung (CMS/HCC) 01/04/2019    Noted on Chest XR   • Cardiomegaly 01/04/2019    Borderline--Noted on Chest XR   • CHF (congestive heart failure) (CMS/HCC)    • CKD (chronic kidney disease), stage III (CMS/HCC)     Does Not See a Nephrologist   • Diabetes mellitus (CMS/HCC)    • DM (diabetes mellitus) (CMS/HCC)     T2   • Dysphagia 08/15-[facility] IP    Due to Pharyngitis   • GERD  (gastroesophageal reflux disease)    • History of chest x-ray 8/28/15-[facility]    Normal   • History of chest x-ray 01/19/2019    Mild Pulmonary Edema w/Congestive Failure Noted   • History of chest x-ray 01/04/2019    Borderline Cardiomegaly    • History of EKG 2014/2018/2019-[facility]   • Hx of diabetic neuropathy    • Hx of dizziness     w/Lightheadedness   • Hx of echocardiogram 4/16/18-[facility]    EF 55-60%; Mild MVR; Mild TVR; Normal Root/No Effusion   • Hyperlipidemia     Controlled w/Meds   • Hypertension     Controlled w/Meds   • Hypokalemia 4/11/19-[facility] IP   • Hypothyroidism     Controlled w/Meds   • Iron deficiency anemia    • Left posterior fascicular block 04/2018 & 4/19    Noted on EKG   • Lower extremity edema 03/2019   • Mild mitral valve regurgitation 04/16/2018    Noted on Echo   • Mild tricuspid valve regurgitation 04/16/2018    Noted on Echo   • Moderate mitral valve regurgitation 2008    S/p MVR in 08'   • Moderate tricuspid insufficiency 04/10/2019    Noted on SHERIE   • Osteoarthritis     Knees w/Hx Knee Repl   • Pulmonary edema 01/19/2019    Mild--Noted on Chest XR   • Rapid palpitations 04/15/18 & 8/9/18-[facility] ER   • Renal dysfunction    • Right axis deviation 08/2018 & 4/19    Noted on EKG   • Severe aortic valve stenosis Since 2008    Hx AVR on 12/11/08 by Dr. Alvarado   • Severe mitral insufficiency 04/10/2019    Noted on SHERIE   • Sinus tachycardia 08/2018    Noted on EKG   • SOB (shortness of breath) chronic   • Torticollis Chronic   • Valvular heart disease     S/p AVR & MVR in 08'       Past Surgical History:   Procedure Laterality Date   • AORTIC VALVE REPAIR/REPLACEMENT  12/11/08-[facility]    Using a #20 Eight Yr Mechanical [place] Prosthesis--Dewey Alvarado MD   • CARDIOVERSION  4/10/19-[facility]    Dr. Spencer--For A-Fib   • COLONOSCOPY N/A 12/5/2019    Procedure: COLONOSCOPY;  Surgeon: Dustin Castellanos MD;  Location: [facility] ENDOSCOPY;  Service: Gastroenterology   • ENDOSCOPY N/A 12/5/2019    Procedure:  ESOPHAGOGASTRODUODENOSCOPY;  Surgeon: Dustin Castellanos MD;  Location: [facility] ENDOSCOPY;  Service: Gastroenterology   • HYSTERECTOMY     • MITRAL VALVE REPLACEMENT  2008   • MITRAL VALVE REPLACEMENT  05/09/2019    Dr Alvarado   • SHERIE  4/10/19-[facility]    EF 40%; Moderate TVR; Severe Eccentric MI; Biatrial Enlargement   • TOTAL KNEE ARTHROPLASTY  2017   • TRICUSPID VALVE SURGERY  05/09/2019    Dr Alvarado         Current Outpatient Medications:   •  aspirin 81 MG tablet, Take 1 tablet by mouth daily., Disp: , Rfl:   •  atorvastatin (LIPITOR) 40 MG tablet, Take 40 mg by mouth Daily., Disp: , Rfl:   •  B Complex Vitamins (VITAMIN B COMPLEX) tablet, Take by mouth., Disp: , Rfl:   •  cholecalciferol (VITAMIN D3) 10 MCG (400 UNIT) tablet, Take 2,000 Units by mouth Daily., Disp: , Rfl:   •  ezetimibe (ZETIA) 10 MG tablet, Take 1 tablet by mouth daily., Disp: , Rfl:   •  furosemide (LASIX) 40 MG tablet, Take 40 mg by mouth Daily., Disp: , Rfl:   •  levothyroxine (SYNTHROID, LEVOTHROID) 112 MCG tablet, Take 112 mcg by mouth Daily., Disp: , Rfl:   •  Liraglutide (VICTOZA) 18 MG/3ML solution pen-injector, Inject 1.8 mg under the skin into the appropriate area as directed Daily., Disp: , Rfl:   •  metFORMIN (GLUCOPHAGE) 500 MG tablet, Take 1 tablet by mouth Every Evening., Disp: , Rfl:   •  metoprolol succinate XL (TOPROL-XL) 25 MG 24 hr tablet, Take 12.5 mg by mouth Daily., Disp: , Rfl:   •  Multiple Vitamin (MULTI-VITAMIN) tablet, Take 1 tablet by mouth daily., Disp: , Rfl:   •  omeprazole (PriLOSEC) 20 MG capsule, Take 1 capsule by mouth daily., Disp: , Rfl:   •  potassium chloride (K-DUR,KLOR-CON) 20 MEQ CR tablet, Take 20 mEq by mouth Daily., Disp: , Rfl:   •  valsartan (DIOVAN) 80 MG tablet, Take 1 tablet by mouth Daily. PT TAKES 40MG DAILY , Disp: , Rfl:   •  vitamin C (ASCORBIC ACID) 500 MG tablet, Take 500 mg by mouth Daily., Disp: , Rfl:   •  warfarin (COUMADIN) 5 MG tablet, Take 5 mg by mouth 3 (Three) Times a Week. Mon, Wed, Fri,  "Disp: , Rfl:   •  warfarin (COUMADIN) 7.5 MG tablet, Take 7.5 mg by mouth 4 (Four) Times a Week. Tues, Thur, Sat, Sun, Disp: , Rfl:     No Known Allergies    Family History   Problem Relation Age of Onset   • Heart disease Father    • Hypertension Father    • Stroke Father    • Diabetes Father    • Lung cancer Mother        Cancer-related family history includes Lung cancer in her mother.    Social History     Tobacco Use   • Smoking status: Never Smoker   • Smokeless tobacco: Never Used   Substance Use Topics   • Alcohol use: No   • Drug use: No       I have reviewed the history of present illness, past medical history, family history, social history, lab results, all notes and other records since the patient was last seen on   Visit date not found  SUBJECTIVE:      Patient is my office for follow-up of her anemia.  Patient was discharged from the hospital last week.  Denies any visible blood loss she is back on the warfarin.  No black stools.  Reports energy level is slightly improved    ROS:      Review of Systems   Constitutional: Negative for fever.   HENT: Negative for nosebleeds and trouble swallowing.    Eyes: Negative for visual disturbance.   Respiratory: Negative for cough, shortness of breath and wheezing.    Cardiovascular: Negative for chest pain.   Gastrointestinal: Negative for abdominal pain and blood in stool.   Endocrine: Negative for cold intolerance.   Genitourinary: Negative for dysuria and hematuria.   Musculoskeletal: Negative for joint swelling.   Skin: Negative for rash.   Allergic/Immunologic: Negative for environmental allergies.   Neurological: Negative for seizures.   Hematological: Does not bruise/bleed easily.   Psychiatric/Behavioral: The patient is not nervous/anxious.          Objective:       Vitals:    12/10/19 1045   BP: 127/79   Pulse: 96   Resp: 18   Temp: 98.2 °F (36.8 °C)   Weight: 86.3 kg (190 lb 3.2 oz)   Height: 162.6 cm (64\")   PainSc: 0-No pain         PHYSICAL EXAM:  "     Physical Exam   Constitutional: She is oriented to person, place, and time. No distress.   HENT:   Head: Normocephalic and atraumatic.   Torticollis   Eyes: Conjunctivae and EOM are normal. Right eye exhibits no discharge. Left eye exhibits no discharge. No scleral icterus.   Neck: Normal range of motion. Neck supple. No thyromegaly present.   Cardiovascular: Normal rate, regular rhythm and normal heart sounds. Exam reveals no gallop and no friction rub.   Crisp S1-S2   Pulmonary/Chest: Effort normal. No stridor. No respiratory distress. She has no wheezes.   Abdominal: Soft. Bowel sounds are normal. She exhibits no mass. There is no tenderness. There is no rebound and no guarding.   Musculoskeletal: Normal range of motion. She exhibits no tenderness.   1+ bilateral lower extremity edema   Lymphadenopathy:     She has no cervical adenopathy.   Neurological: She is alert and oriented to person, place, and time. She exhibits normal muscle tone.   Skin: Skin is warm. No rash noted. She is not diaphoretic. No erythema.   Psychiatric: She has a normal mood and affect. Her behavior is normal.   Nursing note and vitals reviewed.       RECENT LABS:     WBC   Date Value Ref Range Status   12/10/2019 7.34 3.40 - 10.80 10*3/mm3 Final     RBC   Date Value Ref Range Status   12/10/2019 4.12 3.77 - 5.28 10*6/mm3 Final     Hemoglobin   Date Value Ref Range Status   12/10/2019 8.7 (L) 12.0 - 15.9 g/dL Final     Hematocrit   Date Value Ref Range Status   12/10/2019 30.7 (L) 34.0 - 46.6 % Final     MCV   Date Value Ref Range Status   12/10/2019 74.5 (L) 79.0 - 97.0 fL Final     MCH   Date Value Ref Range Status   12/10/2019 21.1 (L) 26.6 - 33.0 pg Final     MCHC   Date Value Ref Range Status   12/10/2019 28.3 (L) 31.5 - 35.7 g/dL Final     RDW   Date Value Ref Range Status   12/10/2019 29.3 (H) 12.3 - 15.4 % Final     RDW-SD   Date Value Ref Range Status   12/10/2019 70.8 (H) 37.0 - 54.0 fl Final     MPV   Date Value Ref Range  Status   12/10/2019 10.0 6.0 - 12.0 fL Final     Platelets   Date Value Ref Range Status   12/10/2019 278 140 - 450 10*3/mm3 Final     Neutrophil %   Date Value Ref Range Status   12/10/2019 73.8 42.7 - 76.0 % Final     Lymphocyte %   Date Value Ref Range Status   12/10/2019 11.4 (L) 19.6 - 45.3 % Final     Monocyte %   Date Value Ref Range Status   12/10/2019 9.1 5.0 - 12.0 % Final     Eosinophil %   Date Value Ref Range Status   12/10/2019 5.0 0.3 - 6.2 % Final     Basophil %   Date Value Ref Range Status   12/10/2019 0.7 0.0 - 1.5 % Final     Neutrophils, Absolute   Date Value Ref Range Status   12/10/2019 5.41 1.70 - 7.00 10*3/mm3 Final     Lymphocytes, Absolute   Date Value Ref Range Status   12/10/2019 0.84 0.70 - 3.10 10*3/mm3 Final     Monocytes, Absolute   Date Value Ref Range Status   12/10/2019 0.67 0.10 - 0.90 10*3/mm3 Final     Eosinophils, Absolute   Date Value Ref Range Status   12/10/2019 0.37 0.00 - 0.40 10*3/mm3 Final     Basophils, Absolute   Date Value Ref Range Status   12/10/2019 0.05 0.00 - 0.20 10*3/mm3 Final     nRBC   Date Value Ref Range Status   12/06/2019 1.0 (H) 0.0 - 0.2 /100 WBC Final   12/05/2019 0.1 0.0 - 0.2 /100 WBC Final       Lab Results   Component Value Date    GLUCOSE 137 (H) 12/06/2019    BUN 24 (H) 12/06/2019    CREATININE 1.48 (H) 12/06/2019    EGFRIFNONA 36 (L) 12/06/2019    BCR 16.2 12/06/2019    K 4.2 12/06/2019    CO2 24.0 12/06/2019    CALCIUM 9.6 12/06/2019    PROTENTOTREF 6.0 12/04/2019    ALBUMIN 3.50 12/05/2019    LABIL2 1.1 12/04/2019    AST 26 12/05/2019    ALT 18 12/05/2019         Assessment/Plan      ASSESSMENT:     1. Microcytic/iron deficiency anemia.  2. Status post mechanical cardiac aortic valve replacement  3. Chronic kidney disease stage III  4. ECOG 1    PLAN:      1. Reviewed the CBC results with the patient she has microcytic anemia patient will benefit from iron infusions.  Does not tolerate oral iron tablets.  Letter arrange for Injectafer  infusions.  She does not require blood transfusion now.  2. Iron deficiency anemia could be related to mechanical aortic valve.  3. I will set up appointment with [facility] for capsule study.  4. And MCV normalizes if the patient continues to be anemic we will start her on Procrit injections for her chronic kidney disease stage III  5. I will see her back in my office in 6 weeks recheck CBC at that time    I have reviewed labs results, imaging, vitals, and medications with the patient today.     Patient verbalized understanding and is in agreement of the above plan.          This report was compiled using Dragon voice recognition software. I have made every effort to proof read this document; however, typographical errors may persist.

## 2019-12-13 ENCOUNTER — OFFICE VISIT (OUTPATIENT)
Dept: CARDIOLOGY | Facility: CLINIC | Age: 64
End: 2019-12-13

## 2019-12-13 ENCOUNTER — ANTICOAGULATION VISIT (OUTPATIENT)
Dept: CARDIOLOGY | Facility: CLINIC | Age: 64
End: 2019-12-13

## 2019-12-13 VITALS
BODY MASS INDEX: 31.76 KG/M2 | HEART RATE: 89 BPM | DIASTOLIC BLOOD PRESSURE: 79 MMHG | WEIGHT: 186 LBS | SYSTOLIC BLOOD PRESSURE: 128 MMHG | HEIGHT: 64 IN

## 2019-12-13 VITALS
DIASTOLIC BLOOD PRESSURE: 79 MMHG | WEIGHT: 186.75 LBS | SYSTOLIC BLOOD PRESSURE: 128 MMHG | BODY MASS INDEX: 32.06 KG/M2 | HEART RATE: 89 BPM

## 2019-12-13 DIAGNOSIS — Z95.3 HISTORY OF AORTIC VALVE REPLACEMENT WITH BIOPROSTHETIC VALVE: ICD-10-CM

## 2019-12-13 DIAGNOSIS — Z95.3 HISTORY OF MITRAL VALVE REPLACEMENT WITH BIOPROSTHETIC VALVE: ICD-10-CM

## 2019-12-13 DIAGNOSIS — I48.21 PERMANENT ATRIAL FIBRILLATION (HCC): ICD-10-CM

## 2019-12-13 DIAGNOSIS — Z79.01 LONG TERM (CURRENT) USE OF ANTICOAGULANTS: ICD-10-CM

## 2019-12-13 DIAGNOSIS — Z95.2 S/P AVR: Primary | ICD-10-CM

## 2019-12-13 DIAGNOSIS — I10 ESSENTIAL HYPERTENSION: ICD-10-CM

## 2019-12-13 DIAGNOSIS — N18.30 CKD (CHRONIC KIDNEY DISEASE) STAGE 3, GFR 30-59 ML/MIN (HCC): ICD-10-CM

## 2019-12-13 LAB — INR PPP: 1.4 (ref 0.9–1.1)

## 2019-12-13 PROCEDURE — 99213 OFFICE O/P EST LOW 20 MIN: CPT | Performed by: INTERNAL MEDICINE

## 2019-12-13 PROCEDURE — 36416 COLLJ CAPILLARY BLOOD SPEC: CPT | Performed by: INTERNAL MEDICINE

## 2019-12-13 PROCEDURE — 85610 PROTHROMBIN TIME: CPT | Performed by: INTERNAL MEDICINE

## 2019-12-13 NOTE — PROGRESS NOTES
Subjective:     Encounter Date:12/13/2019      Patient ID: Jocelin Parra is a 64 y.o. female.    Chief Complaint: Follow-up for AVR, long-term anticoagulation, CAD, CABG, A. fib  History of Present Illness     This is a 64-year-old with PMH      # valvular heart disease, mechanical AVR on chronic Coumadin therapy, , severe mitral regurgitation, moderate TR and previous history of mechanical AVR, status post redo mitral valve replacement with #25 mechanical [place], tricuspid #28 physio ring,   [facility], CABG x2 on 05/09/2019  # CAD cardiac cath 4/13/2019 revealing severe 2 vessel disease, status post CABG x2 with LIMA to LAD and SVG to PDA 05/09/2019  # P.A.fib, long-term anticoagulation, cardioversion 8/9/18,[facility] 05/09/2019  # diabetes,  hemoglobin A1c 04/17/2018 was 8.1  # hypertension  # torticollis, history of neck abscess 2015  # mother and lung cancer, father had diabetes and CVA  # AVR, unilateral oophorectomy.     here for hospital follow-up. patient and denies any shortness of breath chest pain with exertion palpitations. arterial blood pressure is 128/79.,  Heart rate 89bpm.  Patient is dealing with anemia and is seeing hematologist Dr. Mejía.  Getting iron transfusions   patient denies any fever chills. patient had labs done 12/6/2019 which showed creatinine of 1.48 and hemoglobin of 8 point 3 repeat was 8.7      ASSESSMENT:    # VHD,AVR/MVR  #  CAD, CABG  # a.fib, long-term anticoagulation  #  hypertension, hyperlipidemia, diabetes, CKD  #  torticollis  #Anemia  #CKD    PLAN:   followup and [facility]   reviewed valvular heart disease with patient   follow-up with hematology for anemia  Reviewed labs with patient      Past Medical History:  Past Medical History:   Diagnosis Date   • A-fib (CMS/HCC) Dx 2018    S/p Cardioversion by Dr. Spencer on 04/10/19   • Acute renal failure syndrome (CMS/HCC) 4/2019-[facility] IP   • Biatrial enlargement 04/10/2019    Noted on SHERIE   • Calcified granuloma of lung  (CMS/[facility]) 01/04/2019    Noted on Chest XR   • Cardiomegaly 01/04/2019    Borderline--Noted on Chest XR   • CHF (congestive heart failure) (CMS/[facility])    • CKD (chronic kidney disease), stage III (CMS/[facility])     Does Not See a Nephrologist   • Diabetes mellitus (CMS/[facility])    • DM (diabetes mellitus) (CMS/[facility])     T2   • Dysphagia 08/15-[facility] IP    Due to Pharyngitis   • GERD (gastroesophageal reflux disease)    • History of chest x-ray 8/28/15-[facility]    Normal   • History of chest x-ray 01/19/2019    Mild Pulmonary Edema w/Congestive Failure Noted   • History of chest x-ray 01/04/2019    Borderline Cardiomegaly    • History of EKG 2014/2018/2019-[facility]   • Hx of diabetic neuropathy    • Hx of dizziness     w/Lightheadedness   • Hx of echocardiogram 4/16/18-[facility]    EF 55-60%; Mild MVR; Mild TVR; Normal Root/No Effusion   • Hyperlipidemia     Controlled w/Meds   • Hypertension     Controlled w/Meds   • Hypokalemia 4/11/19-[facility] IP   • Hypothyroidism     Controlled w/Meds   • Iron deficiency anemia    • Left posterior fascicular block 04/2018 & 4/19    Noted on EKG   • Lower extremity edema 03/2019   • Mild mitral valve regurgitation 04/16/2018    Noted on Echo   • Mild tricuspid valve regurgitation 04/16/2018    Noted on Echo   • Moderate mitral valve regurgitation 2008    S/p MVR in 08'   • Moderate tricuspid insufficiency 04/10/2019    Noted on SHERIE   • Osteoarthritis     Knees w/Hx Knee Repl   • Pulmonary edema 01/19/2019    Mild--Noted on Chest XR   • Rapid palpitations 04/15/18 & 8/9/18-[facility] ER   • Renal dysfunction    • Right axis deviation 08/2018 & 4/19    Noted on EKG   • Severe aortic valve stenosis Since 2008    Hx AVR on 12/11/08 by Dr. Alvarado   • Severe mitral insufficiency 04/10/2019    Noted on SHERIE   • Sinus tachycardia 08/2018    Noted on EKG   • SOB (shortness of breath) chronic   • Torticollis Chronic   • Valvular heart disease     S/p AVR & MVR in 08'     Past Surgical History:  Past Surgical History:   Procedure  Laterality Date   • AORTIC VALVE REPAIR/REPLACEMENT  12/11/08-[facility]    Using a #20 Eight Yr Mechanical [place] Prosthesis--Dewey Alvarado MD   • CARDIOVERSION  4/10/19-[facility]    Dr. Spencer--For A-Fib   • COLONOSCOPY N/A 12/5/2019    Procedure: COLONOSCOPY;  Surgeon: Dustin Castellanos MD;  Location: [facility] ENDOSCOPY;  Service: Gastroenterology   • ENDOSCOPY N/A 12/5/2019    Procedure: ESOPHAGOGASTRODUODENOSCOPY;  Surgeon: Dustin Castellanos MD;  Location: [facility] ENDOSCOPY;  Service: Gastroenterology   • HYSTERECTOMY     • MITRAL VALVE REPLACEMENT  2008   • MITRAL VALVE REPLACEMENT  05/09/2019    Dr Alvarado   • SHERIE  4/10/19-[facility]    EF 40%; Moderate TVR; Severe Eccentric MI; Biatrial Enlargement   • TOTAL KNEE ARTHROPLASTY  2017   • TRICUSPID VALVE SURGERY  05/09/2019    Dr Alvarado      Allergies:  No Known Allergies  Home Meds:  Current Meds:     Current Outpatient Medications:   •  aspirin 81 MG tablet, Take 1 tablet by mouth daily., Disp: , Rfl:   •  atorvastatin (LIPITOR) 40 MG tablet, Take 40 mg by mouth Daily., Disp: , Rfl:   •  B Complex Vitamins (VITAMIN B COMPLEX) tablet, Take by mouth., Disp: , Rfl:   •  cholecalciferol (VITAMIN D3) 10 MCG (400 UNIT) tablet, Take 2,000 Units by mouth Daily., Disp: , Rfl:   •  ezetimibe (ZETIA) 10 MG tablet, Take 1 tablet by mouth daily., Disp: , Rfl:   •  furosemide (LASIX) 40 MG tablet, Take 40 mg by mouth Daily., Disp: , Rfl:   •  levothyroxine (SYNTHROID, LEVOTHROID) 112 MCG tablet, Take 112 mcg by mouth Daily., Disp: , Rfl:   •  Liraglutide (VICTOZA) 18 MG/3ML solution pen-injector, Inject 1.8 mg under the skin into the appropriate area as directed Daily., Disp: , Rfl:   •  metFORMIN (GLUCOPHAGE) 500 MG tablet, Take 1 tablet by mouth Every Evening., Disp: , Rfl:   •  metoprolol succinate XL (TOPROL-XL) 25 MG 24 hr tablet, Take 12.5 mg by mouth Daily., Disp: , Rfl:   •  Multiple Vitamin (MULTI-VITAMIN) tablet, Take 1 tablet by mouth daily., Disp: , Rfl:   •  omeprazole (PriLOSEC) 20 MG  "capsule, Take 1 capsule by mouth daily., Disp: , Rfl:   •  potassium chloride (K-DUR,KLOR-CON) 20 MEQ CR tablet, Take 20 mEq by mouth Daily., Disp: , Rfl:   •  valsartan (DIOVAN) 80 MG tablet, Take 1 tablet by mouth Daily. PT TAKES 40MG DAILY , Disp: , Rfl:   •  vitamin C (ASCORBIC ACID) 500 MG tablet, Take 500 mg by mouth Daily., Disp: , Rfl:   •  warfarin (COUMADIN) 5 MG tablet, Take 5 mg by mouth 3 (Three) Times a Week. Mon, Wed, Fri, Disp: , Rfl:   •  warfarin (COUMADIN) 7.5 MG tablet, Take 7.5 mg by mouth 4 (Four) Times a Week. Tues, [place], Sat, Sun, Disp: , Rfl:   Social History:   Social History     Tobacco Use   • Smoking status: Never Smoker   • Smokeless tobacco: Never Used   Substance Use Topics   • Alcohol use: No      Family History:  Family History   Problem Relation Age of Onset   • Heart disease Father    • Hypertension Father    • Stroke Father    • Diabetes Father    • Lung cancer Mother         The following portions of the patient's history were reviewed and updated as appropriate: allergies, current medications, past family history, past medical history, past social history, past surgical history and problem list.    Review of Systems   Constitution: Negative for malaise/fatigue.   Cardiovascular: Positive for leg swelling. Negative for chest pain and palpitations.   Respiratory: Negative for shortness of breath.    Skin: Negative for rash.   Neurological: Negative for dizziness, light-headedness and numbness.       Procedures EKG from 12/3/2019 reviewed by me shows A. fib at the rate of 103 beats per       Objective:     Physical Exam  /79   Pulse 89   Ht 162.6 cm (64\")   Wt 84.4 kg (186 lb)   LMP  (LMP Unknown)   BMI 31.93 kg/m²   General:  Appears in no acute distress  Eyes: Sclera is anicteric,  conjunctiva is clear   HEENT:  No JVD. Thyroid not visibly enlarged. No mucosal pallor or cyanosis, torticollis with neck bent to the left seen  Respiratory: Respirations regular and " unlabored at rest.  Bilaterally good breath sounds, with good air entry in all fields. No crackles, rubs or wheezes auscultated  Cardiovascular: S1,S2 Regular rate and rhythm.  2/6 diastolic murmur at the base, no  rub or gallop auscultated. No pretibial pitting edema  Gastrointestinal: Abdomen soft, flat, non tender. Bowel sounds present.   Musculoskeletal:  No abnormal movements  Extremities: No digital clubbing or cyanosis  Skin: Color pink. Skin warm and dry to touch. No rashes  No xanthoma  Neuro: Alert and awake, no lateralizing deficits appreciated    Lab Review:       Assessment:         No diagnosis found.       Plan:

## 2019-12-18 ENCOUNTER — READMISSION MANAGEMENT (OUTPATIENT)
Dept: CALL CENTER | Facility: HOSPITAL | Age: 64
End: 2019-12-18

## 2019-12-18 NOTE — OUTREACH NOTE
Medical Week 2 Survey      Responses   Facility patient discharged from?  [facility]   Does the patient have one of the following disease processes/diagnoses(primary or secondary)?  Other   Week 2 attempt successful?  No   Revoke  Decline to participate [No answer/Left voicemail]          Jeimy Domínguez LPN

## 2019-12-19 ENCOUNTER — ANTICOAGULATION VISIT (OUTPATIENT)
Dept: CARDIOLOGY | Facility: CLINIC | Age: 64
End: 2019-12-19

## 2019-12-19 VITALS
DIASTOLIC BLOOD PRESSURE: 63 MMHG | HEART RATE: 96 BPM | WEIGHT: 187 LBS | SYSTOLIC BLOOD PRESSURE: 115 MMHG | BODY MASS INDEX: 32.1 KG/M2

## 2019-12-19 DIAGNOSIS — Z95.3 HISTORY OF AORTIC VALVE REPLACEMENT WITH BIOPROSTHETIC VALVE: ICD-10-CM

## 2019-12-19 DIAGNOSIS — I48.21 PERMANENT ATRIAL FIBRILLATION (HCC): ICD-10-CM

## 2019-12-19 DIAGNOSIS — Z95.3 HISTORY OF MITRAL VALVE REPLACEMENT WITH BIOPROSTHETIC VALVE: ICD-10-CM

## 2019-12-19 DIAGNOSIS — Z79.01 LONG TERM (CURRENT) USE OF ANTICOAGULANTS: ICD-10-CM

## 2019-12-19 LAB — INR PPP: 2.9 (ref 0.9–1.1)

## 2019-12-19 PROCEDURE — 85610 PROTHROMBIN TIME: CPT | Performed by: INTERNAL MEDICINE

## 2019-12-19 PROCEDURE — 36416 COLLJ CAPILLARY BLOOD SPEC: CPT | Performed by: INTERNAL MEDICINE

## 2019-12-23 ENCOUNTER — HOSPITAL ENCOUNTER (OUTPATIENT)
Dept: ONCOLOGY | Facility: HOSPITAL | Age: 64
Setting detail: INFUSION SERIES
Discharge: HOME | End: 2019-12-23

## 2019-12-23 VITALS
TEMPERATURE: 98 F | HEIGHT: 64 IN | HEART RATE: 98 BPM | WEIGHT: 185.7 LBS | BODY MASS INDEX: 31.7 KG/M2 | DIASTOLIC BLOOD PRESSURE: 75 MMHG | RESPIRATION RATE: 18 BRPM | SYSTOLIC BLOOD PRESSURE: 115 MMHG

## 2019-12-23 DIAGNOSIS — D50.8 OTHER IRON DEFICIENCY ANEMIA: ICD-10-CM

## 2019-12-23 DIAGNOSIS — K90.9 INTESTINAL MALABSORPTION, UNSPECIFIED TYPE: Primary | ICD-10-CM

## 2019-12-23 PROCEDURE — 25010000002 FERRIC CARBOXYMALTOSE 750 MG/15ML SOLUTION 15 ML VIAL: Performed by: INTERNAL MEDICINE

## 2019-12-23 PROCEDURE — 96365 THER/PROPH/DIAG IV INF INIT: CPT | Performed by: INTERNAL MEDICINE

## 2019-12-23 RX ORDER — SODIUM CHLORIDE 9 MG/ML
250 INJECTION, SOLUTION INTRAVENOUS ONCE
Status: DISCONTINUED | OUTPATIENT
Start: 2019-12-23 | End: 2019-12-24 | Stop reason: HOSPADM

## 2019-12-23 RX ADMIN — SODIUM CHLORIDE 750 MG: 900 INJECTION, SOLUTION INTRAVENOUS at 10:46

## 2019-12-30 ENCOUNTER — HOSPITAL ENCOUNTER (OUTPATIENT)
Dept: ONCOLOGY | Facility: HOSPITAL | Age: 64
Setting detail: INFUSION SERIES
Discharge: HOME | End: 2019-12-30

## 2019-12-30 VITALS
SYSTOLIC BLOOD PRESSURE: 127 MMHG | TEMPERATURE: 97.4 F | WEIGHT: 187.4 LBS | BODY MASS INDEX: 32.17 KG/M2 | DIASTOLIC BLOOD PRESSURE: 77 MMHG | HEART RATE: 83 BPM | RESPIRATION RATE: 16 BRPM

## 2019-12-30 DIAGNOSIS — K90.9 INTESTINAL MALABSORPTION, UNSPECIFIED TYPE: Primary | ICD-10-CM

## 2019-12-30 DIAGNOSIS — D50.8 OTHER IRON DEFICIENCY ANEMIA: ICD-10-CM

## 2019-12-30 PROCEDURE — 96365 THER/PROPH/DIAG IV INF INIT: CPT | Performed by: INTERNAL MEDICINE

## 2019-12-30 PROCEDURE — 25010000002 FERRIC CARBOXYMALTOSE 750 MG/15ML SOLUTION 15 ML VIAL: Performed by: INTERNAL MEDICINE

## 2019-12-30 RX ORDER — SODIUM CHLORIDE 9 MG/ML
250 INJECTION, SOLUTION INTRAVENOUS ONCE
Status: DISCONTINUED | OUTPATIENT
Start: 2019-12-30 | End: 2019-12-31 | Stop reason: HOSPADM

## 2019-12-30 RX ADMIN — SODIUM CHLORIDE 750 MG: 900 INJECTION, SOLUTION INTRAVENOUS at 13:39

## 2020-01-02 ENCOUNTER — LAB (OUTPATIENT)
Dept: LAB | Facility: HOSPITAL | Age: 65
End: 2020-01-02

## 2020-01-02 ENCOUNTER — TRANSCRIBE ORDERS (OUTPATIENT)
Dept: ADMINISTRATIVE | Facility: HOSPITAL | Age: 65
End: 2020-01-02

## 2020-01-02 DIAGNOSIS — N18.30 CHRONIC KIDNEY DISEASE, STAGE III (MODERATE) (HCC): Primary | ICD-10-CM

## 2020-01-02 DIAGNOSIS — N18.30 CHRONIC KIDNEY DISEASE, STAGE III (MODERATE) (HCC): ICD-10-CM

## 2020-01-02 LAB
ACANTHOCYTES BLD QL SMEAR: ABNORMAL
ANION GAP SERPL CALCULATED.3IONS-SCNC: 13.5 MMOL/L (ref 5–15)
ANISOCYTOSIS BLD QL: ABNORMAL
BASOPHILS # BLD MANUAL: 0.07 10*3/MM3 (ref 0–0.2)
BASOPHILS NFR BLD AUTO: 1 % (ref 0–1.5)
BILIRUB UR QL STRIP: NEGATIVE
BUN BLD-MCNC: 42 MG/DL (ref 8–23)
BUN/CREAT SERPL: 24.9 (ref 7–25)
CALCIUM SPEC-SCNC: 9.2 MG/DL (ref 8.6–10.5)
CHLORIDE SERPL-SCNC: 103 MMOL/L (ref 98–107)
CLARITY UR: CLEAR
CO2 SERPL-SCNC: 25.5 MMOL/L (ref 22–29)
COLOR UR: YELLOW
CREAT BLD-MCNC: 1.69 MG/DL (ref 0.57–1)
DEPRECATED RDW RBC AUTO: 77.4 FL (ref 37–54)
EOSINOPHIL # BLD MANUAL: 0.07 10*3/MM3 (ref 0–0.4)
EOSINOPHIL NFR BLD MANUAL: 1 % (ref 0.3–6.2)
ERYTHROCYTE [DISTWIDTH] IN BLOOD BY AUTOMATED COUNT: 29.6 % (ref 12.3–15.4)
GFR SERPL CREATININE-BSD FRML MDRD: 30 ML/MIN/1.73
GLUCOSE BLD-MCNC: 133 MG/DL (ref 65–99)
GLUCOSE UR STRIP-MCNC: NEGATIVE MG/DL
HCT VFR BLD AUTO: 29.7 % (ref 34–46.6)
HGB BLD-MCNC: 9 G/DL (ref 12–15.9)
HGB UR QL STRIP.AUTO: NEGATIVE
HYPOCHROMIA BLD QL: ABNORMAL
KETONES UR QL STRIP: NEGATIVE
LEUKOCYTE ESTERASE UR QL STRIP.AUTO: ABNORMAL
LYMPHOCYTES # BLD MANUAL: 0.7 10*3/MM3 (ref 0.7–3.1)
LYMPHOCYTES NFR BLD MANUAL: 10.4 % (ref 19.6–45.3)
LYMPHOCYTES NFR BLD MANUAL: 2.1 % (ref 5–12)
MCH RBC QN AUTO: 23.7 PG (ref 26.6–33)
MCHC RBC AUTO-ENTMCNC: 30.3 G/DL (ref 31.5–35.7)
MCV RBC AUTO: 78.2 FL (ref 79–97)
MICROCYTES BLD QL: ABNORMAL
MONOCYTES # BLD AUTO: 0.14 10*3/MM3 (ref 0.1–0.9)
NEUTROPHILS # BLD AUTO: 5.71 10*3/MM3 (ref 1.7–7)
NEUTROPHILS NFR BLD MANUAL: 85.4 % (ref 42.7–76)
NITRITE UR QL STRIP: NEGATIVE
OVALOCYTES BLD QL SMEAR: ABNORMAL
PH UR STRIP.AUTO: 6.5 [PH] (ref 5–8)
PLAT MORPH BLD: NORMAL
PLATELET # BLD AUTO: 253 10*3/MM3 (ref 140–450)
PMV BLD AUTO: 10 FL (ref 6–12)
POIKILOCYTOSIS BLD QL SMEAR: ABNORMAL
POLYCHROMASIA BLD QL SMEAR: ABNORMAL
POTASSIUM BLD-SCNC: 4.4 MMOL/L (ref 3.5–5.2)
PROT UR QL STRIP: NEGATIVE
RBC # BLD AUTO: 3.8 10*6/MM3 (ref 3.77–5.28)
SCHISTOCYTES BLD QL SMEAR: ABNORMAL
SODIUM BLD-SCNC: 142 MMOL/L (ref 136–145)
SP GR UR STRIP: 1.01 (ref 1–1.03)
SPHEROCYTES BLD QL SMEAR: ABNORMAL
UROBILINOGEN UR QL STRIP: ABNORMAL
WBC MORPH BLD: NORMAL
WBC NRBC COR # BLD: 6.69 10*3/MM3 (ref 3.4–10.8)

## 2020-01-02 PROCEDURE — 81003 URINALYSIS AUTO W/O SCOPE: CPT

## 2020-01-02 PROCEDURE — 85025 COMPLETE CBC W/AUTO DIFF WBC: CPT

## 2020-01-02 PROCEDURE — 80048 BASIC METABOLIC PNL TOTAL CA: CPT

## 2020-01-02 PROCEDURE — 36415 COLL VENOUS BLD VENIPUNCTURE: CPT

## 2020-01-02 PROCEDURE — 85007 BL SMEAR W/DIFF WBC COUNT: CPT

## 2020-01-09 ENCOUNTER — OFFICE (OUTPATIENT)
Dept: URBAN - METROPOLITAN AREA CLINIC 64 | Facility: CLINIC | Age: 65
End: 2020-01-09

## 2020-01-09 VITALS
SYSTOLIC BLOOD PRESSURE: 109 MMHG | DIASTOLIC BLOOD PRESSURE: 55 MMHG | HEART RATE: 101 BPM | WEIGHT: 182 LBS | HEIGHT: 64 IN

## 2020-01-09 DIAGNOSIS — Z90.49 ACQUIRED ABSENCE OF OTHER SPECIFIED PARTS OF DIGESTIVE TRACT: ICD-10-CM

## 2020-01-09 DIAGNOSIS — D50.9 IRON DEFICIENCY ANEMIA, UNSPECIFIED: ICD-10-CM

## 2020-01-09 DIAGNOSIS — K90.9 INTESTINAL MALABSORPTION, UNSPECIFIED: ICD-10-CM

## 2020-01-09 DIAGNOSIS — Z79.01 LONG TERM (CURRENT) USE OF ANTICOAGULANTS: ICD-10-CM

## 2020-01-09 DIAGNOSIS — I10 ESSENTIAL (PRIMARY) HYPERTENSION: ICD-10-CM

## 2020-01-09 PROCEDURE — 99213 OFFICE O/P EST LOW 20 MIN: CPT | Performed by: NURSE PRACTITIONER

## 2020-01-20 ENCOUNTER — OFFICE (OUTPATIENT)
Dept: URBAN - METROPOLITAN AREA CLINIC 64 | Facility: CLINIC | Age: 65
End: 2020-01-20

## 2020-01-20 ENCOUNTER — TELEPHONE (OUTPATIENT)
Dept: ONCOLOGY | Facility: CLINIC | Age: 65
End: 2020-01-20

## 2020-01-20 DIAGNOSIS — N18.30 ANEMIA DUE TO STAGE 3 CHRONIC KIDNEY DISEASE (HCC): Primary | ICD-10-CM

## 2020-01-20 DIAGNOSIS — D63.1 ANEMIA DUE TO STAGE 3 CHRONIC KIDNEY DISEASE (HCC): Primary | ICD-10-CM

## 2020-01-20 DIAGNOSIS — D50.0 IRON DEFICIENCY ANEMIA SECONDARY TO BLOOD LOSS (CHRONIC): ICD-10-CM

## 2020-01-20 DIAGNOSIS — D50.8 OTHER IRON DEFICIENCY ANEMIA: ICD-10-CM

## 2020-01-20 DIAGNOSIS — Z98.890 OTHER SPECIFIED POSTPROCEDURAL STATES: ICD-10-CM

## 2020-01-20 PROCEDURE — 91110 GI TRC IMG INTRAL ESOPH-ILE: CPT | Performed by: INTERNAL MEDICINE

## 2020-01-20 NOTE — TELEPHONE ENCOUNTER
[facility] capsule study appointment: 01/20/20 per phone call to [facility] regarding records. msd

## 2020-01-21 ENCOUNTER — APPOINTMENT (OUTPATIENT)
Dept: LAB | Facility: HOSPITAL | Age: 65
End: 2020-01-21

## 2020-01-21 ENCOUNTER — APPOINTMENT (OUTPATIENT)
Dept: ONCOLOGY | Facility: CLINIC | Age: 65
End: 2020-01-21

## 2020-01-21 ENCOUNTER — OFFICE VISIT (OUTPATIENT)
Dept: ONCOLOGY | Facility: CLINIC | Age: 65
End: 2020-01-21

## 2020-01-21 ENCOUNTER — OFFICE VISIT (OUTPATIENT)
Dept: LAB | Facility: HOSPITAL | Age: 65
End: 2020-01-21

## 2020-01-21 VITALS
HEART RATE: 92 BPM | RESPIRATION RATE: 20 BRPM | TEMPERATURE: 97.9 F | BODY MASS INDEX: 31 KG/M2 | HEIGHT: 64 IN | DIASTOLIC BLOOD PRESSURE: 72 MMHG | WEIGHT: 181.6 LBS | SYSTOLIC BLOOD PRESSURE: 115 MMHG

## 2020-01-21 DIAGNOSIS — D63.1 ANEMIA DUE TO STAGE 3 CHRONIC KIDNEY DISEASE (HCC): ICD-10-CM

## 2020-01-21 DIAGNOSIS — D50.8 OTHER IRON DEFICIENCY ANEMIA: ICD-10-CM

## 2020-01-21 DIAGNOSIS — N18.30 ANEMIA DUE TO STAGE 3 CHRONIC KIDNEY DISEASE (HCC): Primary | ICD-10-CM

## 2020-01-21 DIAGNOSIS — D63.1 ANEMIA DUE TO STAGE 3 CHRONIC KIDNEY DISEASE (HCC): Primary | ICD-10-CM

## 2020-01-21 DIAGNOSIS — N18.30 ANEMIA DUE TO STAGE 3 CHRONIC KIDNEY DISEASE (HCC): ICD-10-CM

## 2020-01-21 DIAGNOSIS — D50.9 IRON DEFICIENCY ANEMIA, UNSPECIFIED IRON DEFICIENCY ANEMIA TYPE: ICD-10-CM

## 2020-01-21 LAB
ANION GAP SERPL CALCULATED.3IONS-SCNC: 12 MMOL/L (ref 5–15)
BASOPHILS # BLD AUTO: 0.05 10*3/MM3 (ref 0–0.2)
BASOPHILS NFR BLD AUTO: 0.8 % (ref 0–1.5)
BUN BLD-MCNC: 40 MG/DL (ref 8–23)
BUN/CREAT SERPL: 27.8 (ref 7–25)
CALCIUM SPEC-SCNC: 9.8 MG/DL (ref 8.6–10.5)
CHLORIDE SERPL-SCNC: 101 MMOL/L (ref 98–107)
CO2 SERPL-SCNC: 26 MMOL/L (ref 22–29)
CREAT BLD-MCNC: 1.44 MG/DL (ref 0.57–1)
DEPRECATED RDW RBC AUTO: 84.2 FL (ref 37–54)
EOSINOPHIL # BLD AUTO: 0.39 10*3/MM3 (ref 0–0.4)
EOSINOPHIL NFR BLD AUTO: 6 % (ref 0.3–6.2)
ERYTHROCYTE [DISTWIDTH] IN BLOOD BY AUTOMATED COUNT: 29.2 % (ref 12.3–15.4)
FERRITIN SERPL-MCNC: 691.8 NG/ML (ref 13–150)
GFR SERPL CREATININE-BSD FRML MDRD: 37 ML/MIN/1.73
GLUCOSE BLD-MCNC: 161 MG/DL (ref 65–99)
HCT VFR BLD AUTO: 31.5 % (ref 34–46.6)
HGB BLD-MCNC: 9.8 G/DL (ref 12–15.9)
IRON 24H UR-MRATE: 47 MCG/DL (ref 37–145)
IRON SATN MFR SERPL: 13 % (ref 20–50)
LYMPHOCYTES # BLD AUTO: 0.72 10*3/MM3 (ref 0.7–3.1)
LYMPHOCYTES NFR BLD AUTO: 11.1 % (ref 19.6–45.3)
MCH RBC QN AUTO: 27.1 PG (ref 26.6–33)
MCHC RBC AUTO-ENTMCNC: 31.1 G/DL (ref 31.5–35.7)
MCV RBC AUTO: 87 FL (ref 79–97)
MONOCYTES # BLD AUTO: 0.59 10*3/MM3 (ref 0.1–0.9)
MONOCYTES NFR BLD AUTO: 9.1 % (ref 5–12)
NEUTROPHILS # BLD AUTO: 4.71 10*3/MM3 (ref 1.7–7)
NEUTROPHILS NFR BLD AUTO: 73 % (ref 42.7–76)
PLATELET # BLD AUTO: 262 10*3/MM3 (ref 140–450)
PMV BLD AUTO: 8.4 FL (ref 6–12)
POTASSIUM BLD-SCNC: 4.7 MMOL/L (ref 3.5–5.2)
RBC # BLD AUTO: 3.62 10*6/MM3 (ref 3.77–5.28)
SODIUM BLD-SCNC: 139 MMOL/L (ref 136–145)
TIBC SERPL-MCNC: 371 MCG/DL (ref 298–536)
TRANSFERRIN SERPL-MCNC: 249 MG/DL (ref 200–360)
WBC NRBC COR # BLD: 6.46 10*3/MM3 (ref 3.4–10.8)

## 2020-01-21 PROCEDURE — 84466 ASSAY OF TRANSFERRIN: CPT | Performed by: NURSE PRACTITIONER

## 2020-01-21 PROCEDURE — 99215 OFFICE O/P EST HI 40 MIN: CPT | Performed by: INTERNAL MEDICINE

## 2020-01-21 PROCEDURE — 83540 ASSAY OF IRON: CPT | Performed by: NURSE PRACTITIONER

## 2020-01-21 PROCEDURE — 80048 BASIC METABOLIC PNL TOTAL CA: CPT | Performed by: NURSE PRACTITIONER

## 2020-01-21 PROCEDURE — 85025 COMPLETE CBC W/AUTO DIFF WBC: CPT

## 2020-01-21 PROCEDURE — 82728 ASSAY OF FERRITIN: CPT | Performed by: NURSE PRACTITIONER

## 2020-01-21 PROCEDURE — 36415 COLL VENOUS BLD VENIPUNCTURE: CPT

## 2020-01-21 NOTE — PROGRESS NOTES
Hematology/Oncology Outpatient Follow Up    Jocelin Parra  1955    Primary Care Physician: Emilie Cruz APRN  Referring Physician: Emilie Cruz APRN  Chief Complaint:  Anemia  Chronic renal failure stage II  History of Present Illness:   · I initially saw Ms. De Los Santos in consultation at [facility] when she was admitted through the emergency room on 12/3/2019 with severe anemia.  Patient required blood transfusion.  Patient has chronic history of kidney disease.    · CBC in the ED showed WBC 3.5, hemoglobin 6.1, MCV 63.4, RDW 18.6, and platelets 339,000. She received 1 unit pRBC.  Creatinine was 1.57.   · Patient is on chronic Coumadin for mechanical cardiac valve INR was 2.74 on admission on 12/3/2019    · Posttransfusion iron studies showed iron 24 and ferritin 21.2.  Vitamin B12 was 947 and folate was 18.6.    · 12/4/2019 SPEP negative for monoclonal gammopathy.  Serum creatinine 1.7 with EGFR of 30  · 12/5/2019 patient underwent colonoscopy and EGD at [facility]- No evidence of any active source of bleeding noticed in the upper at the lower GI tract except diverticulosis and hemorrhoids.  Possibility of a small bowel AVM or lesion leading to blood loss anemia is in the differential however patient denies any overt GI bleeding.  May consider small bowel evaluation with a PillCam if needed.  · 12/23/2019 and 12/30/2019 patient received 2 Injectafer infusions    Past Medical History:   Diagnosis Date   • A-fib (CMS/HCC) Dx 2018    S/p Cardioversion by Dr. Spencer on 04/10/19   • Acute renal failure syndrome (CMS/HCC) 4/2019-[facility] IP   • Biatrial enlargement 04/10/2019    Noted on SHERIE   • Calcified granuloma of lung (CMS/HCC) 01/04/2019    Noted on Chest XR   • Cardiomegaly 01/04/2019    Borderline--Noted on Chest XR   • CHF (congestive heart failure) (CMS/HCC)    • CKD (chronic kidney disease), stage III (CMS/HCC)     Does Not See a Nephrologist   • Diabetes mellitus (CMS/HCC)    • DM (diabetes mellitus) (CMS/HCC)     T2    • Dysphagia 08/15-[facility] IP    Due to Pharyngitis   • GERD (gastroesophageal reflux disease)    • History of chest x-ray 8/28/15-[facility]    Normal   • History of chest x-ray 01/19/2019    Mild Pulmonary Edema w/Congestive Failure Noted   • History of chest x-ray 01/04/2019    Borderline Cardiomegaly    • History of EKG 2014/2018/2019-[facility]   • Hx of diabetic neuropathy    • Hx of dizziness     w/Lightheadedness   • Hx of echocardiogram 4/16/18-[facility]    EF 55-60%; Mild MVR; Mild TVR; Normal Root/No Effusion   • Hyperlipidemia     Controlled w/Meds   • Hypertension     Controlled w/Meds   • Hypokalemia 4/11/19-[facility] IP   • Hypothyroidism     Controlled w/Meds   • Iron deficiency anemia    • Left posterior fascicular block 04/2018 & 4/19    Noted on EKG   • Lower extremity edema 03/2019   • Mild mitral valve regurgitation 04/16/2018    Noted on Echo   • Mild tricuspid valve regurgitation 04/16/2018    Noted on Echo   • Moderate mitral valve regurgitation 2008    S/p MVR in 08'   • Moderate tricuspid insufficiency 04/10/2019    Noted on SHERIE   • Osteoarthritis     Knees w/Hx Knee Repl   • Pulmonary edema 01/19/2019    Mild--Noted on Chest XR   • Rapid palpitations 04/15/18 & 8/9/18-[facility] ER   • Renal dysfunction    • Right axis deviation 08/2018 & 4/19    Noted on EKG   • Severe aortic valve stenosis Since 2008    Hx AVR on 12/11/08 by Dr. Alvarado   • Severe mitral insufficiency 04/10/2019    Noted on SHERIE   • Sinus tachycardia 08/2018    Noted on EKG   • SOB (shortness of breath) chronic   • Torticollis Chronic   • Valvular heart disease     S/p AVR & MVR in 08'       Past Surgical History:   Procedure Laterality Date   • AORTIC VALVE REPAIR/REPLACEMENT  12/11/08-[facility]    Using a #20 Eight Yr Mechanical [place] Prosthesis--Dewey Alvarado MD   • CARDIOVERSION  4/10/19-[facility]    Dr. Spencer--For A-Fib   • COLONOSCOPY N/A 12/5/2019    Procedure: COLONOSCOPY;  Surgeon: Dustin Castellanos MD;  Location: [facility] ENDOSCOPY;  Service: Gastroenterology    • ENDOSCOPY N/A 12/5/2019    Procedure: ESOPHAGOGASTRODUODENOSCOPY;  Surgeon: Dustin Castellanos MD;  Location: [facility] ENDOSCOPY;  Service: Gastroenterology   • HYSTERECTOMY     • MITRAL VALVE REPLACEMENT  2008   • MITRAL VALVE REPLACEMENT  05/09/2019    Dr Alvarado   • SHERIE  4/10/19-[facility]    EF 40%; Moderate TVR; Severe Eccentric MI; Biatrial Enlargement   • TOTAL KNEE ARTHROPLASTY  2017   • TRICUSPID VALVE SURGERY  05/09/2019    Dr Alvarado         Current Outpatient Medications:   •  aspirin 81 MG tablet, Take 1 tablet by mouth daily., Disp: , Rfl:   •  atorvastatin (LIPITOR) 40 MG tablet, Take 40 mg by mouth Daily., Disp: , Rfl:   •  B Complex Vitamins (VITAMIN B COMPLEX) tablet, Take by mouth., Disp: , Rfl:   •  cholecalciferol (VITAMIN D3) 10 MCG (400 UNIT) tablet, Take 2,000 Units by mouth Daily., Disp: , Rfl:   •  ezetimibe (ZETIA) 10 MG tablet, Take 1 tablet by mouth daily., Disp: , Rfl:   •  furosemide (LASIX) 40 MG tablet, Take 40 mg by mouth Daily., Disp: , Rfl:   •  levothyroxine (SYNTHROID, LEVOTHROID) 112 MCG tablet, Take 112 mcg by mouth Daily., Disp: , Rfl:   •  Liraglutide (VICTOZA) 18 MG/3ML solution pen-injector, Inject 1.8 mg under the skin into the appropriate area as directed Daily., Disp: , Rfl:   •  metFORMIN (GLUCOPHAGE) 500 MG tablet, Take 1 tablet by mouth Every Evening., Disp: , Rfl:   •  metoprolol succinate XL (TOPROL-XL) 25 MG 24 hr tablet, Take 12.5 mg by mouth Daily., Disp: , Rfl:   •  Multiple Vitamin (MULTI-VITAMIN) tablet, Take 1 tablet by mouth daily., Disp: , Rfl:   •  omeprazole (PriLOSEC) 20 MG capsule, Take 1 capsule by mouth daily., Disp: , Rfl:   •  potassium chloride (K-DUR,KLOR-CON) 20 MEQ CR tablet, Take 20 mEq by mouth Daily., Disp: , Rfl:   •  valsartan (DIOVAN) 80 MG tablet, Take 1 tablet by mouth Daily. PT TAKES 40MG DAILY , Disp: , Rfl:   •  vitamin C (ASCORBIC ACID) 500 MG tablet, Take 500 mg by mouth Daily., Disp: , Rfl:   •  warfarin (COUMADIN) 5 MG tablet, Take 5 mg by  mouth 3 (Three) Times a Week. Mon, Wed, Fri, Disp: , Rfl:   •  warfarin (COUMADIN) 7.5 MG tablet, Take 7.5 mg by mouth 4 (Four) Times a Week. Tues, Thur, Sat, Sun, Disp: , Rfl:     No Known Allergies    Family History   Problem Relation Age of Onset   • Heart disease Father    • Hypertension Father    • Stroke Father    • Diabetes Father    • Lung cancer Mother        Cancer-related family history includes Lung cancer in her mother.    Social History     Tobacco Use   • Smoking status: Never Smoker   • Smokeless tobacco: Never Used   Substance Use Topics   • Alcohol use: No   • Drug use: No       I have reviewed the history of present illness, past medical history, family history, social history, lab results, all notes and other records since the patient was last seen cancer care center.    SUBJECTIVE:      Patient is my office for follow-up of her anemia.  Tolerated Injectafer well.  Denies any visible blood loss she is back on the warfarin.  No black stools.  Reports energy level is slightly improved had a small bowel study yesterday    ROS:      Review of Systems   Constitutional: Negative for fever.   HENT: Negative for nosebleeds and trouble swallowing.    Eyes: Negative for visual disturbance.   Respiratory: Negative for cough, shortness of breath and wheezing.    Cardiovascular: Negative for chest pain.   Gastrointestinal: Negative for abdominal pain and blood in stool.   Endocrine: Negative for cold intolerance.   Genitourinary: Negative for dysuria and hematuria.   Musculoskeletal: Negative for joint swelling.   Skin: Negative for rash.   Allergic/Immunologic: Negative for environmental allergies.   Neurological: Negative for seizures.   Hematological: Does not bruise/bleed easily.   Psychiatric/Behavioral: The patient is not nervous/anxious.          Objective:       Vitals:    01/21/20 1039   BP: 115/72   Pulse: 92   Resp: 20   Temp: 97.9 °F (36.6 °C)   Weight: 82.4 kg (181 lb 9.6 oz)   Height: 162.6 cm  "(64\")         PHYSICAL EXAM:      Physical Exam   Constitutional: She is oriented to person, place, and time. No distress.   HENT:   Head: Normocephalic and atraumatic.   Torticollis   Eyes: Conjunctivae and EOM are normal. Right eye exhibits no discharge. Left eye exhibits no discharge. No scleral icterus.   Neck: Normal range of motion. Neck supple. No thyromegaly present.   Cardiovascular: Normal rate, regular rhythm and normal heart sounds. Exam reveals no gallop and no friction rub.   Crisp S1-S2   Pulmonary/Chest: Effort normal. No stridor. No respiratory distress. She has no wheezes.   Abdominal: Soft. Bowel sounds are normal. She exhibits no mass. There is no tenderness. There is no rebound and no guarding.   Musculoskeletal: Normal range of motion. She exhibits no tenderness.   1+ bilateral lower extremity edema   Lymphadenopathy:     She has no cervical adenopathy.   Neurological: She is alert and oriented to person, place, and time. She exhibits normal muscle tone.   Skin: Skin is warm. No rash noted. She is not diaphoretic. No erythema.   Psychiatric: She has a normal mood and affect. Her behavior is normal.   Nursing note and vitals reviewed.       RECENT LABS:     WBC   Date Value Ref Range Status   01/21/2020 6.46 3.40 - 10.80 10*3/mm3 Final     RBC   Date Value Ref Range Status   01/21/2020 3.62 (L) 3.77 - 5.28 10*6/mm3 Final     Hemoglobin   Date Value Ref Range Status   01/21/2020 9.8 (L) 12.0 - 15.9 g/dL Final     Hematocrit   Date Value Ref Range Status   01/21/2020 31.5 (L) 34.0 - 46.6 % Final     MCV   Date Value Ref Range Status   01/21/2020 87.0 79.0 - 97.0 fL Final     MCH   Date Value Ref Range Status   01/21/2020 27.1 26.6 - 33.0 pg Final     MCHC   Date Value Ref Range Status   01/21/2020 31.1 (L) 31.5 - 35.7 g/dL Final     RDW   Date Value Ref Range Status   01/21/2020 29.2 (H) 12.3 - 15.4 % Final     RDW-SD   Date Value Ref Range Status   01/21/2020 84.2 (H) 37.0 - 54.0 fl Final     MPV "   Date Value Ref Range Status   01/21/2020 8.4 6.0 - 12.0 fL Final     Platelets   Date Value Ref Range Status   01/21/2020 262 140 - 450 10*3/mm3 Final     Neutrophil %   Date Value Ref Range Status   01/21/2020 73.0 42.7 - 76.0 % Final     Lymphocyte %   Date Value Ref Range Status   01/21/2020 11.1 (L) 19.6 - 45.3 % Final     Monocyte %   Date Value Ref Range Status   01/21/2020 9.1 5.0 - 12.0 % Final     Eosinophil %   Date Value Ref Range Status   01/21/2020 6.0 0.3 - 6.2 % Final     Basophil %   Date Value Ref Range Status   01/21/2020 0.8 0.0 - 1.5 % Final     Neutrophils, Absolute   Date Value Ref Range Status   01/21/2020 4.71 1.70 - 7.00 10*3/mm3 Final     Lymphocytes, Absolute   Date Value Ref Range Status   01/21/2020 0.72 0.70 - 3.10 10*3/mm3 Final     Monocytes, Absolute   Date Value Ref Range Status   01/21/2020 0.59 0.10 - 0.90 10*3/mm3 Final     Eosinophils, Absolute   Date Value Ref Range Status   01/21/2020 0.39 0.00 - 0.40 10*3/mm3 Final     Basophils, Absolute   Date Value Ref Range Status   01/21/2020 0.05 0.00 - 0.20 10*3/mm3 Final     nRBC   Date Value Ref Range Status   12/06/2019 1.0 (H) 0.0 - 0.2 /100 WBC Final   12/05/2019 0.1 0.0 - 0.2 /100 WBC Final       Lab Results   Component Value Date    GLUCOSE 133 (H) 01/02/2020    BUN 42 (H) 01/02/2020    CREATININE 1.69 (H) 01/02/2020    EGFRIFNONA 30 (L) 01/02/2020    BCR 24.9 01/02/2020    K 4.4 01/02/2020    CO2 25.5 01/02/2020    CALCIUM 9.2 01/02/2020    PROTENTOTREF 6.0 12/04/2019    ALBUMIN 3.50 12/05/2019    LABIL2 1.1 12/04/2019    AST 26 12/05/2019    ALT 18 12/05/2019         Assessment/Plan      ASSESSMENT:     1. Microcytic/iron deficiency anemia.  2. Status post mechanical cardiac aortic valve replacement  3. Chronic kidney disease stage III  4. ECOG 1    PLAN:      1. Reviewed the CBC results with the patient she has microcytic anemia globin mildly improved but still low at 9.8.  Encouraged the patient to take 1 iron tablet by  mouth every day.  She can buy it over-the-counter.    2. I will obtain small bowel study that was obtained at [facility]   3. iron deficiency anemia could be related to mechanical aortic valve.  4. Get the report GSI for capsule study.  5. And MCV normalizes if the patient continues to be anemic we will start her on Procrit injections for her chronic kidney disease stage III.  Check erythropoietin level patient may benefit from Procrit injections.  6. I will see her back in my office in 5 weeks recheck CBC at that time    I have reviewed labs results, imaging, vitals, and medications with the patient today.     Patient verbalized understanding and is in agreement of the above plan.          This report was compiled using Dragon voice recognition software. I have made every effort to proof read this document; however, typographical errors may persist.

## 2020-01-22 LAB — ETHNIC BACKGROUND STATED: 37.4 MIU/ML (ref 2.6–18.5)

## 2020-01-23 ENCOUNTER — ANTICOAGULATION VISIT (OUTPATIENT)
Dept: CARDIOLOGY | Facility: CLINIC | Age: 65
End: 2020-01-23

## 2020-01-23 VITALS
DIASTOLIC BLOOD PRESSURE: 70 MMHG | HEART RATE: 72 BPM | SYSTOLIC BLOOD PRESSURE: 114 MMHG | BODY MASS INDEX: 31.24 KG/M2 | WEIGHT: 182 LBS

## 2020-01-23 DIAGNOSIS — Z95.3 HISTORY OF MITRAL VALVE REPLACEMENT WITH BIOPROSTHETIC VALVE: ICD-10-CM

## 2020-01-23 DIAGNOSIS — Z79.01 LONG TERM (CURRENT) USE OF ANTICOAGULANTS: ICD-10-CM

## 2020-01-23 DIAGNOSIS — Z95.3 HISTORY OF AORTIC VALVE REPLACEMENT WITH BIOPROSTHETIC VALVE: ICD-10-CM

## 2020-01-23 DIAGNOSIS — I48.21 PERMANENT ATRIAL FIBRILLATION (HCC): ICD-10-CM

## 2020-01-23 LAB — INR PPP: 2.3 (ref 0.9–1.1)

## 2020-01-23 PROCEDURE — 85610 PROTHROMBIN TIME: CPT | Performed by: INTERNAL MEDICINE

## 2020-01-23 PROCEDURE — 36416 COLLJ CAPILLARY BLOOD SPEC: CPT | Performed by: INTERNAL MEDICINE

## 2020-01-29 RX ORDER — WARFARIN SODIUM 5 MG/1
TABLET ORAL
Qty: 36 TABLET | Refills: 1 | Status: SHIPPED | OUTPATIENT
Start: 2020-01-29 | End: 2020-02-02

## 2020-02-02 ENCOUNTER — APPOINTMENT (OUTPATIENT)
Dept: GENERAL RADIOLOGY | Facility: HOSPITAL | Age: 65
End: 2020-02-02

## 2020-02-02 ENCOUNTER — HOSPITAL ENCOUNTER (OUTPATIENT)
Facility: HOSPITAL | Age: 65
Discharge: HOME | End: 2020-02-04
Attending: EMERGENCY MEDICINE | Admitting: HOSPITALIST

## 2020-02-02 DIAGNOSIS — R06.00 DYSPNEA, UNSPECIFIED TYPE: ICD-10-CM

## 2020-02-02 DIAGNOSIS — D64.9 ANEMIA, UNSPECIFIED TYPE: Primary | ICD-10-CM

## 2020-02-02 DIAGNOSIS — D50.0 IRON DEFICIENCY ANEMIA DUE TO CHRONIC BLOOD LOSS: ICD-10-CM

## 2020-02-02 LAB
ABO GROUP BLD: NORMAL
ANION GAP SERPL CALCULATED.3IONS-SCNC: 12 MMOL/L (ref 5–15)
ANISOCYTOSIS BLD QL: NORMAL
BASO STIPL COARSE BLD QL SMEAR: NORMAL
BASOPHILS # BLD AUTO: 0.1 10*3/MM3 (ref 0–0.2)
BASOPHILS NFR BLD AUTO: 1.3 % (ref 0–1.5)
BLD GP AB SCN SERPL QL: NEGATIVE
BUN BLD-MCNC: 75 MG/DL (ref 8–23)
BUN/CREAT SERPL: 47.8 (ref 7–25)
C3 FRG RBC-MCNC: NORMAL
CALCIUM SPEC-SCNC: 9.2 MG/DL (ref 8.6–10.5)
CHLORIDE SERPL-SCNC: 102 MMOL/L (ref 98–107)
CO2 SERPL-SCNC: 24 MMOL/L (ref 22–29)
CREAT BLD-MCNC: 1.57 MG/DL (ref 0.57–1)
D DIMER PPP FEU-MCNC: 0.2 MCGFEU/ML (ref 0.17–0.59)
DEPRECATED RDW RBC AUTO: 88.8 FL (ref 37–54)
ELLIPTOCYTES BLD QL SMEAR: NORMAL
EOSINOPHIL # BLD AUTO: 0.2 10*3/MM3 (ref 0–0.4)
EOSINOPHIL NFR BLD AUTO: 2.3 % (ref 0.3–6.2)
ERYTHROCYTE [DISTWIDTH] IN BLOOD BY AUTOMATED COUNT: 29.5 % (ref 12.3–15.4)
GFR SERPL CREATININE-BSD FRML MDRD: 33 ML/MIN/1.73
GLUCOSE BLD-MCNC: 197 MG/DL (ref 65–99)
GLUCOSE BLDC GLUCOMTR-MCNC: 226 MG/DL (ref 70–105)
HCT VFR BLD AUTO: 16.4 % (ref 34–46.6)
HGB BLD-MCNC: 5.6 G/DL (ref 12–15.9)
INR PPP: 3 (ref 2–3)
LYMPHOCYTES # BLD AUTO: 0.8 10*3/MM3 (ref 0.7–3.1)
LYMPHOCYTES NFR BLD AUTO: 8.6 % (ref 19.6–45.3)
MCH RBC QN AUTO: 31.2 PG (ref 26.6–33)
MCHC RBC AUTO-ENTMCNC: 34.3 G/DL (ref 31.5–35.7)
MCV RBC AUTO: 91.2 FL (ref 79–97)
MONOCYTES # BLD AUTO: 0.7 10*3/MM3 (ref 0.1–0.9)
MONOCYTES NFR BLD AUTO: 7.8 % (ref 5–12)
NEUTROPHILS # BLD AUTO: 7.2 10*3/MM3 (ref 1.7–7)
NEUTROPHILS NFR BLD AUTO: 80 % (ref 42.7–76)
NRBC BLD AUTO-RTO: 0.1 /100 WBC (ref 0–0.2)
NT-PROBNP SERPL-MCNC: 618.1 PG/ML (ref 5–900)
PLAT MORPH BLD: NORMAL
PLATELET # BLD AUTO: 262 10*3/MM3 (ref 140–450)
PMV BLD AUTO: 7.7 FL (ref 6–12)
POLYCHROMASIA BLD QL SMEAR: NORMAL
POTASSIUM BLD-SCNC: 4.5 MMOL/L (ref 3.5–5.2)
PROTHROMBIN TIME: 28.3 SECONDS (ref 19.4–28.5)
RBC # BLD AUTO: 1.8 10*6/MM3 (ref 3.77–5.28)
RH BLD: POSITIVE
SODIUM BLD-SCNC: 138 MMOL/L (ref 136–145)
T&S EXPIRATION DATE: NORMAL
TROPONIN T SERPL-MCNC: <0.01 NG/ML (ref 0–0.03)
WBC MORPH BLD: NORMAL
WBC NRBC COR # BLD: 9 10*3/MM3 (ref 3.4–10.8)

## 2020-02-02 PROCEDURE — G0378 HOSPITAL OBSERVATION PER HR: HCPCS

## 2020-02-02 PROCEDURE — 86923 COMPATIBILITY TEST ELECTRIC: CPT

## 2020-02-02 PROCEDURE — 93005 ELECTROCARDIOGRAM TRACING: CPT | Performed by: EMERGENCY MEDICINE

## 2020-02-02 PROCEDURE — P9016 RBC LEUKOCYTES REDUCED: HCPCS

## 2020-02-02 PROCEDURE — 99284 EMERGENCY DEPT VISIT MOD MDM: CPT

## 2020-02-02 PROCEDURE — 85007 BL SMEAR W/DIFF WBC COUNT: CPT | Performed by: EMERGENCY MEDICINE

## 2020-02-02 PROCEDURE — 86901 BLOOD TYPING SEROLOGIC RH(D): CPT | Performed by: EMERGENCY MEDICINE

## 2020-02-02 PROCEDURE — 80048 BASIC METABOLIC PNL TOTAL CA: CPT | Performed by: EMERGENCY MEDICINE

## 2020-02-02 PROCEDURE — 99220 PR INITIAL OBSERVATION CARE/DAY 70 MINUTES: CPT | Performed by: HOSPITALIST

## 2020-02-02 PROCEDURE — 71045 X-RAY EXAM CHEST 1 VIEW: CPT

## 2020-02-02 PROCEDURE — 63710000001 DIPHENHYDRAMINE PER 50 MG: Performed by: HOSPITALIST

## 2020-02-02 PROCEDURE — 36430 TRANSFUSION BLD/BLD COMPNT: CPT

## 2020-02-02 PROCEDURE — 83880 ASSAY OF NATRIURETIC PEPTIDE: CPT | Performed by: EMERGENCY MEDICINE

## 2020-02-02 PROCEDURE — 84484 ASSAY OF TROPONIN QUANT: CPT | Performed by: EMERGENCY MEDICINE

## 2020-02-02 PROCEDURE — 86900 BLOOD TYPING SEROLOGIC ABO: CPT

## 2020-02-02 PROCEDURE — 86850 RBC ANTIBODY SCREEN: CPT | Performed by: EMERGENCY MEDICINE

## 2020-02-02 PROCEDURE — 84439 ASSAY OF FREE THYROXINE: CPT | Performed by: HOSPITALIST

## 2020-02-02 PROCEDURE — 82962 GLUCOSE BLOOD TEST: CPT

## 2020-02-02 PROCEDURE — 85379 FIBRIN DEGRADATION QUANT: CPT | Performed by: EMERGENCY MEDICINE

## 2020-02-02 PROCEDURE — 85025 COMPLETE CBC W/AUTO DIFF WBC: CPT | Performed by: EMERGENCY MEDICINE

## 2020-02-02 PROCEDURE — 85610 PROTHROMBIN TIME: CPT | Performed by: EMERGENCY MEDICINE

## 2020-02-02 PROCEDURE — 86900 BLOOD TYPING SEROLOGIC ABO: CPT | Performed by: EMERGENCY MEDICINE

## 2020-02-02 PROCEDURE — 96365 THER/PROPH/DIAG IV INF INIT: CPT

## 2020-02-02 RX ORDER — WARFARIN SODIUM 5 MG/1
5 TABLET ORAL 3 TIMES WEEKLY
COMMUNITY
End: 2020-04-07

## 2020-02-02 RX ORDER — METOPROLOL SUCCINATE 25 MG/1
12.5 TABLET, EXTENDED RELEASE ORAL DAILY
Status: DISCONTINUED | OUTPATIENT
Start: 2020-02-02 | End: 2020-02-04 | Stop reason: HOSPADM

## 2020-02-02 RX ORDER — INSULIN DEGLUDEC 100 U/ML
10 INJECTION, SOLUTION SUBCUTANEOUS
COMMUNITY

## 2020-02-02 RX ORDER — INSULIN GLARGINE 100 [IU]/ML
15 INJECTION, SOLUTION SUBCUTANEOUS EVERY MORNING
Status: DISCONTINUED | OUTPATIENT
Start: 2020-02-03 | End: 2020-02-04 | Stop reason: HOSPADM

## 2020-02-02 RX ORDER — LEVOTHYROXINE SODIUM 112 UG/1
112 TABLET ORAL
Status: DISCONTINUED | OUTPATIENT
Start: 2020-02-03 | End: 2020-02-04 | Stop reason: HOSPADM

## 2020-02-02 RX ORDER — LOSARTAN POTASSIUM 25 MG/1
25 TABLET ORAL
Status: DISCONTINUED | OUTPATIENT
Start: 2020-02-02 | End: 2020-02-04 | Stop reason: HOSPADM

## 2020-02-02 RX ORDER — ASCORBIC ACID 500 MG
500 TABLET ORAL DAILY
Status: DISCONTINUED | OUTPATIENT
Start: 2020-02-02 | End: 2020-02-04 | Stop reason: HOSPADM

## 2020-02-02 RX ORDER — FERROUS SULFATE 325(65) MG
325 TABLET ORAL 2 TIMES DAILY
COMMUNITY

## 2020-02-02 RX ORDER — POTASSIUM CHLORIDE 20 MEQ/1
20 TABLET, EXTENDED RELEASE ORAL DAILY
Status: DISCONTINUED | OUTPATIENT
Start: 2020-02-03 | End: 2020-02-04 | Stop reason: HOSPADM

## 2020-02-02 RX ORDER — METFORMIN HYDROCHLORIDE 500 MG/1
500 TABLET, EXTENDED RELEASE ORAL DAILY
COMMUNITY
End: 2021-07-28

## 2020-02-02 RX ORDER — SODIUM CHLORIDE 0.9 % (FLUSH) 0.9 %
10 SYRINGE (ML) INJECTION AS NEEDED
Status: DISCONTINUED | OUTPATIENT
Start: 2020-02-02 | End: 2020-02-04 | Stop reason: HOSPADM

## 2020-02-02 RX ORDER — DIPHENHYDRAMINE HCL 25 MG
25 TABLET ORAL ONCE
Status: COMPLETED | OUTPATIENT
Start: 2020-02-02 | End: 2020-02-02

## 2020-02-02 RX ORDER — ACETAMINOPHEN 325 MG/1
650 TABLET ORAL ONCE
Status: COMPLETED | OUTPATIENT
Start: 2020-02-02 | End: 2020-02-02

## 2020-02-02 RX ORDER — FUROSEMIDE 40 MG/1
40 TABLET ORAL DAILY
Status: DISCONTINUED | OUTPATIENT
Start: 2020-02-02 | End: 2020-02-04 | Stop reason: HOSPADM

## 2020-02-02 RX ORDER — ATORVASTATIN CALCIUM 40 MG/1
40 TABLET, FILM COATED ORAL NIGHTLY
Status: DISCONTINUED | OUTPATIENT
Start: 2020-02-02 | End: 2020-02-04 | Stop reason: HOSPADM

## 2020-02-02 RX ADMIN — METOPROLOL SUCCINATE 12.5 MG: 25 TABLET, EXTENDED RELEASE ORAL at 17:14

## 2020-02-02 RX ADMIN — DIPHENHYDRAMINE HCL 25 MG: 25 TABLET ORAL at 17:14

## 2020-02-02 RX ADMIN — ATORVASTATIN CALCIUM 40 MG: 40 TABLET, FILM COATED ORAL at 22:29

## 2020-02-02 RX ADMIN — PHYTONADIONE 5 MG: 10 INJECTION, EMULSION INTRAMUSCULAR; INTRAVENOUS; SUBCUTANEOUS at 22:28

## 2020-02-02 RX ADMIN — Medication 10 ML: at 22:29

## 2020-02-02 RX ADMIN — OXYCODONE HYDROCHLORIDE AND ACETAMINOPHEN 500 MG: 500 TABLET ORAL at 17:14

## 2020-02-02 RX ADMIN — FUROSEMIDE 40 MG: 40 TABLET ORAL at 17:14

## 2020-02-02 RX ADMIN — ACETAMINOPHEN 650 MG: 325 TABLET, FILM COATED ORAL at 17:14

## 2020-02-02 NOTE — PLAN OF CARE
Patient arrived on unit from the ED w/ 1 UPRBC's infusing, pt is A&Ox3, pleasnt, v/s stable w/ low grade fever, call light in reach, RN will cont to monitor.     Patient now receiving 2 UPRBC's, premedicated prior to 2nd unit, slightly hypotensive, RN will cont to monitor.

## 2020-02-02 NOTE — H&P
[facility] Medicine Services      Patient Name: Jocelin Parra  : 1955  MRN: [id number]  Primary Care Physician: Emilie Cruz APRN  Date of admission: 2020    Patient Care Team:  Emilie Cruz APRN as PCP - Roldan Garcia MD as Consulting Physician (Cardiology)  Ney Lai MD as Consulting Physician (Nephrology)          Subjective   History Present Illness   Feeling weak.     Chief Complaint:   Chief Complaint   Patient presents with   • Shortness of Breath       HPI.    Ms. Parra is a 65 y.o.  presents to [facility] complaint of feeling weak for last few days for last few days.  Patient denies for any abdominal pain, denies for any melena.  Underwent work-up in ER, which revealed hemoglobin on low side less than 6.  Patient given a history of recent admission with low hemoglobin, patient underwent extensive work-up which included EGD and colonoscopy, revealing diverticulosis with hemorrhoids, no active bleeding was noted.  Patient did require transfusion and anemia work-up at that time.  Patient was seen by GI and hematology service at that time.  Patient in the process of getting a transfusion, following this we were asked to admit patient for the further care.         History of Present Illness    Review of Systems   Constitution: Negative.        Positive for weakness.    HENT: Negative.    Eyes: Negative.    Cardiovascular: Negative.    Respiratory: Negative.    Endocrine: Negative.    Hematologic/Lymphatic: Negative.    Skin: Negative.    Musculoskeletal: Negative.    Gastrointestinal: Negative.    Genitourinary: Negative.    Neurological: Negative.    Psychiatric/Behavioral: Negative.    Allergic/Immunologic: Negative.    All other systems reviewed and are negative.          Personal History     Past Medical History:   Past Medical History:   Diagnosis Date   • A-fib (CMS/HCC) Dx 2018    S/p Cardioversion by Dr. Spencer on  04/10/19   • Acute renal failure syndrome (CMS/HCC) 4/2019-[facility] IP   • Biatrial enlargement 04/10/2019    Noted on SHERIE   • Calcified granuloma of lung (CMS/Formerly [facility]) 01/04/2019    Noted on Chest XR   • Cardiomegaly 01/04/2019    Borderline--Noted on Chest XR   • CHF (congestive heart failure) (CMS/Formerly [facility])    • CKD (chronic kidney disease), stage III (CMS/Formerly [facility])     Does Not See a Nephrologist   • Diabetes mellitus (CMS/[facility]ly [facility])    • DM (diabetes mellitus) (CMS/Formerly [facility])     T2   • Dysphagia 08/15-[facility] IP    Due to Pharyngitis   • GERD (gastroesophageal reflux disease)    • History of chest x-ray 8/28/15-[facility]    Normal   • History of chest x-ray 01/19/2019    Mild Pulmonary Edema w/Congestive Failure Noted   • History of chest x-ray 01/04/2019    Borderline Cardiomegaly    • History of EKG 2014/2018/2019-[facility]   • Hx of diabetic neuropathy    • Hx of dizziness     w/Lightheadedness   • Hx of echocardiogram 4/16/18-[facility]    EF 55-60%; Mild MVR; Mild TVR; Normal Root/No Effusion   • Hyperlipidemia     Controlled w/Meds   • Hypertension     Controlled w/Meds   • Hypokalemia 4/11/19-[facility] IP   • Hypothyroidism     Controlled w/Meds   • Iron deficiency anemia    • Left posterior fascicular block 04/2018 & 4/19    Noted on EKG   • Lower extremity edema 03/2019   • Mild mitral valve regurgitation 04/16/2018    Noted on Echo   • Mild tricuspid valve regurgitation 04/16/2018    Noted on Echo   • Moderate mitral valve regurgitation 2008    S/p MVR in 08'   • Moderate tricuspid insufficiency 04/10/2019    Noted on SHERIE   • Osteoarthritis     Knees w/Hx Knee Repl   • Pulmonary edema 01/19/2019    Mild--Noted on Chest XR   • Rapid palpitations 04/15/18 & 8/9/18-[facility] ER   • Renal dysfunction    • Right axis deviation 08/2018 & 4/19    Noted on EKG   • Severe aortic valve stenosis Since 2008    Hx AVR on 12/11/08 by Dr. Alvarado   • Severe mitral insufficiency 04/10/2019    Noted on SHERIE   • Sinus tachycardia 08/2018    Noted on EKG   • SOB (shortness of breath)  chronic   • Torticollis Chronic   • Valvular heart disease     S/p AVR & MVR in 08'       Surgical History:      Past Surgical History:   Procedure Laterality Date   • AORTIC VALVE REPAIR/REPLACEMENT  12/11/08-[facility]    Using a #20 Eight Yr Mechanical [place] Prosthesis--Dewey Alvarado MD   • CARDIOVERSION  4/10/19-[facility]    Dr. Spencer--For A-Fib   • COLONOSCOPY N/A 12/5/2019    Procedure: COLONOSCOPY;  Surgeon: Dustin Castellanos MD;  Location: [facility] ENDOSCOPY;  Service: Gastroenterology   • ENDOSCOPY N/A 12/5/2019    Procedure: ESOPHAGOGASTRODUODENOSCOPY;  Surgeon: Dustin Castellanos MD;  Location: [facility] ENDOSCOPY;  Service: Gastroenterology   • HYSTERECTOMY     • MITRAL VALVE REPLACEMENT  2008   • MITRAL VALVE REPLACEMENT  05/09/2019    Dr Alvarado   • SHERIE  4/10/19-[facility]    EF 40%; Moderate TVR; Severe Eccentric MI; Biatrial Enlargement   • TOTAL KNEE ARTHROPLASTY  2017   • TRICUSPID VALVE SURGERY  05/09/2019    Dr Alvarado           Family History: family history includes Diabetes in her father; Heart disease in her father; Hypertension in her father; Lung cancer in her mother; Stroke in her father. Otherwise pertinent FHx was reviewed and unremarkable.     Social History:  reports that she has never smoked. She has never used smokeless tobacco. She reports that she does not drink alcohol or use drugs.      Medications:  Prior to Admission medications    Medication Sig Start Date End Date Taking? Authorizing Provider   aspirin 81 MG tablet Take 81 mg by mouth Daily. 9/23/12  Yes Sandip Ramírez MD   atorvastatin (LIPITOR) 40 MG tablet Take 40 mg by mouth Daily.   Yes Sandip Ramírez MD   B Complex Vitamins (VITAMIN B COMPLEX) tablet Take 1 capsule by mouth Daily. 3/18/15  Yes Sandip Ramírez MD   cholecalciferol (VITAMIN D3) 10 MCG (400 UNIT) tablet Take 2,000 Units by mouth Daily.   Yes Sandip Ramírez MD   ezetimibe (ZETIA) 10 MG tablet Take 1 tablet by mouth daily. 9/26/12  Yes Sandip Ramírez MD      ferrous sulfate 325 (65 FE) MG tablet Take 325 mg by mouth 2 (Two) Times a Day.   Yes Sandip Ramírez MD   furosemide (LASIX) 40 MG tablet Take 40 mg by mouth Daily.   Yes Sandip Ramírez MD   Insulin Degludec (TRESIBA) 100 UNIT/ML solution injection Inject 10 Units under the skin into the appropriate area as directed every night at bedtime.   Yes Sandip Ramírez MD   levothyroxine (SYNTHROID, LEVOTHROID) 112 MCG tablet Take 112 mcg by mouth Daily.   Yes Sandip Ramírez MD   Liraglutide (VICTOZA) 18 MG/3ML solution pen-injector Inject 1.8 mg under the skin into the appropriate area as directed Daily. 3/18/15  Yes Sandip Ramírez MD   MAGNESIUM PO Take 1 tablet by mouth Daily.   Yes Sandip Ramírez MD   metFORMIN ER (GLUCOPHAGE-XR) 500 MG 24 hr tablet Take 500 mg by mouth Daily.   Yes Sandip Ramírez MD   metoprolol succinate XL (TOPROL-XL) 25 MG 24 hr tablet Take 12.5 mg by mouth Daily.   Yes Sandip Ramírez MD   Multiple Vitamin (MULTI-VITAMIN) tablet Take 1 tablet by mouth daily. 9/23/12  Yes Sandip Ramírez MD   omeprazole (PriLOSEC) 20 MG capsule Take 20 mg by mouth Daily. 3/18/15  Yes Sandip Ramírez MD   potassium chloride (K-DUR,KLOR-CON) 20 MEQ CR tablet Take 20 mEq by mouth Daily.   Yes Sandip Ramírez MD   valsartan (DIOVAN) 80 MG tablet Take 40 mg by mouth Daily. 9/23/12  Yes Sandip Ramírez MD   vitamin C (ASCORBIC ACID) 500 MG tablet Take 500 mg by mouth Daily.   Yes Sandip Ramírez MD   warfarin (COUMADIN) 5 MG tablet Take 5 mg by mouth 3 (Three) Times a Week. Every Monday, Wednesday, and Friday   Yes Sandip Ramírez MD   warfarin (COUMADIN) 7.5 MG tablet Take 7.5 mg by mouth 4 (Four) Times a Week. Every Tuesday, Thursday, Saturday, and Sunday   Yes Sandip Ramírez MD   metFORMIN (GLUCOPHAGE) 500 MG tablet Take 1 tablet by mouth Every Evening. 3/18/15 2/2/20  Sandip Ramírez MD   warfarin (COUMADIN) 5 MG  tablet TAKE ONE TABLET BY MOUTH ON MONDAY, WEDNESDAY, FRIDAY. TAKE ONE AND ONE-HALF (1.5) TABLET BY MOUTH ALL OTHER DAYS OR AS DIRECTED 1/29/20 2/2/20  Roldan Solano MD       Allergies:  No Known Allergies    Objective   Objective     Vital Signs  Temp:  [98.8 °F (37.1 °C)-99.8 °F (37.7 °C)] 98.8 °F (37.1 °C)  Heart Rate:  [] 98  Resp:  [16-20] 20  BP: ()/(42-69) 105/44  SpO2:  [99 %-100 %] 100 %  on   ;   Device (Oxygen Therapy): room air  Body mass index is 31.9 kg/m².    Physical Exam   Constitutional: She is oriented to person, place, and time. She appears well-developed and well-nourished. No distress.   HENT:   Head: Normocephalic and atraumatic.   Right Ear: External ear normal.   Left Ear: External ear normal.   Nose: Nose normal.   Mouth/Throat: Oropharynx is clear and moist. No oropharyngeal exudate.   Eyes: Pupils are equal, round, and reactive to light. Conjunctivae and EOM are normal. Right eye exhibits no discharge. Left eye exhibits no discharge. No scleral icterus.   Neck: Normal range of motion. No JVD present. No tracheal deviation present. No thyromegaly present.   Cardiovascular: Normal rate, regular rhythm, normal heart sounds and intact distal pulses. Exam reveals no gallop and no friction rub.   No murmur heard.  Pulmonary/Chest: Effort normal and breath sounds normal. No stridor. No respiratory distress. She has no wheezes. She has no rales. She exhibits no tenderness.   Abdominal: Soft. Bowel sounds are normal. She exhibits no distension and no mass. There is no tenderness. There is no rebound and no guarding. No hernia.   Musculoskeletal: Normal range of motion. She exhibits no edema, tenderness or deformity.   Lymphadenopathy:     She has no cervical adenopathy.   Neurological: She is alert and oriented to person, place, and time. No cranial nerve deficit or sensory deficit. She exhibits normal muscle tone. Coordination normal.   Skin: Skin is warm and dry. No rash  noted. She is not diaphoretic. No erythema.   Psychiatric: She has a normal mood and affect. Her behavior is normal.   Nursing note and vitals reviewed.      Results Review:  I have personally reviewed most recent lab results and agree with findings, most notably: .    Results from last 7 days   Lab Units 02/02/20  1219   WBC 10*3/mm3 9.00   HEMOGLOBIN g/dL 5.6*   HEMATOCRIT % 16.4*   PLATELETS 10*3/mm3 262   INR  3.00     Results from last 7 days   Lab Units 02/02/20  1219   SODIUM mmol/L 138   POTASSIUM mmol/L 4.5   CHLORIDE mmol/L 102   CO2 mmol/L 24.0   BUN mg/dL 75*   CREATININE mg/dL 1.57*   GLUCOSE mg/dL 197*   CALCIUM mg/dL 9.2   TROPONIN T ng/mL <0.010   PROBNP pg/mL 618.1     Estimated Creatinine Clearance: 37.5 mL/min (A) (by C-G formula based on SCr of 1.57 mg/dL (H)).  Brief Urine Lab Results  (Last result in the past 365 days)      Color   Clarity   Blood   Leuk Est   Nitrite   Protein   CREAT   Urine HCG        01/02/20 1156 Yellow Clear Negative Trace Negative Negative               Microbiology Results (last 10 days)     ** No results found for the last 240 hours. **          ECG/EMG Results (most recent)     Procedure Component Value Units Date/Time    ECG 12 Lead [[id number]] Collected:  02/02/20 1210     Updated:  02/02/20 1212    Narrative:       HEART RATE= 99  bpm  RR Interval= 608  ms  UT Interval= 155  ms  P Horizontal Axis= 36  deg  P Front Axis= 38  deg  QRSD Interval= 84  ms  QT Interval= 362  ms  QRS Axis= 112  deg  T Wave Axis= 56  deg  - OTHERWISE NORMAL ECG -  Sinus rhythm  Ventricular premature complex  Right axis deviation  When compared with ECG of 03-Dec-2019 19:09:21,  No significant change  Electronically Signed By:   Date and Time of Study: 2020-02-02 12:10:45          Results for orders placed in visit on 04/15/19   SCANNED VASCULAR STUDIES       Results for orders placed in visit on 04/10/19   SCANNED - ECHOCARDIOGRAM       Xr Chest 1 View    Result Date: 2/2/2020  1.No  radiographic findings of acute cardiopulmonary abnormality. 2.Left basilar opacities and suspected left effusion seen on the prior exam from December 2019 appear to have resolved in the interval.  Electronically Signed By-DR. Meliton Merrill MD On:2/2/2020 12:49 PM This report was finalized on [id number] by DR. Meliton Merrill MD.        Estimated Creatinine Clearance: 37.5 mL/min (A) (by C-G formula based on SCr of 1.57 mg/dL (H)).    Assessment/Plan   Assessment/Plan       Active Hospital Problems    Diagnosis  POA   • **Anemia [D64.9]  Yes   • Atrial fibrillation (CMS/HCC) [I48.91] [I48.91]  Yes   • Long term (current) use of anticoagulants [Z79.01] [Z79.01]  Not Applicable   • S/P CABG x 2 (reop sternotomy with lysis of adhesions) (LIMA to LAD, SVG to PDA) per Dr. Alvarado 5/9/2019 [Z95.1]  Not Applicable   • Hypertension [I10]  Yes   • Hyperlipidemia [E78.5]  Yes   • Diabetes mellitus (CMS/HCC) [E11.9]  Yes   • Hypothyroidism [E03.9]  Yes      Resolved Hospital Problems   No resolved problems to display.       Active Hospital Problems:  No notes have been filed under this hospital service.  Service: Hospitalist    Impression    Acute on chronic anemia unclear to me about the etiology, can be blood loss versus rule out other etiologies, will check vitamin B12, GI and hematology consult.  Check reticulocyte count.    Coagulopathy secondary to Coumadin therapy, will hold up on the Coumadin, will give vitamin K 5 mg IV, GI and hematology service to decide about the chronic anticoagulation, medicine may need cardiology consult for the reason.    Chronic atrial fibrillation, Coumadin on hold, continue metoprolol.  Monitor heart rate.    Hypothyroidism, continue Synthroid, monitor TSH as needed.    Hypertension, continue losartan and metoprolol.    Dyslipidemia, continue statin, monitor LFTs as needed.    Coronary artery disease, aspirin on hold for now.    Diabetes mellitus type 2 insulin-dependent, continue Lantus,  sliding scale, ADA diet, monitor Accu-Cheks AC plus at bedtime.    DVT prophylaxis with SCDs.          VTE Prophylaxis - SCDs.    CODE STATUS:    Code Status and Medical Interventions:   Ordered at: 02/02/20 1556     Level Of Support Discussed With:    Patient     Code Status:    CPR     Medical Interventions (Level of Support Prior to Arrest):    Full       Admission Status:  I believe this patient meets observation criteria.      I discussed the patient's findings and my recommendations with patient.        Electronically signed by Alfonso Wright MD, 02/02/20, 4:01 PM.  [facility] Hospitalist Team

## 2020-02-02 NOTE — ED PROVIDER NOTES
Subjective   History of Present Illness  Shortness of breath  65-year-old female complains of shortness of breath over the last 2 days.  She reports no fevers or chills.  She states she has had some dyspnea on exertion.  She reports no chest pain.  States she is had a slight nonproductive cough.  She denies any known ill contacts.  She reports no orthopnea  Review of Systems   Constitutional: Negative.    HENT: Negative.    Eyes: Negative.    Respiratory: Positive for cough and shortness of breath.    Cardiovascular: Negative.    Gastrointestinal: Negative.    Genitourinary: Negative.    Musculoskeletal: Negative.    Skin: Negative.    Neurological: Negative.    Psychiatric/Behavioral: Negative.        Past Medical History:   Diagnosis Date   • A-fib (CMS/Formerly [facility]) Dx 2018    S/p Cardioversion by Dr. Spencer on 04/10/19   • Acute renal failure syndrome (CMS/Formerly [facility]) 4/2019-[facility] IP   • Biatrial enlargement 04/10/2019    Noted on SHERIE   • Calcified granuloma of lung (CMS/Formerly [facility]) 01/04/2019    Noted on Chest XR   • Cardiomegaly 01/04/2019    Borderline--Noted on Chest XR   • CHF (congestive heart failure) (CMS/Formerly [facility])    • CKD (chronic kidney disease), stage III (CMS/Formerly [facility])     Does Not See a Nephrologist   • Diabetes mellitus (CMS/[facility]ly [facility])    • DM (diabetes mellitus) (CMS/Formerly [facility])     T2   • Dysphagia 08/15-[facility] IP    Due to Pharyngitis   • GERD (gastroesophageal reflux disease)    • History of chest x-ray 8/28/15-[facility]    Normal   • History of chest x-ray 01/19/2019    Mild Pulmonary Edema w/Congestive Failure Noted   • History of chest x-ray 01/04/2019    Borderline Cardiomegaly    • History of EKG 2014/2018/2019-[facility]   • Hx of diabetic neuropathy    • Hx of dizziness     w/Lightheadedness   • Hx of echocardiogram 4/16/18-[facility]    EF 55-60%; Mild MVR; Mild TVR; Normal Root/No Effusion   • Hyperlipidemia     Controlled w/Meds   • Hypertension     Controlled w/Meds   • Hypokalemia 4/11/19-[facility] IP   • Hypothyroidism     Controlled w/Meds   • Iron  deficiency anemia    • Left posterior fascicular block 04/2018 & 4/19    Noted on EKG   • Lower extremity edema 03/2019   • Mild mitral valve regurgitation 04/16/2018    Noted on Echo   • Mild tricuspid valve regurgitation 04/16/2018    Noted on Echo   • Moderate mitral valve regurgitation 2008    S/p MVR in 08'   • Moderate tricuspid insufficiency 04/10/2019    Noted on SHERIE   • Osteoarthritis     Knees w/Hx Knee Repl   • Pulmonary edema 01/19/2019    Mild--Noted on Chest XR   • Rapid palpitations 04/15/18 & 8/9/18-[facility] ER   • Renal dysfunction    • Right axis deviation 08/2018 & 4/19    Noted on EKG   • Severe aortic valve stenosis Since 2008    Hx AVR on 12/11/08 by Dr. Alvarado   • Severe mitral insufficiency 04/10/2019    Noted on SHERIE   • Sinus tachycardia 08/2018    Noted on EKG   • SOB (shortness of breath) chronic   • Torticollis Chronic   • Valvular heart disease     S/p AVR & MVR in 08'       No Known Allergies    Past Surgical History:   Procedure Laterality Date   • AORTIC VALVE REPAIR/REPLACEMENT  12/11/08-[facility]    Using a #20 Eight Yr Mechanical [place] Prosthesis--Dewey Alvarado MD   • CARDIOVERSION  4/10/19-[facility]    Dr. Spencer--For A-Fib   • COLONOSCOPY N/A 12/5/2019    Procedure: COLONOSCOPY;  Surgeon: Dustin Castellanos MD;  Location: [facility] ENDOSCOPY;  Service: Gastroenterology   • ENDOSCOPY N/A 12/5/2019    Procedure: ESOPHAGOGASTRODUODENOSCOPY;  Surgeon: Dustin Castellanos MD;  Location: [facility] ENDOSCOPY;  Service: Gastroenterology   • HYSTERECTOMY     • MITRAL VALVE REPLACEMENT  2008   • MITRAL VALVE REPLACEMENT  05/09/2019    Dr Alvarado   • SHERIE  4/10/19-[facility]    EF 40%; Moderate TVR; Severe Eccentric MI; Biatrial Enlargement   • TOTAL KNEE ARTHROPLASTY  2017   • TRICUSPID VALVE SURGERY  05/09/2019    Dr Alvarado       Family History   Problem Relation Age of Onset   • Heart disease Father    • Hypertension Father    • Stroke Father    • Diabetes Father    • Lung cancer Mother        Social History     Socioeconomic  History   • Marital status: Single     Spouse name: Not on file   • Number of children: Not on file   • Years of education: Not on file   • Highest education level: Not on file   Tobacco Use   • Smoking status: Never Smoker   • Smokeless tobacco: Never Used   Substance and Sexual Activity   • Alcohol use: No   • Drug use: No   • Sexual activity: Defer     Birth control/protection: Surgical     Comment: Hyst     Prior to Admission medications    Medication Sig Start Date End Date Taking? Authorizing Provider   aspirin 81 MG tablet Take 1 tablet by mouth daily. 9/23/12   Sandip Ramírez MD   atorvastatin (LIPITOR) 40 MG tablet Take 40 mg by mouth Daily.    Sandip Ramírez MD   B Complex Vitamins (VITAMIN B COMPLEX) tablet Take by mouth. 3/18/15   Sandip Ramírez MD   cholecalciferol (VITAMIN D3) 10 MCG (400 UNIT) tablet Take 2,000 Units by mouth Daily.    Sandip Ramírez MD   ezetimibe (ZETIA) 10 MG tablet Take 1 tablet by mouth daily. 9/26/12   Sandip Ramírez MD   furosemide (LASIX) 40 MG tablet Take 40 mg by mouth Daily.    Sandip Ramírez MD   levothyroxine (SYNTHROID, LEVOTHROID) 112 MCG tablet Take 112 mcg by mouth Daily.    Sandip Ramírez MD   Liraglutide (VICTOZA) 18 MG/3ML solution pen-injector Inject 1.8 mg under the skin into the appropriate area as directed Daily. 3/18/15   Sandip Ramírez MD   metFORMIN (GLUCOPHAGE) 500 MG tablet Take 1 tablet by mouth Every Evening. 3/18/15   Sandip Ramírez MD   metoprolol succinate XL (TOPROL-XL) 25 MG 24 hr tablet Take 12.5 mg by mouth Daily.    Sandip Ramírez MD   Multiple Vitamin (MULTI-VITAMIN) tablet Take 1 tablet by mouth daily. 9/23/12   Sandip Ramírez MD   omeprazole (PriLOSEC) 20 MG capsule Take 1 capsule by mouth daily. 3/18/15   Sandip Ramírez MD   potassium chloride (K-DUR,KLOR-CON) 20 MEQ CR tablet Take 20 mEq by mouth Daily.    Sandip Ramírez MD   valsartan (DIOVAN) 80 MG  "tablet Take 1 tablet by mouth Daily. PT TAKES 40MG DAILY  9/23/12   Sandip Ramírez MD   vitamin C (ASCORBIC ACID) 500 MG tablet Take 500 mg by mouth Daily.    Sandip Ramírez MD   warfarin (COUMADIN) 5 MG tablet TAKE ONE TABLET BY MOUTH ON MONDAY, WEDNESDAY, FRIDAY. TAKE ONE AND ONE-HALF (1.5) TABLET BY MOUTH ALL OTHER DAYS OR AS DIRECTED 1/29/20   Roldan Solano MD   warfarin (COUMADIN) 7.5 MG tablet Take 7.5 mg by mouth 4 (Four) Times a Week. Tues, Thur, Sat, Sun    Provider, Historical, MD     /42   Pulse 97   Temp 99.1 °F (37.3 °C) (Oral)   Resp 17   Ht 162.6 cm (64\")   Wt 82.1 kg (181 lb)   LMP  (LMP Unknown)   SpO2 100%   BMI 31.07 kg/m²         Objective   Physical Exam  General: Well-developed well-appearing, no acute distress, alert and appropriate  Eyes: Pupils round, sclera nonicteric  HEENT: Mucous membranes moist, no mucosal swelling  Neck: Supple, no nuchal rigidity, no lymphadenopathy  Respirations: Respirations nonlabored, equal breath sounds bilaterally, clear lungs  Heart regular rate and rhythm, mechanical heart valve sound  Abdomen soft nontender nondistended, no hepatosplenomegaly, no hernia, no mass, normal bowel sounds, no CVA tenderness  Extremities no clubbing cyanosis or edema, calves are symmetric and nontender  Neuro cranial nerves grossly intact, no focal limb deficits  Psych oriented, pleasant affect  Skin no rash, brisk cap refill  Procedures           ED Course      EKG shows a sinus rhythm rate of 99, right axis deviation           Results for orders placed or performed during the hospital encounter of 02/02/20   Protime-INR   Result Value Ref Range    Protime 28.3 19.4 - 28.5 Seconds    INR 3.00 2.00 - 3.00   Basic Metabolic Panel   Result Value Ref Range    Glucose 197 (H) 65 - 99 mg/dL    BUN 75 (H) 8 - 23 mg/dL    Creatinine 1.57 (H) 0.57 - 1.00 mg/dL    Sodium 138 136 - 145 mmol/L    Potassium 4.5 3.5 - 5.2 mmol/L    Chloride 102 98 - 107 " mmol/L    CO2 24.0 22.0 - 29.0 mmol/L    Calcium 9.2 8.6 - 10.5 mg/dL    eGFR Non African Amer 33 (L) >60 mL/min/1.73    BUN/Creatinine Ratio 47.8 (H) 7.0 - 25.0    Anion Gap 12.0 5.0 - 15.0 mmol/L   Troponin   Result Value Ref Range    Troponin T <0.010 0.000 - 0.030 ng/mL   BNP   Result Value Ref Range    proBNP 618.1 5.0 - 900.0 pg/mL   D-dimer, Quantitative   Result Value Ref Range    D-Dimer, Quantitative 0.20 0.17 - 0.59 MCGFEU/mL   CBC Auto Differential   Result Value Ref Range    WBC 9.00 3.40 - 10.80 10*3/mm3    RBC 1.80 (L) 3.77 - 5.28 10*6/mm3    Hemoglobin 5.6 (C) 12.0 - 15.9 g/dL    Hematocrit 16.4 (C) 34.0 - 46.6 %    MCV 91.2 79.0 - 97.0 fL    MCH 31.2 26.6 - 33.0 pg    MCHC 34.3 31.5 - 35.7 g/dL    RDW 29.5 (H) 12.3 - 15.4 %    RDW-SD 88.8 (H) 37.0 - 54.0 fl    MPV 7.7 6.0 - 12.0 fL    Platelets 262 140 - 450 10*3/mm3    Neutrophil % 80.0 (H) 42.7 - 76.0 %    Lymphocyte % 8.6 (L) 19.6 - 45.3 %    Monocyte % 7.8 5.0 - 12.0 %    Eosinophil % 2.3 0.3 - 6.2 %    Basophil % 1.3 0.0 - 1.5 %    Neutrophils, Absolute 7.20 (H) 1.70 - 7.00 10*3/mm3    Lymphocytes, Absolute 0.80 0.70 - 3.10 10*3/mm3    Monocytes, Absolute 0.70 0.10 - 0.90 10*3/mm3    Eosinophils, Absolute 0.20 0.00 - 0.40 10*3/mm3    Basophils, Absolute 0.10 0.00 - 0.20 10*3/mm3    nRBC 0.1 0.0 - 0.2 /100 WBC   Scan Slide   Result Value Ref Range    Anisocytosis Large/3+ None Seen    Basophilic Stippling Slight/1+ None Seen    Elliptocytes Slight/1+ None Seen    Polychromasia Slight/1+ None Seen    RBC Fragments Slight/1+ None Seen    WBC Morphology Normal Normal    Platelet Morphology Normal Normal     Xr Chest 1 View    Result Date: 2/2/2020  1.No radiographic findings of acute cardiopulmonary abnormality. 2.Left basilar opacities and suspected left effusion seen on the prior exam from December 2019 appear to have resolved in the interval.  Electronically Signed By-DR. Meliton Merrill MD On:2/2/2020 12:49 PM This report was finalized on  [id number] by DR. Meliton Merrill MD.                                     MDM  Patient presents with dyspnea.  Her oxygen saturations are 100%.  She is in no acute respiratory distress.  She was found to have severe anemia.  She has had this problem in the past.  She denies any active bleeding.  She does have chronic renal insufficiency.  She was ordered packed red blood cells.  Case discussed with the hospitalist service and patient will be admitted for further evaluation and care.  X-ray shows no pneumonia or pneumothorax.  Abdomen is soft and nontender without signs of peritonitis or acute abdomen.  Final diagnoses:   Anemia, unspecified type   Dyspnea, unspecified type            Randy Corona MD  02/02/20 1338

## 2020-02-03 ENCOUNTER — INPATIENT HOSPITAL (OUTPATIENT)
Dept: URBAN - METROPOLITAN AREA HOSPITAL 84 | Facility: HOSPITAL | Age: 65
End: 2020-02-03
Payer: MEDICARE

## 2020-02-03 ENCOUNTER — ANESTHESIA (OUTPATIENT)
Dept: GASTROENTEROLOGY | Facility: HOSPITAL | Age: 65
End: 2020-02-03

## 2020-02-03 ENCOUNTER — ANESTHESIA EVENT (OUTPATIENT)
Dept: GASTROENTEROLOGY | Facility: HOSPITAL | Age: 65
End: 2020-02-03

## 2020-02-03 DIAGNOSIS — K55.20 ANGIODYSPLASIA OF COLON WITHOUT HEMORRHAGE: ICD-10-CM

## 2020-02-03 DIAGNOSIS — D50.0 IRON DEFICIENCY ANEMIA SECONDARY TO BLOOD LOSS (CHRONIC): ICD-10-CM

## 2020-02-03 LAB
GLUCOSE BLDC GLUCOMTR-MCNC: 142 MG/DL (ref 70–105)
GLUCOSE BLDC GLUCOMTR-MCNC: 149 MG/DL (ref 70–105)
GLUCOSE BLDC GLUCOMTR-MCNC: 152 MG/DL (ref 70–105)
GLUCOSE BLDC GLUCOMTR-MCNC: 164 MG/DL (ref 70–105)
GLUCOSE BLDC GLUCOMTR-MCNC: 185 MG/DL (ref 70–105)
HCT VFR BLD AUTO: 21.2 % (ref 34–46.6)
HGB BLD-MCNC: 7.2 G/DL (ref 12–15.9)
INR PPP: 1.51 (ref 2–3)
PROTHROMBIN TIME: 14.9 SECONDS (ref 19.4–28.5)
T4 FREE SERPL-MCNC: 1.12 NG/DL (ref 0.93–1.7)

## 2020-02-03 PROCEDURE — 25010000002 IRON SUCROSE PER 1 MG: Performed by: NURSE PRACTITIONER

## 2020-02-03 PROCEDURE — 25010000002 PROPOFOL 10 MG/ML EMULSION: Performed by: ANESTHESIOLOGY

## 2020-02-03 PROCEDURE — G0378 HOSPITAL OBSERVATION PER HR: HCPCS

## 2020-02-03 PROCEDURE — 44369 SMALL BOWEL ENDOSCOPY: CPT | Performed by: INTERNAL MEDICINE

## 2020-02-03 PROCEDURE — 36430 TRANSFUSION BLD/BLD COMPNT: CPT

## 2020-02-03 PROCEDURE — 85018 HEMOGLOBIN: CPT | Performed by: HOSPITALIST

## 2020-02-03 PROCEDURE — 85014 HEMATOCRIT: CPT | Performed by: HOSPITALIST

## 2020-02-03 PROCEDURE — 99214 OFFICE O/P EST MOD 30 MIN: CPT | Performed by: INTERNAL MEDICINE

## 2020-02-03 PROCEDURE — 82962 GLUCOSE BLOOD TEST: CPT

## 2020-02-03 PROCEDURE — 94799 UNLISTED PULMONARY SVC/PX: CPT

## 2020-02-03 PROCEDURE — 96367 TX/PROPH/DG ADDL SEQ IV INF: CPT

## 2020-02-03 PROCEDURE — 86900 BLOOD TYPING SEROLOGIC ABO: CPT

## 2020-02-03 PROCEDURE — 99225 PR SBSQ OBSERVATION CARE/DAY 25 MINUTES: CPT | Performed by: HOSPITALIST

## 2020-02-03 PROCEDURE — 85610 PROTHROMBIN TIME: CPT | Performed by: NURSE PRACTITIONER

## 2020-02-03 PROCEDURE — P9016 RBC LEUKOCYTES REDUCED: HCPCS

## 2020-02-03 RX ORDER — ONDANSETRON 4 MG/1
4 TABLET, FILM COATED ORAL EVERY 6 HOURS PRN
Status: DISCONTINUED | OUTPATIENT
Start: 2020-02-03 | End: 2020-02-04 | Stop reason: HOSPADM

## 2020-02-03 RX ORDER — ONDANSETRON 2 MG/ML
4 INJECTION INTRAMUSCULAR; INTRAVENOUS EVERY 6 HOURS PRN
Status: DISCONTINUED | OUTPATIENT
Start: 2020-02-03 | End: 2020-02-04 | Stop reason: HOSPADM

## 2020-02-03 RX ORDER — PROPOFOL 10 MG/ML
VIAL (ML) INTRAVENOUS AS NEEDED
Status: DISCONTINUED | OUTPATIENT
Start: 2020-02-03 | End: 2020-02-03 | Stop reason: SURG

## 2020-02-03 RX ORDER — WARFARIN SODIUM 7.5 MG/1
7.5 TABLET ORAL
Status: DISCONTINUED | OUTPATIENT
Start: 2020-02-03 | End: 2020-02-04 | Stop reason: HOSPADM

## 2020-02-03 RX ORDER — SODIUM CHLORIDE 9 MG/ML
9 INJECTION, SOLUTION INTRAVENOUS CONTINUOUS
Status: DISCONTINUED | OUTPATIENT
Start: 2020-02-03 | End: 2020-02-04 | Stop reason: HOSPADM

## 2020-02-03 RX ORDER — PANTOPRAZOLE SODIUM 40 MG/1
40 TABLET, DELAYED RELEASE ORAL
Status: DISCONTINUED | OUTPATIENT
Start: 2020-02-03 | End: 2020-02-04 | Stop reason: HOSPADM

## 2020-02-03 RX ADMIN — PANTOPRAZOLE SODIUM 40 MG: 40 TABLET, DELAYED RELEASE ORAL at 18:07

## 2020-02-03 RX ADMIN — PROPOFOL 40 MG: 10 INJECTION, EMULSION INTRAVENOUS at 14:59

## 2020-02-03 RX ADMIN — PROPOFOL 30 MG: 10 INJECTION, EMULSION INTRAVENOUS at 14:48

## 2020-02-03 RX ADMIN — Medication 10 ML: at 21:57

## 2020-02-03 RX ADMIN — PROPOFOL 70 MG: 10 INJECTION, EMULSION INTRAVENOUS at 14:46

## 2020-02-03 RX ADMIN — WARFARIN SODIUM 7.5 MG: 7.5 TABLET ORAL at 19:33

## 2020-02-03 RX ADMIN — IRON SUCROSE 200 MG: 20 INJECTION, SOLUTION INTRAVENOUS at 11:32

## 2020-02-03 RX ADMIN — SODIUM CHLORIDE 9 ML/HR: 0.9 INJECTION, SOLUTION INTRAVENOUS at 14:04

## 2020-02-03 RX ADMIN — ATORVASTATIN CALCIUM 40 MG: 40 TABLET, FILM COATED ORAL at 21:57

## 2020-02-03 RX ADMIN — PROPOFOL 50 MG: 10 INJECTION, EMULSION INTRAVENOUS at 14:53

## 2020-02-03 RX ADMIN — PROPOFOL 50 MG: 10 INJECTION, EMULSION INTRAVENOUS at 14:51

## 2020-02-03 NOTE — PROGRESS NOTES
"      [facility] Medicine Services Daily Progress Note      Hospitalist Team  LOS 0 days      Patient Care Team:  Emilie Cruz APRN as PCP - General  Roldan Solano MD as Consulting Physician (Cardiology)  Ney Lai MD as Consulting Physician (Nephrology)    Patient Location: [facility]      Subjective   Subjective     Chief Complaint / Subjective  Chief Complaint   Patient presents with   • Shortness of Breath         Brief Synopsis of Hospital Course/HPI  The patient is a 65-year-old female who reported to the emergency department that she had been feeling weak for several days.  She denied any abdominal pain or bleeding per rectum or melena, however work-up in the emergency room revealed a hemoglobin of 5.6.    Record review: History of recent admission 12/3/2019-12/6/2019 with low hemoglobin.  Patient underwent extensive work-up which included EGD and colonoscopy, revealing diverticulosis with hemorrhoids, no active bleeding was noted.  Patient did require transfusion and anemia work-up at that time.  Patient was seen by GI and hematology.    Date: 2/3/2020: Patient reports no specific complaints at this time.  She reports that she has been using a cane recently because of the lightheadedness when she walks.  She also reports chronic torticollis of her neck which deviates to the left.        ROS  12 point review of systems was reviewed and was negative except as above.    Objective   Objective      Vital Signs  Temp:  [97.6 °F (36.4 °C)-99.6 °F (37.6 °C)] 97.7 °F (36.5 °C)  Heart Rate:  [72-97] 81  Resp:  [12-20] 14  BP: ()/(42-56) 110/56  Oxygen Therapy  SpO2: 100 %  Pulse Oximetry Type: Intermittent  Device (Oxygen Therapy): room air  Flow (L/min): 3  ETCO2 (mmHg): 33 mmHg  Flowsheet Rows      First Filed Value   Admission Height  162.6 cm (64\") Documented at 02/02/2020 1202   Admission Weight  82.1 kg (181 lb) Documented at 02/02/2020 1202        Intake & " Output (last 3 days)       01/31 0701 - 02/01 0700 02/01 0701 - 02/02 0700 02/02 0701 - 02/03 0700 02/03 0701 - 02/04 0700    P.O.   480 0    I.V. (mL/kg)    400 (4.7)    Blood   769     Total Intake(mL/kg)   1249 (14.6) 400 (4.7)    Net   +1249 +400            Urine Unmeasured Occurrence   3 x         Lines, Drains & Airways    Active LDAs     Name:   Placement date:   Placement time:   Site:   Days:    Peripheral IV 02/02/20 1211 Right Forearm   02/02/20    1211    Forearm   1                  Physical Exam:    Physical Exam  Well-developed well-nourished in no acute distress sitting up in bed awake and alert; mucous membranes moist; sclerae anicteric; neck supple; lungs clear to auscultation bilaterally; CV regular rate and rhythm; abdomen soft nontender nondistended with active bowel sounds; extremities with no edema, cyanosis or calf tenderness on the right; left lower extremity with diffuse trace edema and no calf tenderness; no Heaton catheter. Left facing torticollis.  Well-healed surgical scar midsternum.      Procedures:    Procedure(s):  ESOPHAGOGASTRODUODENOSCOPY WITH SMALL BOWEL ENTEROSCOPY and argonplasma coagulation of two small jejunal ateriovenous malformations          Results Review:     I reviewed the patient's new clinical results.      Lab Results (last 24 hours)     Procedure Component Value Units Date/Time    POC Glucose Once [[id number]]  (Abnormal) Collected:  02/03/20 1612    Specimen:  Blood Updated:  02/03/20 1614     Glucose 142 mg/dL      Comment: Serial Number: [id number]:  [id number]       POC Glucose Once [[id number]]  (Abnormal) Collected:  02/03/20 1411    Specimen:  Blood Updated:  02/03/20 1412     Glucose 149 mg/dL      Comment: Serial Number: [id number]:  [id number]       Protime-INR [[id number]]  (Abnormal) Collected:  02/03/20 1011    Specimen:  Blood Updated:  02/03/20 1156     Protime 14.9 Seconds      INR 1.51    POC Glucose Once [[id number]]  (Abnormal) Collected:   02/03/20 1126    Specimen:  Blood Updated:  02/03/20 1128     Glucose 164 mg/dL      Comment: Serial Number: [id number]:  [id number]       POC Glucose Once [[id number]]  (Abnormal) Collected:  02/03/20 0730    Specimen:  Blood Updated:  02/03/20 0733     Glucose 152 mg/dL      Comment: Serial Number: [id number]:  [id number]       Hemoglobin & Hematocrit, Blood [[id number]]  (Abnormal) Collected:  02/03/20 0410    Specimen:  Blood Updated:  02/03/20 0445     Hemoglobin 7.2 g/dL      Hematocrit 21.2 %      Comment: Result checked        POC Glucose Once [[id number]]  (Abnormal) Collected:  02/02/20 2021    Specimen:  Blood Updated:  02/02/20 2022     Glucose 226 mg/dL      Comment: Serial Number: [id number]:  [id number]           No results found for: HGBA1C  Results from last 7 days   Lab Units 02/03/20  1011 02/02/20  1219   INR  1.51* 3.00           No results found for: LIPASE  Lab Results   Component Value Date    CHOL 70 12/03/2019    CHLPL 89 07/11/2019    TRIG 64 12/03/2019    HDL 34 (L) 12/03/2019    LDL 23 12/03/2019       No results found for: INTRAOP, PREDX, FINALDX, COMDX    Microbiology Results (last 10 days)     ** No results found for the last 240 hours. **          ECG/EMG Results (most recent)     Procedure Component Value Units Date/Time    ECG 12 Lead [[id number]] Collected:  02/02/20 1210     Updated:  02/03/20 0704    Narrative:       HEART RATE= 99  bpm  RR Interval= 608  ms  IA Interval= 155  ms  P Horizontal Axis= 36  deg  P Front Axis= 38  deg  QRSD Interval= 84  ms  QT Interval= 362  ms  QRS Axis= 112  deg  T Wave Axis= 56  deg  - OTHERWISE NORMAL ECG -  Sinus rhythm  Ventricular premature complex  Right axis deviation  When compared with ECG of 03-Dec-2019 19:09:21,  No significant change  Electronically Signed By: Randy Corona ([place]ec[place]) 03-Feb-2020 07:03:51  Date and Time of Study: 2020-02-02 12:10:45          Results for orders placed in visit on 04/15/19   SCANNED VASCULAR  STUDIES       Results for orders placed in visit on 04/10/19   SCANNED - ECHOCARDIOGRAM       Xr Chest 1 View    Result Date: 2/2/2020  1.No radiographic findings of acute cardiopulmonary abnormality. 2.Left basilar opacities and suspected left effusion seen on the prior exam from December 2019 appear to have resolved in the interval.  Electronically Signed By-DR. Meliton Merrill MD On:2/2/2020 12:49 PM This report was finalized on [id number] by DR. Meliton Merrill MD.          Xrays, labs reviewed personally by physician.    Medication Review:   I have reviewed the patient's current medication list      Scheduled Meds    atorvastatin 40 mg Oral Nightly   furosemide 40 mg Oral Daily   insulin glargine 15 Units Subcutaneous QAM   levothyroxine 112 mcg Oral Q AM   losartan 25 mg Oral Q24H   metoprolol succinate XL 12.5 mg Oral Daily   pantoprazole 40 mg Oral Q AM   potassium chloride 20 mEq Oral Daily   vitamin C 500 mg Oral Daily   warfarin 7.5 mg Oral Daily       Meds Infusions    Pharmacy to dose warfarin     sodium chloride 9 mL/hr Last Rate: Stopped (02/03/20 1503)       Meds PRN  ondansetron **OR** ondansetron  •  Pharmacy to dose warfarin  •  [COMPLETED] Insert peripheral IV **AND** sodium chloride      Assessment/Plan   Assessment/Plan     Active Hospital Problems    Diagnosis  POA   • **Anemia [D64.9]  Yes   • Iron deficiency anemia due to chronic blood loss [D50.0]  Unknown   • Atrial fibrillation (CMS/HCC) [I48.91] [I48.91]  Yes   • Long term (current) use of anticoagulants [Z79.01] [Z79.01]  Not Applicable   • S/P CABG x 2 (reop sternotomy with lysis of adhesions) (LIMA to LAD, SVG to PDA) per Dr. Alvarado 5/9/2019 [Z95.1]  Not Applicable   • Hypertension [I10]  Yes   • Hyperlipidemia [E78.5]  Yes   • Diabetes mellitus (CMS/HCC) [E11.9]  Yes   • Hypothyroidism [E03.9]  Yes      Resolved Hospital Problems   No resolved problems to display.       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    Coronary artery disease status post CABG  -Resume aspirin and continue atorvastatin, Zetia and metoprolol    Chronic systolic right and left congestive heart failure ischemic cardiomyopathy  -Most recent echo 4/9/2019: LVEF 40%; RVSP 40 to 50 mmHg  -Continue Lasix and metoprolol    Aortic stenosis, mitral valve regurgitation and tricuspid valvular disease status post mechanical AVR and mechanical MVR and tricuspid valve repair  -Resume Coumadin goal INR 2.5-3    Chronic atrial fibrillation  -Continue metoprolol and resume Coumadin    Essential hypertension, chronic and controlled   -continue metoprolol and losartan    Hypothyroidism  -TSH is elevated at 5.61; check free T4  -continue levothyroxine    Type 2 diabetes mellitus, insulin-dependent  -Continue home basal insulin and SSI  -Hold Victoza and metformin    Hyperlipidemia  -Continue Zetia and atorvastatin    GERD  -Continue omeprazole    Nutritional supplements  -Continue iron with vitamin C, magnesium, multiple vitamin and potassium      VTE Prophylaxis - SCDs.  The patient will be restarted on Coumadin as well.      Code Status -   Code Status and Medical Interventions:   Ordered at: 02/02/20 0140     Level Of Support Discussed With:    Patient     Code Status:    CPR     Medical Interventions (Level of Support Prior to Arrest):    Full       Discharge Planning          Destination      Coordination has not been started for this encounter.      Durable Medical Equipment      Coordination has not been started for this encounter.      Dialysis/Infusion      Coordination has not been started for this encounter.      Home Medical Care       Coordination has not been started for this encounter.      Therapy      Coordination has not been started for this encounter.      Community Resources      Coordination has not been started for this encounter.            Electronically signed by Faye Mendoza MD, 02/03/20, 5:59 PM.  [facility] Hospitalist Team

## 2020-02-03 NOTE — ANESTHESIA POSTPROCEDURE EVALUATION
Patient: Jocelin Parra    Procedure Summary     Date:  02/03/20 Room / Location:  [facility] ENDOSCOPY 1 / [facility] ENDOSCOPY    Anesthesia Start:  1435 Anesthesia Stop:  1503    Procedure:  ESOPHAGOGASTRODUODENOSCOPY WITH SMALL BOWEL ENTEROSCOPY (N/A ) Diagnosis:       Iron deficiency anemia due to chronic blood loss      (Iron deficiency anemia due to chronic blood loss [D50.0])    Surgeon:  Dexter Akhtar MD Provider:  Riccardo Padilla MD    Anesthesia Type:  MAC ASA Status:  3          Anesthesia Type: MAC    Vitals  No vitals data found for the desired time range.          Post Anesthesia Care and Evaluation    Patient location during evaluation: PACU  Patient participation: complete - patient participated  Level of consciousness: awake  Pain scale: See nurse's notes for pain score.  Pain management: adequate  Airway patency: patent  Anesthetic complications: No anesthetic complications  PONV Status: none  Cardiovascular status: acceptable  Respiratory status: acceptable  Hydration status: acceptable    Comments: Patient seen and examined postoperatively; vital signs stable; SpO2 greater than or equal to 90%; cardiopulmonary status stable; nausea/vomiting adequately controlled; pain adequately controlled; no apparent anesthesia complications; patient discharged from anesthesia care when discharge criteria were met

## 2020-02-03 NOTE — ANESTHESIA PREPROCEDURE EVALUATION
Anesthesia Evaluation     Patient summary reviewed and Nursing notes reviewed   NPO Solid Status: > 8 hours  NPO Liquid Status: > 8 hours           Airway   Mallampati: II  TM distance: >3 FB  Neck ROM: full  No difficulty expected  Dental - normal exam     Pulmonary - negative pulmonary ROS and normal exam   Cardiovascular - normal exam    (+) hypertension, CAD, CABG >6 Months, dysrhythmias Atrial Fib, CHF , hyperlipidemia,       Neuro/Psych- negative ROS  GI/Hepatic/Renal/Endo    (+) obesity,  GERD,  diabetes mellitus,     Musculoskeletal (-) negative ROS    Abdominal  - normal exam    Bowel sounds: normal.   Substance History - negative use     OB/GYN negative ob/gyn ROS         Other                        Anesthesia Plan    ASA 3     MAC     intravenous induction     Anesthetic plan, all risks, benefits, and alternatives have been provided, discussed and informed consent has been obtained with: patient.

## 2020-02-03 NOTE — CONSULTS
Hematology/Oncology Inpatient Consultation    Patient name: Jocelin Parra  : 1955  MRN: [id number]  Referring Provider: Dr. Wright    Reason for Consultation: anemia    Chief complaint: weakness/SOA    History of present illness:    65 y.o. female who was admitted through the ER on 2020 reporting shortness of air for 2 days associated with weakness and a cough.  Chest x-ray was negative for any acute abnormality.  INR was 3.0.  Hemoglobin 5.6, MCV 91.2.  She was treated with 2 units PRBCs and vitamin K 5 mg IV.  Outpatient medications included aspirin, oral iron, multivitamin, Prilosec and Coumadin.  She was on chronic anticoagulation for history of A. fib, status post MVR and AVR.  GI was consulted.    20  Hematology/Oncology was consulted was consulted as she is an established patient who we follow for her anemia.  She was initially seen in consultation during her 2019 admission for severe anemia.  Hemoglobin 6.1, MCV 63.4.  B12/folate were normal and SPEP showed no monoclonal gammopathy.  She underwent an EGD/colonoscopy that showed no active bleeding, diverticulosis and hemorrhoids were seen.  Small bowel AVM was in the differential for the etiology of her ROSEANNE.  Her creatinine was elevated with ACD due to CKD contributing to her anemia.  After discharge, she was treated with Injectafer on  and 2019.  She was seen in follow-up on 2020 with a hemoglobin of 9.8, MCV 87 and creatinine 1.69.  She reported a small bowel study performed on 2020.  She was prescribed ferrous sulfate 325 mg daily.  It was planned to start Procrit in the future if needed to keep hemoglobin greater than 10.    PCP: Emilie Cruz APRN    History:  Past Medical History:   Diagnosis Date   • A-fib (CMS/HCC) Dx 2018    S/p Cardioversion by Dr. Spencer on 04/10/19   • Acute renal failure syndrome (CMS/HCC) 2019-[facility] IP   • Biatrial enlargement 04/10/2019    Noted on SHERIE   • Calcified granuloma  of lung (CMS/[facility]) 01/04/2019    Noted on Chest XR   • Cardiomegaly 01/04/2019    Borderline--Noted on Chest XR   • CHF (congestive heart failure) (CMS/[facility])    • CKD (chronic kidney disease), stage III (CMS/HCC)     Does Not See a Nephrologist   • Diabetes mellitus (CMS/[facility])    • DM (diabetes mellitus) (CMS/[facility])     T2   • Dysphagia 08/15-[facility] IP    Due to Pharyngitis   • GERD (gastroesophageal reflux disease)    • History of chest x-ray 8/28/15-[facility]    Normal   • History of chest x-ray 01/19/2019    Mild Pulmonary Edema w/Congestive Failure Noted   • History of chest x-ray 01/04/2019    Borderline Cardiomegaly    • History of EKG 2014/2018/2019-[facility]   • Hx of diabetic neuropathy    • Hx of dizziness     w/Lightheadedness   • Hx of echocardiogram 4/16/18-[facility]    EF 55-60%; Mild MVR; Mild TVR; Normal Root/No Effusion   • Hyperlipidemia     Controlled w/Meds   • Hypertension     Controlled w/Meds   • Hypokalemia 4/11/19-[facility] IP   • Hypothyroidism     Controlled w/Meds   • Iron deficiency anemia    • Left posterior fascicular block 04/2018 & 4/19    Noted on EKG   • Lower extremity edema 03/2019   • Mild mitral valve regurgitation 04/16/2018    Noted on Echo   • Mild tricuspid valve regurgitation 04/16/2018    Noted on Echo   • Moderate mitral valve regurgitation 2008    S/p MVR in 08'   • Moderate tricuspid insufficiency 04/10/2019    Noted on SHERIE   • Osteoarthritis     Knees w/Hx Knee Repl   • Pulmonary edema 01/19/2019    Mild--Noted on Chest XR   • Rapid palpitations 04/15/18 & 8/9/18-[facility] ER   • Renal dysfunction    • Right axis deviation 08/2018 & 4/19    Noted on EKG   • Severe aortic valve stenosis Since 2008    Hx AVR on 12/11/08 by Dr. Alvarado   • Severe mitral insufficiency 04/10/2019    Noted on SHERIE   • Sinus tachycardia 08/2018    Noted on EKG   • SOB (shortness of breath) chronic   • Torticollis Chronic   • Valvular heart disease     S/p AVR & MVR in 08'   , Past Surgical History:   Procedure Laterality Date   •  AORTIC VALVE REPAIR/REPLACEMENT  12/11/08-[facility]    Using a #20 Eight Yr Mechanical [place] Prosthesis--Dewey Alvarado MD   • CARDIOVERSION  4/10/19-[facility]    Dr. Spencer--For A-Fib   • COLONOSCOPY N/A 12/5/2019    Procedure: COLONOSCOPY;  Surgeon: Dustin Castellanos MD;  Location: [facility] ENDOSCOPY;  Service: Gastroenterology   • ENDOSCOPY N/A 12/5/2019    Procedure: ESOPHAGOGASTRODUODENOSCOPY;  Surgeon: Dustin Castellanos MD;  Location: [facility] ENDOSCOPY;  Service: Gastroenterology   • HYSTERECTOMY     • MITRAL VALVE REPLACEMENT  2008   • MITRAL VALVE REPLACEMENT  05/09/2019    Dr Alvarado   • SHERIE  4/10/19-[facility]    EF 40%; Moderate TVR; Severe Eccentric MI; Biatrial Enlargement   • TOTAL KNEE ARTHROPLASTY  2017   • TRICUSPID VALVE SURGERY  05/09/2019    Dr Alvarado   , Family History   Problem Relation Age of Onset   • Heart disease Father    • Hypertension Father    • Stroke Father    • Diabetes Father    • Lung cancer Mother    , Social History     Tobacco Use   • Smoking status: Never Smoker   • Smokeless tobacco: Never Used   Substance Use Topics   • Alcohol use: No   • Drug use: No   , Medications Prior to Admission   Medication Sig Dispense Refill Last Dose   • aspirin 81 MG tablet Take 81 mg by mouth Daily.   Taking   • atorvastatin (LIPITOR) 40 MG tablet Take 40 mg by mouth Daily.   Taking   • B Complex Vitamins (VITAMIN B COMPLEX) tablet Take 1 capsule by mouth Daily.   Taking   • cholecalciferol (VITAMIN D3) 10 MCG (400 UNIT) tablet Take 2,000 Units by mouth Daily.   Taking   • ezetimibe (ZETIA) 10 MG tablet Take 1 tablet by mouth daily.   Taking   • ferrous sulfate 325 (65 FE) MG tablet Take 325 mg by mouth 2 (Two) Times a Day.      • furosemide (LASIX) 40 MG tablet Take 40 mg by mouth Daily.   Taking   • Insulin Degludec (TRESIBA) 100 UNIT/ML solution injection Inject 10 Units under the skin into the appropriate area as directed every night at bedtime.      • levothyroxine (SYNTHROID, LEVOTHROID) 112 MCG tablet Take 112 mcg by  mouth Daily.   Taking   • Liraglutide (VICTOZA) 18 MG/3ML solution pen-injector Inject 1.8 mg under the skin into the appropriate area as directed Daily.   Taking   • MAGNESIUM PO Take 1 tablet by mouth Daily.      • metFORMIN ER (GLUCOPHAGE-XR) 500 MG 24 hr tablet Take 500 mg by mouth Daily.      • metoprolol succinate XL (TOPROL-XL) 25 MG 24 hr tablet Take 12.5 mg by mouth Daily.   Taking   • Multiple Vitamin (MULTI-VITAMIN) tablet Take 1 tablet by mouth daily.   Taking   • omeprazole (PriLOSEC) 20 MG capsule Take 20 mg by mouth Daily.   Taking   • potassium chloride (K-DUR,KLOR-CON) 20 MEQ CR tablet Take 20 mEq by mouth Daily.   Taking   • valsartan (DIOVAN) 80 MG tablet Take 40 mg by mouth Daily.      • vitamin C (ASCORBIC ACID) 500 MG tablet Take 500 mg by mouth Daily.   Taking   • warfarin (COUMADIN) 5 MG tablet Take 5 mg by mouth 3 (Three) Times a Week. Every Monday, Wednesday, and Friday      • warfarin (COUMADIN) 7.5 MG tablet Take 7.5 mg by mouth 4 (Four) Times a Week. Every Tuesday, Thursday, Saturday, and Sunday      , Scheduled Meds:    atorvastatin 40 mg Oral Nightly   furosemide 40 mg Oral Daily   insulin glargine 15 Units Subcutaneous QAM   levothyroxine 112 mcg Oral Q AM   losartan 25 mg Oral Q24H   metoprolol succinate XL 12.5 mg Oral Daily   potassium chloride 20 mEq Oral Daily   vitamin C 500 mg Oral Daily   , Continuous Infusions:   , PRN Meds:  [COMPLETED] Insert peripheral IV **AND** sodium chloride   Allergies:  Patient has no known allergies.    Subjective   Patient awake alert.  Denies visible blood loss or no black stools.  Patient reports that she was taking iron tablets and had dark stools but not tarry.  ROS:  Review of Systems   Constitutional: Positive for fatigue. Negative for diaphoresis, fever and unexpected weight change.   HENT: Negative for congestion and nosebleeds.    Eyes: Negative.    Respiratory: Positive for shortness of breath. Negative for cough.    Cardiovascular:  "Negative for chest pain and leg swelling.   Gastrointestinal: Negative for abdominal pain, blood in stool, constipation, diarrhea, nausea and vomiting.   Endocrine: Negative for cold intolerance and heat intolerance.   Genitourinary: Negative for dysuria and hematuria.   Musculoskeletal: Negative for arthralgias and joint swelling.   Skin: Negative for rash and wound.   Neurological: Positive for weakness. Negative for numbness and headaches.   Hematological: Does not bruise/bleed easily.   Psychiatric/Behavioral: Negative for confusion. The patient is not nervous/anxious.    All other systems reviewed and are negative.       Objective   Vital Signs:   BP 90/46 (BP Location: Left arm, Patient Position: Lying)   Pulse 76   Temp 97.9 °F (36.6 °C) (Oral)   Resp 18   Ht 162.6 cm (64\")   Wt 85.7 kg (188 lb 15 oz)   LMP  (LMP Unknown)   SpO2 97%   BMI 32.43 kg/m²     Physical Exam:  Physical Exam   Constitutional: She is oriented to person, place, and time. She appears well-developed and well-nourished.   HENT:   Head: Normocephalic and atraumatic.   Mouth/Throat: Oropharynx is clear and moist.   Eyes: Pupils are equal, round, and reactive to light. Conjunctivae, EOM and lids are normal.   Neck: Normal range of motion. Neck supple. No thyromegaly present.   Cardiovascular: Normal rate, regular rhythm and normal heart sounds.   No murmur heard.  Pulmonary/Chest: Effort normal and breath sounds normal. No respiratory distress.   Abdominal: Soft. Normal appearance and bowel sounds are normal. She exhibits no distension.   Genitourinary:   Genitourinary Comments: Deferred.   Musculoskeletal: Normal range of motion. She exhibits no edema.   Lymphadenopathy:     She has no cervical adenopathy.     She has no axillary adenopathy.        Right: No supraclavicular adenopathy present.        Left: No supraclavicular adenopathy present.   Neurological: She is alert and oriented to person, place, and time.   Skin: Skin is " warm and dry. Capillary refill takes less than 2 seconds. No bruising, no petechiae and no rash noted.   Psychiatric: She has a normal mood and affect. Her speech is normal and behavior is normal. Judgment and thought content normal. Cognition and memory are normal.   Nursing note and vitals reviewed.       Results Review:  Lab Results (last 48 hours)     Procedure Component Value Units Date/Time    POC Glucose Once [[id number]]  (Abnormal) Collected:  02/03/20 0730    Specimen:  Blood Updated:  02/03/20 0733     Glucose 152 mg/dL      Comment: Serial Number: [id number]:  [id number]       Hemoglobin & Hematocrit, Blood [[id number]]  (Abnormal) Collected:  02/03/20 0410    Specimen:  Blood Updated:  02/03/20 0445     Hemoglobin 7.2 g/dL      Hematocrit 21.2 %      Comment: Result checked        POC Glucose Once [[id number]]  (Abnormal) Collected:  02/02/20 2021    Specimen:  Blood Updated:  02/02/20 2022     Glucose 226 mg/dL      Comment: Serial Number: [id number]:  [id number]       Troponin [[id number]]  (Normal) Collected:  02/02/20 1219    Specimen:  Blood Updated:  02/02/20 1325     Troponin T <0.010 ng/mL     Narrative:       Troponin T Reference Range:  <= 0.03 ng/mL-   Negative for AMI  >0.03 ng/mL-     Abnormal for myocardial necrosis.  Clinicians would have to utilize clinical acumen, EKG, Troponin and serial changes to determine if it is an Acute Myocardial Infarction or myocardial injury due to an underlying chronic condition.       Results may be falsely decreased if patient taking Biotin.      BNP [[id number]]  (Normal) Collected:  02/02/20 1219    Specimen:  Blood Updated:  02/02/20 1325     proBNP 618.1 pg/mL     Narrative:       Among patients with dyspnea, NT-proBNP is highly sensitive for the detection of acute congestive heart failure. In addition NT-proBNP of <300 pg/ml effectively rules out acute congestive heart failure with 99% negative predictive value.    Results may be falsely  decreased if patient taking Biotin.      Basic Metabolic Panel [[id number]]  (Abnormal) Collected:  02/02/20 1219    Specimen:  Blood Updated:  02/02/20 1300     Glucose 197 mg/dL      BUN 75 mg/dL      Creatinine 1.57 mg/dL      Sodium 138 mmol/L      Potassium 4.5 mmol/L      Chloride 102 mmol/L      CO2 24.0 mmol/L      Calcium 9.2 mg/dL      eGFR Non African Amer 33 mL/min/1.73      BUN/Creatinine Ratio 47.8     Anion Gap 12.0 mmol/L     Narrative:       GFR Normal >60  Chronic Kidney Disease <60  Kidney Failure <15      CBC & Differential [[id number]] Collected:  02/02/20 1219    Specimen:  Blood Updated:  02/02/20 1257    Narrative:       The following orders were created for panel order CBC & Differential.  Procedure                               Abnormality         Status                     ---------                               -----------         ------                     CBC Auto Differential[[id number]]        Abnormal            Final result                 Please view results for these tests on the individual orders.    CBC Auto Differential [[id number]]  (Abnormal) Collected:  02/02/20 1219    Specimen:  Blood Updated:  02/02/20 1257     WBC 9.00 10*3/mm3      RBC 1.80 10*6/mm3      Hemoglobin 5.6 g/dL      Hematocrit 16.4 %      MCV 91.2 fL      MCH 31.2 pg      MCHC 34.3 g/dL      RDW 29.5 %      RDW-SD 88.8 fl      MPV 7.7 fL      Platelets 262 10*3/mm3      Neutrophil % 80.0 %      Lymphocyte % 8.6 %      Monocyte % 7.8 %      Eosinophil % 2.3 %      Basophil % 1.3 %      Neutrophils, Absolute 7.20 10*3/mm3      Lymphocytes, Absolute 0.80 10*3/mm3      Monocytes, Absolute 0.70 10*3/mm3      Eosinophils, Absolute 0.20 10*3/mm3      Basophils, Absolute 0.10 10*3/mm3      nRBC 0.1 /100 WBC     Narrative:       Appended report. These results have been appended to a previously verified report.    Scan Slide [[id number]] Collected:  02/02/20 1219    Specimen:  Blood Updated:  02/02/20 1257      Anisocytosis Large/3+     Basophilic Stippling Slight/1+     Elliptocytes Slight/1+     Polychromasia Slight/1+     RBC Fragments Slight/1+     WBC Morphology Normal     Platelet Morphology Normal    Narrative:       Slide Reviewed      Protime-INR [[id number]]  (Normal) Collected:  02/02/20 1219    Specimen:  Blood Updated:  02/02/20 1243     Protime 28.3 Seconds      INR 3.00    D-dimer, Quantitative [[id number]]  (Normal) Collected:  02/02/20 1219    Specimen:  Blood Updated:  02/02/20 1243     D-Dimer, Quantitative 0.20 MCGFEU/mL     Narrative:       Reference Range  --------------------------------------------------------------------     < 0.50   Negative Predictive Value  0.50-0.59   Indeterminate    >= 0.60   Probable VTE             A very low percentage of patients with DVT may yield D-Dimer results   below the cut-off of 0.50 MCGFEU/mL.  This is known to be more   prevalent in patients with distal DVT.             Results of this test should always be interpreted in conjunction with   the patient's medical history, clinical presentation and other   findings.  Clinical diagnosis should not be based on the result of   IN[place]OVANCE D-Dimer alone.           Pending Results: PT/INR    Imaging Reviewed:   Xr Chest 1 View    Result Date: 2/2/2020  1.No radiographic findings of acute cardiopulmonary abnormality. 2.Left basilar opacities and suspected left effusion seen on the prior exam from December 2019 appear to have resolved in the interval.  Electronically Signed By-DR. Meliton Merrill MD On:2/2/2020 12:49 PM This report was finalized on [id number] by DR. Meliton Merrill MD.             Assessment/Plan   ASSESSMENT  1. Acute on chronic anemia/ROSEANNE- hemoglobin fell acutely with documented ROSEANNE on recent iron studies.  Recent small bowel study on 1/20-GI consulted.  Recommend to transfuse PRN hemoglobin less than 7.5-2 units 2/2 and 1 unit 2/30.  Venofer given 2/3.  2. CKD stage III- contributing to anemia.  Will  start Procrit if needed after ROSEANNE has been corrected.  3. Status post CABG, MVR, AVR/A. fib/aortic stenosis-on chronic anticoagulation, currently on hold.  4. HLD/HTN/hypothyroidism/diabetes mellitis-per PMD.    PLAN  1. Transfuse PRN hemoglobin less than 7.5-1 unit today  2. Await GI consultation  3. Venofer x1  4. Monitor INR.  5. Resume Coumadin when hemoglobin stabilizes.    Note initiated by TIM Pollard.  Pt seen and examined by Dr. Spencer.   Electronically signed by TIM Skelton, 02/03/20, 10:29 AM.    Thank you for this consult. We will be happy to follow along with you.   I saw the patient examined her discussed with nurse practitioner updated her note.  Discussed with patient's nursing.  Transfuse 1 more unit of blood today.  Noted endoscopy findings.  Continue warfarin since she had valve replacement.  Patient will benefit from monthly iron infusions on a scheduled basis.  Check CBC in a.m.  Discussed with Dr. Mendoza

## 2020-02-03 NOTE — OP NOTE
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 A physical exam was performed and vital signs were monitored throughout the procedure.    An upper GI endoscope was placed into the mouth and proceeded through the esophagus, stomach, duodenum without difficulty. The scope was then retroflexed and the fundus was visualized. The procedure was not difficult and there were no immediate complications.    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

## 2020-02-03 NOTE — PLAN OF CARE
EGD done with cauterization of AVM, receiving 1 unit of blood. Will continue to monitor blood counts for further drop.

## 2020-02-03 NOTE — CONSULTS
GI CONSULT  NOTE:    Referring Provider:  Dr. Mendoza    Chief complaint: Weakness, anemia    Subjective .     History of present illness: Patient is a 65-year-old female with history of A. fib on Coumadin, heart valve replacement, chronic kidney disease, CHF, and diabetes who presented to the hospital with increasing weakness and shortness of air.  The symptoms are similar to worsening anemia that she has experienced recently.  She is moving her bowels daily with the help of stool softeners.  She has seemed to have slight difficulty since starting on iron.  Stools are dark on iron.  She denies bright red blood per rectum.  No complaints of abdominal pain or nausea/vomiting.  She denies heartburn.  She does not take NSAIDs but is on Coumadin.      Endo History:  1/20/2020 M2A -few erosions versus questionable small bowel AVMs in the proximal small bowel, no active bleeding seen.  12/2019 EGD/colonoscopy by Dr. Castellanos - normal EGD, diverticulosis, hemorrhoids.      Past Medical History:  Past Medical History:   Diagnosis Date   • A-fib (CMS/[facility]) Dx 2018    S/p Cardioversion by Dr. Spencer on 04/10/19   • Acute renal failure syndrome (CMS/HCC) 4/2019-F IP   • Biatrial enlargement 04/10/2019    Noted on SHERIE   • Calcified granuloma of lung (CMS/HCC) 01/04/2019    Noted on Chest XR   • Cardiomegaly 01/04/2019    Borderline--Noted on Chest XR   • CHF (congestive heart failure) (CMS/[facility])    • CKD (chronic kidney disease), stage III (CMS/[facility])     Does Not See a Nephrologist   • Diabetes mellitus (CMS/[facility])    • DM (diabetes mellitus) (CMS/[facility])     T2   • Dysphagia 08/15-F IP    Due to Pharyngitis   • GERD (gastroesophageal reflux disease)    • History of chest x-ray 8/28/15-F    Normal   • History of chest x-ray 01/19/2019    Mild Pulmonary Edema w/Congestive Failure Noted   • History of chest x-ray 01/04/2019    Borderline Cardiomegaly    • History of EKG 2014/2018/2019-F   • Hx of diabetic neuropathy    • Hx of dizziness      w/Lightheadedness   • Hx of echocardiogram 4/16/18-[facility]    EF 55-60%; Mild MVR; Mild TVR; Normal Root/No Effusion   • Hyperlipidemia     Controlled w/Meds   • Hypertension     Controlled w/Meds   • Hypokalemia 4/11/19-[facility] IP   • Hypothyroidism     Controlled w/Meds   • Iron deficiency anemia    • Left posterior fascicular block 04/2018 & 4/19    Noted on EKG   • Lower extremity edema 03/2019   • Mild mitral valve regurgitation 04/16/2018    Noted on Echo   • Mild tricuspid valve regurgitation 04/16/2018    Noted on Echo   • Moderate mitral valve regurgitation 2008    S/p MVR in 08'   • Moderate tricuspid insufficiency 04/10/2019    Noted on SHERIE   • Osteoarthritis     Knees w/Hx Knee Repl   • Pulmonary edema 01/19/2019    Mild--Noted on Chest XR   • Rapid palpitations 04/15/18 & 8/9/18-[facility] ER   • Renal dysfunction    • Right axis deviation 08/2018 & 4/19    Noted on EKG   • Severe aortic valve stenosis Since 2008    Hx AVR on 12/11/08 by Dr. Alvarado   • Severe mitral insufficiency 04/10/2019    Noted on SHERIE   • Sinus tachycardia 08/2018    Noted on EKG   • SOB (shortness of breath) chronic   • Torticollis Chronic   • Valvular heart disease     S/p AVR & MVR in 08'       Past Surgical History:  Past Surgical History:   Procedure Laterality Date   • AORTIC VALVE REPAIR/REPLACEMENT  12/11/08-[facility]    Using a #20 Eight Yr Mechanical [place] Prosthesis--Dewey Alvarado MD   • CARDIOVERSION  4/10/19-[facility]    Dr. Spencer--For A-Fib   • COLONOSCOPY N/A 12/5/2019    Procedure: COLONOSCOPY;  Surgeon: Dustin Castellanos MD;  Location: [facility] ENDOSCOPY;  Service: Gastroenterology   • ENDOSCOPY N/A 12/5/2019    Procedure: ESOPHAGOGASTRODUODENOSCOPY;  Surgeon: Dustin Castellanos MD;  Location: [facility] ENDOSCOPY;  Service: Gastroenterology   • HYSTERECTOMY     • MITRAL VALVE REPLACEMENT  2008   • MITRAL VALVE REPLACEMENT  05/09/2019    Dr Alvarado   • SHERIE  4/10/19-[facility]    EF 40%; Moderate TVR; Severe Eccentric MI; Biatrial Enlargement   • TOTAL KNEE  ARTHROPLASTY  2017   • TRICUSPID VALVE SURGERY  05/09/2019    Dr Alvarado       Social History:  Social History     Tobacco Use   • Smoking status: Never Smoker   • Smokeless tobacco: Never Used   Substance Use Topics   • Alcohol use: No   • Drug use: No       Family History:  Family History   Problem Relation Age of Onset   • Heart disease Father    • Hypertension Father    • Stroke Father    • Diabetes Father    • Lung cancer Mother        Medications:  Medications Prior to Admission   Medication Sig Dispense Refill Last Dose   • aspirin 81 MG tablet Take 81 mg by mouth Daily.   Taking   • atorvastatin (LIPITOR) 40 MG tablet Take 40 mg by mouth Daily.   Taking   • B Complex Vitamins (VITAMIN B COMPLEX) tablet Take 1 capsule by mouth Daily.   Taking   • cholecalciferol (VITAMIN D3) 10 MCG (400 UNIT) tablet Take 2,000 Units by mouth Daily.   Taking   • ezetimibe (ZETIA) 10 MG tablet Take 1 tablet by mouth daily.   Taking   • ferrous sulfate 325 (65 FE) MG tablet Take 325 mg by mouth 2 (Two) Times a Day.      • furosemide (LASIX) 40 MG tablet Take 40 mg by mouth Daily.   Taking   • Insulin Degludec (TRESIBA) 100 UNIT/ML solution injection Inject 10 Units under the skin into the appropriate area as directed every night at bedtime.      • levothyroxine (SYNTHROID, LEVOTHROID) 112 MCG tablet Take 112 mcg by mouth Daily.   Taking   • Liraglutide (VICTOZA) 18 MG/3ML solution pen-injector Inject 1.8 mg under the skin into the appropriate area as directed Daily.   Taking   • MAGNESIUM PO Take 1 tablet by mouth Daily.      • metFORMIN ER (GLUCOPHAGE-XR) 500 MG 24 hr tablet Take 500 mg by mouth Daily.      • metoprolol succinate XL (TOPROL-XL) 25 MG 24 hr tablet Take 12.5 mg by mouth Daily.   Taking   • Multiple Vitamin (MULTI-VITAMIN) tablet Take 1 tablet by mouth daily.   Taking   • omeprazole (PriLOSEC) 20 MG capsule Take 20 mg by mouth Daily.   Taking   • potassium chloride (K-DUR,KLOR-CON) 20 MEQ CR tablet Take 20 mEq by  "mouth Daily.   Taking   • valsartan (DIOVAN) 80 MG tablet Take 40 mg by mouth Daily.      • vitamin C (ASCORBIC ACID) 500 MG tablet Take 500 mg by mouth Daily.   Taking   • warfarin (COUMADIN) 5 MG tablet Take 5 mg by mouth 3 (Three) Times a Week. Every Monday, Wednesday, and Friday      • warfarin (COUMADIN) 7.5 MG tablet Take 7.5 mg by mouth 4 (Four) Times a Week. Every Tuesday, Thursday, Saturday, and Sunday          Scheduled Meds:  atorvastatin 40 mg Oral Nightly   furosemide 40 mg Oral Daily   insulin glargine 15 Units Subcutaneous QAM   levothyroxine 112 mcg Oral Q AM   losartan 25 mg Oral Q24H   metoprolol succinate XL 12.5 mg Oral Daily   potassium chloride 20 mEq Oral Daily   vitamin C 500 mg Oral Daily     Continuous Infusions:   PRN Meds:.[COMPLETED] Insert peripheral IV **AND** sodium chloride    ALLERGIES:  Patient has no known allergies.    ROS:  Review of Systems   Constitutional: Negative for chills and fever.   HENT: Negative for congestion and trouble swallowing.    Respiratory: Positive for shortness of breath. Negative for cough.    Cardiovascular: Negative for chest pain and palpitations.   Gastrointestinal: Positive for constipation. Negative for abdominal pain, nausea and vomiting.   Genitourinary: Negative for difficulty urinating and dysuria.   Musculoskeletal: Negative for arthralgias and back pain.   Neurological: Positive for weakness. Negative for dizziness.   Psychiatric/Behavioral: Negative for agitation and confusion.       Objective     Vital Signs:   Visit Vitals  BP 90/46 (BP Location: Left arm, Patient Position: Lying)   Pulse 76   Temp 97.9 °F (36.6 °C) (Oral)   Resp 18   Ht 162.6 cm (64\")   Wt 85.7 kg (188 lb 15 oz)   LMP  (LMP Unknown)   SpO2 97%   BMI 32.43 kg/m²       Physical Exam:      General Appearance:    Awake and alert, in no acute distress   Head:    Normocephalic, without obvious abnormality, atraumatic   Eyes:            Conjunctivae normal, anicteric sclera   "   Ears:    Ears appear intact with no abnormalities noted   Throat:   No oral lesions, no thrush, oral mucosa moist   Neck:   No adenopathy, supple, no thyromegaly, no JVD   Lungs:     Respirations regular, even and unlabored       Chest Wall:    No abnormalities observed   Abdomen:     Soft, non-tender, no rebound or guarding, non-distended, no hepatosplenomegaly   Rectal:     Deferred   Extremities:   Moves all extremities well, no edema, no cyanosis, no             redness   Pulses:   Pulses palpable and equal bilaterally   Skin:   No bleeding, bruising or rash, no jaundice   Lymph nodes:   No palpable adenopathy   Neurologic:   Cranial nerves 2 - 12 grossly intact, no asterixis, sensation intact       Results Review:   I reviewed the patient's labs and imaging.  Lab Results (last 24 hours)     Procedure Component Value Units Date/Time    POC Glucose Once [[id number]]  (Abnormal) Collected:  02/03/20 0730    Specimen:  Blood Updated:  02/03/20 0733     Glucose 152 mg/dL      Comment: Serial Number: [id number]:  [id number]       Hemoglobin & Hematocrit, Blood [[id number]]  (Abnormal) Collected:  02/03/20 0410    Specimen:  Blood Updated:  02/03/20 0445     Hemoglobin 7.2 g/dL      Hematocrit 21.2 %      Comment: Result checked        POC Glucose Once [[id number]]  (Abnormal) Collected:  02/02/20 2021    Specimen:  Blood Updated:  02/02/20 2022     Glucose 226 mg/dL      Comment: Serial Number: [id number]:  [id number]       Troponin [[id number]]  (Normal) Collected:  02/02/20 1219    Specimen:  Blood Updated:  02/02/20 1325     Troponin T <0.010 ng/mL     Narrative:       Troponin T Reference Range:  <= 0.03 ng/mL-   Negative for AMI  >0.03 ng/mL-     Abnormal for myocardial necrosis.  Clinicians would have to utilize clinical acumen, EKG, Troponin and serial changes to determine if it is an Acute Myocardial Infarction or myocardial injury due to an underlying chronic condition.       Results may be falsely  decreased if patient taking Biotin.      BNP [[id number]]  (Normal) Collected:  02/02/20 1219    Specimen:  Blood Updated:  02/02/20 1325     proBNP 618.1 pg/mL     Narrative:       Among patients with dyspnea, NT-proBNP is highly sensitive for the detection of acute congestive heart failure. In addition NT-proBNP of <300 pg/ml effectively rules out acute congestive heart failure with 99% negative predictive value.    Results may be falsely decreased if patient taking Biotin.      Basic Metabolic Panel [[id number]]  (Abnormal) Collected:  02/02/20 1219    Specimen:  Blood Updated:  02/02/20 1300     Glucose 197 mg/dL      BUN 75 mg/dL      Creatinine 1.57 mg/dL      Sodium 138 mmol/L      Potassium 4.5 mmol/L      Chloride 102 mmol/L      CO2 24.0 mmol/L      Calcium 9.2 mg/dL      eGFR Non African Amer 33 mL/min/1.73      BUN/Creatinine Ratio 47.8     Anion Gap 12.0 mmol/L     Narrative:       GFR Normal >60  Chronic Kidney Disease <60  Kidney Failure <15      CBC & Differential [[id number]] Collected:  02/02/20 1219    Specimen:  Blood Updated:  02/02/20 1257    Narrative:       The following orders were created for panel order CBC & Differential.  Procedure                               Abnormality         Status                     ---------                               -----------         ------                     CBC Auto Differential[[id number]]        Abnormal            Final result                 Please view results for these tests on the individual orders.    CBC Auto Differential [[id number]]  (Abnormal) Collected:  02/02/20 1219    Specimen:  Blood Updated:  02/02/20 1257     WBC 9.00 10*3/mm3      RBC 1.80 10*6/mm3      Hemoglobin 5.6 g/dL      Hematocrit 16.4 %      MCV 91.2 fL      MCH 31.2 pg      MCHC 34.3 g/dL      RDW 29.5 %      RDW-SD 88.8 fl      MPV 7.7 fL      Platelets 262 10*3/mm3      Neutrophil % 80.0 %      Lymphocyte % 8.6 %      Monocyte % 7.8 %      Eosinophil % 2.3 %      Basophil %  1.3 %      Neutrophils, Absolute 7.20 10*3/mm3      Lymphocytes, Absolute 0.80 10*3/mm3      Monocytes, Absolute 0.70 10*3/mm3      Eosinophils, Absolute 0.20 10*3/mm3      Basophils, Absolute 0.10 10*3/mm3      nRBC 0.1 /100 WBC     Narrative:       Appended report. These results have been appended to a previously verified report.    Scan Slide [[id number]] Collected:  02/02/20 1219    Specimen:  Blood Updated:  02/02/20 1257     Anisocytosis Large/3+     Basophilic Stippling Slight/1+     Elliptocytes Slight/1+     Polychromasia Slight/1+     RBC Fragments Slight/1+     WBC Morphology Normal     Platelet Morphology Normal    Narrative:       Slide Reviewed      Protime-INR [[id number]]  (Normal) Collected:  02/02/20 1219    Specimen:  Blood Updated:  02/02/20 1243     Protime 28.3 Seconds      INR 3.00    D-dimer, Quantitative [[id number]]  (Normal) Collected:  02/02/20 1219    Specimen:  Blood Updated:  02/02/20 1243     D-Dimer, Quantitative 0.20 MCGFEU/mL     Narrative:       Reference Range  --------------------------------------------------------------------     < 0.50   Negative Predictive Value  0.50-0.59   Indeterminate    >= 0.60   Probable VTE             A very low percentage of patients with DVT may yield D-Dimer results   below the cut-off of 0.50 MCGFEU/mL.  This is known to be more   prevalent in patients with distal DVT.             Results of this test should always be interpreted in conjunction with   the patient's medical history, clinical presentation and other   findings.  Clinical diagnosis should not be based on the result of   INNOVANCE D-Dimer alone.          Imaging Results (Last 24 Hours)     Procedure Component Value Units Date/Time    XR Chest 1 View [[id number]] Collected:  02/02/20 1247     Updated:  02/02/20 1251    Narrative:       DATE OF EXAM:  2/2/2020 12:35 PM     PROCEDURE:  XR CHEST 1 VW-     INDICATIONS:  soa     COMPARISON:  12/03/2019     TECHNIQUE:   Single radiographic view  of the chest was obtained.     FINDINGS:  Previously seen left basilar opacities and left pleural effusion appear  to have resolved. No significant new focal or diffuse infiltrate is  seen. There is a stable granuloma in the left upper lobe near the apex.  No effusion or pneumothorax is identified.  Status post median  sternotomy with valve replacement, as before. Heart size and mediastinal  contour appear grossly unchanged.  No acute osseous abnormality is  identified on this limited single view.       Impression:       1.No radiographic findings of acute cardiopulmonary abnormality.  2.Left basilar opacities and suspected left effusion seen on the prior  exam from December 2019 appear to have resolved in the interval.     Electronically Signed By-DR. Meliton Merrill MD On:2/2/2020 12:49 PM  This report was finalized on [id number] by DR. Meliton Merrill MD.             ASSESSMENT AND PLAN:    Severe anemia  Recent M2A suggesting small bowel AVMs  A. Fib  History of CABG/valve replacement  Chronic kidney disease  CHF  Diabetes    Plan:  65-year-old female presented with increasing weakness and shortness of air which is similar to previous symptoms when hemoglobin is dropping.  Recently had EGD/colonoscopy showing no bleeding source.  Outpatient M2A suggested small bowel AVMs.  Patient is on Coumadin and INR was 3 yesterday.  She was given vitamin K yesterday and Coumadin is on hold.  Plan to check stat INR this morning and consider EGD with push enteroscopy either today or in the morning.  Monitor H&H and transfuse as needed.  She has received 2 units of packed red blood cells.  Continue supportive care.    I discussed the patients findings and my recommendations with the patient.  Zoraida Alvarenga, APRN  02/03/20  9:57 AM

## 2020-02-03 NOTE — PROGRESS NOTES
Discharge Planning Assessment   [facility]     Patient Name: Jocelin Parra  MRN: [id number]  Today's Date: 2/3/2020    Admit Date: 2/2/2020    Discharge Needs Assessment     Row Name 02/03/20 1303       Living Environment    Lives With  alone    Current Living Arrangements  home/apartment/condo    Primary Care Provided by  self    Able to Return to Prior Arrangements  yes       Resource/Environmental Concerns    Resource/Environmental Concerns  none    Transportation Concerns  car, none       Transition Planning    Patient/Family Anticipates Transition to  home    Patient/Family Anticipated Services at Transition  none    Transportation Anticipated  car, drives self       Discharge Needs Assessment    Readmission Within the Last 30 Days  no previous admission in last 30 days    Concerns to be Addressed  no discharge needs identified    Equipment Currently Used at Home  cane, straight    Anticipated Changes Related to Illness  none    Equipment Needed After Discharge  none        Discharge Plan     Row Name 02/03/20 1304       Plan    Plan  Routine d/c to home   PCP Emilie Cruz                Demographic Summary     Row Name 02/03/20 1303       General Information    Admission Type  observation    Arrived From  emergency department    Required Notices Provided  Observation Status Notice    Referral Source  admission list    Reason for Consult  discharge planning    Preferred Language  English        Functional Status     Row Name 02/03/20 1303       Functional Status, IADL    Medications  independent    Meal Preparation  independent    Housekeeping  independent    Laundry  independent    Shopping  independent        DC Barriers - Plan for EGD today     Mary Mazariegos RN, CM  Office Phone [phone]  Cell [phone]

## 2020-02-04 ENCOUNTER — INPATIENT HOSPITAL (OUTPATIENT)
Dept: URBAN - METROPOLITAN AREA HOSPITAL 84 | Facility: HOSPITAL | Age: 65
End: 2020-02-04
Payer: MEDICARE

## 2020-02-04 VITALS
WEIGHT: 187.83 LBS | HEART RATE: 78 BPM | DIASTOLIC BLOOD PRESSURE: 50 MMHG | HEIGHT: 64 IN | BODY MASS INDEX: 32.07 KG/M2 | OXYGEN SATURATION: 96 % | TEMPERATURE: 97.4 F | SYSTOLIC BLOOD PRESSURE: 107 MMHG | RESPIRATION RATE: 18 BRPM

## 2020-02-04 DIAGNOSIS — K55.20 ANGIODYSPLASIA OF COLON WITHOUT HEMORRHAGE: ICD-10-CM

## 2020-02-04 DIAGNOSIS — R06.00 DYSPNEA, UNSPECIFIED: ICD-10-CM

## 2020-02-04 DIAGNOSIS — D50.0 IRON DEFICIENCY ANEMIA SECONDARY TO BLOOD LOSS (CHRONIC): ICD-10-CM

## 2020-02-04 LAB
ABO + RH BLD: NORMAL
BH BB BLOOD EXPIRATION DATE: NORMAL
BH BB BLOOD TYPE BARCODE: 5100
BH BB DISPENSE STATUS: NORMAL
BH BB PRODUCT CODE: NORMAL
BH BB UNIT NUMBER: NORMAL
DEPRECATED RDW RBC AUTO: 71.3 FL (ref 37–54)
ERYTHROCYTE [DISTWIDTH] IN BLOOD BY AUTOMATED COUNT: 22.5 % (ref 12.3–15.4)
GLUCOSE BLDC GLUCOMTR-MCNC: 177 MG/DL (ref 70–105)
GLUCOSE BLDC GLUCOMTR-MCNC: 297 MG/DL (ref 70–105)
HCT VFR BLD AUTO: 25.9 % (ref 34–46.6)
HGB BLD-MCNC: 9 G/DL (ref 12–15.9)
INR PPP: 1.17 (ref 2–3)
MCH RBC QN AUTO: 31.6 PG (ref 26.6–33)
MCHC RBC AUTO-ENTMCNC: 34.8 G/DL (ref 31.5–35.7)
MCV RBC AUTO: 90.6 FL (ref 79–97)
PLATELET # BLD AUTO: 216 10*3/MM3 (ref 140–450)
PMV BLD AUTO: 7.6 FL (ref 6–12)
PROTHROMBIN TIME: 11.9 SECONDS (ref 19.4–28.5)
RBC # BLD AUTO: 2.85 10*6/MM3 (ref 3.77–5.28)
UNIT  ABO: NORMAL
UNIT  RH: NORMAL
WBC NRBC COR # BLD: 10.9 10*3/MM3 (ref 3.4–10.8)

## 2020-02-04 PROCEDURE — 97165 OT EVAL LOW COMPLEX 30 MIN: CPT

## 2020-02-04 PROCEDURE — 99214 OFFICE O/P EST MOD 30 MIN: CPT | Performed by: INTERNAL MEDICINE

## 2020-02-04 PROCEDURE — 63710000001 INSULIN LISPRO (HUMAN) PER 5 UNITS: Performed by: HOSPITALIST

## 2020-02-04 PROCEDURE — 82962 GLUCOSE BLOOD TEST: CPT

## 2020-02-04 PROCEDURE — 99217 PR OBSERVATION CARE DISCHARGE MANAGEMENT: CPT | Performed by: HOSPITALIST

## 2020-02-04 PROCEDURE — 85610 PROTHROMBIN TIME: CPT | Performed by: HOSPITALIST

## 2020-02-04 PROCEDURE — G0378 HOSPITAL OBSERVATION PER HR: HCPCS

## 2020-02-04 PROCEDURE — 97161 PT EVAL LOW COMPLEX 20 MIN: CPT

## 2020-02-04 PROCEDURE — 63710000001 INSULIN GLARGINE PER 5 UNITS: Performed by: INTERNAL MEDICINE

## 2020-02-04 PROCEDURE — 85027 COMPLETE CBC AUTOMATED: CPT | Performed by: HOSPITALIST

## 2020-02-04 PROCEDURE — 99232 SBSQ HOSP IP/OBS MODERATE 35: CPT | Performed by: NURSE PRACTITIONER

## 2020-02-04 RX ORDER — NICOTINE POLACRILEX 4 MG
15 LOZENGE BUCCAL
Status: DISCONTINUED | OUTPATIENT
Start: 2020-02-04 | End: 2020-02-04 | Stop reason: HOSPADM

## 2020-02-04 RX ORDER — DEXTROSE MONOHYDRATE 25 G/50ML
25 INJECTION, SOLUTION INTRAVENOUS
Status: DISCONTINUED | OUTPATIENT
Start: 2020-02-04 | End: 2020-02-04 | Stop reason: HOSPADM

## 2020-02-04 RX ADMIN — METOPROLOL SUCCINATE 12.5 MG: 25 TABLET, EXTENDED RELEASE ORAL at 10:22

## 2020-02-04 RX ADMIN — INSULIN GLARGINE 15 UNITS: 100 INJECTION, SOLUTION SUBCUTANEOUS at 10:21

## 2020-02-04 RX ADMIN — LOSARTAN POTASSIUM 25 MG: 25 TABLET, FILM COATED ORAL at 10:22

## 2020-02-04 RX ADMIN — POTASSIUM CHLORIDE 20 MEQ: 1500 TABLET, EXTENDED RELEASE ORAL at 10:22

## 2020-02-04 RX ADMIN — Medication 10 ML: at 10:26

## 2020-02-04 RX ADMIN — OXYCODONE HYDROCHLORIDE AND ACETAMINOPHEN 500 MG: 500 TABLET ORAL at 10:22

## 2020-02-04 RX ADMIN — PANTOPRAZOLE SODIUM 40 MG: 40 TABLET, DELAYED RELEASE ORAL at 05:51

## 2020-02-04 RX ADMIN — FUROSEMIDE 40 MG: 40 TABLET ORAL at 10:22

## 2020-02-04 RX ADMIN — LEVOTHYROXINE SODIUM 112 MCG: 112 TABLET ORAL at 05:51

## 2020-02-04 RX ADMIN — INSULIN LISPRO 4 UNITS: 100 INJECTION, SOLUTION INTRAVENOUS; SUBCUTANEOUS at 14:00

## 2020-02-04 NOTE — PLAN OF CARE
Problem: Patient Care Overview  Goal: Plan of Care Review  Outcome: Ongoing (interventions implemented as appropriate)  Note:   Pt is resting in bed. Pt had a blood transfusion of 1 unit last night. Waiting on hgb results. No complaints from pt or acute changes over night.   Goal: Individualization and Mutuality  Outcome: Ongoing (interventions implemented as appropriate)  Goal: Discharge Needs Assessment  Outcome: Ongoing (interventions implemented as appropriate)  Goal: Interprofessional Rounds/Family Conf  Outcome: Ongoing (interventions implemented as appropriate)     Problem: Fall Risk (Adult)  Goal: Identify Related Risk Factors and Signs and Symptoms  Outcome: Ongoing (interventions implemented as appropriate)  Goal: Absence of Fall  Outcome: Ongoing (interventions implemented as appropriate)     Problem: Activity Intolerance (Adult)  Goal: Identify Related Risk Factors and Signs and Symptoms  Outcome: Ongoing (interventions implemented as appropriate)  Goal: Activity Tolerance  Outcome: Ongoing (interventions implemented as appropriate)  Goal: Effective Energy Conservation Techniques  Outcome: Ongoing (interventions implemented as appropriate)

## 2020-02-04 NOTE — PROGRESS NOTES
LOS: 0 days   Patient Care Team:  Emilie Cruz APRN as PCP - General  Roldan Solano MD as Consulting Physician (Cardiology)  Ney Lai MD as Consulting Physician (Nephrology)      Subjective     Subjective: Patient denies any rectal bleeding.  No abdominal pain.  Tolerating healthy heart diet.      ROS:   Review of Systems   Constitutional: Negative for chills and fever.   HENT: Negative for congestion and trouble swallowing.    Respiratory: Positive for shortness of breath. Negative for cough.    Cardiovascular: Negative for chest pain and palpitations.   Gastrointestinal: Negative for abdominal pain, nausea and vomiting.   Musculoskeletal: Negative for arthralgias and back pain.   Neurological: Negative for dizziness and weakness.   Psychiatric/Behavioral: Negative for agitation and confusion.        Medication Review:   Scheduled Meds:  atorvastatin 40 mg Oral Nightly   furosemide 40 mg Oral Daily   insulin glargine 15 Units Subcutaneous QAM   levothyroxine 112 mcg Oral Q AM   losartan 25 mg Oral Q24H   metoprolol succinate XL 12.5 mg Oral Daily   pantoprazole 40 mg Oral Q AM   potassium chloride 20 mEq Oral Daily   vitamin C 500 mg Oral Daily   warfarin 7.5 mg Oral Daily     Continuous Infusions:  Pharmacy to dose warfarin     sodium chloride 9 mL/hr Last Rate: Stopped (02/03/20 1503)     PRN Meds:.ondansetron **OR** ondansetron  •  Pharmacy to dose warfarin  •  [COMPLETED] Insert peripheral IV **AND** sodium chloride      Objective     Vital Signs  Temp:  [97.5 °F (36.4 °C)-98.7 °F (37.1 °C)] 98.1 °F (36.7 °C)  Heart Rate:  [72-90] 89  Resp:  [14-21] 20  BP: (102-128)/(47-64) 118/50    Physical Exam:    General Appearance:    Awake and alert, in no acute distress   Head:    Normocephalic, without obvious abnormality   Eyes:          Conjunctivae normal, anicteric sclera   Ears:    Hearing intact   Throat:   No oral lesions, no thrush, oral mucosa moist   Neck:   No adenopathy,  supple, no JVD   Lungs:     Respirations regular, even and unlabored       Abdomen:     Soft, non-tender, no rebound or guarding, non-distended, no hepatosplenomegaly   Rectal:     Deferred   Extremities:   No edema, no cyanosis, no redness   Skin:   No bleeding, bruising or rash, no jaundice   Neurologic:   Cranial nerves 2 - 12 grossly intact, no asterixis, sensation   intact        Results Review:    Lab Results (last 24 hours)     Procedure Component Value Units Date/Time    CBC (No Diff) [[id number]]  (Abnormal) Collected:  02/04/20 0821    Specimen:  Blood Updated:  02/04/20 0910     WBC 10.90 10*3/mm3      RBC 2.85 10*6/mm3      Hemoglobin 9.0 g/dL      Hematocrit 25.9 %      MCV 90.6 fL      MCH 31.6 pg      MCHC 34.8 g/dL      RDW 22.5 %      RDW-SD 71.3 fl      MPV 7.6 fL      Platelets 216 10*3/mm3     POC Glucose Once [[id number]]  (Abnormal) Collected:  02/04/20 0733    Specimen:  Blood Updated:  02/04/20 0736     Glucose 177 mg/dL      Comment: Serial Number: [id number]:  [id number]       Protime-INR [[id number]]  (Abnormal) Collected:  02/04/20 0227    Specimen:  Blood Updated:  02/04/20 0318     Protime 11.9 Seconds      INR 1.17    POC Glucose Once [[id number]]  (Abnormal) Collected:  02/03/20 2009    Specimen:  Blood Updated:  02/03/20 2012     Glucose 185 mg/dL      Comment: Serial Number: [id number]:  [id number]       T4, Free [[id number]]  (Normal) Collected:  02/02/20 1219    Specimen:  Blood Updated:  02/03/20 1933     Free T4 1.12 ng/dL     Narrative:       Results may be falsely increased if patient taking Biotin.      POC Glucose Once [[id number]]  (Abnormal) Collected:  02/03/20 1612    Specimen:  Blood Updated:  02/03/20 1614     Glucose 142 mg/dL      Comment: Serial Number: [id number]:  [id number]       POC Glucose Once [[id number]]  (Abnormal) Collected:  02/03/20 1411    Specimen:  Blood Updated:  02/03/20 1412     Glucose 149 mg/dL      Comment: Serial Number:  [id number]:  [id number]       Protime-INR [[id number]]  (Abnormal) Collected:  02/03/20 1011    Specimen:  Blood Updated:  02/03/20 1156     Protime 14.9 Seconds      INR 1.51          Imaging Results (Last 24 Hours)     ** No results found for the last 24 hours. **            Assessment/Plan     Severe anemia  Recent M2A suggesting small bowel AVMs  A. Fib  History of CABG/valve replacement  Chronic kidney disease  CHF  Diabetes     Plan:  Status post EGD 2/3/2020 by Dr. Akhtar -2 nonbleeding jejunal angiectasia's status post APC.  Patient denies any rectal bleeding.  She is doing well and tolerating healthy heart diet.  Hemoglobin today is 9.  Patient can be discharged home later today and recommend that she have weekly CBCs.  She may need referral for double-balloon enteroscopy.  Continue PPI.    Zoraida Alvarenga, APRN  02/04/20  11:31 AM

## 2020-02-04 NOTE — THERAPY EVALUATION
Acute Care - Occupational Therapy Initial Evaluation   Dario     Patient Name: Jocelin Parra  : 1955  MRN: [id number]  Today's Date: 2020             Admit Date: 2020       ICD-10-CM ICD-9-CM   1. Anemia, unspecified type D64.9 285.9   2. Dyspnea, unspecified type R06.00 786.09   3. Iron deficiency anemia due to chronic blood loss D50.0 280.0     Patient Active Problem List   Diagnosis   • Aortic stenosis   • Hyperlipidemia   • Hypertension   • Diabetes mellitus (CMS/HCC)   • Hypothyroidism   • Osteoarthritis   • S/P s/p reop sternotomy mechanical MVR (25 mm [place] prosthesis) per Dr. Alvarado 2019   • S/P mechanical AVR (prior surgery per Dr. Alvarado)   • S/P tricuspid valve repair (28 mm Physio tricuspid ring) per Dr. Alvarado 2019   • S/P Maze operation for atrial fibrillation   • S/P CABG x 2 (reop sternotomy with lysis of adhesions) (LIMA to LAD, SVG to PDA) per Dr. Alvarado 2019   • History of aortic valve replacement with bioprosthetic valve [Z95.3]   • History of mitral valve replacement with bioprosthetic valve [Z95.3]   • Atrial fibrillation (CMS/HCC) [I48.91]   • Long term (current) use of anticoagulants [Z79.01]   • Anemia   • Atherosclerotic heart disease of native coronary artery without angina pectoris   • Chronic kidney disease, stage 3 (moderate) (CMS/HCC)   • Diabetes mellitus type 2, insulin dependent (CMS/HCC)   • Diabetic peripheral neuropathy (CMS/HCC)   • Dyspnea   • Edema   • Endocarditis   • Goiter, nontoxic, multinodular   • Chronic systolic heart failure (CMS/HCC)   • Hypokalemia   • Iron deficiency anemia   • Presence of prosthetic heart valve   • Sinusitis   • Thyroid nodule   • Weight gain   • Aortic valve stenosis   • Paroxysmal atrial fibrillation (CMS/HCC)   • Obesity (BMI 30-39.9)   • Anemia due to stage 3 chronic kidney disease (CMS/HCC)   • Malabsorption   • Iron deficiency anemia due to chronic blood loss     Past Medical History:   Diagnosis Date   •  A-fib (CMS/[facility]) Dx 2018    S/p Cardioversion by Dr. Spencer on 04/10/19   • Acute renal failure syndrome (CMS/HCC) 4/2019-[facility] IP   • Biatrial enlargement 04/10/2019    Noted on SHERIE   • Calcified granuloma of lung (CMS/HCC) 01/04/2019    Noted on Chest XR   • Cardiomegaly 01/04/2019    Borderline--Noted on Chest XR   • CHF (congestive heart failure) (CMS/[facility])    • CKD (chronic kidney disease), stage III (CMS/[facility])     Does Not See a Nephrologist   • Diabetes mellitus (CMS/[facility])    • DM (diabetes mellitus) (CMS/[facility])     T2   • Dysphagia 08/15-[facility] IP    Due to Pharyngitis   • GERD (gastroesophageal reflux disease)    • History of chest x-ray 8/28/15-[facility]    Normal   • History of chest x-ray 01/19/2019    Mild Pulmonary Edema w/Congestive Failure Noted   • History of chest x-ray 01/04/2019    Borderline Cardiomegaly    • History of EKG 2014/2018/2019-[facility]   • Hx of diabetic neuropathy    • Hx of dizziness     w/Lightheadedness   • Hx of echocardiogram 4/16/18-[facility]    EF 55-60%; Mild MVR; Mild TVR; Normal Root/No Effusion   • Hyperlipidemia     Controlled w/Meds   • Hypertension     Controlled w/Meds   • Hypokalemia 4/11/19-[facility] IP   • Hypothyroidism     Controlled w/Meds   • Iron deficiency anemia    • Left posterior fascicular block 04/2018 & 4/19    Noted on EKG   • Lower extremity edema 03/2019   • Mild mitral valve regurgitation 04/16/2018    Noted on Echo   • Mild tricuspid valve regurgitation 04/16/2018    Noted on Echo   • Moderate mitral valve regurgitation 2008    S/p MVR in 08'   • Moderate tricuspid insufficiency 04/10/2019    Noted on SHERIE   • Osteoarthritis     Knees w/Hx Knee Repl   • Pulmonary edema 01/19/2019    Mild--Noted on Chest XR   • Rapid palpitations 04/15/18 & 8/9/18-[facility] ER   • Renal dysfunction    • Right axis deviation 08/2018 & 4/19    Noted on EKG   • Severe aortic valve stenosis Since 2008    Hx AVR on 12/11/08 by Dr. Alvarado   • Severe mitral insufficiency 04/10/2019    Noted on SHERIE   • Sinus  tachycardia 08/2018    Noted on EKG   • SOB (shortness of breath) chronic   • Torticollis Chronic   • Valvular heart disease     S/p AVR & MVR in 08'     Past Surgical History:   Procedure Laterality Date   • AORTIC VALVE REPAIR/REPLACEMENT  12/11/08-[facility]    Using a #20 Eight Yr Mechanical [place]. Bethel Prosthesis--Dewey Alvarado MD   • CARDIOVERSION  4/10/19-[facility]    Dr. Spencer--For A-Fib   • COLONOSCOPY N/A 12/5/2019    Procedure: COLONOSCOPY;  Surgeon: Dustin Castellanos MD;  Location: [facility] ENDOSCOPY;  Service: Gastroenterology   • ENDOSCOPY N/A 12/5/2019    Procedure: ESOPHAGOGASTRODUODENOSCOPY;  Surgeon: Dustin Castellanos MD;  Location: [facility] ENDOSCOPY;  Service: Gastroenterology   • ENTEROSCOPY SMALL BOWEL N/A 2/3/2020    Procedure: ESOPHAGOGASTRODUODENOSCOPY WITH SMALL BOWEL ENTEROSCOPY and argonplasma coagulation of two small jejunal ateriovenous malformations;  Surgeon: Dexter Akhtar MD;  Location: [facility] ENDOSCOPY;  Service: Gastroenterology;  Laterality: N/A;  jejunal ateriovenous malformation   • HYSTERECTOMY     • MITRAL VALVE REPLACEMENT  2008   • MITRAL VALVE REPLACEMENT  05/09/2019    Dr Alvarado   • SHERIE  4/10/19-[facility]    EF 40%; Moderate TVR; Severe Eccentric MI; Biatrial Enlargement   • TOTAL KNEE ARTHROPLASTY  2017   • TRICUSPID VALVE SURGERY  05/09/2019    Dr Alvarado          OT ASSESSMENT FLOWSHEET (last 12 hours)      Occupational Therapy Evaluation     Row Name 02/04/20 1041                   OT Evaluation Time/Intention    Document Type  evaluation  -SR        Mode of Treatment  occupational therapy  -SR        Comment  Pt lives at home alone.  She is indpendent with all activity and uses a cane for long distances.    -SR           General Information    Patient Profile Reviewed?  yes  -SR        Prior Level of Function  independent:  -SR        Pertinent History of Current Functional Problem  Pt admitted for low hgb with GI bleed.    -SR           Relationship/Environment    Lives With  alone  -SR            Resource/Environmental Concerns    Current Living Arrangements  home/apartment/condo  -SR           Home Main Entrance    Number of Stairs, Main Entrance  none  -SR           Cognitive Assessment/Intervention- PT/OT    Orientation Status (Cognition)  oriented x 4  -SR           Safety Issues, Functional Mobility    Impairments Affecting Function (Mobility)  strength;endurance/activity tolerance  -SR           Bed Mobility Assessment/Treatment    Bed Mobility Assessment/Treatment  -- up in chair  -SR           Functional Mobility    Functional Mobility- Ind. Level  contact guard assist  -SR        Functional Mobility- Comment  Cane with HHA.  Anticipate she would do better with rwx   -SR           Sit-Stand Transfer    Sit-Stand [place] (Transfers)  independent  -SR           ADL Assessment/Intervention    BADL Assessment/Intervention  lower body dressing;grooming  -SR           Lower Body Dressing Assessment/Training    Comment (Lower Body Dressing)  Reports she is able to do this in low sitting chair   -SR           Grooming Assessment/Training    [place] Level (Grooming)  oral care regimen;supervision  -SR        Grooming Position  sink side  -SR           General ROM    GENERAL ROM COMMENTS  BUE WFL   -SR           MMT (Manual Muscle Testing)    General MMT Comments  BUE 4/5  -SR           Static Sitting Balance    Level of [place] (Unsupported Sitting, Static Balance)  independent  -SR           Dynamic Sitting Balance    Level of [place], Reaches Outside Midline (Sitting, Dynamic Balance)  independent  -SR           Static Standing Balance    Level of [place] (Supported Standing, Static Balance)  supervision  -SR           Dynamic Standing Balance    Level of [place], Reaches Outside Midline (Standing, Dynamic Balance)  contact guard assist  -SR           Positioning and Restraints    Pre-Treatment Position  sitting in chair/recliner  -SR        Post Treatment Position  chair  -SR         In Chair  reclined;call light within reach;encouraged to call for assist;exit alarm on  -SR           Plan of Care Review    Outcome Summary  Pt is 64 y/o female admitted for anemia with GI bleed.  She has hx of torticollis and has difficulty looking to L side.  She exhibits mild general weakness and impaired balance. Anticipate improvment using rwx.  Able to complete standing ADLs with superivison.  She would benefit from HH therapy to improve strength and dc.    -SR           Clinical Impression (OT)    Therapy Frequency (OT Eval)  evaluation only  -SR        Anticipated Discharge Disposition (OT)  home with home health  -SR          User Key  (r) = Recorded By, (t) = Taken By, (c) = Cosigned By    Initials Name Effective Dates    Kesha Troy OT 03/01/19 -                OT Recommendation and Plan  Outcome Summary/Treatment Plan (OT)  Anticipated Discharge Disposition (OT): home with home health  Therapy Frequency (OT Eval): evaluation only  Outcome Summary: Pt is 64 y/o female admitted for anemia with GI bleed.  She has hx of torticollis and has difficulty looking to L side.  She exhibits mild general weakness and impaired balance. Anticipate improvment using rwx.  Able to complete standing ADLs with superivison.  She would benefit from HH therapy to improve strength and dc.          Time Calculation:   Time Calculation- OT     Row Name 02/04/20 1650 02/04/20 1649          Time Calculation- OT    OT Start Time  1035  -SR  1041  -SR     OT Stop Time  1055  -SR  1055  -SR     OT Time Calculation (min)  20 min  -SR  14 min  -SR     Total Timed Code Minutes- OT  0 minute(s)  -SR  0 minute(s)  -SR     OT Non-Billable Time (min)  20 min  -SR  14 min  -SR     OT Received On  --  02/04/20  -SR       User Key  (r) = Recorded By, (t) = Taken By, (c) = Cosigned By    Initials Name Provider Type    Kesha Troy, OT Occupational Therapist        Therapy Charges for Today     Code Description  Service Date Service Provider Modifiers Qty    87467603156 HC OT EVAL LOW COMPLEXITY 3 2/4/2020 Kesha Tobin, OT GO 1               Kesha Tobin, OT  2/4/2020

## 2020-02-04 NOTE — DISCHARGE SUMMARY
[facility] Medicine Services  DISCHARGE SUMMARY        Prepared For PCP:  Emilie Cruz APRN    Patient Name: Jocelin Parra  : 1955  MRN: [id number]      Date of Admission:   2020    Date of Discharge:  2020    Length of stay:  LOS: 0 days     Hospital Course     Presenting Problem:   Anemia, unspecified type [D64.9]  Dyspnea, unspecified type [R06.00]      Active Hospital Problems    Diagnosis  POA   • **Anemia [D64.9]  Yes   • Iron deficiency anemia due to chronic blood loss [D50.0]  Yes   • Atrial fibrillation (CMS/HCC) [I48.91] [I48.91]  Yes   • Long term (current) use of anticoagulants [Z79.01] [Z79.01]  Not Applicable   • S/P CABG x 2 (reop sternotomy with lysis of adhesions) (LIMA to LAD, SVG to PDA) per Dr. Alvarado 2019 [Z95.1]  Not Applicable   • Hypertension [I10]  Yes   • Hyperlipidemia [E78.5]  Yes   • Diabetes mellitus (CMS/HCC) [E11.9]  Yes   • Hypothyroidism [E03.9]  Yes      Resolved Hospital Problems   No resolved problems to display.           Hospital Course:  Jocelin Parra is a 65 y.o. female who reported to the emergency department that she had been feeling weak for several days.  She denied any abdominal pain or bleeding per rectum or melena, however work-up in the emergency room revealed a hemoglobin of 5.6.     Record review: History of recent admission 12/3/2019-2019 with low hemoglobin.  Patient underwent extensive work-up which included EGD and colonoscopy, revealing diverticulosis with hemorrhoids, no active bleeding was noted.  Patient did require transfusion and anemia work-up at that time. Patient was seen by GI and hematology.     Date: 2/3/2020: Patient reports no specific complaints at this time.  She reports that she has been using a cane recently because of the lightheadedness when she walks.  She also reports chronic torticollis of her neck which deviates to the left.  EGD with small bowel enteroscopy was performed and  showed none bleeding jejunal angioma ectasias that were cauterized by argon plasma coagulation.  2/4/2020: No current complaints.  Patient's hemoglobin remained stable and she denies any further melena or bright red blood per rectum.  He is now felt to be stable for discharge.             Recommendation for Outpatient Providers:     Close follow-up for management of INR to maintain between 2.5 and 3 for mechanical heart valves.  Current INR 1.17 due to Coumadin being held after admission for GI bleeding.  Patient received first dose of Coumadin after resolution of bleeding on 2/3/2020 of 7.5 mg.  She will receive another 7.5 mg of Coumadin today.          Day of Discharge     HPI: Patient denies current complaints.    Vital Signs:   Temp:  [97.5 °F (36.4 °C)-98.7 °F (37.1 °C)] 98.1 °F (36.7 °C)  Heart Rate:  [72-90] 89  Resp:  [14-21] 20  BP: (102-128)/(47-64) 118/50     Physical Exam:  Physical Exam   Well-developed well-nourished female in no acute distress sitting up in chair comfortable on room air awake and alert; mucous membranes moist; sclerae anicteric; lungs clear to auscultation bilaterally; CV regular rate and rhythm; abdomen soft nontender nondistended with active bowel sounds; extremities with no edema, cyanosis or calf tenderness; palpable pedal pulses bilaterally; no Heaton catheter.    Pertinent  and/or Most Recent Results     Results from last 7 days   Lab Units 02/04/20  0821 02/03/20  0410 02/02/20  1219   WBC 10*3/mm3 10.90*  --  9.00   HEMOGLOBIN g/dL 9.0* 7.2* 5.6*   HEMATOCRIT % 25.9* 21.2* 16.4*   PLATELETS 10*3/mm3 216  --  262   SODIUM mmol/L  --   --  138   POTASSIUM mmol/L  --   --  4.5   CHLORIDE mmol/L  --   --  102   CO2 mmol/L  --   --  24.0   BUN mg/dL  --   --  75*   CREATININE mg/dL  --   --  1.57*   GLUCOSE mg/dL  --   --  197*   CALCIUM mg/dL  --   --  9.2     Results from last 7 days   Lab Units 02/04/20  0227 02/03/20  1011 02/02/20  1219   PROTIME Seconds 11.9* 14.9* 28.3   INR   1.17* 1.51* 3.00           Invalid input(s): TG, LDLCALC, LDLREALC  Results from last 7 days   Lab Units 02/02/20  1219   PROBNP pg/mL 618.1   TROPONIN T ng/mL <0.010       Brief Urine Lab Results  (Last result in the past 365 days)      Color   Clarity   Blood   Leuk Est   Nitrite   Protein   CREAT   Urine HCG        01/02/20 1156 Yellow Clear Negative Trace Negative Negative               Microbiology Results Abnormal     None          Xr Chest 1 View    Result Date: 2/2/2020  Impression: 1.No radiographic findings of acute cardiopulmonary abnormality. 2.Left basilar opacities and suspected left effusion seen on the prior exam from December 2019 appear to have resolved in the interval.  Electronically Signed By-DR. Meliton Merrill MD On:2/2/2020 12:49 PM This report was finalized on [id number] by DR. Meliton Merrill MD.      Results for orders placed in visit on 04/15/19   SCANNED VASCULAR STUDIES       Results for orders placed in visit on 04/15/19   SCANNED VASCULAR STUDIES       Results for orders placed in visit on 04/10/19   SCANNED - ECHOCARDIOGRAM               Test Results Pending at Discharge        Procedures Performed  Procedure(s):  ESOPHAGOGASTRODUODENOSCOPY WITH SMALL BOWEL ENTEROSCOPY and argonplasma coagulation of two small jejunal ateriovenous malformations         Consults:   Consults     Date and Time Order Name Status Description    2/2/2020 1559 IP Consult to Hematology and Oncology Completed     2/2/2020 1559 Inpatient Gastroenterology Consult      2/2/2020 1309 Hospitalist (on-call MD unless specified) Completed             Discharge Details        Discharge Medications      Continue These Medications      Instructions Start Date   aspirin 81 MG tablet   81 mg, Oral, Daily      atorvastatin 40 MG tablet  Commonly known as:  LIPITOR   40 mg, Oral, Daily      cholecalciferol 10 MCG (400 UNIT) tablet  Commonly known as:  VITAMIN D3   2,000 Units, Oral, Daily      DIOVAN 80 MG tablet  Generic  drug:  valsartan   40 mg, Oral, Daily      ferrous sulfate 325 (65 FE) MG tablet   325 mg, Oral, 2 Times Daily      furosemide 40 MG tablet  Commonly known as:  LASIX   40 mg, Oral, Daily      levothyroxine 112 MCG tablet  Commonly known as:  SYNTHROID, LEVOTHROID   112 mcg, Oral, Daily      MAGNESIUM PO   1 tablet, Oral, Daily      metFORMIN  MG 24 hr tablet  Commonly known as:  GLUCOPHAGE-XR   500 mg, Oral, Daily      metoprolol succinate XL 25 MG 24 hr tablet  Commonly known as:  TOPROL-XL   12.5 mg, Oral, Daily      MULTI-VITAMIN tablet   1 tablet, Oral, Daily      omeprazole 20 MG capsule  Commonly known as:  priLOSEC   20 mg, Oral, Daily      potassium chloride 20 MEQ CR tablet  Commonly known as:  K-DUR,KLOR-CON   20 mEq, Oral, Daily      TRESIBA 100 UNIT/ML solution injection  Generic drug:  Insulin Degludec   10 Units, Subcutaneous, Every Night at Bedtime      VICTOZA 18 MG/3ML solution pen-injector injection  Generic drug:  Liraglutide   1.8 mg, Subcutaneous, Daily      Vitamin B Complex tablet   1 capsule, Oral, Daily      vitamin C 500 MG tablet  Commonly known as:  ASCORBIC ACID   500 mg, Oral, Daily      warfarin 7.5 MG tablet  Commonly known as:  COUMADIN   7.5 mg, Oral, 4 Times Weekly, Every Tuesday, Thursday, Saturday, and Sunday      warfarin 5 MG tablet  Commonly known as:  COUMADIN   5 mg, Oral, 3 Times Weekly, Every Monday, Wednesday, and Friday       ZETIA 10 MG tablet  Generic drug:  ezetimibe   1 tablet, Oral, Daily             No Known Allergies      Discharge Disposition:  Home or Self Care    Diet:  Hospital:  Diet Order   Procedures   • Diet Cardiac; Healthy Heart         Discharge Activity:   Activity Instructions     Activity as Tolerated              CODE STATUS:    Code Status and Medical Interventions:   Ordered at: 02/02/20 2571     Level Of Support Discussed With:    Patient     Code Status:    CPR     Medical Interventions (Level of Support Prior to Arrest):    Full                  Follow-up Appointments  Future Appointments   Date Time Provider Department Center   2/25/2020 12:30 PM Nav Rizzo MD [facility]   2/25/2020 12:30 PM LAB MD [facility]   2/27/2020 10:30 AM PROTIME, [facility]   6/12/2020 11:40 AM Roldan Solano MD [facility]       Additional Instructions for the Follow-ups that You Need to Schedule     Call MD With Problems / Concerns   As directed      Instructions: Call [phone] or email [email or web address] for problems or concerns.    Order Comments:  Instructions: Call [phone] or email [email or web address] for problems or concerns.          Discharge Follow-up with PCP   As directed       Currently Documented PCP:    Emilie Cruz APRN    PCP Phone Number:    [phone]     Follow Up Details:  2-3 days                 Condition on Discharge:      Stable          Electronically signed by Faye Mendoza MD, 02/04/20, 12:39 PM.    Time: I spent 85 minutes on this discharge activity which included face-to-face encounter with the patient/reviewing the data in the system/coordination of the care with the nursing staff as well as consultants/documentation/entering orders.

## 2020-02-04 NOTE — PROGRESS NOTES
Hematology/Oncology Inpatient Progress Note    PATIENT NAME: Jocelin Parra  : 1955  MRN: [id number]    CHIEF COMPLAINT: anemia    HISTORY OF PRESENT ILLNESS:  65 y.o. female who was admitted through the ER on 2020 reporting shortness of air for 2 days associated with weakness and a cough.  Chest x-ray was negative for any acute abnormality.  INR was 3.0.  Hemoglobin 5.6, MCV 91.2.  She was treated with 2 units PRBCs and vitamin K 5 mg IV.  Outpatient medications included aspirin, oral iron, multivitamin, Prilosec and Coumadin.  She was on chronic anticoagulation for history of A. fib, status post MVR and AVR.  GI was consulted.     20  Hematology/Oncology was consulted was consulted as she is an established patient who we follow for her anemia.  She was initially seen in consultation during her 2019 admission for severe anemia.  Hemoglobin 6.1, MCV 63.4.  B12/folate were normal and SPEP showed no monoclonal gammopathy.  She underwent an EGD/colonoscopy that showed no active bleeding, diverticulosis and hemorrhoids were seen.  Small bowel AVM was in the differential for the etiology of her ROSEANNE.  Her creatinine was elevated with ACD due to CKD contributing to her anemia.  After discharge, she was treated with Injectafer on  and 2019.  She was seen in follow-up on 2020 with a hemoglobin of 9.8, MCV 87 and creatinine 1.69.  She reported a small bowel study performed on 2020.  She was prescribed ferrous sulfate 325 mg daily.  It was planned to start Procrit in the future if needed to keep hemoglobin greater than 10.    · -EGD with small bowel enteroscopy performed by Dr. Akhtar.  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  Treated with 1 unit PRBCs and venofer.    History of present illness reviewed and changes noted since most recent visit.     PCP: Emilie Cruz APRN    Subjective   Feeling better after transfusion. Denies visible blood loss. Has  "dark but not black stools.  ROS:  Review of Systems   Constitutional: Negative for diaphoresis, fatigue, fever and unexpected weight change.   HENT: Negative for congestion and nosebleeds.    Eyes: Negative.    Respiratory: Negative for cough and shortness of breath.    Cardiovascular: Negative for chest pain and leg swelling.   Gastrointestinal: Negative for abdominal pain, blood in stool, constipation, diarrhea, nausea and vomiting.   Endocrine: Negative for cold intolerance and heat intolerance.   Genitourinary: Negative for dysuria and hematuria.   Musculoskeletal: Negative for arthralgias and joint swelling.   Skin: Negative for rash and wound.   Neurological: Negative for numbness and headaches.   Hematological: Does not bruise/bleed easily.   Psychiatric/Behavioral: Negative for confusion. The patient is not nervous/anxious.    All other systems reviewed and are negative.       MEDICATIONS:    Scheduled Meds:      atorvastatin 40 mg Oral Nightly   furosemide 40 mg Oral Daily   insulin glargine 15 Units Subcutaneous QAM   levothyroxine 112 mcg Oral Q AM   losartan 25 mg Oral Q24H   metoprolol succinate XL 12.5 mg Oral Daily   pantoprazole 40 mg Oral Q AM   potassium chloride 20 mEq Oral Daily   vitamin C 500 mg Oral Daily   warfarin 7.5 mg Oral Daily      Continuous Infusions:      Pharmacy to dose warfarin     sodium chloride 9 mL/hr Last Rate: Stopped (02/03/20 1503)      PRN Meds:  ondansetron **OR** ondansetron  •  Pharmacy to dose warfarin  •  [COMPLETED] Insert peripheral IV **AND** sodium chloride     ALLERGIES:  No Known Allergies    Objective    VITALS:   /50 (BP Location: Right arm, Patient Position: Sitting)   Pulse 89   Temp 98.1 °F (36.7 °C) (Oral)   Resp 20   Ht 162.6 cm (64\")   Wt 85.2 kg (187 lb 13.3 oz)   LMP  (LMP Unknown)   SpO2 93%   BMI 32.24 kg/m²     PHYSICAL EXAM:  Physical Exam   Constitutional: She is oriented to person, place, and time. She appears well-developed and " well-nourished.   HENT:   Head: Normocephalic and atraumatic.   Mouth/Throat: Oropharynx is clear and moist.   Eyes: Pupils are equal, round, and reactive to light. Conjunctivae, EOM and lids are normal.   Neck: Normal range of motion. Neck supple. No thyromegaly present.   Cardiovascular: Normal rate, regular rhythm and normal heart sounds.   No murmur heard.  Pulmonary/Chest: Effort normal and breath sounds normal. No respiratory distress.   Abdominal: Soft. Normal appearance and bowel sounds are normal. She exhibits no distension.   Genitourinary:   Genitourinary Comments: Deferred.   Musculoskeletal: Normal range of motion. She exhibits no edema.   Lymphadenopathy:     She has no cervical adenopathy.     She has no axillary adenopathy.        Right: No supraclavicular adenopathy present.        Left: No supraclavicular adenopathy present.   Neurological: She is alert and oriented to person, place, and time.   Skin: Skin is warm and dry. Capillary refill takes less than 2 seconds. No bruising, no petechiae and no rash noted.   Psychiatric: She has a normal mood and affect. Her speech is normal and behavior is normal. Judgment and thought content normal. Cognition and memory are normal.   Nursing note and vitals reviewed.        RECENT LABS:  Lab Results (last 24 hours)     Procedure Component Value Units Date/Time    CBC (No Diff) [[id number]]  (Abnormal) Collected:  02/04/20 0821    Specimen:  Blood Updated:  02/04/20 0910     WBC 10.90 10*3/mm3      RBC 2.85 10*6/mm3      Hemoglobin 9.0 g/dL      Hematocrit 25.9 %      MCV 90.6 fL      MCH 31.6 pg      MCHC 34.8 g/dL      RDW 22.5 %      RDW-SD 71.3 fl      MPV 7.6 fL      Platelets 216 10*3/mm3     POC Glucose Once [[id number]]  (Abnormal) Collected:  02/04/20 0733    Specimen:  Blood Updated:  02/04/20 0736     Glucose 177 mg/dL      Comment: Serial Number: [id number]:  [id number]       Protime-INR [[id number]]  (Abnormal) Collected:  02/04/20 0227     Specimen:  Blood Updated:  02/04/20 0318     Protime 11.9 Seconds      INR 1.17    POC Glucose Once [[id number]]  (Abnormal) Collected:  02/03/20 2009    Specimen:  Blood Updated:  02/03/20 2012     Glucose 185 mg/dL      Comment: Serial Number: [id number]:  [id number]       T4, Free [[id number]]  (Normal) Collected:  02/02/20 1219    Specimen:  Blood Updated:  02/03/20 1933     Free T4 1.12 ng/dL     Narrative:       Results may be falsely increased if patient taking Biotin.      POC Glucose Once [[id number]]  (Abnormal) Collected:  02/03/20 1612    Specimen:  Blood Updated:  02/03/20 1614     Glucose 142 mg/dL      Comment: Serial Number: [id number]:  [id number]       POC Glucose Once [[id number]]  (Abnormal) Collected:  02/03/20 1411    Specimen:  Blood Updated:  02/03/20 1412     Glucose 149 mg/dL      Comment: Serial Number: [id number]:  [id number]       Protime-INR [[id number]]  (Abnormal) Collected:  02/03/20 1011    Specimen:  Blood Updated:  02/03/20 1156     Protime 14.9 Seconds      INR 1.51          PENDING RESULTS:     IMAGING REVIEWED:  Xr Chest 1 View    Result Date: 2/2/2020  1.No radiographic findings of acute cardiopulmonary abnormality. 2.Left basilar opacities and suspected left effusion seen on the prior exam from December 2019 appear to have resolved in the interval.  Electronically Signed By-DR. Meliton Merrill MD On:2/2/2020 12:49 PM This report was finalized on [id number] by DR. Meliton Merrill MD.      Assessment/Plan   ASSESSMENT:  2. Acute on chronic anemia/ROSEANNE due to GI blood loss- hemoglobin fell acutely with documented ROSEANNE on recent iron studies.  Recent small bowel study on 1/20-GI consulted.  Recommend to transfuse PRN hemoglobin less than 7.5-2 units 2/2 and 1 unit 2/3.  Venofer given 2/3.  EGD showed nonbleeding angiectasia's.  3. CKD stage III- contributing to anemia.  Will start Procrit if needed after ROSEANNE has been corrected.  4. Status post CABG, MVR, AVR/A.  fib/aortic stenosis-on chronic anticoagulation, currently on hold. INR has normalized.  5. HLD/HTN/hypothyroidism/diabetes mellitis-per PMD.     PLAN  1. Transfuse PRN hemoglobin less than 7.5.  2. Resume Coumadin when hemoglobin stabilizes.  3. Will plan for monthly iron infusions.     Note updated by TIM Pollard.  Pt seen and examined by Dr. Spencer.  Electronically signed by TIM Skelton, 02/04/20, 11:48 AM.        I saw the patient, examined her.  I reviewed NP note, updated , edited  Received blood transfusion and iron infusion  Ok to d/c home, f/u with me at [facility] next week, restart injectafer  Continue warfarin

## 2020-02-04 NOTE — THERAPY DISCHARGE NOTE
Patient Name: Jocelin Parra  : 1955    MRN: [id number]                              Today's Date: 2020       Admit Date: 2020    Visit Dx:     ICD-10-CM ICD-9-CM   1. Anemia, unspecified type D64.9 285.9   2. Dyspnea, unspecified type R06.00 786.09   3. Iron deficiency anemia due to chronic blood loss D50.0 280.0     Patient Active Problem List   Diagnosis   • Aortic stenosis   • Hyperlipidemia   • Hypertension   • Diabetes mellitus (CMS/[facility])   • Hypothyroidism   • Osteoarthritis   • S/P s/p reop sternotomy mechanical MVR (25 mm [place] prosthesis) per Dr. Alvarado 2019   • S/P mechanical AVR (prior surgery per Dr. Alvarado)   • S/P tricuspid valve repair (28 mm Physio tricuspid ring) per Dr. Alvarado 2019   • S/P Maze operation for atrial fibrillation   • S/P CABG x 2 (reop sternotomy with lysis of adhesions) (LIMA to LAD, SVG to PDA) per Dr. Alvarado 2019   • History of aortic valve replacement with bioprosthetic valve [Z95.3]   • History of mitral valve replacement with bioprosthetic valve [Z95.3]   • Atrial fibrillation (CMS/[facility]) [I48.91]   • Long term (current) use of anticoagulants [Z79.01]   • Anemia   • Atherosclerotic heart disease of native coronary artery without angina pectoris   • Chronic kidney disease, stage 3 (moderate) (CMS/HCC)   • Diabetes mellitus type 2, insulin dependent (CMS/HCC)   • Diabetic peripheral neuropathy (CMS/HCC)   • Dyspnea   • Edema   • Endocarditis   • Goiter, nontoxic, multinodular   • Chronic systolic heart failure (CMS/HCC)   • Hypokalemia   • Iron deficiency anemia   • Presence of prosthetic heart valve   • Sinusitis   • Thyroid nodule   • Weight gain   • Aortic valve stenosis   • Paroxysmal atrial fibrillation (CMS/HCC)   • Obesity (BMI 30-39.9)   • Anemia due to stage 3 chronic kidney disease (CMS/HCC)   • Malabsorption   • Iron deficiency anemia due to chronic blood loss     Past Medical History:   Diagnosis Date   • A-fib (CMS/HCC) Dx 2018    S/p  Cardioversion by Dr. Spencer on 04/10/19   • Acute renal failure syndrome (CMS/HCC) 4/2019-[facility] IP   • Biatrial enlargement 04/10/2019    Noted on SHERIE   • Calcified granuloma of lung (CMS/HCC) 01/04/2019    Noted on Chest XR   • Cardiomegaly 01/04/2019    Borderline--Noted on Chest XR   • CHF (congestive heart failure) (CMS/[facility])    • CKD (chronic kidney disease), stage III (CMS/[facility])     Does Not See a Nephrologist   • Diabetes mellitus (CMS/[facility])    • DM (diabetes mellitus) (CMS/[facility])     T2   • Dysphagia 08/15-[facility] IP    Due to Pharyngitis   • GERD (gastroesophageal reflux disease)    • History of chest x-ray 8/28/15-[facility]    Normal   • History of chest x-ray 01/19/2019    Mild Pulmonary Edema w/Congestive Failure Noted   • History of chest x-ray 01/04/2019    Borderline Cardiomegaly    • History of EKG 2014/2018/2019-[facility]   • Hx of diabetic neuropathy    • Hx of dizziness     w/Lightheadedness   • Hx of echocardiogram 4/16/18-[facility]    EF 55-60%; Mild MVR; Mild TVR; Normal Root/No Effusion   • Hyperlipidemia     Controlled w/Meds   • Hypertension     Controlled w/Meds   • Hypokalemia 4/11/19-[facility] IP   • Hypothyroidism     Controlled w/Meds   • Iron deficiency anemia    • Left posterior fascicular block 04/2018 & 4/19    Noted on EKG   • Lower extremity edema 03/2019   • Mild mitral valve regurgitation 04/16/2018    Noted on Echo   • Mild tricuspid valve regurgitation 04/16/2018    Noted on Echo   • Moderate mitral valve regurgitation 2008    S/p MVR in 08'   • Moderate tricuspid insufficiency 04/10/2019    Noted on SHERIE   • Osteoarthritis     Knees w/Hx Knee Repl   • Pulmonary edema 01/19/2019    Mild--Noted on Chest XR   • Rapid palpitations 04/15/18 & 8/9/18-[facility] ER   • Renal dysfunction    • Right axis deviation 08/2018 & 4/19    Noted on EKG   • Severe aortic valve stenosis Since 2008    Hx AVR on 12/11/08 by Dr. Alvarado   • Severe mitral insufficiency 04/10/2019    Noted on SHERIE   • Sinus tachycardia 08/2018    Noted on EKG   •  SOB (shortness of breath) chronic   • Torticollis Chronic   • Valvular heart disease     S/p AVR & MVR in 08'     Past Surgical History:   Procedure Laterality Date   • AORTIC VALVE REPAIR/REPLACEMENT  12/11/08-E    Using a #20 Eight Yr Mechanical [place] [place] Prosthesis--Dewey Alvarado MD   • CARDIOVERSION  4/10/19-[facility]    Dr. Spencer--For A-Fib   • COLONOSCOPY N/A 12/5/2019    Procedure: COLONOSCOPY;  Surgeon: Dustin Castellanos MD;  Location: [facility] ENDOSCOPY;  Service: Gastroenterology   • ENDOSCOPY N/A 12/5/2019    Procedure: ESOPHAGOGASTRODUODENOSCOPY;  Surgeon: Dustin Castellanos MD;  Location: [facility] ENDOSCOPY;  Service: Gastroenterology   • ENTEROSCOPY SMALL BOWEL N/A 2/3/2020    Procedure: ESOPHAGOGASTRODUODENOSCOPY WITH SMALL BOWEL ENTEROSCOPY and argonplasma coagulation of two small jejunal ateriovenous malformations;  Surgeon: Dexter Akhtar MD;  Location: [facility] ENDOSCOPY;  Service: Gastroenterology;  Laterality: N/A;  jejunal ateriovenous malformation   • HYSTERECTOMY     • MITRAL VALVE REPLACEMENT  2008   • MITRAL VALVE REPLACEMENT  05/09/2019    Dr Alvarado   • SHERIE  4/10/19-[facility]    EF 40%; Moderate TVR; Severe Eccentric MI; Biatrial Enlargement   • TOTAL KNEE ARTHROPLASTY  2017   • TRICUSPID VALVE SURGERY  05/09/2019    Dr Alvarado     General Information     Row Name 02/04/20 1258          PT Evaluation Time/Intention    Document Type  evaluation  -HD     Mode of Treatment  physical therapy  -HD     Row Name 02/04/20 1258          General Information    Patient Profile Reviewed?  yes  -HD     Prior Level of Function  independent:  -HD     Row Name 02/04/20 1258          Relationship/Environment    Lives With  alone  -HD     Row Name 02/04/20 1258          Resource/Environmental Concerns    Current Living Arrangements  home/apartment/condo  -HD     Row Name 02/04/20 1258          Home Main Entrance    Number of Stairs, Main Entrance  -- ramp to enter  -HD     Row Name 02/04/20 1258          Cognitive  Assessment/Intervention- PT/OT    Orientation Status (Cognition)  oriented x 4  -HD     Row Name 02/04/20 1258          Safety Issues, Functional Mobility    Impairments Affecting Function (Mobility)  strength;endurance/activity tolerance  -HD       User Key  (r) = Recorded By, (t) = Taken By, (c) = Cosigned By    Initials Name Provider Type    HD Irma Govea, PT Physical Therapist        Mobility     Row Name 02/04/20 1258          Bed Mobility Assessment/Treatment    Bed Mobility Assessment/Treatment  -- up in chair  -HD     Row Name 02/04/20 1258          Sit-Stand Transfer    Sit-Stand [place] (Transfers)  independent  -HD     Assistive Device (Sit-Stand Transfers)  walker, front-wheeled  -HD     Row Name 02/04/20 1258          Gait/Stairs Assessment/Training    Gait/Stairs Assessment/Training  gait/ambulation independence;gait/ambulation assistive device  -HD     [place] Level (Gait)  independent  -HD     Assistive Device (Gait)  walker, front-wheeled  -HD     Distance in Feet (Gait)  450 ft with two standing rest breaks  -HD     Comment (Gait/Stairs)  pt has torticollis, no safety deficits noted with RW  -HD       User Key  (r) = Recorded By, (t) = Taken By, (c) = Cosigned By    Initials Name Provider Type     Irma Govea, PT Physical Therapist        Obj/Interventions     Row Name 02/04/20 1300          General ROM    GENERAL ROM COMMENTS  BLEs WFL  -HD     Row Name 02/04/20 1300          MMT (Manual Muscle Testing)    General MMT Comments  BLEs grossly 4/5 MMT  -HD     Row Name 02/04/20 1300          Static Sitting Balance    Level of [place] (Unsupported Sitting, Static Balance)  independent  -HD     Sitting Position (Unsupported Sitting, Static Balance)  sitting in chair  -HD     Row Name 02/04/20 1300          Dynamic Sitting Balance    Level of [place], Reaches Outside Midline (Sitting, Dynamic Balance)  independent  -HD     Sitting Position, Reaches Outside Midline (Sitting,  Dynamic Balance)  sitting in chair  -HD     Row Name 02/04/20 1300          Static Standing Balance    Level of [place] (Supported Standing, Static Balance)  independent  -HD     Time Able to Maintain Position (Supported Standing, Static Balance)  2 to 3 minutes  -HD     Assistive Device Utilized (Supported Standing, Static Balance)  walker, rolling  -HD     Row Name 02/04/20 1300          Dynamic Standing Balance    Level of [place], Reaches Outside Midline (Standing, Dynamic Balance)  independent  -HD     Time Able to Maintain Position, Reaches Outside Midline (Standing, Dynamic Balance)  more than 5 minutes  -HD     Assistive Device Utilized (Supported Standing, Dynamic Balance)  walker, rolling  -HD       User Key  (r) = Recorded By, (t) = Taken By, (c) = Cosigned By    Initials Name Provider Type    HD Irma Govea, PT Physical Therapist        Goals/Plan    No documentation.       Clinical Impression     Row Name 02/04/20 1300          Pain Assessment    Additional Documentation  Pain Scale: Numbers Pre/Post-Treatment (Group)  -HD     [facility] Name 02/04/20 1300          Pain Scale: Numbers Pre/Post-Treatment    Pain Scale: Numbers, Pretreatment  0/10 - no pain  -HD     Pain Scale: Numbers, Post-Treatment  0/10 - no pain  -HD     Row Name 02/04/20 1300          Physical Therapy Clinical Impression    Patient/Family Goals Statement (PT Clinical Impression)  Pt is 66 yo female admitted for anemia, dyspnea.  Pt has torticollis impacting posture.    -HD     Criteria for Skilled Interventions Met (PT Clinical Impression)  no problems identified which require skilled intervention  -HD     Row Name 02/04/20 1300          Vital Signs    Post SpO2 (%)  96  -HD     O2 Delivery Post Treatment  room air  -HD     Row Name 02/04/20 1300          Positioning and Restraints    Pre-Treatment Position  sitting in chair/recliner  -HD     Post Treatment Position  chair  -HD     In Chair  notified nsg;call light within  reach;encouraged to call for assist;exit alarm on  -HD       User Key  (r) = Recorded By, (t) = Taken By, (c) = Cosigned By    Initials Name Provider Type    Irma Basilio, PT Physical Therapist        Outcome Measures    No documentation.         PT Recommendation and Plan     Outcome Summary/Treatment Plan (PT)  Anticipated Discharge Disposition (PT): home with home health  Plan of Care Reviewed With: patient  Outcome Summary: Pt is 66 yo female admitted for anemia, dyspnea.  Pt has torticollis impacting posture.  Pt able to complete transfers with indep and ambulate indep using RW x450 ft with two standing rest breaks.  No signs of LOB or safety concerns noted with ambulation using RW.  PT spoke with RN regarding pt's indep with mobility.  Pt can be up ad yamil using RW.  No further inpatient PT needs.  Plan home with HHPT and use of RW for safety with mobility at this time.     Time Calculation:   PT Charges     Row Name 02/04/20 1303             Time Calculation    Start Time  1119  -HD      Stop Time  1135  -HD      Time Calculation (min)  16 min  -HD      PT Received On  02/04/20  -HD         Time Calculation- PT    Total Timed Code Minutes- PT  0 minute(s)  -HD        User Key  (r) = Recorded By, (t) = Taken By, (c) = Cosigned By    Initials Name Provider Type    Irma Basilio, PT Physical Therapist        Therapy Charges for Today     Code Description Service Date Service Provider Modifiers Qty    34020178510 HC PT EVAL LOW COMPLEXITY 3 2/4/2020 Irma Govea, PT GP 1               PT Discharge Summary  Anticipated Discharge Disposition (PT): home with home health    Irma Govea PT  2/4/2020

## 2020-02-04 NOTE — SIGNIFICANT NOTE
Pt's vitals stable at this time. Pt has had no complaints throughout this shift. Pt to D/C home accompanied by car. Spoke to pt and friend at bedside.

## 2020-02-04 NOTE — PLAN OF CARE
Problem: Patient Care Overview  Goal: Plan of Care Review  Outcome: Outcome(s) achieved  Flowsheets (Taken 2/4/2020 1302)  Plan of Care Reviewed With: patient  Outcome Summary: Pt is 66 yo female admitted for anemia, dyspnea.  Pt has torticollis impacting posture.  Pt able to complete transfers with indep and ambulate indep using RW x450 ft with two standing rest breaks.  No signs of LOB or safety concerns noted with ambulation using RW.  PT spoke with RN regarding pt's indep with mobility.  Pt can be up ad yamil using RW.  No further inpatient PT needs.  Plan home with HHPT and use of RW for safety with mobility at this time.

## 2020-02-04 NOTE — PLAN OF CARE
Problem: Patient Care Overview  Goal: Individualization and Mutuality  Outcome: Ongoing (interventions implemented as appropriate)  Note:   Pt's hgb increased to 9.0 today 2/4. Vitals remain stable, no complaints at this time. Plan is to D/C home this afternoon.

## 2020-02-05 ENCOUNTER — READMISSION MANAGEMENT (OUTPATIENT)
Dept: CALL CENTER | Facility: HOSPITAL | Age: 65
End: 2020-02-05

## 2020-02-05 LAB
ABO + RH BLD: NORMAL
ABO + RH BLD: NORMAL
BH BB BLOOD EXPIRATION DATE: NORMAL
BH BB BLOOD EXPIRATION DATE: NORMAL
BH BB BLOOD TYPE BARCODE: 5100
BH BB BLOOD TYPE BARCODE: 5100
BH BB DISPENSE STATUS: NORMAL
BH BB DISPENSE STATUS: NORMAL
BH BB PRODUCT CODE: NORMAL
BH BB PRODUCT CODE: NORMAL
BH BB UNIT NUMBER: NORMAL
BH BB UNIT NUMBER: NORMAL
UNIT  ABO: NORMAL
UNIT  ABO: NORMAL
UNIT  RH: NORMAL
UNIT  RH: NORMAL

## 2020-02-05 NOTE — OUTREACH NOTE
Prep Survey      Responses   Facility patient discharged from?  [facility]   Is patient eligible?  Yes   Discharge diagnosis  Anemia, s/p EGD with small bowel enteroscopy, AFib, S/P CABG x2 2019, HTN, HLD, DM ,Hypothyroidism   Does the patient have one of the following disease processes/diagnoses(primary or secondary)?  Other   Does the patient have Home health ordered?  No   Is there a DME ordered?  No   Comments regarding appointments  See AVS   Prep survey completed?  Yes          Yasmin Sanches RN

## 2020-02-06 ENCOUNTER — READMISSION MANAGEMENT (OUTPATIENT)
Dept: CALL CENTER | Facility: HOSPITAL | Age: 65
End: 2020-02-06

## 2020-02-06 NOTE — OUTREACH NOTE
Medical Week 1 Survey      Responses   Facility patient discharged from?  [facility]   Does the patient have one of the following disease processes/diagnoses(primary or secondary)?  Other   Is there a successful TCM telephone encounter documented?  No   Week 1 attempt successful?  Yes   Call start time  1423   Call end time  1426   Meds reviewed with patient/caregiver?  Yes   Does the patient have all medications ordered at discharge?  N/A   Does the patient have a primary care provider?   Yes   Does the patient have an appointment with their PCP within 7 days of discharge?  Yes   Comments regarding PCP  PATIENT WAS IN HER PCP OFFICE FOR FOLLOW UP APPT AT THE TIME OF THIS CALL.    Has the patient kept scheduled appointments due by today?  Yes   Has home health visited the patient within 72 hours of discharge?  N/A   Did the patient receive a copy of their discharge instructions?  Yes   Nursing interventions  Reviewed instructions with patient   What is the patient's perception of their health status since discharge?  Improving   Is the patient/caregiver able to teach back signs and symptoms related to disease process for when to call PCP?  Yes   Is the patient/caregiver able to teach back signs and symptoms related to disease process for when to call 911?  Yes   Is the patient/caregiver able to teach back the hierarchy of who to call/visit for symptoms/problems? PCP, Specialist, Home health nurse, Urgent Care, ED, 911  Yes   Week 1 call completed?  Yes          Juanita Hagen LPN

## 2020-02-14 ENCOUNTER — READMISSION MANAGEMENT (OUTPATIENT)
Dept: CALL CENTER | Facility: HOSPITAL | Age: 65
End: 2020-02-14

## 2020-02-14 NOTE — OUTREACH NOTE
Medical Week 2 Survey      Responses   Facility patient discharged from?  [facility]   Does the patient have one of the following disease processes/diagnoses(primary or secondary)?  Other   Week 2 attempt successful?  Yes   Call start time  1130   Call end time  1133   Meds reviewed with patient/caregiver?  Yes   Does the patient have all medications ordered at discharge?  N/A   Is the patient taking all medications as directed (includes completed medication regime)?  Yes   Does the patient have a primary care provider?   Yes   Has the patient kept scheduled appointments due by today?  Yes   Has home health visited the patient within 72 hours of discharge?  N/A   Did the patient receive a copy of their discharge instructions?  Yes   Nursing interventions  Reviewed instructions with patient   What is the patient's perception of their health status since discharge?  Improving   Is the patient/caregiver able to teach back signs and symptoms related to disease process for when to call PCP?  Yes   Is the patient/caregiver able to teach back signs and symptoms related to disease process for when to call 911?  Yes   Is the patient/caregiver able to teach back the hierarchy of who to call/visit for symptoms/problems? PCP, Specialist, Home health nurse, Urgent Care, ED, 911  Yes   Week 2 Call Completed?  Yes   Graduated  Yes   Was the number of calls appropriate?  Yes          Juanita Hagen LPN

## 2020-02-18 RX ORDER — METOPROLOL SUCCINATE 25 MG/1
TABLET, EXTENDED RELEASE ORAL
Qty: 45 TABLET | Refills: 1 | Status: SHIPPED | OUTPATIENT
Start: 2020-02-18 | End: 2020-10-12

## 2020-02-20 NOTE — PROGRESS NOTES
Hematology/Oncology Outpatient Follow Up    Jocelin Parra  1955    Primary Care Physician: Emilie Cruz APRN  Referring Physician: Emilie Cruz APRN  Chief Complaint:  Anemia  Chronic renal failure stage II  History of Present Illness:   · I initially saw Ms. De Los Santos in consultation at [facility] when she was admitted through the emergency room on 12/3/2019 with severe anemia.  Patient required blood transfusion.  Patient has chronic history of kidney disease.    · CBC in the ED showed WBC 3.5, hemoglobin 6.1, MCV 63.4, RDW 18.6, and platelets 339,000. She received 1 unit pRBC.  Creatinine was 1.57.   · Patient is on chronic Coumadin for mechanical cardiac valve INR was 2.74 on admission on 12/3/2019    · Posttransfusion iron studies showed iron 24 and ferritin 21.2.  Vitamin B12 was 947 and folate was 18.6.    · 12/4/2019 SPEP negative for monoclonal gammopathy.  Serum creatinine 1.7 with EGFR of 30  · 12/5/2019 patient underwent colonoscopy and EGD at [facility]- No evidence of any active source of bleeding noticed in the upper at the lower GI tract except diverticulosis and hemorrhoids.  Possibility of a small bowel AVM or lesion leading to blood loss anemia is in the differential however patient denies any overt GI bleeding.  May consider small bowel evaluation with a PillCam if needed.  · 12/23/2019 and 12/30/2019 patient received 2 Injectafer infusions.  · 1/21/2020 - Erythropoietin 37.4, iron 47, iron saturation 13, TIBC 371, ferritin 681.80.  · 2/30/2020-EGD with small bowel enteroscopy performed by Dr. Akhtar.  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 Spencer on 04/10/19   • Acute renal failure syndrome (CMS/HCC) 4/2019-[facility] IP   • Biatrial enlargement 04/10/2019    Noted on SHERIE   • Calcified granuloma of lung (CMS/HCC) 01/04/2019    Noted on Chest XR   • Cardiomegaly 01/04/2019    Borderline--Noted on  Chest XR   • CHF (congestive heart failure) (CMS/[facility])    • CKD (chronic kidney disease), stage III (CMS/[facility])     Does Not See a Nephrologist   • Diabetes mellitus (CMS/[facility])    • DM (diabetes mellitus) (CMS/[facility])     T2   • Dysphagia 08/15-[facility] IP    Due to Pharyngitis   • GERD (gastroesophageal reflux disease)    • History of chest x-ray 8/28/15-[facility]    Normal   • History of chest x-ray 01/19/2019    Mild Pulmonary Edema w/Congestive Failure Noted   • History of chest x-ray 01/04/2019    Borderline Cardiomegaly    • History of EKG 2014/2018/2019-[facility]   • Hx of diabetic neuropathy    • Hx of dizziness     w/Lightheadedness   • Hx of echocardiogram 4/16/18-[facility]    EF 55-60%; Mild MVR; Mild TVR; Normal Root/No Effusion   • Hyperlipidemia     Controlled w/Meds   • Hypertension     Controlled w/Meds   • Hypokalemia 4/11/19-[facility] IP   • Hypothyroidism     Controlled w/Meds   • Iron deficiency anemia    • Left posterior fascicular block 04/2018 & 4/19    Noted on EKG   • Lower extremity edema 03/2019   • Mild mitral valve regurgitation 04/16/2018    Noted on Echo   • Mild tricuspid valve regurgitation 04/16/2018    Noted on Echo   • Moderate mitral valve regurgitation 2008    S/p MVR in 08'   • Moderate tricuspid insufficiency 04/10/2019    Noted on SHERIE   • Osteoarthritis     Knees w/Hx Knee Repl   • Pulmonary edema 01/19/2019    Mild--Noted on Chest XR   • Rapid palpitations 04/15/18 & 8/9/18-[facility] ER   • Renal dysfunction    • Right axis deviation 08/2018 & 4/19    Noted on EKG   • Severe aortic valve stenosis Since 2008    Hx AVR on 12/11/08 by Dr. Alvarado   • Severe mitral insufficiency 04/10/2019    Noted on SHERIE   • Sinus tachycardia 08/2018    Noted on EKG   • SOB (shortness of breath) chronic   • Torticollis Chronic   • Valvular heart disease     S/p AVR & MVR in 08'       Past Surgical History:   Procedure Laterality Date   • AORTIC VALVE REPAIR/REPLACEMENT  12/11/08-[facility]    Using a #20 Eight Yr Mechanical [place]  Prosthesis--Dewey Alvarado MD   • CARDIOVERSION  4/10/19-[facility]    Dr. Spencer--For A-Fib   • COLONOSCOPY N/A 12/5/2019    Procedure: COLONOSCOPY;  Surgeon: Dustin Castellanos MD;  Location: [facility] ENDOSCOPY;  Service: Gastroenterology   • ENDOSCOPY N/A 12/5/2019    Procedure: ESOPHAGOGASTRODUODENOSCOPY;  Surgeon: Dustin Castellanos MD;  Location: [facility] ENDOSCOPY;  Service: Gastroenterology   • ENTEROSCOPY SMALL BOWEL N/A 2/3/2020    Procedure: ESOPHAGOGASTRODUODENOSCOPY WITH SMALL BOWEL ENTEROSCOPY and argonplasma coagulation of two small jejunal ateriovenous malformations;  Surgeon: Dexter Akhtar MD;  Location: [facility] ENDOSCOPY;  Service: Gastroenterology;  Laterality: N/A;  jejunal ateriovenous malformation   • HYSTERECTOMY     • MITRAL VALVE REPLACEMENT  2008   • MITRAL VALVE REPLACEMENT  05/09/2019    Dr Alvarado   • SHERIE  4/10/19-[facility]    EF 40%; Moderate TVR; Severe Eccentric MI; Biatrial Enlargement   • TOTAL KNEE ARTHROPLASTY  2017   • TRICUSPID VALVE SURGERY  05/09/2019    Dr Alvarado         Current Outpatient Medications:   •  aspirin 81 MG tablet, Take 81 mg by mouth Daily., Disp: , Rfl:   •  atorvastatin (LIPITOR) 40 MG tablet, Take 40 mg by mouth Daily., Disp: , Rfl:   •  B Complex Vitamins (VITAMIN B COMPLEX) tablet, Take 1 capsule by mouth Daily., Disp: , Rfl:   •  cholecalciferol (VITAMIN D3) 10 MCG (400 UNIT) tablet, Take 2,000 Units by mouth Daily., Disp: , Rfl:   •  ezetimibe (ZETIA) 10 MG tablet, Take 1 tablet by mouth daily., Disp: , Rfl:   •  ferrous sulfate 325 (65 FE) MG tablet, Take 325 mg by mouth 2 (Two) Times a Day., Disp: , Rfl:   •  furosemide (LASIX) 40 MG tablet, Take 40 mg by mouth Daily., Disp: , Rfl:   •  Insulin Degludec (TRESIBA) 100 UNIT/ML solution injection, Inject 10 Units under the skin into the appropriate area as directed every night at bedtime., Disp: , Rfl:   •  levothyroxine (SYNTHROID, LEVOTHROID) 112 MCG tablet, Take 112 mcg by mouth Daily., Disp: , Rfl:   •  MAGNESIUM PO, Take 1  tablet by mouth Daily., Disp: , Rfl:   •  metFORMIN ER (GLUCOPHAGE-XR) 500 MG 24 hr tablet, Take 500 mg by mouth Daily., Disp: , Rfl:   •  metoprolol succinate XL (TOPROL-XL) 25 MG 24 hr tablet, TAKE ONE-HALF TABLET BY MOUTH DAILY, Disp: 45 tablet, Rfl: 1  •  Multiple Vitamin (MULTI-VITAMIN) tablet, Take 1 tablet by mouth daily., Disp: , Rfl:   •  omeprazole (PriLOSEC) 20 MG capsule, Take 20 mg by mouth Daily., Disp: , Rfl:   •  potassium chloride (K-DUR,KLOR-CON) 20 MEQ CR tablet, Take 20 mEq by mouth Daily., Disp: , Rfl:   •  valsartan (DIOVAN) 80 MG tablet, Take 40 mg by mouth Daily., Disp: , Rfl:   •  vitamin C (ASCORBIC ACID) 500 MG tablet, Take 500 mg by mouth Daily., Disp: , Rfl:   •  warfarin (COUMADIN) 5 MG tablet, Take 5 mg by mouth 3 (Three) Times a Week. Every Monday, Wednesday, and Friday, Disp: , Rfl:   •  warfarin (COUMADIN) 7.5 MG tablet, Take 7.5 mg by mouth 4 (Four) Times a Week. Every Tuesday, Thursday, Saturday, and Sunday, Disp: , Rfl:   •  Liraglutide (VICTOZA) 18 MG/3ML solution pen-injector, Inject 1.8 mg under the skin into the appropriate area as directed Daily., Disp: , Rfl:     No Known Allergies    Family History   Problem Relation Age of Onset   • Heart disease Father    • Hypertension Father    • Stroke Father    • Diabetes Father    • Lung cancer Mother        Cancer-related family history includes Lung cancer in her mother.    Social History     Tobacco Use   • Smoking status: Never Smoker   • Smokeless tobacco: Never Used   Substance Use Topics   • Alcohol use: No   • Drug use: No       I have reviewed the history of present illness, past medical history, family history, social history, lab results, all notes and other records since the patient was last seen cancer care center.    SUBJECTIVE:      Patient is my office for follow-up of her anemia.  Was discharged from the hospital recently after severe anemia and blood transfusions.  She received iron infusion during hospital stay..   "Denies any visible blood loss she is back on the warfarin.  No black stools.  Reports energy level is slightly improved     ROS:      Review of Systems   Constitutional: Negative for fever.   HENT: Negative for nosebleeds and trouble swallowing.    Eyes: Negative for visual disturbance.   Respiratory: Negative for cough, shortness of breath and wheezing.    Cardiovascular: Negative for chest pain.   Gastrointestinal: Negative for abdominal pain and blood in stool.   Endocrine: Negative for cold intolerance.   Genitourinary: Negative for dysuria and hematuria.   Musculoskeletal: Negative for joint swelling.   Skin: Negative for rash.   Allergic/Immunologic: Negative for environmental allergies.   Neurological: Negative for seizures.   Hematological: Does not bruise/bleed easily.   Psychiatric/Behavioral: The patient is not nervous/anxious.          Objective:       Vitals:    02/25/20 1217   BP: 137/80   Pulse: 99   Resp: 20   Temp: 98 °F (36.7 °C)   Weight: 81.6 kg (179 lb 12.8 oz)   Height: 162.6 cm (64\")         PHYSICAL EXAM:      Physical Exam   Constitutional: She is oriented to person, place, and time. No distress.   HENT:   Head: Normocephalic and atraumatic.   Torticollis   Eyes: Conjunctivae and EOM are normal. Right eye exhibits no discharge. Left eye exhibits no discharge. No scleral icterus.   Neck: Normal range of motion. Neck supple. No thyromegaly present.   Cardiovascular: Normal rate, regular rhythm and normal heart sounds. Exam reveals no gallop and no friction rub.   Crisp S1-S2   Pulmonary/Chest: Effort normal. No stridor. No respiratory distress. She has no wheezes.   Abdominal: Soft. Bowel sounds are normal. She exhibits no mass. There is no tenderness. There is no rebound and no guarding.   Musculoskeletal: Normal range of motion. She exhibits no tenderness.   1+ bilateral lower extremity edema   Lymphadenopathy:     She has no cervical adenopathy.   Neurological: She is alert and oriented to " person, place, and time. She exhibits normal muscle tone.   Skin: Skin is warm. No rash noted. She is not diaphoretic. No erythema.   Psychiatric: She has a normal mood and affect. Her behavior is normal.   Nursing note and vitals reviewed.       RECENT LABS:     WBC   Date Value Ref Range Status   02/25/2020 7.31 3.40 - 10.80 10*3/mm3 Final     RBC   Date Value Ref Range Status   02/25/2020 3.48 (L) 3.77 - 5.28 10*6/mm3 Final     Hemoglobin   Date Value Ref Range Status   02/25/2020 10.3 (L) 12.0 - 15.9 g/dL Final     Hematocrit   Date Value Ref Range Status   02/25/2020 32.3 (L) 34.0 - 46.6 % Final     MCV   Date Value Ref Range Status   02/25/2020 92.8 79.0 - 97.0 fL Final     MCH   Date Value Ref Range Status   02/25/2020 29.6 26.6 - 33.0 pg Final     MCHC   Date Value Ref Range Status   02/25/2020 31.9 31.5 - 35.7 g/dL Final     RDW   Date Value Ref Range Status   02/25/2020 17.2 (H) 12.3 - 15.4 % Final     RDW-SD   Date Value Ref Range Status   02/25/2020 55.4 (H) 37.0 - 54.0 fl Final     MPV   Date Value Ref Range Status   02/25/2020 9.0 6.0 - 12.0 fL Final     Platelets   Date Value Ref Range Status   02/25/2020 278 140 - 450 10*3/mm3 Final     Neutrophil %   Date Value Ref Range Status   02/25/2020 69.0 42.7 - 76.0 % Final     Lymphocyte %   Date Value Ref Range Status   02/25/2020 15.2 (L) 19.6 - 45.3 % Final     Monocyte %   Date Value Ref Range Status   02/25/2020 9.6 5.0 - 12.0 % Final     Eosinophil %   Date Value Ref Range Status   02/25/2020 5.5 0.3 - 6.2 % Final     Basophil %   Date Value Ref Range Status   02/25/2020 0.7 0.0 - 1.5 % Final     Neutrophils, Absolute   Date Value Ref Range Status   02/25/2020 5.05 1.70 - 7.00 10*3/mm3 Final     Lymphocytes, Absolute   Date Value Ref Range Status   02/25/2020 1.11 0.70 - 3.10 10*3/mm3 Final     Monocytes, Absolute   Date Value Ref Range Status   02/25/2020 0.70 0.10 - 0.90 10*3/mm3 Final     Eosinophils, Absolute   Date Value Ref Range Status    02/25/2020 0.40 0.00 - 0.40 10*3/mm3 Final     Basophils, Absolute   Date Value Ref Range Status   02/25/2020 0.05 0.00 - 0.20 10*3/mm3 Final     nRBC   Date Value Ref Range Status   02/02/2020 0.1 0.0 - 0.2 /100 WBC Final       Lab Results   Component Value Date    GLUCOSE 197 (H) 02/02/2020    BUN 75 (H) 02/02/2020    CREATININE 1.57 (H) 02/02/2020    EGFRIFNONA 33 (L) 02/02/2020    BCR 47.8 (H) 02/02/2020    K 4.5 02/02/2020    CO2 24.0 02/02/2020    CALCIUM 9.2 02/02/2020    PROTENTOTREF 6.0 12/04/2019    ALBUMIN 3.50 12/05/2019    LABIL2 1.1 12/04/2019    AST 26 12/05/2019    ALT 18 12/05/2019         Assessment/Plan      ASSESSMENT:     1. Chronic iron deficiency anemia.  2. Status post mechanical cardiac aortic valve replacement  3. Chronic kidney disease stage III  4. Anemia related to chronic kidney disease stage III  5. Arteriovenous malformations of small bowel  6. ECOG 1    PLAN:      1. Reviewed the CBC results with the patient she has anemia.  Microcytosis has resolved.  I will obtain iron studies today.   2.  I will obtain the report of small bowel study that was obtained at [facility]   3. Patient is on chronic anticoagulation with warfarin status post mechanical cardiac aortic valve replacement.  4. Her anemia is related to chronic renal failure 3.  I will obtain iron studies.  Start her on Procrit injection.  Today hemoglobin is 10.3.  Check CBC weekly and Procrit 40,000 units if the hemoglobin is less than 10.  5. We will start her on Procrit injections for anemia of chronic disease stage III.  Obtain weekly CBC.  Orders were written for weekly Procrit if the hemoglobin is less than 10  6.  I will see her back in my office in 4 weeks recheck CBC at that time, patient report to us if she notices any blood in the stool or black stools    I have reviewed labs results, imaging, vitals, and medications with the patient today.     Patient verbalized understanding and is in agreement of the above plan.           This report was compiled using Dragon voice recognition software. I have made every effort to proof read this document; however, typographical errors may persist.

## 2020-02-25 ENCOUNTER — OFFICE VISIT (OUTPATIENT)
Dept: LAB | Facility: HOSPITAL | Age: 65
End: 2020-02-25

## 2020-02-25 ENCOUNTER — OFFICE VISIT (OUTPATIENT)
Dept: ONCOLOGY | Facility: CLINIC | Age: 65
End: 2020-02-25

## 2020-02-25 VITALS
RESPIRATION RATE: 20 BRPM | HEART RATE: 99 BPM | TEMPERATURE: 98 F | WEIGHT: 179.8 LBS | HEIGHT: 64 IN | DIASTOLIC BLOOD PRESSURE: 80 MMHG | BODY MASS INDEX: 30.7 KG/M2 | SYSTOLIC BLOOD PRESSURE: 137 MMHG

## 2020-02-25 DIAGNOSIS — D63.1 ANEMIA DUE TO STAGE 3 CHRONIC KIDNEY DISEASE (HCC): ICD-10-CM

## 2020-02-25 DIAGNOSIS — D50.9 IRON DEFICIENCY ANEMIA, UNSPECIFIED IRON DEFICIENCY ANEMIA TYPE: Primary | ICD-10-CM

## 2020-02-25 DIAGNOSIS — N18.30 ANEMIA DUE TO STAGE 3 CHRONIC KIDNEY DISEASE (HCC): ICD-10-CM

## 2020-02-25 DIAGNOSIS — D50.8 OTHER IRON DEFICIENCY ANEMIA: ICD-10-CM

## 2020-02-25 LAB
BASOPHILS # BLD AUTO: 0.05 10*3/MM3 (ref 0–0.2)
BASOPHILS NFR BLD AUTO: 0.7 % (ref 0–1.5)
DEPRECATED RDW RBC AUTO: 55.4 FL (ref 37–54)
EOSINOPHIL # BLD AUTO: 0.4 10*3/MM3 (ref 0–0.4)
EOSINOPHIL NFR BLD AUTO: 5.5 % (ref 0.3–6.2)
ERYTHROCYTE [DISTWIDTH] IN BLOOD BY AUTOMATED COUNT: 17.2 % (ref 12.3–15.4)
FERRITIN SERPL-MCNC: 306.3 NG/ML (ref 13–150)
HCT VFR BLD AUTO: 32.3 % (ref 34–46.6)
HGB BLD-MCNC: 10.3 G/DL (ref 12–15.9)
IRON 24H UR-MRATE: 54 MCG/DL (ref 37–145)
IRON SATN MFR SERPL: 15 % (ref 20–50)
LYMPHOCYTES # BLD AUTO: 1.11 10*3/MM3 (ref 0.7–3.1)
LYMPHOCYTES NFR BLD AUTO: 15.2 % (ref 19.6–45.3)
MCH RBC QN AUTO: 29.6 PG (ref 26.6–33)
MCHC RBC AUTO-ENTMCNC: 31.9 G/DL (ref 31.5–35.7)
MCV RBC AUTO: 92.8 FL (ref 79–97)
MONOCYTES # BLD AUTO: 0.7 10*3/MM3 (ref 0.1–0.9)
MONOCYTES NFR BLD AUTO: 9.6 % (ref 5–12)
NEUTROPHILS # BLD AUTO: 5.05 10*3/MM3 (ref 1.7–7)
NEUTROPHILS NFR BLD AUTO: 69 % (ref 42.7–76)
PLATELET # BLD AUTO: 278 10*3/MM3 (ref 140–450)
PMV BLD AUTO: 9 FL (ref 6–12)
RBC # BLD AUTO: 3.48 10*6/MM3 (ref 3.77–5.28)
TIBC SERPL-MCNC: 365 MCG/DL (ref 298–536)
TRANSFERRIN SERPL-MCNC: 245 MG/DL (ref 200–360)
WBC NRBC COR # BLD: 7.31 10*3/MM3 (ref 3.4–10.8)

## 2020-02-25 PROCEDURE — 84466 ASSAY OF TRANSFERRIN: CPT | Performed by: INTERNAL MEDICINE

## 2020-02-25 PROCEDURE — 83540 ASSAY OF IRON: CPT | Performed by: INTERNAL MEDICINE

## 2020-02-25 PROCEDURE — 36415 COLL VENOUS BLD VENIPUNCTURE: CPT | Performed by: INTERNAL MEDICINE

## 2020-02-25 PROCEDURE — 85025 COMPLETE CBC W/AUTO DIFF WBC: CPT

## 2020-02-25 PROCEDURE — 99215 OFFICE O/P EST HI 40 MIN: CPT | Performed by: INTERNAL MEDICINE

## 2020-02-25 PROCEDURE — 82728 ASSAY OF FERRITIN: CPT | Performed by: INTERNAL MEDICINE

## 2020-02-26 DIAGNOSIS — K90.9 INTESTINAL MALABSORPTION, UNSPECIFIED TYPE: ICD-10-CM

## 2020-02-26 DIAGNOSIS — D50.0 IRON DEFICIENCY ANEMIA DUE TO CHRONIC BLOOD LOSS: ICD-10-CM

## 2020-02-26 RX ORDER — SODIUM CHLORIDE 9 MG/ML
250 INJECTION, SOLUTION INTRAVENOUS ONCE
Status: CANCELLED | OUTPATIENT
Start: 2020-03-13

## 2020-02-26 RX ORDER — SODIUM CHLORIDE 9 MG/ML
250 INJECTION, SOLUTION INTRAVENOUS ONCE
Status: CANCELLED | OUTPATIENT
Start: 2020-03-06

## 2020-02-26 NOTE — PROGRESS NOTES
Tabby/Nara BRIZUELA.     Please let pt know she needs to have IV iron x 2 weekly infusions before her Procrit can be started.  This was ordered in Epic.     Electronically signed by TIM Kenyon, 02/26/20, 5:38 PM.

## 2020-02-27 ENCOUNTER — TELEPHONE (OUTPATIENT)
Dept: ONCOLOGY | Facility: CLINIC | Age: 65
End: 2020-02-27

## 2020-02-27 ENCOUNTER — ANTICOAGULATION VISIT (OUTPATIENT)
Dept: CARDIOLOGY | Facility: CLINIC | Age: 65
End: 2020-02-27

## 2020-02-27 VITALS
SYSTOLIC BLOOD PRESSURE: 118 MMHG | HEART RATE: 91 BPM | WEIGHT: 182.5 LBS | DIASTOLIC BLOOD PRESSURE: 61 MMHG | BODY MASS INDEX: 31.33 KG/M2

## 2020-02-27 DIAGNOSIS — I48.91 ATRIAL FIBRILLATION, UNSPECIFIED TYPE (HCC): ICD-10-CM

## 2020-02-27 DIAGNOSIS — Z95.3 HISTORY OF MITRAL VALVE REPLACEMENT WITH BIOPROSTHETIC VALVE: ICD-10-CM

## 2020-02-27 DIAGNOSIS — Z79.01 LONG TERM (CURRENT) USE OF ANTICOAGULANTS: ICD-10-CM

## 2020-02-27 DIAGNOSIS — Z95.3 HISTORY OF AORTIC VALVE REPLACEMENT WITH BIOPROSTHETIC VALVE: ICD-10-CM

## 2020-02-27 LAB — INR PPP: 2.3 (ref 0.9–1.1)

## 2020-02-27 PROCEDURE — 36416 COLLJ CAPILLARY BLOOD SPEC: CPT | Performed by: INTERNAL MEDICINE

## 2020-02-27 PROCEDURE — 85610 PROTHROMBIN TIME: CPT | Performed by: INTERNAL MEDICINE

## 2020-02-27 NOTE — TELEPHONE ENCOUNTER
----- Message from TIM Kenyon sent at 2/26/2020  5:38 PM EST -----  Tabby/Nara MIKE     Please let pt know she needs to have IV iron x 2 weekly infusions before her Procrit can be started.  This was ordered in Epic.     Electronically signed by TIM Kenyon, 02/26/20, 5:38 PM.

## 2020-03-03 ENCOUNTER — APPOINTMENT (OUTPATIENT)
Dept: LAB | Facility: HOSPITAL | Age: 65
End: 2020-03-03

## 2020-03-06 ENCOUNTER — APPOINTMENT (OUTPATIENT)
Dept: LAB | Facility: HOSPITAL | Age: 65
End: 2020-03-06

## 2020-03-06 ENCOUNTER — HOSPITAL ENCOUNTER (OUTPATIENT)
Dept: ONCOLOGY | Facility: HOSPITAL | Age: 65
Setting detail: INFUSION SERIES
Discharge: HOME | End: 2020-03-06

## 2020-03-06 VITALS
TEMPERATURE: 97.6 F | RESPIRATION RATE: 20 BRPM | HEIGHT: 64 IN | HEART RATE: 96 BPM | SYSTOLIC BLOOD PRESSURE: 110 MMHG | DIASTOLIC BLOOD PRESSURE: 62 MMHG | WEIGHT: 180 LBS | BODY MASS INDEX: 30.73 KG/M2

## 2020-03-06 DIAGNOSIS — D50.0 IRON DEFICIENCY ANEMIA DUE TO CHRONIC BLOOD LOSS: Primary | ICD-10-CM

## 2020-03-06 DIAGNOSIS — K90.9 INTESTINAL MALABSORPTION, UNSPECIFIED TYPE: ICD-10-CM

## 2020-03-06 DIAGNOSIS — D50.8 OTHER IRON DEFICIENCY ANEMIA: ICD-10-CM

## 2020-03-06 DIAGNOSIS — N18.30 ANEMIA DUE TO STAGE 3 CHRONIC KIDNEY DISEASE (HCC): ICD-10-CM

## 2020-03-06 DIAGNOSIS — D63.1 ANEMIA DUE TO STAGE 3 CHRONIC KIDNEY DISEASE (HCC): ICD-10-CM

## 2020-03-06 LAB
BASOPHILS # BLD AUTO: 0.05 10*3/MM3 (ref 0–0.2)
BASOPHILS NFR BLD AUTO: 0.8 % (ref 0–1.5)
DEPRECATED RDW RBC AUTO: 52.6 FL (ref 37–54)
EOSINOPHIL # BLD AUTO: 0.43 10*3/MM3 (ref 0–0.4)
EOSINOPHIL NFR BLD AUTO: 6.6 % (ref 0.3–6.2)
ERYTHROCYTE [DISTWIDTH] IN BLOOD BY AUTOMATED COUNT: 16.4 % (ref 12.3–15.4)
HCT VFR BLD AUTO: 31.3 % (ref 34–46.6)
HGB BLD-MCNC: 9.9 G/DL (ref 12–15.9)
LYMPHOCYTES # BLD AUTO: 1.1 10*3/MM3 (ref 0.7–3.1)
LYMPHOCYTES NFR BLD AUTO: 16.8 % (ref 19.6–45.3)
MCH RBC QN AUTO: 29.6 PG (ref 26.6–33)
MCHC RBC AUTO-ENTMCNC: 31.6 G/DL (ref 31.5–35.7)
MCV RBC AUTO: 93.4 FL (ref 79–97)
MONOCYTES # BLD AUTO: 0.51 10*3/MM3 (ref 0.1–0.9)
MONOCYTES NFR BLD AUTO: 7.8 % (ref 5–12)
NEUTROPHILS # BLD AUTO: 4.47 10*3/MM3 (ref 1.7–7)
NEUTROPHILS NFR BLD AUTO: 68 % (ref 42.7–76)
PLATELET # BLD AUTO: 280 10*3/MM3 (ref 140–450)
PMV BLD AUTO: 9.3 FL (ref 6–12)
RBC # BLD AUTO: 3.35 10*6/MM3 (ref 3.77–5.28)
WBC NRBC COR # BLD: 6.56 10*3/MM3 (ref 3.4–10.8)

## 2020-03-06 PROCEDURE — 85025 COMPLETE CBC W/AUTO DIFF WBC: CPT | Performed by: NURSE PRACTITIONER

## 2020-03-06 PROCEDURE — 96365 THER/PROPH/DIAG IV INF INIT: CPT

## 2020-03-06 PROCEDURE — 96367 TX/PROPH/DG ADDL SEQ IV INF: CPT

## 2020-03-06 PROCEDURE — 36415 COLL VENOUS BLD VENIPUNCTURE: CPT

## 2020-03-06 PROCEDURE — 25010000002 FERRIC CARBOXYMALTOSE 750 MG/15ML SOLUTION 15 ML VIAL: Performed by: NURSE PRACTITIONER

## 2020-03-06 RX ORDER — SODIUM CHLORIDE 9 MG/ML
250 INJECTION, SOLUTION INTRAVENOUS ONCE
Status: COMPLETED | OUTPATIENT
Start: 2020-03-06 | End: 2020-03-06

## 2020-03-06 RX ADMIN — SODIUM CHLORIDE 750 MG: 900 INJECTION, SOLUTION INTRAVENOUS at 13:52

## 2020-03-06 RX ADMIN — SODIUM CHLORIDE 250 ML: 900 INJECTION, SOLUTION INTRAVENOUS at 13:52

## 2020-03-13 ENCOUNTER — HOSPITAL ENCOUNTER (OUTPATIENT)
Dept: ONCOLOGY | Facility: HOSPITAL | Age: 65
Setting detail: INFUSION SERIES
Discharge: HOME | End: 2020-03-13

## 2020-03-13 VITALS
HEIGHT: 64 IN | TEMPERATURE: 98 F | BODY MASS INDEX: 30.73 KG/M2 | SYSTOLIC BLOOD PRESSURE: 104 MMHG | HEART RATE: 99 BPM | RESPIRATION RATE: 18 BRPM | WEIGHT: 180 LBS | DIASTOLIC BLOOD PRESSURE: 68 MMHG

## 2020-03-13 DIAGNOSIS — D63.1 ANEMIA DUE TO STAGE 3 CHRONIC KIDNEY DISEASE (HCC): ICD-10-CM

## 2020-03-13 DIAGNOSIS — K90.9 INTESTINAL MALABSORPTION, UNSPECIFIED TYPE: ICD-10-CM

## 2020-03-13 DIAGNOSIS — D50.0 IRON DEFICIENCY ANEMIA DUE TO CHRONIC BLOOD LOSS: Primary | ICD-10-CM

## 2020-03-13 DIAGNOSIS — N18.30 ANEMIA DUE TO STAGE 3 CHRONIC KIDNEY DISEASE (HCC): ICD-10-CM

## 2020-03-13 DIAGNOSIS — D50.8 OTHER IRON DEFICIENCY ANEMIA: ICD-10-CM

## 2020-03-13 LAB
BASOPHILS # BLD AUTO: 0.04 10*3/MM3 (ref 0–0.2)
BASOPHILS NFR BLD AUTO: 0.5 % (ref 0–1.5)
DEPRECATED RDW RBC AUTO: 56 FL (ref 37–54)
EOSINOPHIL # BLD AUTO: 0.52 10*3/MM3 (ref 0–0.4)
EOSINOPHIL NFR BLD AUTO: 7.1 % (ref 0.3–6.2)
ERYTHROCYTE [DISTWIDTH] IN BLOOD BY AUTOMATED COUNT: 17 % (ref 12.3–15.4)
HCT VFR BLD AUTO: 32.1 % (ref 34–46.6)
HGB BLD-MCNC: 10.3 G/DL (ref 12–15.9)
LYMPHOCYTES # BLD AUTO: 1.07 10*3/MM3 (ref 0.7–3.1)
LYMPHOCYTES NFR BLD AUTO: 14.6 % (ref 19.6–45.3)
MCH RBC QN AUTO: 30.4 PG (ref 26.6–33)
MCHC RBC AUTO-ENTMCNC: 32.1 G/DL (ref 31.5–35.7)
MCV RBC AUTO: 94.7 FL (ref 79–97)
MONOCYTES # BLD AUTO: 0.68 10*3/MM3 (ref 0.1–0.9)
MONOCYTES NFR BLD AUTO: 9.3 % (ref 5–12)
NEUTROPHILS # BLD AUTO: 5.03 10*3/MM3 (ref 1.7–7)
NEUTROPHILS NFR BLD AUTO: 68.5 % (ref 42.7–76)
PLATELET # BLD AUTO: 249 10*3/MM3 (ref 140–450)
PMV BLD AUTO: 9 FL (ref 6–12)
RBC # BLD AUTO: 3.39 10*6/MM3 (ref 3.77–5.28)
WBC NRBC COR # BLD: 7.34 10*3/MM3 (ref 3.4–10.8)

## 2020-03-13 PROCEDURE — 85025 COMPLETE CBC W/AUTO DIFF WBC: CPT | Performed by: NURSE PRACTITIONER

## 2020-03-13 PROCEDURE — 36415 COLL VENOUS BLD VENIPUNCTURE: CPT

## 2020-03-13 PROCEDURE — 96365 THER/PROPH/DIAG IV INF INIT: CPT

## 2020-03-13 PROCEDURE — 25010000002 FERRIC CARBOXYMALTOSE 750 MG/15ML SOLUTION 15 ML VIAL: Performed by: NURSE PRACTITIONER

## 2020-03-13 RX ORDER — SODIUM CHLORIDE 9 MG/ML
250 INJECTION, SOLUTION INTRAVENOUS ONCE
Status: COMPLETED | OUTPATIENT
Start: 2020-03-13 | End: 2020-03-13

## 2020-03-13 RX ADMIN — SODIUM CHLORIDE 750 MG: 900 INJECTION, SOLUTION INTRAVENOUS at 13:33

## 2020-03-13 RX ADMIN — SODIUM CHLORIDE 250 ML: 900 INJECTION, SOLUTION INTRAVENOUS at 13:31

## 2020-03-23 NOTE — PROGRESS NOTES
Hematology/Oncology Outpatient Follow Up    Jocelin Parra  1955    Primary Care Physician: Emilie Cruz APRN  Referring Physician: Emilie Cruz APRN  Chief Complaint:  Iron deficiency anemia  Chronic renal failure stage III  Arteriovenous malformation of small bowel  Mechanical cardiac valve  History of Present Illness:   · I initially saw Ms. De Los Santos in consultation at [facility] when she was admitted through the emergency room on 12/3/2019 with severe anemia.  Patient required blood transfusion.  Patient has chronic history of kidney disease.    · CBC in the ED showed WBC 3.5, hemoglobin 6.1, MCV 63.4, RDW 18.6, and platelets 339,000. She received 1 unit pRBC.  Creatinine was 1.57.   · Patient is on chronic Coumadin for mechanical cardiac valve INR was 2.74 on admission on 12/3/2019    · Posttransfusion iron studies showed iron 24 and ferritin 21.2.  Vitamin B12 was 947 and folate was 18.6.    · 12/4/2019 SPEP negative for monoclonal gammopathy.  Serum creatinine 1.7 with EGFR of 30  · 12/5/2019 patient underwent colonoscopy and EGD at [facility]- No evidence of any active source of bleeding noticed in the upper at the lower GI tract except diverticulosis and hemorrhoids.  Possibility of a small bowel AVM or lesion leading to blood loss anemia is in the differential however patient denies any overt GI bleeding.  May consider small bowel evaluation with a PillCam if needed.  · 12/23/2019 and 12/30/2019 patient received 2 Injectafer infusions.  · 1/21/2020 - Erythropoietin 37.4, iron 47, iron saturation 13, TIBC 371, ferritin 681.80.  · 2/30/2020-EGD with small bowel enteroscopy performed by Dr. Akhtar.  2 nonbleeding jejunal angiectasia's were cauterized  · 3/6/2020 and 3/13/2020 patient received 2 dose of Injectafer infusions.    Past Medical History:   Diagnosis Date   • A-fib (CMS/HCC) Dx 2018    S/p Cardioversion by Dr. Spencer on 04/10/19   • Acute renal failure syndrome (CMS/HCC) 4/2019-[facility] IP   • Biatrial  enlargement 04/10/2019    Noted on SHERIE   • Calcified granuloma of lung (CMS/HCC) 01/04/2019    Noted on Chest XR   • Cardiomegaly 01/04/2019    Borderline--Noted on Chest XR   • CHF (congestive heart failure) (CMS/[facility])    • CKD (chronic kidney disease), stage III (CMS/[facility])     Does Not See a Nephrologist   • Diabetes mellitus (CMS/[facility])    • DM (diabetes mellitus) (CMS/[facility])     T2   • Dysphagia 08/15-[facility] IP    Due to Pharyngitis   • GERD (gastroesophageal reflux disease)    • History of chest x-ray 8/28/15-[facility]    Normal   • History of chest x-ray 01/19/2019    Mild Pulmonary Edema w/Congestive Failure Noted   • History of chest x-ray 01/04/2019    Borderline Cardiomegaly    • History of EKG 2014/2018/2019-[facility]   • Hx of diabetic neuropathy    • Hx of dizziness     w/Lightheadedness   • Hx of echocardiogram 4/16/18-[facility]    EF 55-60%; Mild MVR; Mild TVR; Normal Root/No Effusion   • Hyperlipidemia     Controlled w/Meds   • Hypertension     Controlled w/Meds   • Hypokalemia 4/11/19-[facility] IP   • Hypothyroidism     Controlled w/Meds   • Iron deficiency anemia    • Left posterior fascicular block 04/2018 & 4/19    Noted on EKG   • Lower extremity edema 03/2019   • Mild mitral valve regurgitation 04/16/2018    Noted on Echo   • Mild tricuspid valve regurgitation 04/16/2018    Noted on Echo   • Moderate mitral valve regurgitation 2008    S/p MVR in 08'   • Moderate tricuspid insufficiency 04/10/2019    Noted on SHERIE   • Osteoarthritis     Knees w/Hx Knee Repl   • Pulmonary edema 01/19/2019    Mild--Noted on Chest XR   • Rapid palpitations 04/15/18 & 8/9/18-[facility] ER   • Renal dysfunction    • Right axis deviation 08/2018 & 4/19    Noted on EKG   • Severe aortic valve stenosis Since 2008    Hx AVR on 12/11/08 by Dr. Alvarado   • Severe mitral insufficiency 04/10/2019    Noted on SHERIE   • Sinus tachycardia 08/2018    Noted on EKG   • SOB (shortness of breath) chronic   • Torticollis Chronic   • Valvular heart disease     S/p AVR & MVR in  08'       Past Surgical History:   Procedure Laterality Date   • AORTIC VALVE REPAIR/REPLACEMENT  12/11/08-E    Using a #20 Eight Yr Mechanical [place] Prosthesis--Dewey Alvarado MD   • CARDIOVERSION  4/10/19-[facility]    Dr. Spencer--For A-Fib   • COLONOSCOPY N/A 12/5/2019    Procedure: COLONOSCOPY;  Surgeon: Dustin Castellanos MD;  Location: [facility] ENDOSCOPY;  Service: Gastroenterology   • ENDOSCOPY N/A 12/5/2019    Procedure: ESOPHAGOGASTRODUODENOSCOPY;  Surgeon: Dustin Castellanos MD;  Location: [facility] ENDOSCOPY;  Service: Gastroenterology   • ENTEROSCOPY SMALL BOWEL N/A 2/3/2020    Procedure: ESOPHAGOGASTRODUODENOSCOPY WITH SMALL BOWEL ENTEROSCOPY and argonplasma coagulation of two small jejunal ateriovenous malformations;  Surgeon: Dexter Akhtar MD;  Location: [facility] ENDOSCOPY;  Service: Gastroenterology;  Laterality: N/A;  jejunal ateriovenous malformation   • HYSTERECTOMY     • MITRAL VALVE REPLACEMENT  2008   • MITRAL VALVE REPLACEMENT  05/09/2019    Dr Alvarado   • SHERIE  4/10/19-[facility]    EF 40%; Moderate TVR; Severe Eccentric MI; Biatrial Enlargement   • TOTAL KNEE ARTHROPLASTY  2017   • TRICUSPID VALVE SURGERY  05/09/2019    Dr Alvarado         Current Outpatient Medications:   •  aspirin 81 MG tablet, Take 81 mg by mouth Daily., Disp: , Rfl:   •  atorvastatin (LIPITOR) 40 MG tablet, Take 40 mg by mouth Daily., Disp: , Rfl:   •  B Complex Vitamins (VITAMIN B COMPLEX) tablet, Take 1 capsule by mouth Daily., Disp: , Rfl:   •  cholecalciferol (VITAMIN D3) 10 MCG (400 UNIT) tablet, Take 2,000 Units by mouth Daily., Disp: , Rfl:   •  ezetimibe (ZETIA) 10 MG tablet, Take 1 tablet by mouth daily., Disp: , Rfl:   •  ferrous sulfate 325 (65 FE) MG tablet, Take 325 mg by mouth 2 (Two) Times a Day., Disp: , Rfl:   •  furosemide (LASIX) 40 MG tablet, Take 40 mg by mouth Daily., Disp: , Rfl:   •  Insulin Degludec (TRESIBA) 100 UNIT/ML solution injection, Inject 10 Units under the skin into the appropriate area as directed every night at  bedtime., Disp: , Rfl:   •  levothyroxine (SYNTHROID, LEVOTHROID) 112 MCG tablet, Take 112 mcg by mouth Daily., Disp: , Rfl:   •  Liraglutide (VICTOZA) 18 MG/3ML solution pen-injector, Inject 1.8 mg under the skin into the appropriate area as directed Daily., Disp: , Rfl:   •  MAGNESIUM PO, Take 1 tablet by mouth Daily., Disp: , Rfl:   •  metFORMIN ER (GLUCOPHAGE-XR) 500 MG 24 hr tablet, Take 500 mg by mouth Daily., Disp: , Rfl:   •  metoprolol succinate XL (TOPROL-XL) 25 MG 24 hr tablet, TAKE ONE-HALF TABLET BY MOUTH DAILY, Disp: 45 tablet, Rfl: 1  •  Multiple Vitamin (MULTI-VITAMIN) tablet, Take 1 tablet by mouth daily., Disp: , Rfl:   •  omeprazole (PriLOSEC) 20 MG capsule, Take 20 mg by mouth Daily., Disp: , Rfl:   •  potassium chloride (K-DUR,KLOR-CON) 20 MEQ CR tablet, Take 20 mEq by mouth Daily., Disp: , Rfl:   •  valsartan (DIOVAN) 80 MG tablet, Take 40 mg by mouth Daily., Disp: , Rfl:   •  vitamin C (ASCORBIC ACID) 500 MG tablet, Take 500 mg by mouth Daily., Disp: , Rfl:   •  warfarin (COUMADIN) 5 MG tablet, Take 5 mg by mouth 3 (Three) Times a Week. Every Monday, Wednesday, and Friday, Disp: , Rfl:   •  warfarin (COUMADIN) 7.5 MG tablet, Take 7.5 mg by mouth 4 (Four) Times a Week. Every Tuesday, Thursday, Saturday, and Sunday, Disp: , Rfl:     No Known Allergies    Family History   Problem Relation Age of Onset   • Heart disease Father    • Hypertension Father    • Stroke Father    • Diabetes Father    • Lung cancer Mother        Cancer-related family history includes Lung cancer in her mother.    Social History     Tobacco Use   • Smoking status: Never Smoker   • Smokeless tobacco: Never Used   Substance Use Topics   • Alcohol use: No   • Drug use: No       I have reviewed the history of present illness, past medical history, family history, social history, lab results, all notes and other records since the patient was last seen cancer care center.    SUBJECTIVE:      Patient is my office for follow-up of  "her anemia.  Patient feeling poorly with anemia tolerated Injectafer well.  Energy level is slowly improving.  .  Denies any visible blood loss. she is back on the warfarin.  No black stools.  Reports energy level is slightly improved .    ROS:      Review of Systems   Constitutional: Negative for fever.   HENT: Negative for nosebleeds and trouble swallowing.    Eyes: Negative for visual disturbance.   Respiratory: Negative for cough, shortness of breath and wheezing.    Cardiovascular: Negative for chest pain.   Gastrointestinal: Negative for abdominal pain and blood in stool.   Endocrine: Negative for cold intolerance.   Genitourinary: Negative for dysuria and hematuria.   Musculoskeletal: Negative for joint swelling.   Skin: Negative for rash.   Allergic/Immunologic: Negative for environmental allergies.   Neurological: Negative for seizures.   Hematological: Does not bruise/bleed easily.   Psychiatric/Behavioral: The patient is not nervous/anxious.          Objective:       Vitals:    03/24/20 1107   BP: 119/74   Pulse: 98   Resp: 16   Temp: 97.8 °F (36.6 °C)   Weight: 82.6 kg (182 lb 3.2 oz)   Height: 162.6 cm (64\")         PHYSICAL EXAM:      Physical Exam   Constitutional: She is oriented to person, place, and time. No distress.   HENT:   Head: Normocephalic and atraumatic.   Torticollis   Eyes: Conjunctivae and EOM are normal. Right eye exhibits no discharge. Left eye exhibits no discharge. No scleral icterus.   Neck: Normal range of motion. Neck supple. No thyromegaly present.   Cardiovascular: Normal rate, regular rhythm and normal heart sounds. Exam reveals no gallop and no friction rub.   Crisp S1-S2   Pulmonary/Chest: Effort normal. No stridor. No respiratory distress. She has no wheezes.   Abdominal: Soft. Bowel sounds are normal. She exhibits no mass. There is no tenderness. There is no rebound and no guarding.   Musculoskeletal: Normal range of motion. She exhibits no tenderness.   1+ bilateral lower " extremity edema   Lymphadenopathy:     She has no cervical adenopathy.   Neurological: She is alert and oriented to person, place, and time. She exhibits normal muscle tone.   Skin: Skin is warm. No rash noted. She is not diaphoretic. No erythema.   Psychiatric: She has a normal mood and affect. Her behavior is normal.   Nursing note and vitals reviewed.       RECENT LABS:     WBC   Date Value Ref Range Status   03/24/2020 7.02 3.40 - 10.80 10*3/mm3 Final     RBC   Date Value Ref Range Status   03/24/2020 3.65 (L) 3.77 - 5.28 10*6/mm3 Final     Hemoglobin   Date Value Ref Range Status   03/24/2020 11.4 (L) 12.0 - 15.9 g/dL Final     Hematocrit   Date Value Ref Range Status   03/24/2020 34.7 34.0 - 46.6 % Final     MCV   Date Value Ref Range Status   03/24/2020 95.1 79.0 - 97.0 fL Final     MCH   Date Value Ref Range Status   03/24/2020 31.2 26.6 - 33.0 pg Final     MCHC   Date Value Ref Range Status   03/24/2020 32.9 31.5 - 35.7 g/dL Final     RDW   Date Value Ref Range Status   03/24/2020 16.8 (H) 12.3 - 15.4 % Final     RDW-SD   Date Value Ref Range Status   03/24/2020 55.5 (H) 37.0 - 54.0 fl Final     MPV   Date Value Ref Range Status   03/24/2020 9.1 6.0 - 12.0 fL Final     Platelets   Date Value Ref Range Status   03/24/2020 238 140 - 450 10*3/mm3 Final     Neutrophil %   Date Value Ref Range Status   03/24/2020 68.3 42.7 - 76.0 % Final     Lymphocyte %   Date Value Ref Range Status   03/24/2020 12.7 (L) 19.6 - 45.3 % Final     Monocyte %   Date Value Ref Range Status   03/24/2020 9.8 5.0 - 12.0 % Final     Eosinophil %   Date Value Ref Range Status   03/24/2020 8.3 (H) 0.3 - 6.2 % Final     Basophil %   Date Value Ref Range Status   03/24/2020 0.9 0.0 - 1.5 % Final     Neutrophils, Absolute   Date Value Ref Range Status   03/24/2020 4.80 1.70 - 7.00 10*3/mm3 Final     Lymphocytes, Absolute   Date Value Ref Range Status   03/24/2020 0.89 0.70 - 3.10 10*3/mm3 Final     Monocytes, Absolute   Date Value Ref Range  Status   03/24/2020 0.69 0.10 - 0.90 10*3/mm3 Final     Eosinophils, Absolute   Date Value Ref Range Status   03/24/2020 0.58 (H) 0.00 - 0.40 10*3/mm3 Final     Basophils, Absolute   Date Value Ref Range Status   03/24/2020 0.06 0.00 - 0.20 10*3/mm3 Final     nRBC   Date Value Ref Range Status   02/02/2020 0.1 0.0 - 0.2 /100 WBC Final       Lab Results   Component Value Date    GLUCOSE 197 (H) 02/02/2020    BUN 75 (H) 02/02/2020    CREATININE 1.57 (H) 02/02/2020    EGFRIFNONA 33 (L) 02/02/2020    BCR 47.8 (H) 02/02/2020    K 4.5 02/02/2020    CO2 24.0 02/02/2020    CALCIUM 9.2 02/02/2020    PROTENTOTREF 6.0 12/04/2019    ALBUMIN 3.50 12/05/2019    LABIL2 1.1 12/04/2019    AST 26 12/05/2019    ALT 18 12/05/2019         Assessment/Plan      ASSESSMENT:     1. Chronic iron deficiency anemia.  2. Status post mechanical cardiac aortic valve replacement  3. Chronic kidney disease stage III  4. Anemia related to chronic kidney disease stage III  5. Arteriovenous malformations of small bowel  6. ECOG 1    PLAN:      1. Reviewed the CBC results with the patient she has anemia.  Microcytosis has resolved.  Patient was given Injectafer when her iron studies revealed anemia related to iron deficiency during last visit.   2.  I will obtain the report of small bowel study that was obtained at [facility]   3. Patient is on chronic anticoagulation with warfarin status post mechanical cardiac aortic valve replacement.  This could be worsening her GI bleed follow CBC closely.  4. If hemoglobin drops below 10 and the iron studies are normal patient will benefit from Procrit injection.    5. Her hemoglobin is 11.4 and she will not benefit from Procrit injection.   6.  I will see her back in my office in 6 weeks recheck CBC at that time  7.  patient report to us if she notices any blood in the stool or black stools as she becomes very anemic very fast secondary to being on Coumadin and AV malformations in the small bowel.    I have reviewed  labs results, imaging, vitals, and medications with the patient today.     Patient verbalized understanding and is in agreement of the above plan.  Electronically signed by Nav Rizzo MD, 03/24/20, 4:02 PM.          This report was compiled using Dragon voice recognition software. I have made every effort to proof read this document; however, typographical errors may persist.

## 2020-03-24 ENCOUNTER — OFFICE VISIT (OUTPATIENT)
Dept: LAB | Facility: HOSPITAL | Age: 65
End: 2020-03-24

## 2020-03-24 ENCOUNTER — OFFICE VISIT (OUTPATIENT)
Dept: ONCOLOGY | Facility: CLINIC | Age: 65
End: 2020-03-24

## 2020-03-24 VITALS
BODY MASS INDEX: 31.1 KG/M2 | RESPIRATION RATE: 16 BRPM | SYSTOLIC BLOOD PRESSURE: 119 MMHG | TEMPERATURE: 97.8 F | HEIGHT: 64 IN | DIASTOLIC BLOOD PRESSURE: 74 MMHG | HEART RATE: 98 BPM | WEIGHT: 182.2 LBS

## 2020-03-24 DIAGNOSIS — D50.0 IRON DEFICIENCY ANEMIA DUE TO CHRONIC BLOOD LOSS: Primary | ICD-10-CM

## 2020-03-24 DIAGNOSIS — D63.1 ANEMIA DUE TO STAGE 3 CHRONIC KIDNEY DISEASE (HCC): ICD-10-CM

## 2020-03-24 DIAGNOSIS — N18.30 ANEMIA DUE TO STAGE 3 CHRONIC KIDNEY DISEASE (HCC): ICD-10-CM

## 2020-03-24 DIAGNOSIS — D50.8 OTHER IRON DEFICIENCY ANEMIA: ICD-10-CM

## 2020-03-24 LAB
BASOPHILS # BLD AUTO: 0.06 10*3/MM3 (ref 0–0.2)
BASOPHILS NFR BLD AUTO: 0.9 % (ref 0–1.5)
DEPRECATED RDW RBC AUTO: 55.5 FL (ref 37–54)
EOSINOPHIL # BLD AUTO: 0.58 10*3/MM3 (ref 0–0.4)
EOSINOPHIL NFR BLD AUTO: 8.3 % (ref 0.3–6.2)
ERYTHROCYTE [DISTWIDTH] IN BLOOD BY AUTOMATED COUNT: 16.8 % (ref 12.3–15.4)
FERRITIN SERPL-MCNC: 1367 NG/ML (ref 13–150)
HCT VFR BLD AUTO: 34.7 % (ref 34–46.6)
HGB BLD-MCNC: 11.4 G/DL (ref 12–15.9)
IRON 24H UR-MRATE: 65 MCG/DL (ref 37–145)
IRON SATN MFR SERPL: 20 % (ref 20–50)
LYMPHOCYTES # BLD AUTO: 0.89 10*3/MM3 (ref 0.7–3.1)
LYMPHOCYTES NFR BLD AUTO: 12.7 % (ref 19.6–45.3)
MCH RBC QN AUTO: 31.2 PG (ref 26.6–33)
MCHC RBC AUTO-ENTMCNC: 32.9 G/DL (ref 31.5–35.7)
MCV RBC AUTO: 95.1 FL (ref 79–97)
MONOCYTES # BLD AUTO: 0.69 10*3/MM3 (ref 0.1–0.9)
MONOCYTES NFR BLD AUTO: 9.8 % (ref 5–12)
NEUTROPHILS # BLD AUTO: 4.8 10*3/MM3 (ref 1.7–7)
NEUTROPHILS NFR BLD AUTO: 68.3 % (ref 42.7–76)
PLATELET # BLD AUTO: 238 10*3/MM3 (ref 140–450)
PMV BLD AUTO: 9.1 FL (ref 6–12)
RBC # BLD AUTO: 3.65 10*6/MM3 (ref 3.77–5.28)
TIBC SERPL-MCNC: 322 MCG/DL (ref 298–536)
TRANSFERRIN SERPL-MCNC: 216 MG/DL (ref 200–360)
WBC NRBC COR # BLD: 7.02 10*3/MM3 (ref 3.4–10.8)

## 2020-03-24 PROCEDURE — 85025 COMPLETE CBC W/AUTO DIFF WBC: CPT

## 2020-03-24 PROCEDURE — 99215 OFFICE O/P EST HI 40 MIN: CPT | Performed by: INTERNAL MEDICINE

## 2020-03-24 PROCEDURE — 84466 ASSAY OF TRANSFERRIN: CPT | Performed by: NURSE PRACTITIONER

## 2020-03-24 PROCEDURE — 36415 COLL VENOUS BLD VENIPUNCTURE: CPT | Performed by: INTERNAL MEDICINE

## 2020-03-24 PROCEDURE — 82728 ASSAY OF FERRITIN: CPT | Performed by: NURSE PRACTITIONER

## 2020-03-24 PROCEDURE — 83540 ASSAY OF IRON: CPT | Performed by: NURSE PRACTITIONER

## 2020-04-01 NOTE — PROGRESS NOTES
4/1/20-Drug changed from Procrit to Retacrit per in basket message from Yasmin Pozo advising that this is what they will approve.-ars

## 2020-04-02 ENCOUNTER — ANTICOAGULATION VISIT (OUTPATIENT)
Dept: CARDIOLOGY | Facility: CLINIC | Age: 65
End: 2020-04-02

## 2020-04-02 VITALS
SYSTOLIC BLOOD PRESSURE: 110 MMHG | BODY MASS INDEX: 30.73 KG/M2 | DIASTOLIC BLOOD PRESSURE: 60 MMHG | WEIGHT: 179 LBS | HEART RATE: 64 BPM

## 2020-04-02 DIAGNOSIS — Z95.3 HISTORY OF MITRAL VALVE REPLACEMENT WITH BIOPROSTHETIC VALVE: ICD-10-CM

## 2020-04-02 DIAGNOSIS — Z95.3 HISTORY OF AORTIC VALVE REPLACEMENT WITH BIOPROSTHETIC VALVE: ICD-10-CM

## 2020-04-02 DIAGNOSIS — Z79.01 LONG TERM (CURRENT) USE OF ANTICOAGULANTS: ICD-10-CM

## 2020-04-02 DIAGNOSIS — I48.91 ATRIAL FIBRILLATION, UNSPECIFIED TYPE (HCC): ICD-10-CM

## 2020-04-02 LAB — INR PPP: 1.8 (ref 0.9–1.1)

## 2020-04-02 PROCEDURE — 85610 PROTHROMBIN TIME: CPT | Performed by: INTERNAL MEDICINE

## 2020-04-02 PROCEDURE — 36416 COLLJ CAPILLARY BLOOD SPEC: CPT | Performed by: INTERNAL MEDICINE

## 2020-04-07 RX ORDER — WARFARIN SODIUM 5 MG/1
TABLET ORAL
Qty: 45 TABLET | Refills: 1 | Status: SHIPPED | OUTPATIENT
Start: 2020-04-07 | End: 2020-06-02

## 2020-05-04 ENCOUNTER — TELEPHONE (OUTPATIENT)
Dept: ONCOLOGY | Facility: CLINIC | Age: 65
End: 2020-05-04

## 2020-05-05 ENCOUNTER — OFFICE VISIT (OUTPATIENT)
Dept: ONCOLOGY | Facility: CLINIC | Age: 65
End: 2020-05-05

## 2020-05-05 ENCOUNTER — APPOINTMENT (OUTPATIENT)
Dept: LAB | Facility: HOSPITAL | Age: 65
End: 2020-05-05

## 2020-05-05 DIAGNOSIS — D50.0 IRON DEFICIENCY ANEMIA DUE TO CHRONIC BLOOD LOSS: Primary | ICD-10-CM

## 2020-05-05 PROCEDURE — 99442 PR PHYS/QHP TELEPHONE EVALUATION 11-20 MIN: CPT | Performed by: INTERNAL MEDICINE

## 2020-05-05 NOTE — PROGRESS NOTES
Hematology/Oncology Outpatient Follow Up  You have chosen to receive care through a telephone visit. Do you consent to use a telephone visit for your medical care today? yes  Jocelin Parra  1955    Primary Care Physician: Emilie Cruz APRN  Referring Physician: Emilie Cruz APRN  Chief Complaint:  Iron deficiency anemia  Chronic renal failure stage III  Arteriovenous malformation of small bowel  Mechanical cardiac valve  History of Present Illness:   · I initially saw Ms. De Los Santos in consultation at [facility] when she was admitted through the emergency room on 12/3/2019 with severe anemia.  Patient required blood transfusion.  Patient has chronic history of kidney disease.    · CBC in the ED showed WBC 3.5, hemoglobin 6.1, MCV 63.4, RDW 18.6, and platelets 339,000. She received 1 unit pRBC.  Creatinine was 1.57.   · Patient is on chronic Coumadin for mechanical cardiac valve INR was 2.74 on admission on 12/3/2019    · Posttransfusion iron studies showed iron 24 and ferritin 21.2.  Vitamin B12 was 947 and folate was 18.6.    · 12/4/2019 SPEP negative for monoclonal gammopathy.  Serum creatinine 1.7 with EGFR of 30  · 12/5/2019 patient underwent colonoscopy and EGD at [facility]- No evidence of any active source of bleeding noticed in the upper at the lower GI tract except diverticulosis and hemorrhoids.  Possibility of a small bowel AVM or lesion leading to blood loss anemia is in the differential however patient denies any overt GI bleeding.  May consider small bowel evaluation with a PillCam if needed.  · 12/23/2019 and 12/30/2019 patient received 2 Injectafer infusions.  · 1/21/2020 - Erythropoietin 37.4, iron 47, iron saturation 13, TIBC 371, ferritin 681.80.  · 2/30/2020-EGD with small bowel enteroscopy performed by Dr. Akhtar.  2 nonbleeding jejunal angiectasia's were cauterized  · 3/6/2020 and 3/13/2020 patient received 2 dose of Injectafer infusions.    Past Medical History:   Diagnosis Date   • A-fib  (CMS/[facility]) Dx 2018    S/p Cardioversion by Dr. Spencer on 04/10/19   • Acute renal failure syndrome (CMS/HCC) 4/2019-[facility] IP   • Biatrial enlargement 04/10/2019    Noted on SHERIE   • Calcified granuloma of lung (CMS/[facility]) 01/04/2019    Noted on Chest XR   • Cardiomegaly 01/04/2019    Borderline--Noted on Chest XR   • CHF (congestive heart failure) (CMS/[facility])    • CKD (chronic kidney disease), stage III (CMS/[facility])     Does Not See a Nephrologist   • Diabetes mellitus (CMS/[facility])    • DM (diabetes mellitus) (CMS/[facility])     T2   • Dysphagia 08/15-[facility] IP    Due to Pharyngitis   • GERD (gastroesophageal reflux disease)    • History of chest x-ray 8/28/15-[facility]    Normal   • History of chest x-ray 01/19/2019    Mild Pulmonary Edema w/Congestive Failure Noted   • History of chest x-ray 01/04/2019    Borderline Cardiomegaly    • History of EKG 2014/2018/2019-[facility]   • Hx of diabetic neuropathy    • Hx of dizziness     w/Lightheadedness   • Hx of echocardiogram 4/16/18-[facility]    EF 55-60%; Mild MVR; Mild TVR; Normal Root/No Effusion   • Hyperlipidemia     Controlled w/Meds   • Hypertension     Controlled w/Meds   • Hypokalemia 4/11/19-[facility] IP   • Hypothyroidism     Controlled w/Meds   • Iron deficiency anemia    • Left posterior fascicular block 04/2018 & 4/19    Noted on EKG   • Lower extremity edema 03/2019   • Mild mitral valve regurgitation 04/16/2018    Noted on Echo   • Mild tricuspid valve regurgitation 04/16/2018    Noted on Echo   • Moderate mitral valve regurgitation 2008    S/p MVR in 08'   • Moderate tricuspid insufficiency 04/10/2019    Noted on SHERIE   • Osteoarthritis     Knees w/Hx Knee Repl   • Pulmonary edema 01/19/2019    Mild--Noted on Chest XR   • Rapid palpitations 04/15/18 & 8/9/18-[facility] ER   • Renal dysfunction    • Right axis deviation 08/2018 & 4/19    Noted on EKG   • Severe aortic valve stenosis Since 2008    Hx AVR on 12/11/08 by Dr. Alvarado   • Severe mitral insufficiency 04/10/2019    Noted on SHERIE   • Sinus tachycardia  08/2018    Noted on EKG   • SOB (shortness of breath) chronic   • Torticollis Chronic   • Valvular heart disease     S/p AVR & MVR in 08'       Past Surgical History:   Procedure Laterality Date   • AORTIC VALVE REPAIR/REPLACEMENT  12/11/08-E    Using a #20 Eight Yr Mechanical [employer] Prosthesis--Dewey Alvarado MD   • CARDIOVERSION  4/10/19-[facility]    Dr. Spencer--For A-Fib   • COLONOSCOPY N/A 12/5/2019    Procedure: COLONOSCOPY;  Surgeon: Dustin Castellanos MD;  Location: [facility] ENDOSCOPY;  Service: Gastroenterology   • ENDOSCOPY N/A 12/5/2019    Procedure: ESOPHAGOGASTRODUODENOSCOPY;  Surgeon: Dustin Castellanos MD;  Location: [facility] ENDOSCOPY;  Service: Gastroenterology   • ENTEROSCOPY SMALL BOWEL N/A 2/3/2020    Procedure: ESOPHAGOGASTRODUODENOSCOPY WITH SMALL BOWEL ENTEROSCOPY and argonplasma coagulation of two small jejunal ateriovenous malformations;  Surgeon: Dexter Akhtar MD;  Location: [facility] ENDOSCOPY;  Service: Gastroenterology;  Laterality: N/A;  jejunal ateriovenous malformation   • HYSTERECTOMY     • MITRAL VALVE REPLACEMENT  2008   • MITRAL VALVE REPLACEMENT  05/09/2019    Dr Alvarado   • SHERIE  4/10/19-[facility]    EF 40%; Moderate TVR; Severe Eccentric MI; Biatrial Enlargement   • TOTAL KNEE ARTHROPLASTY  2017   • TRICUSPID VALVE SURGERY  05/09/2019    Dr Alvarado         Current Outpatient Medications:   •  aspirin 81 MG tablet, Take 81 mg by mouth Daily., Disp: , Rfl:   •  atorvastatin (LIPITOR) 40 MG tablet, Take 40 mg by mouth Daily., Disp: , Rfl:   •  B Complex Vitamins (VITAMIN B COMPLEX) tablet, Take 1 capsule by mouth Daily., Disp: , Rfl:   •  cholecalciferol (VITAMIN D3) 10 MCG (400 UNIT) tablet, Take 2,000 Units by mouth Daily., Disp: , Rfl:   •  ezetimibe (ZETIA) 10 MG tablet, Take 1 tablet by mouth daily., Disp: , Rfl:   •  ferrous sulfate 325 (65 FE) MG tablet, Take 325 mg by mouth 2 (Two) Times a Day., Disp: , Rfl:   •  furosemide (LASIX) 40 MG tablet, Take 40 mg by mouth Daily., Disp: , Rfl:   •  Insulin  Degludec (TRESIBA) 100 UNIT/ML solution injection, Inject 10 Units under the skin into the appropriate area as directed every night at bedtime., Disp: , Rfl:   •  levothyroxine (SYNTHROID, LEVOTHROID) 112 MCG tablet, Take 112 mcg by mouth Daily., Disp: , Rfl:   •  MAGNESIUM PO, Take 1 tablet by mouth Daily., Disp: , Rfl:   •  metFORMIN ER (GLUCOPHAGE-XR) 500 MG 24 hr tablet, Take 500 mg by mouth Daily., Disp: , Rfl:   •  metoprolol succinate XL (TOPROL-XL) 25 MG 24 hr tablet, TAKE ONE-HALF TABLET BY MOUTH DAILY, Disp: 45 tablet, Rfl: 1  •  Multiple Vitamin (MULTI-VITAMIN) tablet, Take 1 tablet by mouth daily., Disp: , Rfl:   •  omeprazole (PriLOSEC) 20 MG capsule, Take 20 mg by mouth Daily., Disp: , Rfl:   •  potassium chloride (K-DUR,KLOR-CON) 20 MEQ CR tablet, Take 20 mEq by mouth Daily., Disp: , Rfl:   •  valsartan (DIOVAN) 80 MG tablet, Take 40 mg by mouth Daily., Disp: , Rfl:   •  vitamin C (ASCORBIC ACID) 500 MG tablet, Take 500 mg by mouth Daily., Disp: , Rfl:   •  warfarin (COUMADIN) 5 MG tablet, Take 1 tablet on Mon and Fri with 1 1/2 tabs on all other days by mouth or as directed., Disp: 45 tablet, Rfl: 1  •  Liraglutide (VICTOZA) 18 MG/3ML solution pen-injector, Inject 1.8 mg under the skin into the appropriate area as directed Daily., Disp: , Rfl:     No Known Allergies    Family History   Problem Relation Age of Onset   • Heart disease Father    • Hypertension Father    • Stroke Father    • Diabetes Father    • Lung cancer Mother        Cancer-related family history includes Lung cancer in her mother.    Social History     Tobacco Use   • Smoking status: Never Smoker   • Smokeless tobacco: Never Used   Substance Use Topics   • Alcohol use: No   • Drug use: No       I have reviewed the history of present illness, past medical history, family history, social history, lab results, all notes and other records since the patient was last seen cancer [facility] center.    SUBJECTIVE:      Patient is is on telemetry  visit for follow-up of her anemia.  Denies any visible blood loss.  She is taking oral iron tablets also has some dark stools.  ROS:      Review of Systems   Constitutional: Negative for fever.   HENT: Negative for nosebleeds and trouble swallowing.    Eyes: Negative for visual disturbance.   Respiratory: Negative for cough, shortness of breath and wheezing.    Cardiovascular: Negative for chest pain.   Gastrointestinal: Negative for abdominal pain and blood in stool.   Endocrine: Negative for cold intolerance.   Genitourinary: Negative for dysuria and hematuria.   Musculoskeletal: Negative for joint swelling.   Skin: Negative for rash.   Allergic/Immunologic: Negative for environmental allergies.   Neurological: Negative for seizures.   Hematological: Does not bruise/bleed easily.   Psychiatric/Behavioral: The patient is not nervous/anxious.        MD performed ROS and are negative except as mentioned in Subjective.    Objective:       There were no vitals filed for this visit.      PHYSICAL EXAM:      Physical Exam   Constitutional: She is oriented to person, place, and time. No distress.   HENT:   Head: Normocephalic and atraumatic.   Torticollis   Eyes: Conjunctivae and EOM are normal. Right eye exhibits no discharge. Left eye exhibits no discharge. No scleral icterus.   Neck: Normal range of motion. Neck supple. No thyromegaly present.   Cardiovascular: Normal rate, regular rhythm and normal heart sounds. Exam reveals no gallop and no friction rub.   Crisp S1-S2 can be heard   Pulmonary/Chest: Effort normal. No stridor. No respiratory distress. She has no wheezes.   Abdominal: Soft. Bowel sounds are normal. She exhibits no mass. There is no tenderness. There is no rebound and no guarding.   Musculoskeletal: Normal range of motion. She exhibits no tenderness.   Lymphadenopathy:     She has no cervical adenopathy.   Neurological: She is alert and oriented to person, place, and time. She exhibits normal muscle  tone.   Skin: Skin is warm. No rash noted. She is not diaphoretic. No erythema.   Psychiatric: She has a normal mood and affect. Her behavior is normal.   Nursing note and vitals reviewed.     Awake alert oriented x3  RECENT LABS:     WBC   Date Value Ref Range Status   03/24/2020 7.02 3.40 - 10.80 10*3/mm3 Final     RBC   Date Value Ref Range Status   03/24/2020 3.65 (L) 3.77 - 5.28 10*6/mm3 Final     Hemoglobin   Date Value Ref Range Status   03/24/2020 11.4 (L) 12.0 - 15.9 g/dL Final     Hematocrit   Date Value Ref Range Status   03/24/2020 34.7 34.0 - 46.6 % Final     MCV   Date Value Ref Range Status   03/24/2020 95.1 79.0 - 97.0 fL Final     MCH   Date Value Ref Range Status   03/24/2020 31.2 26.6 - 33.0 pg Final     MCHC   Date Value Ref Range Status   03/24/2020 32.9 31.5 - 35.7 g/dL Final     RDW   Date Value Ref Range Status   03/24/2020 16.8 (H) 12.3 - 15.4 % Final     RDW-SD   Date Value Ref Range Status   03/24/2020 55.5 (H) 37.0 - 54.0 fl Final     MPV   Date Value Ref Range Status   03/24/2020 9.1 6.0 - 12.0 fL Final     Platelets   Date Value Ref Range Status   03/24/2020 238 140 - 450 10*3/mm3 Final     Neutrophil %   Date Value Ref Range Status   03/24/2020 68.3 42.7 - 76.0 % Final     Lymphocyte %   Date Value Ref Range Status   03/24/2020 12.7 (L) 19.6 - 45.3 % Final     Monocyte %   Date Value Ref Range Status   03/24/2020 9.8 5.0 - 12.0 % Final     Eosinophil %   Date Value Ref Range Status   03/24/2020 8.3 (H) 0.3 - 6.2 % Final     Basophil %   Date Value Ref Range Status   03/24/2020 0.9 0.0 - 1.5 % Final     Neutrophils, Absolute   Date Value Ref Range Status   03/24/2020 4.80 1.70 - 7.00 10*3/mm3 Final     Lymphocytes, Absolute   Date Value Ref Range Status   03/24/2020 0.89 0.70 - 3.10 10*3/mm3 Final     Monocytes, Absolute   Date Value Ref Range Status   03/24/2020 0.69 0.10 - 0.90 10*3/mm3 Final     Eosinophils, Absolute   Date Value Ref Range Status   03/24/2020 0.58 (H) 0.00 - 0.40  10*3/mm3 Final     Basophils, Absolute   Date Value Ref Range Status   03/24/2020 0.06 0.00 - 0.20 10*3/mm3 Final     nRBC   Date Value Ref Range Status   02/02/2020 0.1 0.0 - 0.2 /100 WBC Final       Lab Results   Component Value Date    GLUCOSE 197 (H) 02/02/2020    BUN 75 (H) 02/02/2020    CREATININE 1.57 (H) 02/02/2020    EGFRIFNONA 33 (L) 02/02/2020    BCR 47.8 (H) 02/02/2020    K 4.5 02/02/2020    CO2 24.0 02/02/2020    CALCIUM 9.2 02/02/2020    PROTENTOTREF 6.0 12/04/2019    ALBUMIN 3.50 12/05/2019    LABIL2 1.1 12/04/2019    AST 26 12/05/2019    ALT 18 12/05/2019         Assessment/Plan      ASSESSMENT:     1. Chronic iron deficiency anemia.  2. Status post mechanical cardiac aortic valve replacement  3. Chronic kidney disease stage III  4. Anemia related to chronic kidney disease stage III  5. Arteriovenous malformations of small bowel  6. ECOG 1    PLAN:      1. Patient with chronic recurrent iron deficiency anemia.  She received iron infusions 6 weeks ago.  I will recheck CBC in 6 weeks when I see her back in my office.   2. No evidence of small bowel blood loss on pill study.  3. Patient is on chronic anticoagulation with warfarin status post mechanical cardiac aortic valve replacement.  This could be worsening her GI bleed follow CBC closely.  4. If hemoglobin drops below 10 and the iron studies are normal patient will benefit from Procrit injection.    5. Check CBC in 6 weeks  6.  I will see her back in my office in 6 weeks recheck CBC at that time  7.  patient report to us if she notices any blood in the stool or black stools as she becomes very anemic very fast secondary to being on Coumadin and AV malformations in the small bowel.  I spent 15 minutes with the patient on tele-visit  Electronically signed by Nav Rizzo MD, 05/05/20, 6:22 PM.    I have reviewed labs results, imaging, vitals, and medications with the patient today.     Patient verbalized understanding and is in agreement of  the above plan.            This report was compiled using Dragon voice recognition software. I have made every effort to proof read this document; however, typographical errors may persist.

## 2020-05-07 ENCOUNTER — TRANSCRIBE ORDERS (OUTPATIENT)
Dept: ADMINISTRATIVE | Facility: HOSPITAL | Age: 65
End: 2020-05-07

## 2020-05-07 ENCOUNTER — LAB (OUTPATIENT)
Dept: LAB | Facility: HOSPITAL | Age: 65
End: 2020-05-07

## 2020-05-07 DIAGNOSIS — N18.30 CHRONIC KIDNEY DISEASE, STAGE III (MODERATE) (HCC): Primary | ICD-10-CM

## 2020-05-07 DIAGNOSIS — N18.30 CHRONIC KIDNEY DISEASE, STAGE III (MODERATE) (HCC): ICD-10-CM

## 2020-05-07 LAB
ANION GAP SERPL CALCULATED.3IONS-SCNC: 14 MMOL/L (ref 5–15)
BASOPHILS # BLD AUTO: 0.05 10*3/MM3 (ref 0–0.2)
BASOPHILS NFR BLD AUTO: 0.7 % (ref 0–1.5)
BUN BLD-MCNC: 50 MG/DL (ref 8–23)
BUN/CREAT SERPL: 31.3 (ref 7–25)
CALCIUM SPEC-SCNC: 10 MG/DL (ref 8.6–10.5)
CHLORIDE SERPL-SCNC: 99 MMOL/L (ref 98–107)
CO2 SERPL-SCNC: 26 MMOL/L (ref 22–29)
CREAT BLD-MCNC: 1.6 MG/DL (ref 0.57–1)
DEPRECATED RDW RBC AUTO: 46.2 FL (ref 37–54)
EOSINOPHIL # BLD AUTO: 0.38 10*3/MM3 (ref 0–0.4)
EOSINOPHIL NFR BLD AUTO: 5.7 % (ref 0.3–6.2)
ERYTHROCYTE [DISTWIDTH] IN BLOOD BY AUTOMATED COUNT: 13.8 % (ref 12.3–15.4)
GFR SERPL CREATININE-BSD FRML MDRD: 32 ML/MIN/1.73
GLUCOSE BLD-MCNC: 187 MG/DL (ref 65–99)
HCT VFR BLD AUTO: 31.4 % (ref 34–46.6)
HGB BLD-MCNC: 10.4 G/DL (ref 12–15.9)
IMM GRANULOCYTES # BLD AUTO: 0.03 10*3/MM3 (ref 0–0.05)
IMM GRANULOCYTES NFR BLD AUTO: 0.4 % (ref 0–0.5)
LYMPHOCYTES # BLD AUTO: 0.92 10*3/MM3 (ref 0.7–3.1)
LYMPHOCYTES NFR BLD AUTO: 13.8 % (ref 19.6–45.3)
MCH RBC QN AUTO: 30.6 PG (ref 26.6–33)
MCHC RBC AUTO-ENTMCNC: 33.1 G/DL (ref 31.5–35.7)
MCV RBC AUTO: 92.4 FL (ref 79–97)
MONOCYTES # BLD AUTO: 0.64 10*3/MM3 (ref 0.1–0.9)
MONOCYTES NFR BLD AUTO: 9.6 % (ref 5–12)
NEUTROPHILS # BLD AUTO: 4.67 10*3/MM3 (ref 1.7–7)
NEUTROPHILS NFR BLD AUTO: 69.8 % (ref 42.7–76)
NRBC BLD AUTO-RTO: 0 /100 WBC (ref 0–0.2)
PHOSPHATE SERPL-MCNC: 3.5 MG/DL (ref 2.5–4.5)
PLATELET # BLD AUTO: 273 10*3/MM3 (ref 140–450)
PMV BLD AUTO: 9.8 FL (ref 6–12)
POTASSIUM BLD-SCNC: 4.7 MMOL/L (ref 3.5–5.2)
PTH-INTACT SERPL-MCNC: 97.7 PG/ML (ref 15–65)
RBC # BLD AUTO: 3.4 10*6/MM3 (ref 3.77–5.28)
SODIUM BLD-SCNC: 139 MMOL/L (ref 136–145)
WBC NRBC COR # BLD: 6.69 10*3/MM3 (ref 3.4–10.8)

## 2020-05-07 PROCEDURE — 84100 ASSAY OF PHOSPHORUS: CPT

## 2020-05-07 PROCEDURE — 80048 BASIC METABOLIC PNL TOTAL CA: CPT

## 2020-05-07 PROCEDURE — 36415 COLL VENOUS BLD VENIPUNCTURE: CPT

## 2020-05-07 PROCEDURE — 85025 COMPLETE CBC W/AUTO DIFF WBC: CPT

## 2020-05-07 PROCEDURE — 83970 ASSAY OF PARATHORMONE: CPT

## 2020-05-12 ENCOUNTER — ANTICOAGULATION VISIT (OUTPATIENT)
Dept: CARDIOLOGY | Facility: CLINIC | Age: 65
End: 2020-05-12

## 2020-05-12 VITALS
DIASTOLIC BLOOD PRESSURE: 55 MMHG | HEART RATE: 80 BPM | SYSTOLIC BLOOD PRESSURE: 111 MMHG | WEIGHT: 185 LBS | BODY MASS INDEX: 31.76 KG/M2

## 2020-05-12 DIAGNOSIS — Z95.3 HISTORY OF MITRAL VALVE REPLACEMENT WITH BIOPROSTHETIC VALVE: ICD-10-CM

## 2020-05-12 DIAGNOSIS — I48.91 ATRIAL FIBRILLATION, UNSPECIFIED TYPE (HCC): ICD-10-CM

## 2020-05-12 DIAGNOSIS — Z95.3 HISTORY OF AORTIC VALVE REPLACEMENT WITH BIOPROSTHETIC VALVE: ICD-10-CM

## 2020-05-12 DIAGNOSIS — Z79.01 LONG TERM (CURRENT) USE OF ANTICOAGULANTS: ICD-10-CM

## 2020-05-12 LAB — INR PPP: 2.3 (ref 0.9–1.1)

## 2020-05-12 PROCEDURE — 36416 COLLJ CAPILLARY BLOOD SPEC: CPT | Performed by: INTERNAL MEDICINE

## 2020-05-12 PROCEDURE — 85610 PROTHROMBIN TIME: CPT | Performed by: INTERNAL MEDICINE

## 2020-05-14 ENCOUNTER — TELEPHONE (OUTPATIENT)
Dept: ONCOLOGY | Facility: CLINIC | Age: 65
End: 2020-05-14

## 2020-05-14 NOTE — TELEPHONE ENCOUNTER
Patient needs lab appt in 3 weeks and appt to see dr. veliz in 6 weeks      Call patient back at [phone]

## 2020-05-20 ENCOUNTER — TELEPHONE (OUTPATIENT)
Dept: ONCOLOGY | Facility: CLINIC | Age: 65
End: 2020-05-20

## 2020-05-26 DIAGNOSIS — D50.0 IRON DEFICIENCY ANEMIA DUE TO CHRONIC BLOOD LOSS: Primary | ICD-10-CM

## 2020-05-27 ENCOUNTER — LAB (OUTPATIENT)
Dept: LAB | Facility: HOSPITAL | Age: 65
End: 2020-05-27

## 2020-05-27 DIAGNOSIS — D50.0 IRON DEFICIENCY ANEMIA DUE TO CHRONIC BLOOD LOSS: ICD-10-CM

## 2020-05-27 LAB
BASOPHILS # BLD AUTO: 0.04 10*3/MM3 (ref 0–0.2)
BASOPHILS NFR BLD AUTO: 0.5 % (ref 0–1.5)
DEPRECATED RDW RBC AUTO: 46.9 FL (ref 37–54)
EOSINOPHIL # BLD AUTO: 0.5 10*3/MM3 (ref 0–0.4)
EOSINOPHIL NFR BLD AUTO: 6.3 % (ref 0.3–6.2)
ERYTHROCYTE [DISTWIDTH] IN BLOOD BY AUTOMATED COUNT: 14.3 % (ref 12.3–15.4)
HCT VFR BLD AUTO: 32.1 % (ref 34–46.6)
HGB BLD-MCNC: 10.5 G/DL (ref 12–15.9)
LYMPHOCYTES # BLD AUTO: 1.07 10*3/MM3 (ref 0.7–3.1)
LYMPHOCYTES NFR BLD AUTO: 13.4 % (ref 19.6–45.3)
MCH RBC QN AUTO: 31.3 PG (ref 26.6–33)
MCHC RBC AUTO-ENTMCNC: 32.7 G/DL (ref 31.5–35.7)
MCV RBC AUTO: 95.5 FL (ref 79–97)
MONOCYTES # BLD AUTO: 0.94 10*3/MM3 (ref 0.1–0.9)
MONOCYTES NFR BLD AUTO: 11.8 % (ref 5–12)
NEUTROPHILS # BLD AUTO: 5.41 10*3/MM3 (ref 1.7–7)
NEUTROPHILS NFR BLD AUTO: 68 % (ref 42.7–76)
PLATELET # BLD AUTO: 276 10*3/MM3 (ref 140–450)
PMV BLD AUTO: 9.4 FL (ref 6–12)
RBC # BLD AUTO: 3.36 10*6/MM3 (ref 3.77–5.28)
WBC NRBC COR # BLD: 7.96 10*3/MM3 (ref 3.4–10.8)

## 2020-05-27 PROCEDURE — 36415 COLL VENOUS BLD VENIPUNCTURE: CPT

## 2020-05-27 PROCEDURE — 85025 COMPLETE CBC W/AUTO DIFF WBC: CPT

## 2020-06-02 RX ORDER — WARFARIN SODIUM 5 MG/1
TABLET ORAL
Qty: 45 TABLET | Refills: 0 | Status: SHIPPED | OUTPATIENT
Start: 2020-06-02 | End: 2020-06-29

## 2020-06-10 RX ORDER — BLOOD SUGAR DIAGNOSTIC
STRIP MISCELLANEOUS
COMMUNITY
Start: 2020-05-05

## 2020-06-10 RX ORDER — PEN NEEDLE, DIABETIC 32GX 5/32"
NEEDLE, DISPOSABLE MISCELLANEOUS
COMMUNITY
Start: 2020-05-24

## 2020-06-12 ENCOUNTER — OFFICE VISIT (OUTPATIENT)
Dept: CARDIOLOGY | Facility: CLINIC | Age: 65
End: 2020-06-12

## 2020-06-12 ENCOUNTER — ANTICOAGULATION VISIT (OUTPATIENT)
Dept: CARDIOLOGY | Facility: CLINIC | Age: 65
End: 2020-06-12

## 2020-06-12 VITALS
WEIGHT: 184 LBS | BODY MASS INDEX: 31.41 KG/M2 | HEART RATE: 89 BPM | DIASTOLIC BLOOD PRESSURE: 66 MMHG | HEIGHT: 64 IN | SYSTOLIC BLOOD PRESSURE: 116 MMHG

## 2020-06-12 VITALS
HEART RATE: 89 BPM | BODY MASS INDEX: 31.58 KG/M2 | SYSTOLIC BLOOD PRESSURE: 116 MMHG | DIASTOLIC BLOOD PRESSURE: 66 MMHG | WEIGHT: 184 LBS

## 2020-06-12 DIAGNOSIS — D64.9 ANEMIA, UNSPECIFIED TYPE: ICD-10-CM

## 2020-06-12 DIAGNOSIS — Z79.01 LONG TERM (CURRENT) USE OF ANTICOAGULANTS: ICD-10-CM

## 2020-06-12 DIAGNOSIS — Z95.3 HISTORY OF MITRAL VALVE REPLACEMENT WITH BIOPROSTHETIC VALVE: ICD-10-CM

## 2020-06-12 DIAGNOSIS — N18.30 CKD (CHRONIC KIDNEY DISEASE) STAGE 3, GFR 30-59 ML/MIN (HCC): ICD-10-CM

## 2020-06-12 DIAGNOSIS — R07.2 PRECORDIAL PAIN: Primary | ICD-10-CM

## 2020-06-12 DIAGNOSIS — I48.0 PAROXYSMAL ATRIAL FIBRILLATION (HCC): ICD-10-CM

## 2020-06-12 DIAGNOSIS — Z95.3 HISTORY OF AORTIC VALVE REPLACEMENT WITH BIOPROSTHETIC VALVE: ICD-10-CM

## 2020-06-12 DIAGNOSIS — I48.91 ATRIAL FIBRILLATION, UNSPECIFIED TYPE (HCC): ICD-10-CM

## 2020-06-12 DIAGNOSIS — I10 ESSENTIAL HYPERTENSION: ICD-10-CM

## 2020-06-12 LAB — INR PPP: 2.1 (ref 0.9–1.1)

## 2020-06-12 PROCEDURE — 85610 PROTHROMBIN TIME: CPT | Performed by: INTERNAL MEDICINE

## 2020-06-12 PROCEDURE — 99214 OFFICE O/P EST MOD 30 MIN: CPT | Performed by: INTERNAL MEDICINE

## 2020-06-12 PROCEDURE — 36416 COLLJ CAPILLARY BLOOD SPEC: CPT | Performed by: INTERNAL MEDICINE

## 2020-06-12 NOTE — PROGRESS NOTES
Subjective:     Encounter Date:06/12/2020      Patient ID: Jocelin Parra is a 65 y.o. female.    Chief Complaint : Complaining of chest pain.  History of AVR, CAD, CABG, A. fib, long-term anticoagulation  History of Present Illness        This is a 65-year-old with PMH      # valvular heart disease, mechanical AVR on chronic Coumadin therapy, , severe mitral regurgitation, moderate TR and previous history of mechanical AVR, status post redo mitral valve replacement with #25 mechanical [place], tricuspid #28 physio ring,   Maze, CABG x2 on 05/09/2019  # CAD cardiac cath 4/13/2019 revealing severe 2 vessel disease, status post CABG x2 with LIMA to LAD and SVG to PDA 05/09/2019  # P.A.fib, long-term anticoagulation, cardioversion 8/9/18,Maze 05/09/2019  # diabetes,  hemoglobin A1c 04/17/2018 was 8.1  # hypertension  # torticollis, history of neck abscess 2015  # mother and lung cancer, father had diabetes and CVA  # AVR, unilateral oophorectomy.     here for  follow-up. patient is complaining of intermittent left-sided chest pain which feels like a pinching sensation which is on and off but last for few hours at a time.  Sometimes happens with exertion sometimes with rest..  And denies any shortness of breath  palpitations.   Patient's arterial blood pressure is 116/66, heart rate 89, weight of 184 pounds.   Patient is dealing with anemia and is seeing hematologist Dr. Mejía.  Getting iron transfusions   patient denies any fever chills. patient had labs done 12/6/2019 which showed creatinine of 1.48 and hemoglobin of 8 point 3 repeat was 8.7.  Labs from 2/6/2020 reveal cholesterol 111 triglycerides 102, HDL 43, LDL 37, hemoglobin A1c of 5.0.  CBC from 5/27/2020 reveals hemoglobin of 10.5      ASSESSMENT:    #Chest pain  # VHD,AVR/MVR  #  CAD, CABG  # a.fib, long-term anticoagulation  #  hypertension, hyperlipidemia, diabetes, CKD  #  torticollis  #Anemia  #CKD    PLAN:  Reviewed EKG results  We will schedule  a stress test, patient walks with a walker and has significant torticollis cannot walk on treadmill will schedule Lexiscan Cardiolite.  Risk benefits alternatives explained.   reviewed valvular heart disease with patient   follow-up with hematology for anemia  Reviewed labs with patient        Assessment:          Diagnosis Plan   1. Precordial pain  Stress Test With Myocardial Perfusion One Day   2. History of aortic valve replacement with bioprosthetic valve  Stress Test With Myocardial Perfusion One Day   3. History of mitral valve replacement with bioprosthetic valve  Stress Test With Myocardial Perfusion One Day   4. Paroxysmal atrial fibrillation (CMS/HCC)  Stress Test With Myocardial Perfusion One Day   5. Long term (current) use of anticoagulants  Stress Test With Myocardial Perfusion One Day   6. Anemia, unspecified type  Stress Test With Myocardial Perfusion One Day   7. CKD (chronic kidney disease) stage 3, GFR 30-59 ml/min (CMS/HCC)  Stress Test With Myocardial Perfusion One Day   8. Essential hypertension  Stress Test With Myocardial Perfusion One Day          Plan:         Past Medical History:  Past Medical History:   Diagnosis Date   • A-fib (CMS/[facility], located within [facility]) Dx 2018    S/p Cardioversion by Dr. Spencer on 04/10/19   • Acute renal failure syndrome (CMS/HCC) 4/2019-F IP   • Biatrial enlargement 04/10/2019    Noted on SHERIE   • Calcified granuloma of lung (CMS/HCC) 01/04/2019    Noted on Chest XR   • Cardiomegaly 01/04/2019    Borderline--Noted on Chest XR   • CHF (congestive heart failure) (CMS/HCC)    • CKD (chronic kidney disease), stage III (CMS/HCC)     Does Not See a Nephrologist   • Diabetes mellitus (CMS/HCC)    • DM (diabetes mellitus) (CMS/HCC)     T2   • Dysphagia 08/15-F IP    Due to Pharyngitis   • GERD (gastroesophageal reflux disease)    • History of chest x-ray 8/28/15-F    Normal   • History of chest x-ray 01/19/2019    Mild Pulmonary Edema w/Congestive Failure Noted   • History of chest x-ray  01/04/2019    Borderline Cardiomegaly    • History of EKG 2014/2018/2019-[facility]   • Hx of diabetic neuropathy    • Hx of dizziness     w/Lightheadedness   • Hx of echocardiogram 4/16/18-[facility]    EF 55-60%; Mild MVR; Mild TVR; Normal Root/No Effusion   • Hyperlipidemia     Controlled w/Meds   • Hypertension     Controlled w/Meds   • Hypokalemia 4/11/19-[facility] IP   • Hypothyroidism     Controlled w/Meds   • Iron deficiency anemia    • Left posterior fascicular block 04/2018 & 4/19    Noted on EKG   • Lower extremity edema 03/2019   • Mild mitral valve regurgitation 04/16/2018    Noted on Echo   • Mild tricuspid valve regurgitation 04/16/2018    Noted on Echo   • Moderate mitral valve regurgitation 2008    S/p MVR in 08'   • Moderate tricuspid insufficiency 04/10/2019    Noted on SHERIE   • Osteoarthritis     Knees w/Hx Knee Repl   • Pulmonary edema 01/19/2019    Mild--Noted on Chest XR   • Rapid palpitations 04/15/18 & 8/9/18-[facility] ER   • Renal dysfunction    • Right axis deviation 08/2018 & 4/19    Noted on EKG   • Severe aortic valve stenosis Since 2008    Hx AVR on 12/11/08 by Dr. Alvarado   • Severe mitral insufficiency 04/10/2019    Noted on SHERIE   • Sinus tachycardia 08/2018    Noted on EKG   • SOB (shortness of breath) chronic   • Torticollis Chronic   • Valvular heart disease     S/p AVR & MVR in 08'     Past Surgical History:  Past Surgical History:   Procedure Laterality Date   • AORTIC VALVE REPAIR/REPLACEMENT  12/11/08-[facility]    Using a #20 Eight Yr Mechanical [place] Prosthesis--Dewey Alvarado MD   • CARDIOVERSION  4/10/19-[facility]    Dr. Spencer--For A-Fib   • COLONOSCOPY N/A 12/5/2019    Procedure: COLONOSCOPY;  Surgeon: Dustin Castellanos MD;  Location: [facility] ENDOSCOPY;  Service: Gastroenterology   • ENDOSCOPY N/A 12/5/2019    Procedure: ESOPHAGOGASTRODUODENOSCOPY;  Surgeon: Dustin Castellanos MD;  Location: [facility] ENDOSCOPY;  Service: Gastroenterology   • ENTEROSCOPY SMALL BOWEL N/A 2/3/2020    Procedure: ESOPHAGOGASTRODUODENOSCOPY  WITH SMALL BOWEL ENTEROSCOPY and argonplasma coagulation of two small jejunal ateriovenous malformations;  Surgeon: Dexter Akhtar MD;  Location: [facility] ENDOSCOPY;  Service: Gastroenterology;  Laterality: N/A;  jejunal ateriovenous malformation   • HYSTERECTOMY     • MITRAL VALVE REPLACEMENT  2008   • MITRAL VALVE REPLACEMENT  05/09/2019    Dr Alvarado   • SHERIE  4/10/19-[facility]    EF 40%; Moderate TVR; Severe Eccentric MI; Biatrial Enlargement   • TOTAL KNEE ARTHROPLASTY  2017   • TRICUSPID VALVE SURGERY  05/09/2019    Dr Alvarado      Allergies:  No Known Allergies  Home Meds:  Current Meds:     Current Outpatient Medications:   •  ACCU-CHEK GUIDE test strip, , Disp: , Rfl:   •  aspirin 81 MG tablet, Take 81 mg by mouth Daily., Disp: , Rfl:   •  atorvastatin (LIPITOR) 40 MG tablet, Take 40 mg by mouth Daily., Disp: , Rfl:   •  B Complex Vitamins (VITAMIN B COMPLEX) tablet, Take 1 capsule by mouth Daily., Disp: , Rfl:   •  cholecalciferol (VITAMIN D3) 10 MCG (400 UNIT) tablet, Take 2,000 Units by mouth Daily., Disp: , Rfl:   •  ezetimibe (ZETIA) 10 MG tablet, Take 1 tablet by mouth daily., Disp: , Rfl:   •  ferrous sulfate 325 (65 FE) MG tablet, Take 325 mg by mouth 2 (Two) Times a Day., Disp: , Rfl:   •  furosemide (LASIX) 40 MG tablet, Take 40 mg by mouth Daily., Disp: , Rfl:   •  Insulin Degludec (TRESIBA) 100 UNIT/ML solution injection, Inject 10 Units under the skin into the appropriate area as directed every night at bedtime., Disp: , Rfl:   •  [employer] PEN NEEDLES 32G X 4 MM misc, , Disp: , Rfl:   •  levothyroxine (SYNTHROID, LEVOTHROID) 112 MCG tablet, Take 112 mcg by mouth Daily., Disp: , Rfl:   •  Liraglutide (VICTOZA) 18 MG/3ML solution pen-injector, Inject 1.8 mg under the skin into the appropriate area as directed Daily., Disp: , Rfl:   •  MAGNESIUM PO, Take 1 tablet by mouth Daily., Disp: , Rfl:   •  metFORMIN ER (GLUCOPHAGE-XR) 500 MG 24 hr tablet, Take 500 mg by mouth Daily., Disp: , Rfl:   •  metoprolol  "succinate XL (TOPROL-XL) 25 MG 24 hr tablet, TAKE ONE-HALF TABLET BY MOUTH DAILY, Disp: 45 tablet, Rfl: 1  •  Multiple Vitamin (MULTI-VITAMIN) tablet, Take 1 tablet by mouth daily., Disp: , Rfl:   •  omeprazole (PriLOSEC) 20 MG capsule, Take 20 mg by mouth Daily., Disp: , Rfl:   •  potassium chloride (K-DUR,KLOR-CON) 20 MEQ CR tablet, Take 20 mEq by mouth Daily., Disp: , Rfl:   •  valsartan (DIOVAN) 80 MG tablet, Take 80 mg by mouth Daily., Disp: , Rfl:   •  vitamin C (ASCORBIC ACID) 500 MG tablet, Take 500 mg by mouth Daily., Disp: , Rfl:   •  warfarin (COUMADIN) 5 MG tablet, TAKE ONE TABLET BY MOUTH DAILY ON MONDAY AND FRIDAY AND TAKE ONE AND ONE-HALF TABLETS BY MOUTH ON ALL  OTHER DAYS  OR AS DIRECTED, Disp: 45 tablet, Rfl: 0  Social History:   Social History     Tobacco Use   • Smoking status: Never Smoker   • Smokeless tobacco: Never Used   Substance Use Topics   • Alcohol use: No      Family History:  Family History   Problem Relation Age of Onset   • Heart disease Father    • Hypertension Father    • Stroke Father    • Diabetes Father    • Lung cancer Mother         The following portions of the patient's history were reviewed and updated as appropriate: allergies, current medications, past family history, past medical history, past social history, past surgical history and problem list.      Review of Systems   Cardiovascular: Positive for chest pain. Negative for leg swelling and palpitations.   Respiratory: Negative for shortness of breath.    Neurological: Negative for dizziness and numbness.     Comprehensive review of systems were reviewed and all others review of systems were found to be negative other than HPI    Procedures EKG done on 2/2/2020 reviewed by me shows sinus rhythm with rate of 99 bpm with PVCs     Objective:     Physical Exam  /66 (BP Location: Left arm, Patient Position: Sitting, Cuff Size: Large Adult)   Pulse 89   Ht 162.6 cm (64\")   Wt 83.5 kg (184 lb)   LMP  (LMP Unknown)  "  BMI 31.58 kg/m²   General:  Appears in no acute distress, walks with a cane  Eyes: Sclera is anicteric,  conjunctiva is clear   HEENT:  No JVD. Thyroid not visibly enlarged.  Torticollis present  Respiratory: Respirations regular and unlabored at rest.  Bilaterally good breath sounds, with good air entry in all fields. No crackles, rubs or wheezes auscultated  Cardiovascular: S1,S2 Regular rate and rhythm. No murmur, rub or gallop auscultated. No pretibial pitting edema  Gastrointestinal: Abdomen soft, flat, non tender. Bowel sounds present.   Musculoskeletal:  No abnormal movements  Extremities: No digital clubbing or cyanosis  Skin: Color pink. Skin warm and dry to touch. No rashes  No xanthoma  Neuro: Alert and awake, no lateralizing deficits appreciated    Lab Reviewed:

## 2020-06-15 ENCOUNTER — OFFICE VISIT (OUTPATIENT)
Dept: ONCOLOGY | Facility: CLINIC | Age: 65
End: 2020-06-15

## 2020-06-15 ENCOUNTER — APPOINTMENT (OUTPATIENT)
Dept: LAB | Facility: HOSPITAL | Age: 65
End: 2020-06-15

## 2020-06-15 VITALS
DIASTOLIC BLOOD PRESSURE: 69 MMHG | WEIGHT: 181 LBS | SYSTOLIC BLOOD PRESSURE: 104 MMHG | HEART RATE: 82 BPM | TEMPERATURE: 97.1 F | HEIGHT: 64 IN | BODY MASS INDEX: 30.9 KG/M2 | RESPIRATION RATE: 16 BRPM

## 2020-06-15 DIAGNOSIS — D50.0 IRON DEFICIENCY ANEMIA DUE TO CHRONIC BLOOD LOSS: Primary | ICD-10-CM

## 2020-06-15 LAB
BASOPHILS # BLD AUTO: 0.03 10*3/MM3 (ref 0–0.2)
BASOPHILS NFR BLD AUTO: 0.3 % (ref 0–1.5)
DEPRECATED RDW RBC AUTO: 48.4 FL (ref 37–54)
EOSINOPHIL # BLD AUTO: 0.35 10*3/MM3 (ref 0–0.4)
EOSINOPHIL NFR BLD AUTO: 4.1 % (ref 0.3–6.2)
ERYTHROCYTE [DISTWIDTH] IN BLOOD BY AUTOMATED COUNT: 14.8 % (ref 12.3–15.4)
FERRITIN SERPL-MCNC: 464.5 NG/ML (ref 13–150)
FOLATE SERPL-MCNC: 18.9 NG/ML (ref 4.78–24.2)
HCT VFR BLD AUTO: 32 % (ref 34–46.6)
HGB BLD-MCNC: 10.6 G/DL (ref 12–15.9)
IRON 24H UR-MRATE: 41 MCG/DL (ref 37–145)
IRON SATN MFR SERPL: 14 % (ref 20–50)
LYMPHOCYTES # BLD AUTO: 0.79 10*3/MM3 (ref 0.7–3.1)
LYMPHOCYTES NFR BLD AUTO: 9.2 % (ref 19.6–45.3)
MCH RBC QN AUTO: 31.3 PG (ref 26.6–33)
MCHC RBC AUTO-ENTMCNC: 33.1 G/DL (ref 31.5–35.7)
MCV RBC AUTO: 94.4 FL (ref 79–97)
MONOCYTES # BLD AUTO: 0.73 10*3/MM3 (ref 0.1–0.9)
MONOCYTES NFR BLD AUTO: 8.5 % (ref 5–12)
NEUTROPHILS # BLD AUTO: 6.68 10*3/MM3 (ref 1.7–7)
NEUTROPHILS NFR BLD AUTO: 77.9 % (ref 42.7–76)
PLATELET # BLD AUTO: 256 10*3/MM3 (ref 140–450)
PMV BLD AUTO: 9.8 FL (ref 6–12)
RBC # BLD AUTO: 3.39 10*6/MM3 (ref 3.77–5.28)
TIBC SERPL-MCNC: 295 MCG/DL (ref 298–536)
TRANSFERRIN SERPL-MCNC: 198 MG/DL (ref 200–360)
WBC NRBC COR # BLD: 8.58 10*3/MM3 (ref 3.4–10.8)

## 2020-06-15 PROCEDURE — 36415 COLL VENOUS BLD VENIPUNCTURE: CPT | Performed by: INTERNAL MEDICINE

## 2020-06-15 PROCEDURE — 84466 ASSAY OF TRANSFERRIN: CPT | Performed by: INTERNAL MEDICINE

## 2020-06-15 PROCEDURE — 83540 ASSAY OF IRON: CPT | Performed by: INTERNAL MEDICINE

## 2020-06-15 PROCEDURE — 82746 ASSAY OF FOLIC ACID SERUM: CPT | Performed by: INTERNAL MEDICINE

## 2020-06-15 PROCEDURE — 85025 COMPLETE CBC W/AUTO DIFF WBC: CPT | Performed by: INTERNAL MEDICINE

## 2020-06-15 PROCEDURE — 82728 ASSAY OF FERRITIN: CPT | Performed by: INTERNAL MEDICINE

## 2020-06-15 PROCEDURE — 99214 OFFICE O/P EST MOD 30 MIN: CPT | Performed by: INTERNAL MEDICINE

## 2020-06-15 NOTE — PROGRESS NOTES
Hematology/Oncology Outpatient Follow Up    Jocelin Parra  1955    Primary Care Physician: Emilie Cruz APRN  Referring Physician: Emilie Cruz APRN  Chief Complaint:  Iron deficiency anemia  Chronic renal failure stage III  Arteriovenous malformation of small bowel  Mechanical cardiac valve  History of Present Illness:   · I initially saw Ms. De Los Santos in consultation at [facility] when she was admitted through the emergency room on 12/3/2019 with severe anemia.  Patient required blood transfusion.  Patient has chronic history of kidney disease.    · CBC in the ED showed WBC 3.5, hemoglobin 6.1, MCV 63.4, RDW 18.6, and platelets 339,000. She received 1 unit pRBC.  Creatinine was 1.57.   · Patient is on chronic Coumadin for mechanical cardiac valve INR was 2.74 on admission on 12/3/2019    · Posttransfusion iron studies showed iron 24 and ferritin 21.2.  Vitamin B12 was 947 and folate was 18.6.    · 12/4/2019 SPEP negative for monoclonal gammopathy.  Serum creatinine 1.7 with EGFR of 30  · 12/5/2019 patient underwent colonoscopy and EGD at [facility]- No evidence of any active source of bleeding noticed in the upper at the lower GI tract except diverticulosis and hemorrhoids.  Possibility of a small bowel AVM or lesion leading to blood loss anemia is in the differential however patient denies any overt GI bleeding.  May consider small bowel evaluation with a PillCam if needed.  · 12/23/2019 and 12/30/2019 patient received 2 Injectafer infusions.  · 1/21/2020 - Erythropoietin 37.4, iron 47, iron saturation 13, TIBC 371, ferritin 681.80.  · 2/30/2020-EGD with small bowel enteroscopy performed by Dr. Akhtar.  2 nonbleeding jejunal angiectasia's were cauterized  · 3/6/2020 and 3/13/2020 patient received 2 dose of Injectafer infusions.    Past Medical History:   Diagnosis Date   • A-fib (CMS/HCC) Dx 2018    S/p Cardioversion by Dr. Spencer on 04/10/19   • Acute renal failure syndrome (CMS/HCC) 4/2019-[facility] IP   • Biatrial  enlargement 04/10/2019    Noted on SHERIE   • Calcified granuloma of lung (CMS/HCC) 01/04/2019    Noted on Chest XR   • Cardiomegaly 01/04/2019    Borderline--Noted on Chest XR   • CHF (congestive heart failure) (CMS/[facility])    • CKD (chronic kidney disease), stage III (CMS/[facility])     Does Not See a Nephrologist   • Diabetes mellitus (CMS/[facility])    • DM (diabetes mellitus) (CMS/[facility])     T2   • Dysphagia 08/15-[facility] IP    Due to Pharyngitis   • GERD (gastroesophageal reflux disease)    • History of chest x-ray 8/28/15-[facility]    Normal   • History of chest x-ray 01/19/2019    Mild Pulmonary Edema w/Congestive Failure Noted   • History of chest x-ray 01/04/2019    Borderline Cardiomegaly    • History of EKG 2014/2018/2019-[facility]   • Hx of diabetic neuropathy    • Hx of dizziness     w/Lightheadedness   • Hx of echocardiogram 4/16/18-[facility]    EF 55-60%; Mild MVR; Mild TVR; Normal Root/No Effusion   • Hyperlipidemia     Controlled w/Meds   • Hypertension     Controlled w/Meds   • Hypokalemia 4/11/19-[facility] IP   • Hypothyroidism     Controlled w/Meds   • Iron deficiency anemia    • Left posterior fascicular block 04/2018 & 4/19    Noted on EKG   • Lower extremity edema 03/2019   • Mild mitral valve regurgitation 04/16/2018    Noted on Echo   • Mild tricuspid valve regurgitation 04/16/2018    Noted on Echo   • Moderate mitral valve regurgitation 2008    S/p MVR in 08'   • Moderate tricuspid insufficiency 04/10/2019    Noted on SHERIE   • Osteoarthritis     Knees w/Hx Knee Repl   • Pulmonary edema 01/19/2019    Mild--Noted on Chest XR   • Rapid palpitations 04/15/18 & 8/9/18-[facility] ER   • Renal dysfunction    • Right axis deviation 08/2018 & 4/19    Noted on EKG   • Severe aortic valve stenosis Since 2008    Hx AVR on 12/11/08 by Dr. Alvarado   • Severe mitral insufficiency 04/10/2019    Noted on SHERIE   • Sinus tachycardia 08/2018    Noted on EKG   • SOB (shortness of breath) chronic   • Torticollis Chronic   • Valvular heart disease     S/p AVR & MVR in  08'       Past Surgical History:   Procedure Laterality Date   • AORTIC VALVE REPAIR/REPLACEMENT  12/11/08-E    Using a #20 Eight Yr Mechanical [employer] Prosthesis--Dewey Alvarado MD   • CARDIOVERSION  4/10/19-[facility]    Dr. Spencer--For A-Fib   • COLONOSCOPY N/A 12/5/2019    Procedure: COLONOSCOPY;  Surgeon: Dustin Castellanos MD;  Location: [facility] ENDOSCOPY;  Service: Gastroenterology   • ENDOSCOPY N/A 12/5/2019    Procedure: ESOPHAGOGASTRODUODENOSCOPY;  Surgeon: Dustin Castellanos MD;  Location: [facility] ENDOSCOPY;  Service: Gastroenterology   • ENTEROSCOPY SMALL BOWEL N/A 2/3/2020    Procedure: ESOPHAGOGASTRODUODENOSCOPY WITH SMALL BOWEL ENTEROSCOPY and argonplasma coagulation of two small jejunal ateriovenous malformations;  Surgeon: Dexter Akhtar MD;  Location: [facility] ENDOSCOPY;  Service: Gastroenterology;  Laterality: N/A;  jejunal ateriovenous malformation   • HYSTERECTOMY     • MITRAL VALVE REPLACEMENT  2008   • MITRAL VALVE REPLACEMENT  05/09/2019    Dr Alvarado   • SHERIE  4/10/19-[facility]    EF 40%; Moderate TVR; Severe Eccentric MI; Biatrial Enlargement   • TOTAL KNEE ARTHROPLASTY  2017   • TRICUSPID VALVE SURGERY  05/09/2019    Dr Alvarado         Current Outpatient Medications:   •  ACCU-CHEK GUIDE test strip, , Disp: , Rfl:   •  aspirin 81 MG tablet, Take 81 mg by mouth Daily., Disp: , Rfl:   •  atorvastatin (LIPITOR) 40 MG tablet, Take 40 mg by mouth Daily., Disp: , Rfl:   •  B Complex Vitamins (VITAMIN B COMPLEX) tablet, Take 1 capsule by mouth Daily., Disp: , Rfl:   •  cholecalciferol (VITAMIN D3) 10 MCG (400 UNIT) tablet, Take 2,000 Units by mouth Daily., Disp: , Rfl:   •  ezetimibe (ZETIA) 10 MG tablet, Take 1 tablet by mouth daily., Disp: , Rfl:   •  ferrous sulfate 325 (65 FE) MG tablet, Take 325 mg by mouth 2 (Two) Times a Day., Disp: , Rfl:   •  furosemide (LASIX) 40 MG tablet, Take 40 mg by mouth Daily., Disp: , Rfl:   •  Insulin Degludec (TRESIBA) 100 UNIT/ML solution injection, Inject 10 Units under the skin into  the appropriate area as directed every night at bedtime., Disp: , Rfl:   •  [employer] PEN NEEDLES 32G X 4 MM misc, , Disp: , Rfl:   •  levothyroxine (SYNTHROID, LEVOTHROID) 112 MCG tablet, Take 112 mcg by mouth Daily., Disp: , Rfl:   •  MAGNESIUM PO, Take 1 tablet by mouth Daily., Disp: , Rfl:   •  metFORMIN ER (GLUCOPHAGE-XR) 500 MG 24 hr tablet, Take 500 mg by mouth Daily., Disp: , Rfl:   •  metoprolol succinate XL (TOPROL-XL) 25 MG 24 hr tablet, TAKE ONE-HALF TABLET BY MOUTH DAILY, Disp: 45 tablet, Rfl: 1  •  Multiple Vitamin (MULTI-VITAMIN) tablet, Take 1 tablet by mouth daily., Disp: , Rfl:   •  omeprazole (PriLOSEC) 20 MG capsule, Take 20 mg by mouth Daily., Disp: , Rfl:   •  potassium chloride (K-DUR,KLOR-CON) 20 MEQ CR tablet, Take 20 mEq by mouth Daily., Disp: , Rfl:   •  valsartan (DIOVAN) 80 MG tablet, Take 80 mg by mouth Daily., Disp: , Rfl:   •  vitamin C (ASCORBIC ACID) 500 MG tablet, Take 500 mg by mouth Daily., Disp: , Rfl:   •  warfarin (COUMADIN) 5 MG tablet, TAKE ONE TABLET BY MOUTH DAILY ON MONDAY AND FRIDAY AND TAKE ONE AND ONE-HALF TABLETS BY MOUTH ON ALL  OTHER DAYS  OR AS DIRECTED, Disp: 45 tablet, Rfl: 0  •  Liraglutide (VICTOZA) 18 MG/3ML solution pen-injector, Inject 1.8 mg under the skin into the appropriate area as directed Daily., Disp: , Rfl:     No Known Allergies    Family History   Problem Relation Age of Onset   • Heart disease Father    • Hypertension Father    • Stroke Father    • Diabetes Father    • Lung cancer Mother        Cancer-related family history includes Lung cancer in her mother.    Social History     Tobacco Use   • Smoking status: Never Smoker   • Smokeless tobacco: Never Used   Substance Use Topics   • Alcohol use: No   • Drug use: No       I have reviewed the history of present illness, past medical history, family history, social history, lab results, all notes and other records since the patient was last seen cancer [facility] center.    SUBJECTIVE:      Patient is in my  "office for follow-up of her anemia.  Denies any visible blood loss.  She is taking oral iron tablets also has some dark stools.  Reports episodes of shortness of air.  She contacted her cardiologist office and set up for a stress test.  ROS:      Review of Systems   Constitutional: Negative for fever.   HENT: Negative for nosebleeds and trouble swallowing.    Eyes: Negative for visual disturbance.   Respiratory: Negative for cough, shortness of breath and wheezing.    Cardiovascular: Negative for chest pain.   Gastrointestinal: Negative for abdominal pain and blood in stool.   Endocrine: Negative for cold intolerance.   Genitourinary: Negative for dysuria and hematuria.   Musculoskeletal: Negative for joint swelling.   Skin: Negative for rash.   Allergic/Immunologic: Negative for environmental allergies.   Neurological: Negative for seizures.   Hematological: Does not bruise/bleed easily.   Psychiatric/Behavioral: The patient is not nervous/anxious.        MD performed ROS and are negative except as mentioned in Subjective.    Objective:       Vitals:    06/15/20 1050   BP: 104/69   Pulse: 82   Resp: 16   Temp: 97.1 °F (36.2 °C)   TempSrc: Oral   Weight: 82.1 kg (181 lb)   Height: 162.6 cm (64\")   PainSc: 0-No pain         PHYSICAL EXAM:      Physical Exam   Constitutional: She is oriented to person, place, and time. No distress.   HENT:   Head: Normocephalic and atraumatic.   Torticollis   Eyes: Conjunctivae and EOM are normal. Right eye exhibits no discharge. Left eye exhibits no discharge. No scleral icterus.   Neck: Normal range of motion. Neck supple. No thyromegaly present.   Cardiovascular: Normal rate, regular rhythm and normal heart sounds. Exam reveals no gallop and no friction rub.   Crisp S1-S2 can be heard   Pulmonary/Chest: Effort normal. No stridor. No respiratory distress. She has no wheezes.   Abdominal: Soft. Bowel sounds are normal. She exhibits no mass. There is no tenderness. There is no rebound " and no guarding.   Musculoskeletal: Normal range of motion. She exhibits no tenderness.   Lymphadenopathy:     She has no cervical adenopathy.   Neurological: She is alert and oriented to person, place, and time. She exhibits normal muscle tone.   Skin: Skin is warm. No rash noted. She is not diaphoretic. No erythema.   Psychiatric: She has a normal mood and affect. Her behavior is normal.   Nursing note and vitals reviewed.       RECENT LABS:     WBC   Date Value Ref Range Status   06/15/2020 8.58 3.40 - 10.80 10*3/mm3 Final     RBC   Date Value Ref Range Status   06/15/2020 3.39 (L) 3.77 - 5.28 10*6/mm3 Final     Hemoglobin   Date Value Ref Range Status   06/15/2020 10.6 (L) 12.0 - 15.9 g/dL Final     Hematocrit   Date Value Ref Range Status   06/15/2020 32.0 (L) 34.0 - 46.6 % Final     MCV   Date Value Ref Range Status   06/15/2020 94.4 79.0 - 97.0 fL Final     MCH   Date Value Ref Range Status   06/15/2020 31.3 26.6 - 33.0 pg Final     MCHC   Date Value Ref Range Status   06/15/2020 33.1 31.5 - 35.7 g/dL Final     RDW   Date Value Ref Range Status   06/15/2020 14.8 12.3 - 15.4 % Final     RDW-SD   Date Value Ref Range Status   06/15/2020 48.4 37.0 - 54.0 fl Final     MPV   Date Value Ref Range Status   06/15/2020 9.8 6.0 - 12.0 fL Final     Platelets   Date Value Ref Range Status   06/15/2020 256 140 - 450 10*3/mm3 Final     Neutrophil %   Date Value Ref Range Status   06/15/2020 77.9 (H) 42.7 - 76.0 % Final     Lymphocyte %   Date Value Ref Range Status   06/15/2020 9.2 (L) 19.6 - 45.3 % Final     Monocyte %   Date Value Ref Range Status   06/15/2020 8.5 5.0 - 12.0 % Final     Eosinophil %   Date Value Ref Range Status   06/15/2020 4.1 0.3 - 6.2 % Final     Basophil %   Date Value Ref Range Status   06/15/2020 0.3 0.0 - 1.5 % Final     Immature Grans %   Date Value Ref Range Status   05/07/2020 0.4 0.0 - 0.5 % Final     Neutrophils, Absolute   Date Value Ref Range Status   06/15/2020 6.68 1.70 - 7.00 10*3/mm3  Final     Lymphocytes, Absolute   Date Value Ref Range Status   06/15/2020 0.79 0.70 - 3.10 10*3/mm3 Final     Monocytes, Absolute   Date Value Ref Range Status   06/15/2020 0.73 0.10 - 0.90 10*3/mm3 Final     Eosinophils, Absolute   Date Value Ref Range Status   06/15/2020 0.35 0.00 - 0.40 10*3/mm3 Final     Basophils, Absolute   Date Value Ref Range Status   06/15/2020 0.03 0.00 - 0.20 10*3/mm3 Final     Immature Grans, Absolute   Date Value Ref Range Status   05/07/2020 0.03 0.00 - 0.05 10*3/mm3 Final     nRBC   Date Value Ref Range Status   05/07/2020 0.0 0.0 - 0.2 /100 WBC Final       Lab Results   Component Value Date    GLUCOSE 187 (H) 05/07/2020    BUN 50 (H) 05/07/2020    CREATININE 1.60 (H) 05/07/2020    EGFRIFNONA 32 (L) 05/07/2020    BCR 31.3 (H) 05/07/2020    K 4.7 05/07/2020    CO2 26.0 05/07/2020    CALCIUM 10.0 05/07/2020    PROTENTOTREF 6.0 12/04/2019    ALBUMIN 3.50 12/05/2019    LABIL2 1.1 12/04/2019    AST 26 12/05/2019    ALT 18 12/05/2019         Assessment/Plan      ASSESSMENT:     1. Chronic iron deficiency anemia.  2. Status post mechanical cardiac aortic valve replacement  3. Chronic kidney disease stage III  4. Anemia related to chronic kidney disease stage III  5. Arteriovenous malformations of small bowel  6. ECOG 1    PLAN:      1. Patient with ongoing  iron deficiency anemia.  She received iron infusions 6 weeks ago.  I will recheck CBC in 6 weeks when I see her back in my office.   2. I will obtain iron studies today  3. No evidence of small bowel blood loss on pill study.  4. Patient is on chronic anticoagulation with warfarin status post mechanical cardiac aortic valve replacement.  This could be worsening her GI bleed follow CBC closely.  5. Follow-up with cardiology regarding her shortness of air.  This is unlikely related to anemia as her hemoglobin is only mildly low.  6. If hemoglobin drops below 10 and the iron studies are normal patient will benefit from Procrit injection.     7. Check CBC in 6 weeks  8.  I will see her back in my office in 6 weeks recheck CBC at that time  9.  patient report to us if she notices any blood in the stool or black stools as she becomes very anemic very fast secondary to being on Coumadin and AV malformations in the small bowel.  Electronically signed by Nav Rizzo MD, 06/15/20, 10:35 AM.      I have reviewed labs results, imaging, vitals, and medications with the patient today.     Patient verbalized understanding and is in agreement of the above plan.            This report was compiled using Dragon voice recognition software. I have made every effort to proof read this document; however, typographical errors may persist.

## 2020-06-29 RX ORDER — WARFARIN SODIUM 5 MG/1
TABLET ORAL
Qty: 45 TABLET | Refills: 0 | Status: SHIPPED | OUTPATIENT
Start: 2020-06-29 | End: 2020-07-23

## 2020-06-30 ENCOUNTER — HOSPITAL ENCOUNTER (OUTPATIENT)
Dept: ONCOLOGY | Facility: HOSPITAL | Age: 65
Setting detail: INFUSION SERIES
Discharge: HOME | End: 2020-06-30

## 2020-06-30 VITALS
HEIGHT: 64 IN | HEART RATE: 81 BPM | RESPIRATION RATE: 18 BRPM | SYSTOLIC BLOOD PRESSURE: 99 MMHG | TEMPERATURE: 98.7 F | DIASTOLIC BLOOD PRESSURE: 64 MMHG | WEIGHT: 185 LBS | BODY MASS INDEX: 31.58 KG/M2

## 2020-06-30 DIAGNOSIS — D50.0 IRON DEFICIENCY ANEMIA DUE TO CHRONIC BLOOD LOSS: Primary | ICD-10-CM

## 2020-06-30 DIAGNOSIS — K90.9 INTESTINAL MALABSORPTION, UNSPECIFIED TYPE: ICD-10-CM

## 2020-06-30 PROCEDURE — 96365 THER/PROPH/DIAG IV INF INIT: CPT

## 2020-06-30 PROCEDURE — 36415 COLL VENOUS BLD VENIPUNCTURE: CPT

## 2020-06-30 PROCEDURE — 25010000002 FERRIC CARBOXYMALTOSE 750 MG/15ML SOLUTION 15 ML VIAL: Performed by: INTERNAL MEDICINE

## 2020-06-30 RX ADMIN — SODIUM CHLORIDE 750 MG: 900 INJECTION, SOLUTION INTRAVENOUS at 13:15

## 2020-07-07 ENCOUNTER — HOSPITAL ENCOUNTER (OUTPATIENT)
Dept: ONCOLOGY | Facility: HOSPITAL | Age: 65
Setting detail: INFUSION SERIES
Discharge: HOME | End: 2020-07-07

## 2020-07-07 VITALS
BODY MASS INDEX: 31.58 KG/M2 | TEMPERATURE: 98.2 F | SYSTOLIC BLOOD PRESSURE: 105 MMHG | HEART RATE: 82 BPM | DIASTOLIC BLOOD PRESSURE: 68 MMHG | WEIGHT: 185 LBS | HEIGHT: 64 IN | RESPIRATION RATE: 18 BRPM

## 2020-07-07 DIAGNOSIS — K90.9 INTESTINAL MALABSORPTION, UNSPECIFIED TYPE: ICD-10-CM

## 2020-07-07 DIAGNOSIS — D50.0 IRON DEFICIENCY ANEMIA DUE TO CHRONIC BLOOD LOSS: Primary | ICD-10-CM

## 2020-07-07 PROCEDURE — 96365 THER/PROPH/DIAG IV INF INIT: CPT

## 2020-07-07 PROCEDURE — 25010000002 FERRIC CARBOXYMALTOSE 750 MG/15ML SOLUTION 15 ML VIAL: Performed by: INTERNAL MEDICINE

## 2020-07-07 PROCEDURE — 36415 COLL VENOUS BLD VENIPUNCTURE: CPT

## 2020-07-07 RX ORDER — SODIUM CHLORIDE 9 MG/ML
250 INJECTION, SOLUTION INTRAVENOUS ONCE
Status: COMPLETED | OUTPATIENT
Start: 2020-07-07 | End: 2020-07-07

## 2020-07-07 RX ADMIN — FERRIC CARBOXYMALTOSE INJECTION 750 MG: 50 INJECTION, SOLUTION INTRAVENOUS at 13:51

## 2020-07-07 RX ADMIN — SODIUM CHLORIDE 250 ML: 900 INJECTION, SOLUTION INTRAVENOUS at 13:50

## 2020-07-17 ENCOUNTER — ANTICOAGULATION VISIT (OUTPATIENT)
Dept: CARDIOLOGY | Facility: CLINIC | Age: 65
End: 2020-07-17

## 2020-07-17 VITALS
BODY MASS INDEX: 31.76 KG/M2 | SYSTOLIC BLOOD PRESSURE: 110 MMHG | DIASTOLIC BLOOD PRESSURE: 70 MMHG | WEIGHT: 185 LBS | HEART RATE: 80 BPM

## 2020-07-17 DIAGNOSIS — I48.91 ATRIAL FIBRILLATION, UNSPECIFIED TYPE (HCC): ICD-10-CM

## 2020-07-17 DIAGNOSIS — Z95.3 HISTORY OF MITRAL VALVE REPLACEMENT WITH BIOPROSTHETIC VALVE: ICD-10-CM

## 2020-07-17 DIAGNOSIS — Z79.01 LONG TERM (CURRENT) USE OF ANTICOAGULANTS: ICD-10-CM

## 2020-07-17 DIAGNOSIS — Z95.3 HISTORY OF AORTIC VALVE REPLACEMENT WITH BIOPROSTHETIC VALVE: ICD-10-CM

## 2020-07-17 LAB — INR PPP: 3.5 (ref 0.9–1.1)

## 2020-07-17 PROCEDURE — 85610 PROTHROMBIN TIME: CPT | Performed by: INTERNAL MEDICINE

## 2020-07-17 PROCEDURE — 36416 COLLJ CAPILLARY BLOOD SPEC: CPT | Performed by: INTERNAL MEDICINE

## 2020-07-22 ENCOUNTER — APPOINTMENT (OUTPATIENT)
Dept: CARDIOLOGY | Facility: HOSPITAL | Age: 65
End: 2020-07-22

## 2020-07-23 RX ORDER — WARFARIN SODIUM 5 MG/1
TABLET ORAL
Qty: 45 TABLET | Refills: 0 | Status: SHIPPED | OUTPATIENT
Start: 2020-07-23 | End: 2020-08-20

## 2020-07-24 ENCOUNTER — OFFICE VISIT (OUTPATIENT)
Dept: CARDIOLOGY | Facility: CLINIC | Age: 65
End: 2020-07-24

## 2020-07-24 ENCOUNTER — HOSPITAL ENCOUNTER (OUTPATIENT)
Dept: CARDIOLOGY | Facility: HOSPITAL | Age: 65
Discharge: HOME | End: 2020-07-24

## 2020-07-24 VITALS
HEIGHT: 64 IN | HEART RATE: 83 BPM | BODY MASS INDEX: 31.58 KG/M2 | SYSTOLIC BLOOD PRESSURE: 120 MMHG | DIASTOLIC BLOOD PRESSURE: 78 MMHG | WEIGHT: 185 LBS

## 2020-07-24 DIAGNOSIS — Z79.01 LONG TERM (CURRENT) USE OF ANTICOAGULANTS: ICD-10-CM

## 2020-07-24 DIAGNOSIS — N18.30 CKD (CHRONIC KIDNEY DISEASE) STAGE 3, GFR 30-59 ML/MIN (HCC): ICD-10-CM

## 2020-07-24 DIAGNOSIS — I10 ESSENTIAL HYPERTENSION: ICD-10-CM

## 2020-07-24 DIAGNOSIS — I48.0 PAROXYSMAL ATRIAL FIBRILLATION (HCC): ICD-10-CM

## 2020-07-24 DIAGNOSIS — Z95.3 HISTORY OF MITRAL VALVE REPLACEMENT WITH BIOPROSTHETIC VALVE: ICD-10-CM

## 2020-07-24 DIAGNOSIS — R07.2 PRECORDIAL PAIN: ICD-10-CM

## 2020-07-24 DIAGNOSIS — Z95.3 HISTORY OF AORTIC VALVE REPLACEMENT WITH BIOPROSTHETIC VALVE: Primary | ICD-10-CM

## 2020-07-24 DIAGNOSIS — Z95.3 HISTORY OF AORTIC VALVE REPLACEMENT WITH BIOPROSTHETIC VALVE: ICD-10-CM

## 2020-07-24 DIAGNOSIS — D64.9 ANEMIA, UNSPECIFIED TYPE: ICD-10-CM

## 2020-07-24 PROCEDURE — 78452 HT MUSCLE IMAGE SPECT MULT: CPT

## 2020-07-24 PROCEDURE — 99213 OFFICE O/P EST LOW 20 MIN: CPT | Performed by: INTERNAL MEDICINE

## 2020-07-24 PROCEDURE — A9500 TC99M SESTAMIBI: HCPCS | Performed by: INTERNAL MEDICINE

## 2020-07-24 PROCEDURE — 93017 CV STRESS TEST TRACING ONLY: CPT

## 2020-07-24 PROCEDURE — 93016 CV STRESS TEST SUPVJ ONLY: CPT | Performed by: INTERNAL MEDICINE

## 2020-07-24 PROCEDURE — 25010000002 REGADENOSON 0.4 MG/5ML SOLUTION: Performed by: INTERNAL MEDICINE

## 2020-07-24 PROCEDURE — 0 TECHNETIUM SESTAMIBI: Performed by: INTERNAL MEDICINE

## 2020-07-24 RX ADMIN — REGADENOSON 0.4 MG: 0.08 INJECTION, SOLUTION INTRAVENOUS at 10:45

## 2020-07-24 RX ADMIN — TECHNETIUM TC 99M SESTAMIBI 1 DOSE: 1 INJECTION INTRAVENOUS at 09:15

## 2020-07-27 ENCOUNTER — OFFICE VISIT (OUTPATIENT)
Dept: ONCOLOGY | Facility: CLINIC | Age: 65
End: 2020-07-27

## 2020-07-27 ENCOUNTER — APPOINTMENT (OUTPATIENT)
Dept: LAB | Facility: HOSPITAL | Age: 65
End: 2020-07-27

## 2020-07-27 VITALS
SYSTOLIC BLOOD PRESSURE: 108 MMHG | WEIGHT: 186.2 LBS | RESPIRATION RATE: 16 BRPM | DIASTOLIC BLOOD PRESSURE: 72 MMHG | HEART RATE: 92 BPM | HEIGHT: 64 IN | BODY MASS INDEX: 31.79 KG/M2 | TEMPERATURE: 97.8 F

## 2020-07-27 DIAGNOSIS — D50.0 IRON DEFICIENCY ANEMIA DUE TO CHRONIC BLOOD LOSS: Primary | ICD-10-CM

## 2020-07-27 LAB
BASOPHILS # BLD AUTO: 0.05 10*3/MM3 (ref 0–0.2)
BASOPHILS NFR BLD AUTO: 0.5 % (ref 0–1.5)
BH CV STRESS COMMENTS STAGE 1: NORMAL
BH CV STRESS DOSE REGADENOSON STAGE 1: 0.4
BH CV STRESS DURATION MIN STAGE 1: 0
BH CV STRESS DURATION SEC STAGE 1: 10
BH CV STRESS PROTOCOL 1: NORMAL
BH CV STRESS RECOVERY BP: NORMAL MMHG
BH CV STRESS RECOVERY HR: 91 BPM
BH CV STRESS STAGE 1: 1
DEPRECATED RDW RBC AUTO: 52.9 FL (ref 37–54)
EOSINOPHIL # BLD AUTO: 0.51 10*3/MM3 (ref 0–0.4)
EOSINOPHIL NFR BLD AUTO: 4.7 % (ref 0.3–6.2)
ERYTHROCYTE [DISTWIDTH] IN BLOOD BY AUTOMATED COUNT: 16 % (ref 12.3–15.4)
HCT VFR BLD AUTO: 33.3 % (ref 34–46.6)
HGB BLD-MCNC: 11 G/DL (ref 12–15.9)
LYMPHOCYTES # BLD AUTO: 1.02 10*3/MM3 (ref 0.7–3.1)
LYMPHOCYTES NFR BLD AUTO: 9.3 % (ref 19.6–45.3)
MAXIMAL PREDICTED HEART RATE: 155 BPM
MCH RBC QN AUTO: 31.2 PG (ref 26.6–33)
MCHC RBC AUTO-ENTMCNC: 33 G/DL (ref 31.5–35.7)
MCV RBC AUTO: 94.3 FL (ref 79–97)
MONOCYTES # BLD AUTO: 0.97 10*3/MM3 (ref 0.1–0.9)
MONOCYTES NFR BLD AUTO: 8.9 % (ref 5–12)
NEUTROPHILS NFR BLD AUTO: 76.6 % (ref 42.7–76)
NEUTROPHILS NFR BLD AUTO: 8.39 10*3/MM3 (ref 1.7–7)
PLATELET # BLD AUTO: 277 10*3/MM3 (ref 140–450)
PMV BLD AUTO: 9.2 FL (ref 6–12)
RBC # BLD AUTO: 3.53 10*6/MM3 (ref 3.77–5.28)
STRESS BASELINE BP: NORMAL MMHG
STRESS BASELINE HR: 79 BPM
STRESS TARGET HR: 132 BPM
WBC # BLD AUTO: 10.94 10*3/MM3 (ref 3.4–10.8)

## 2020-07-27 PROCEDURE — 85025 COMPLETE CBC W/AUTO DIFF WBC: CPT | Performed by: INTERNAL MEDICINE

## 2020-07-27 PROCEDURE — 36415 COLL VENOUS BLD VENIPUNCTURE: CPT | Performed by: INTERNAL MEDICINE

## 2020-07-27 PROCEDURE — 93018 CV STRESS TEST I&R ONLY: CPT | Performed by: INTERNAL MEDICINE

## 2020-07-27 PROCEDURE — 78452 HT MUSCLE IMAGE SPECT MULT: CPT | Performed by: INTERNAL MEDICINE

## 2020-07-27 PROCEDURE — 99214 OFFICE O/P EST MOD 30 MIN: CPT | Performed by: INTERNAL MEDICINE

## 2020-07-27 NOTE — PROGRESS NOTES
Hematology/Oncology Outpatient Follow Up    Jocelin Parra  1955    Primary Care Physician: Emilie Cruz APRN  Referring Physician: Emilie Cruz APRN  Chief Complaint:  Iron deficiency anemia  Chronic renal failure stage III  Arteriovenous malformation of small bowel  Mechanical cardiac valve  History of Present Illness:   · I initially saw Ms. De Los Santos in consultation at [facility] when she was admitted through the emergency room on 12/3/2019 with severe anemia.  Patient required blood transfusion.  Patient has chronic history of kidney disease.    · CBC in the ED showed WBC 3.5, hemoglobin 6.1, MCV 63.4, RDW 18.6, and platelets 339,000. She received 1 unit pRBC.  Creatinine was 1.57.   · Patient is on chronic Coumadin for mechanical cardiac valve INR was 2.74 on admission on 12/3/2019    · Posttransfusion iron studies showed iron 24 and ferritin 21.2.  Vitamin B12 was 947 and folate was 18.6.    · 12/4/2019 SPEP negative for monoclonal gammopathy.  Serum creatinine 1.7 with EGFR of 30  · 12/5/2019 patient underwent colonoscopy and EGD at [facility]- No evidence of any active source of bleeding noticed in the upper at the lower GI tract except diverticulosis and hemorrhoids.  Possibility of a small bowel AVM or lesion leading to blood loss anemia is in the differential however patient denies any overt GI bleeding.  May consider small bowel evaluation with a PillCam if needed.  · 12/23/2019 and 12/30/2019 patient received 2 Injectafer infusions.  · 1/21/2020 - Erythropoietin 37.4, iron 47, iron saturation 13, TIBC 371, ferritin 681.80.  · 2/30/2020-EGD with small bowel enteroscopy performed by Dr. Akhtar.  2 nonbleeding jejunal angiectasia's were cauterized  · 3/6/2020 and 3/13/2020 patient received 2 dose of Injectafer infusions.    Past Medical History:   Diagnosis Date   • A-fib (CMS/HCC) Dx 2018    S/p Cardioversion by Dr. Spencer on 04/10/19   • Acute renal failure syndrome (CMS/HCC) 4/2019-[facility] IP   • Biatrial  enlargement 04/10/2019    Noted on SHERIE   • Calcified granuloma of lung (CMS/HCC) 01/04/2019    Noted on Chest XR   • Cardiomegaly 01/04/2019    Borderline--Noted on Chest XR   • CHF (congestive heart failure) (CMS/[facility])    • CKD (chronic kidney disease), stage III (CMS/[facility])     Does Not See a Nephrologist   • Diabetes mellitus (CMS/[facility])    • DM (diabetes mellitus) (CMS/[facility])     T2   • Dysphagia 08/15-[facility] IP    Due to Pharyngitis   • GERD (gastroesophageal reflux disease)    • History of chest x-ray 8/28/15-[facility]    Normal   • History of chest x-ray 01/19/2019    Mild Pulmonary Edema w/Congestive Failure Noted   • History of chest x-ray 01/04/2019    Borderline Cardiomegaly    • History of EKG 2014/2018/2019-[facility]   • Hx of diabetic neuropathy    • Hx of dizziness     w/Lightheadedness   • Hx of echocardiogram 4/16/18-[facility]    EF 55-60%; Mild MVR; Mild TVR; Normal Root/No Effusion   • Hyperlipidemia     Controlled w/Meds   • Hypertension     Controlled w/Meds   • Hypokalemia 4/11/19-[facility] IP   • Hypothyroidism     Controlled w/Meds   • Iron deficiency anemia    • Left posterior fascicular block 04/2018 & 4/19    Noted on EKG   • Lower extremity edema 03/2019   • Mild mitral valve regurgitation 04/16/2018    Noted on Echo   • Mild tricuspid valve regurgitation 04/16/2018    Noted on Echo   • Moderate mitral valve regurgitation 2008    S/p MVR in 08'   • Moderate tricuspid insufficiency 04/10/2019    Noted on SHERIE   • Osteoarthritis     Knees w/Hx Knee Repl   • Pulmonary edema 01/19/2019    Mild--Noted on Chest XR   • Rapid palpitations 04/15/18 & 8/9/18-[facility] ER   • Renal dysfunction    • Right axis deviation 08/2018 & 4/19    Noted on EKG   • Severe aortic valve stenosis Since 2008    Hx AVR on 12/11/08 by Dr. Alvarado   • Severe mitral insufficiency 04/10/2019    Noted on SHERIE   • Sinus tachycardia 08/2018    Noted on EKG   • SOB (shortness of breath) chronic   • Torticollis Chronic   • Valvular heart disease     S/p AVR & MVR in  08'       Past Surgical History:   Procedure Laterality Date   • AORTIC VALVE REPAIR/REPLACEMENT  12/11/08-E    Using a #20 Eight Yr Mechanical [employer] [place] Prosthesis--Dewey Alvarado MD   • CARDIOVERSION  4/10/19-[facility]    Dr. Spencer--For A-Fib   • COLONOSCOPY N/A 12/5/2019    Procedure: COLONOSCOPY;  Surgeon: Dustin Castellanos MD;  Location: [facility] ENDOSCOPY;  Service: Gastroenterology   • ENDOSCOPY N/A 12/5/2019    Procedure: ESOPHAGOGASTRODUODENOSCOPY;  Surgeon: Dustin Castellanos MD;  Location: [facility] ENDOSCOPY;  Service: Gastroenterology   • ENTEROSCOPY SMALL BOWEL N/A 2/3/2020    Procedure: ESOPHAGOGASTRODUODENOSCOPY WITH SMALL BOWEL ENTEROSCOPY and argonplasma coagulation of two small jejunal ateriovenous malformations;  Surgeon: Dexter Akhtar MD;  Location: [facility] ENDOSCOPY;  Service: Gastroenterology;  Laterality: N/A;  jejunal ateriovenous malformation   • HYSTERECTOMY     • MITRAL VALVE REPLACEMENT  2008   • MITRAL VALVE REPLACEMENT  05/09/2019    Dr Alvarado   • SHERIE  4/10/19-[facility]    EF 40%; Moderate TVR; Severe Eccentric MI; Biatrial Enlargement   • TOTAL KNEE ARTHROPLASTY  2017   • TRICUSPID VALVE SURGERY  05/09/2019    Dr Alvarado         Current Outpatient Medications:   •  ACCU-CHEK GUIDE test strip, , Disp: , Rfl:   •  aspirin 81 MG tablet, Take 81 mg by mouth Daily., Disp: , Rfl:   •  atorvastatin (LIPITOR) 40 MG tablet, Take 40 mg by mouth Daily., Disp: , Rfl:   •  B Complex Vitamins (VITAMIN B COMPLEX) tablet, Take 1 capsule by mouth Daily., Disp: , Rfl:   •  cholecalciferol (VITAMIN D3) 10 MCG (400 UNIT) tablet, Take 2,000 Units by mouth Daily., Disp: , Rfl:   •  ezetimibe (ZETIA) 10 MG tablet, Take 1 tablet by mouth daily., Disp: , Rfl:   •  ferrous sulfate 325 (65 FE) MG tablet, Take 325 mg by mouth 2 (Two) Times a Day., Disp: , Rfl:   •  furosemide (LASIX) 40 MG tablet, Take 40 mg by mouth Daily., Disp: , Rfl:   •  Insulin Degludec (TRESIBA) 100 UNIT/ML solution injection, Inject 10 Units under the skin into  the appropriate area as directed every night at bedtime., Disp: , Rfl:   •  [employer] PEN NEEDLES 32G X 4 MM misc, , Disp: , Rfl:   •  levothyroxine (SYNTHROID, LEVOTHROID) 112 MCG tablet, Take 112 mcg by mouth Daily., Disp: , Rfl:   •  Liraglutide (VICTOZA) 18 MG/3ML solution pen-injector, Inject 1.8 mg under the skin into the appropriate area as directed Daily., Disp: , Rfl:   •  MAGNESIUM PO, Take 1 tablet by mouth Daily., Disp: , Rfl:   •  metFORMIN ER (GLUCOPHAGE-XR) 500 MG 24 hr tablet, Take 500 mg by mouth Daily., Disp: , Rfl:   •  metoprolol succinate XL (TOPROL-XL) 25 MG 24 hr tablet, TAKE ONE-HALF TABLET BY MOUTH DAILY, Disp: 45 tablet, Rfl: 1  •  Multiple Vitamin (MULTI-VITAMIN) tablet, Take 1 tablet by mouth daily., Disp: , Rfl:   •  omeprazole (PriLOSEC) 20 MG capsule, Take 20 mg by mouth Daily., Disp: , Rfl:   •  potassium chloride (K-DUR,KLOR-CON) 20 MEQ CR tablet, Take 20 mEq by mouth Daily., Disp: , Rfl:   •  valsartan (DIOVAN) 80 MG tablet, Take 80 mg by mouth Daily., Disp: , Rfl:   •  vitamin C (ASCORBIC ACID) 500 MG tablet, Take 500 mg by mouth Daily., Disp: , Rfl:   •  warfarin (COUMADIN) 5 MG tablet, TAKE ONE TABLET BY MOUTH DAILY ON MONDAY AND FRIDAY AND TAKE 1 AND ONE-HALF TABLETS BY MOUTH ON ALL OTHER DAYS OR AS DIRECTED, Disp: 45 tablet, Rfl: 0    No Known Allergies    Family History   Problem Relation Age of Onset   • Heart disease Father    • Hypertension Father    • Stroke Father    • Diabetes Father    • Lung cancer Mother        Cancer-related family history includes Lung cancer in her mother.    Social History     Tobacco Use   • Smoking status: Never Smoker   • Smokeless tobacco: Never Used   Substance Use Topics   • Alcohol use: No   • Drug use: No       I have reviewed the history of present illness, past medical history, family history, social history, lab results, all notes and other records since the patient was last seen cancer [facility] center.    SUBJECTIVE:      Patient is in my  "office for follow-up of her anemia.  Denies any visible blood loss.  Reports she is slowly gaining energy.  She is off of iron tablets.  Breathing better now.     ROS:      Review of Systems   Constitutional: Negative for fever.   HENT: Negative for nosebleeds and trouble swallowing.    Eyes: Negative for visual disturbance.   Respiratory: Negative for cough, shortness of breath and wheezing.    Cardiovascular: Negative for chest pain.   Gastrointestinal: Negative for abdominal pain and blood in stool.   Endocrine: Negative for cold intolerance.   Genitourinary: Negative for dysuria and hematuria.   Musculoskeletal: Negative for joint swelling.   Skin: Negative for rash.   Allergic/Immunologic: Negative for environmental allergies.   Neurological: Negative for seizures.   Hematological: Does not bruise/bleed easily.   Psychiatric/Behavioral: The patient is not nervous/anxious.        MD performed ROS and are negative except as mentioned in Subjective.    Objective:       Vitals:    07/27/20 1055   BP: 108/72   Pulse: 92   Resp: 16   Temp: 97.8 °F (36.6 °C)   TempSrc: Skin   Weight: 84.5 kg (186 lb 3.2 oz)   Height: 162.6 cm (64\")   PainSc: 0-No pain         PHYSICAL EXAM:      Physical Exam   Constitutional: She is oriented to person, place, and time. No distress.   HENT:   Head: Normocephalic and atraumatic.   Torticollis   Eyes: Conjunctivae and EOM are normal. Right eye exhibits no discharge. Left eye exhibits no discharge. No scleral icterus.   Neck: Normal range of motion. Neck supple. No thyromegaly present.   Cardiovascular: Normal rate, regular rhythm and normal heart sounds. Exam reveals no gallop and no friction rub.   Crisp S1-S2 can be heard   Pulmonary/Chest: Effort normal. No stridor. No respiratory distress. She has no wheezes.   Abdominal: Soft. Bowel sounds are normal. She exhibits no mass. There is no tenderness. There is no rebound and no guarding.   Musculoskeletal: Normal range of motion. She " exhibits no tenderness.   Lymphadenopathy:     She has no cervical adenopathy.   Neurological: She is alert and oriented to person, place, and time. She exhibits normal muscle tone.   Skin: Skin is warm. No rash noted. She is not diaphoretic. No erythema.   Psychiatric: She has a normal mood and affect. Her behavior is normal.   Nursing note and vitals reviewed.   Physical exam done by MD.      RECENT LABS:     WBC   Date Value Ref Range Status   07/27/2020 10.94 (H) 3.40 - 10.80 10*3/mm3 Final     RBC   Date Value Ref Range Status   07/27/2020 3.53 (L) 3.77 - 5.28 10*6/mm3 Final     Hemoglobin   Date Value Ref Range Status   07/27/2020 11.0 (L) 12.0 - 15.9 g/dL Final     Hematocrit   Date Value Ref Range Status   07/27/2020 33.3 (L) 34.0 - 46.6 % Final     MCV   Date Value Ref Range Status   07/27/2020 94.3 79.0 - 97.0 fL Final     MCH   Date Value Ref Range Status   07/27/2020 31.2 26.6 - 33.0 pg Final     MCHC   Date Value Ref Range Status   07/27/2020 33.0 31.5 - 35.7 g/dL Final     RDW   Date Value Ref Range Status   07/27/2020 16.0 (H) 12.3 - 15.4 % Final     RDW-SD   Date Value Ref Range Status   07/27/2020 52.9 37.0 - 54.0 fl Final     MPV   Date Value Ref Range Status   07/27/2020 9.2 6.0 - 12.0 fL Final     Platelets   Date Value Ref Range Status   07/27/2020 277 140 - 450 10*3/mm3 Final     Neutrophil %   Date Value Ref Range Status   07/27/2020 76.6 (H) 42.7 - 76.0 % Final     Lymphocyte %   Date Value Ref Range Status   07/27/2020 9.3 (L) 19.6 - 45.3 % Final     Monocyte %   Date Value Ref Range Status   07/27/2020 8.9 5.0 - 12.0 % Final     Eosinophil %   Date Value Ref Range Status   07/27/2020 4.7 0.3 - 6.2 % Final     Basophil %   Date Value Ref Range Status   07/27/2020 0.5 0.0 - 1.5 % Final     Immature Grans %   Date Value Ref Range Status   05/07/2020 0.4 0.0 - 0.5 % Final     Neutrophils, Absolute   Date Value Ref Range Status   07/27/2020 8.39 (H) 1.70 - 7.00 10*3/mm3 Final     Lymphocytes,  Absolute   Date Value Ref Range Status   07/27/2020 1.02 0.70 - 3.10 10*3/mm3 Final     Monocytes, Absolute   Date Value Ref Range Status   07/27/2020 0.97 (H) 0.10 - 0.90 10*3/mm3 Final     Eosinophils, Absolute   Date Value Ref Range Status   07/27/2020 0.51 (H) 0.00 - 0.40 10*3/mm3 Final     Basophils, Absolute   Date Value Ref Range Status   07/27/2020 0.05 0.00 - 0.20 10*3/mm3 Final     Immature Grans, Absolute   Date Value Ref Range Status   05/07/2020 0.03 0.00 - 0.05 10*3/mm3 Final     nRBC   Date Value Ref Range Status   05/07/2020 0.0 0.0 - 0.2 /100 WBC Final       Lab Results   Component Value Date    GLUCOSE 187 (H) 05/07/2020    BUN 50 (H) 05/07/2020    CREATININE 1.60 (H) 05/07/2020    EGFRIFNONA 32 (L) 05/07/2020    BCR 31.3 (H) 05/07/2020    K 4.7 05/07/2020    CO2 26.0 05/07/2020    CALCIUM 10.0 05/07/2020    PROTENTOTREF 6.0 12/04/2019    ALBUMIN 3.50 12/05/2019    LABIL2 1.1 12/04/2019    AST 26 12/05/2019    ALT 18 12/05/2019         Assessment/Plan      ASSESSMENT:     1. Chronic iron deficiency anemia.  2. Status post mechanical cardiac aortic valve replacement  3. Chronic kidney disease stage III  4. Anemia related to chronic kidney disease stage III  5. Arteriovenous malformations of small bowel  6. ECOG 1    PLAN:      1. Patient with ongoing  iron deficiency anemia.  She received iron infusions 6 weeks ago.  I will recheck CBC in 6 weeks when I see her back in my office.   2. I will obtain iron studies today.  Iron studies shows improvement in percentage saturation.  No further iron infusions at this time.  3. No evidence of small bowel blood loss on pill study.  4. Patient is on chronic anticoagulation with warfarin status post mechanical cardiac aortic valve replacement.  This could be worsening her GI bleed follow CBC closely.  Follow iron studies closely.  5. Follow-up with cardiology regarding her shortness of air.  This is unlikely related to anemia as her hemoglobin is only mildly  low.  6. If hemoglobin drops below 10 and the iron studies are normal patient will benefit from Procrit injection.    7. Check CBC in 2 months check iron studies also at the time  8.  Patient report to us if she notices any blood in the stool or black stools as she becomes very anemic very fast secondary to being on Coumadin and AV malformations in the small bowel.    Electronically signed by Nav Rizzo MD, 07/27/20, 11:01 AM.      I have reviewed labs results, imaging, vitals, and medications with the patient today.     Patient verbalized understanding and is in agreement of the above plan.            This report was compiled using Dragon voice recognition software. I have made every effort to proof read this document; however, typographical errors may persist.

## 2020-08-20 RX ORDER — WARFARIN SODIUM 5 MG/1
TABLET ORAL
Qty: 45 TABLET | Refills: 0 | Status: SHIPPED | OUTPATIENT
Start: 2020-08-20 | End: 2020-09-21

## 2020-08-21 ENCOUNTER — ANTICOAGULATION VISIT (OUTPATIENT)
Dept: CARDIOLOGY | Facility: CLINIC | Age: 65
End: 2020-08-21

## 2020-08-21 VITALS
BODY MASS INDEX: 31.41 KG/M2 | WEIGHT: 183 LBS | DIASTOLIC BLOOD PRESSURE: 73 MMHG | SYSTOLIC BLOOD PRESSURE: 120 MMHG | HEART RATE: 81 BPM

## 2020-08-21 DIAGNOSIS — Z79.01 LONG TERM (CURRENT) USE OF ANTICOAGULANTS: ICD-10-CM

## 2020-08-21 DIAGNOSIS — I48.91 ATRIAL FIBRILLATION, UNSPECIFIED TYPE (HCC): ICD-10-CM

## 2020-08-21 DIAGNOSIS — Z95.3 HISTORY OF AORTIC VALVE REPLACEMENT WITH BIOPROSTHETIC VALVE: ICD-10-CM

## 2020-08-21 DIAGNOSIS — Z95.3 HISTORY OF MITRAL VALVE REPLACEMENT WITH BIOPROSTHETIC VALVE: ICD-10-CM

## 2020-08-21 LAB — INR PPP: 2.8 (ref 0.9–1.1)

## 2020-08-21 PROCEDURE — 85610 PROTHROMBIN TIME: CPT | Performed by: INTERNAL MEDICINE

## 2020-08-21 PROCEDURE — 36416 COLLJ CAPILLARY BLOOD SPEC: CPT | Performed by: INTERNAL MEDICINE

## 2020-09-21 RX ORDER — WARFARIN SODIUM 5 MG/1
TABLET ORAL
Qty: 45 TABLET | Refills: 2 | Status: SHIPPED | OUTPATIENT
Start: 2020-09-21 | End: 2020-12-21

## 2020-09-23 ENCOUNTER — TELEPHONE (OUTPATIENT)
Dept: ONCOLOGY | Facility: CLINIC | Age: 65
End: 2020-09-23

## 2020-09-23 NOTE — TELEPHONE ENCOUNTER
LM LETTING PT KNOW THAT DR GRIGSBY IS NO LONGER WITH [facility] AND ASKING HER TO CALL TO R/S THIS WITH NP OR ANOTHER PROVIDER

## 2020-09-25 ENCOUNTER — ANTICOAGULATION VISIT (OUTPATIENT)
Dept: CARDIOLOGY | Facility: CLINIC | Age: 65
End: 2020-09-25

## 2020-09-25 VITALS — BODY MASS INDEX: 31.41 KG/M2 | DIASTOLIC BLOOD PRESSURE: 59 MMHG | SYSTOLIC BLOOD PRESSURE: 114 MMHG | WEIGHT: 183 LBS

## 2020-09-25 DIAGNOSIS — Z95.3 HISTORY OF MITRAL VALVE REPLACEMENT WITH BIOPROSTHETIC VALVE: ICD-10-CM

## 2020-09-25 DIAGNOSIS — Z79.01 LONG TERM (CURRENT) USE OF ANTICOAGULANTS: ICD-10-CM

## 2020-09-25 DIAGNOSIS — Z95.3 HISTORY OF AORTIC VALVE REPLACEMENT WITH BIOPROSTHETIC VALVE: ICD-10-CM

## 2020-09-25 DIAGNOSIS — I48.91 ATRIAL FIBRILLATION, UNSPECIFIED TYPE (HCC): ICD-10-CM

## 2020-09-25 LAB — INR PPP: 2.4 (ref 0.9–1.1)

## 2020-09-25 PROCEDURE — 36416 COLLJ CAPILLARY BLOOD SPEC: CPT | Performed by: INTERNAL MEDICINE

## 2020-09-25 PROCEDURE — 85610 PROTHROMBIN TIME: CPT | Performed by: INTERNAL MEDICINE

## 2020-09-28 ENCOUNTER — OFFICE VISIT (OUTPATIENT)
Dept: ONCOLOGY | Facility: CLINIC | Age: 65
End: 2020-09-28

## 2020-09-28 ENCOUNTER — LAB (OUTPATIENT)
Dept: LAB | Facility: HOSPITAL | Age: 65
End: 2020-09-28

## 2020-09-28 VITALS
HEART RATE: 91 BPM | RESPIRATION RATE: 16 BRPM | SYSTOLIC BLOOD PRESSURE: 101 MMHG | DIASTOLIC BLOOD PRESSURE: 68 MMHG | HEIGHT: 64 IN | TEMPERATURE: 96.6 F | BODY MASS INDEX: 29.88 KG/M2 | WEIGHT: 175 LBS

## 2020-09-28 DIAGNOSIS — D63.1 ANEMIA DUE TO STAGE 3 CHRONIC KIDNEY DISEASE (HCC): ICD-10-CM

## 2020-09-28 DIAGNOSIS — N18.30 ANEMIA DUE TO STAGE 3 CHRONIC KIDNEY DISEASE (HCC): ICD-10-CM

## 2020-09-28 DIAGNOSIS — D50.0 IRON DEFICIENCY ANEMIA DUE TO CHRONIC BLOOD LOSS: ICD-10-CM

## 2020-09-28 DIAGNOSIS — E11.42 DIABETIC PERIPHERAL NEUROPATHY (HCC): ICD-10-CM

## 2020-09-28 DIAGNOSIS — D50.0 IRON DEFICIENCY ANEMIA DUE TO CHRONIC BLOOD LOSS: Primary | ICD-10-CM

## 2020-09-28 LAB
BASOPHILS # BLD AUTO: 0.05 10*3/MM3 (ref 0–0.2)
BASOPHILS NFR BLD AUTO: 0.5 % (ref 0–1.5)
DEPRECATED RDW RBC AUTO: 51 FL (ref 37–54)
EOSINOPHIL # BLD AUTO: 0.53 10*3/MM3 (ref 0–0.4)
EOSINOPHIL NFR BLD AUTO: 5.5 % (ref 0.3–6.2)
ERYTHROCYTE [DISTWIDTH] IN BLOOD BY AUTOMATED COUNT: 15.7 % (ref 12.3–15.4)
FERRITIN SERPL-MCNC: 1119 NG/ML (ref 13–150)
HCT VFR BLD AUTO: 34.7 % (ref 34–46.6)
HGB BLD-MCNC: 11.2 G/DL (ref 12–15.9)
IRON 24H UR-MRATE: 39 MCG/DL (ref 37–145)
IRON SATN MFR SERPL: 12 % (ref 20–50)
LYMPHOCYTES # BLD AUTO: 1.2 10*3/MM3 (ref 0.7–3.1)
LYMPHOCYTES NFR BLD AUTO: 12.3 % (ref 19.6–45.3)
MCH RBC QN AUTO: 30.4 PG (ref 26.6–33)
MCHC RBC AUTO-ENTMCNC: 32.3 G/DL (ref 31.5–35.7)
MCV RBC AUTO: 94.3 FL (ref 79–97)
MONOCYTES # BLD AUTO: 0.95 10*3/MM3 (ref 0.1–0.9)
MONOCYTES NFR BLD AUTO: 9.8 % (ref 5–12)
NEUTROPHILS NFR BLD AUTO: 6.99 10*3/MM3 (ref 1.7–7)
NEUTROPHILS NFR BLD AUTO: 71.9 % (ref 42.7–76)
PLATELET # BLD AUTO: 268 10*3/MM3 (ref 140–450)
PMV BLD AUTO: 9.4 FL (ref 6–12)
RBC # BLD AUTO: 3.68 10*6/MM3 (ref 3.77–5.28)
TIBC SERPL-MCNC: 320 MCG/DL (ref 298–536)
TRANSFERRIN SERPL-MCNC: 215 MG/DL (ref 200–360)
WBC # BLD AUTO: 9.72 10*3/MM3 (ref 3.4–10.8)

## 2020-09-28 PROCEDURE — 99213 OFFICE O/P EST LOW 20 MIN: CPT | Performed by: INTERNAL MEDICINE

## 2020-09-28 PROCEDURE — 83540 ASSAY OF IRON: CPT | Performed by: INTERNAL MEDICINE

## 2020-09-28 PROCEDURE — 82728 ASSAY OF FERRITIN: CPT | Performed by: INTERNAL MEDICINE

## 2020-09-28 PROCEDURE — 84466 ASSAY OF TRANSFERRIN: CPT | Performed by: INTERNAL MEDICINE

## 2020-09-28 PROCEDURE — 36415 COLL VENOUS BLD VENIPUNCTURE: CPT

## 2020-09-28 PROCEDURE — 85025 COMPLETE CBC W/AUTO DIFF WBC: CPT

## 2020-09-28 NOTE — PROGRESS NOTES
ONCOLOGY/HEMATOLOGY    PATIENT: Jocelin Parra  YOB: 1955  MEDICAL RECORD NUMBER: [id number] OF SERVICE: 09/28/20      CHIEF COMPLAINT:   Chief Complaint:  Iron deficiency anemia  Chronic renal failure stage III  Arteriovenous malformation of small bowel  Mechanical cardiac valve  History of Present Illness:   · I initially saw Ms. De Los Santos in consultation at [facility] when she was admitted through the emergency room on 12/3/2019 with severe anemia.  Patient required blood transfusion.  Patient has chronic history of kidney disease.    · CBC in the ED showed WBC 3.5, hemoglobin 6.1, MCV 63.4, RDW 18.6, and platelets 339,000. She received 1 unit pRBC.  Creatinine was 1.57.   · Patient is on chronic Coumadin for mechanical cardiac valve INR was 2.74 on admission on 12/3/2019    · Posttransfusion iron studies showed iron 24 and ferritin 21.2.  Vitamin B12 was 947 and folate was 18.6.    · 12/4/2019 SPEP negative for monoclonal gammopathy.  Serum creatinine 1.7 with EGFR of 30  · 12/5/2019 patient underwent colonoscopy and EGD at [facility]- No evidence of any active source of bleeding noticed in the upper at the lower GI tract except diverticulosis and hemorrhoids.  Possibility of a small bowel AVM or lesion leading to blood loss anemia is in the differential however patient denies any overt GI bleeding.  May consider small bowel evaluation with a PillCam if needed.  · 12/23/2019 and 12/30/2019 patient received 2 Injectafer infusions.  · 1/21/2020 - Erythropoietin 37.4, iron 47, iron saturation 13, TIBC 371, ferritin 681.80.  · 2/30/2020-EGD with small bowel enteroscopy performed by Dr. Akhtar.  2 nonbleeding jejunal angiectasia's were cauterized  · 3/6/2020 and 3/13/2020 patient received 2 dose of Injectafer infusions.  · July 2020 received 2 additonal doses of injectafer for Hgb of 11    HISTORY OF PRESENT ILLNESS: she comes unaccompanied today.  Since her last visit, she underwent injectafer infusions x 2  for iron deficiency anemia with Hgb 11 and saturation of 14%.   She feels better now. She does not get as tired as she did prior to the infusions.  She did endorse that she received injectafer in May 2020 but I can't see the infusion records for that administration.      Review of Systems - Oncology     Review of systems:  Constitutional: Denies fatigue, fever, night sweats or 10 pound weight loss from fluid and some change in eating habits   HEENT: sinus issues   Lymph nodes: Denies lymphadenopathy  Heme: Denies bleeding or abnormal bruising  Respiratory: dyspnea with exertion  Cardiovascular: Denies chest pain, palpitations or lower extremity swelling  GI: Denies abdominal pain, nausea, vomiting, diarrhea, constipation, hematochezia or melena  : Denies dysuria or hematuria  Musculoskeletal: uses a cane for arthritic knees   Skin: Denies rash or skin lesions  Neurologic:neuropathy in the feet from DM  Endocrine: denies hot flashes  Psychologic: Denies depression or anxiety    Past Medical History:   Diagnosis Date   • A-fib (CMS/[facility]) Dx 2018    S/p Cardioversion by Dr. Spencer on 04/10/19   • Acute renal failure syndrome (CMS/[facility]) 4/2019-F IP   • Biatrial enlargement 04/10/2019    Noted on SHERIE   • Calcified granuloma of lung (CMS/[facility]) 01/04/2019    Noted on Chest XR   • Cardiomegaly 01/04/2019    Borderline--Noted on Chest XR   • CHF (congestive heart failure) (CMS/[facility])    • CKD (chronic kidney disease), stage III (CMS/[facility])     Does Not See a Nephrologist   • Diabetes mellitus (CMS/[facility])    • DM (diabetes mellitus) (CMS/[facility])     T2   • Dysphagia 08/15-[facility] IP    Due to Pharyngitis   • GERD (gastroesophageal reflux disease)    • History of chest x-ray 8/28/15-F    Normal   • History of chest x-ray 01/19/2019    Mild Pulmonary Edema w/Congestive Failure Noted   • History of chest x-ray 01/04/2019    Borderline Cardiomegaly    • History of EKG 2014/2018/2019-F   • Hx of diabetic neuropathy    • Hx of dizziness      w/Lightheadedness   • Hx of echocardiogram 4/16/18-[facility]    EF 55-60%; Mild MVR; Mild TVR; Normal Root/No Effusion   • Hyperlipidemia     Controlled w/Meds   • Hypertension     Controlled w/Meds   • Hypokalemia 4/11/19-[facility] IP   • Hypothyroidism     Controlled w/Meds   • Iron deficiency anemia    • Left posterior fascicular block 04/2018 & 4/19    Noted on EKG   • Lower extremity edema 03/2019   • Mild mitral valve regurgitation 04/16/2018    Noted on Echo   • Mild tricuspid valve regurgitation 04/16/2018    Noted on Echo   • Moderate mitral valve regurgitation 2008    S/p MVR in 08'   • Moderate tricuspid insufficiency 04/10/2019    Noted on SHERIE   • Osteoarthritis     Knees w/Hx Knee Repl   • Pulmonary edema 01/19/2019    Mild--Noted on Chest XR   • Rapid palpitations 04/15/18 & 8/9/18-[facility]   • Renal dysfunction    • Right axis deviation 08/2018 & 4/19    Noted on EKG   • Severe aortic valve stenosis Since 2008    Hx AVR on 12/11/08 by Dr. Alvarado   • Severe mitral insufficiency 04/10/2019    Noted on SHERIE   • Sinus tachycardia 08/2018    Noted on EKG   • SOB (shortness of breath) chronic   • Torticollis Chronic   • Valvular heart disease     S/p AVR & MVR in 08'       Past Surgical History:   Procedure Laterality Date   • AORTIC VALVE REPAIR/REPLACEMENT  12/11/08-[facility]    Using a #20 Eight Yr Mechanical [employer] [place] Prosthesis--Dewey Alvarado MD   • CARDIOVERSION  4/10/19-[facility]    Dr. Spencer--For A-Fib   • COLONOSCOPY N/A 12/5/2019    Procedure: COLONOSCOPY;  Surgeon: Dustin Castellanos MD;  Location: [facility] ENDOSCOPY;  Service: Gastroenterology   • ENDOSCOPY N/A 12/5/2019    Procedure: ESOPHAGOGASTRODUODENOSCOPY;  Surgeon: Dustin Castellanos MD;  Location: [facility] ENDOSCOPY;  Service: Gastroenterology   • ENTEROSCOPY SMALL BOWEL N/A 2/3/2020    Procedure: ESOPHAGOGASTRODUODENOSCOPY WITH SMALL BOWEL ENTEROSCOPY and argonplasma coagulation of two small jejunal ateriovenous malformations;  Surgeon: Dexter Akhtar MD;  Location: [facility]  ENDOSCOPY;  Service: Gastroenterology;  Laterality: N/A;  jejunal ateriovenous malformation   • HYSTERECTOMY     • MITRAL VALVE REPLACEMENT  2008   • MITRAL VALVE REPLACEMENT  05/09/2019    Dr Alvarado   • SHERIE  4/10/19-[facility]    EF 40%; Moderate TVR; Severe Eccentric MI; Biatrial Enlargement   • TOTAL KNEE ARTHROPLASTY  2017   • TRICUSPID VALVE SURGERY  05/09/2019    Dr Alvarado       Family History   Problem Relation Age of Onset   • Heart disease Father    • Hypertension Father    • Stroke Father    • Diabetes Father    • Lung cancer Mother        Social History     Socioeconomic History   • Marital status: Single     Spouse name: Not on file   • Number of children: Not on file   • Years of education: Not on file   • Highest education level: Not on file   Tobacco Use   • Smoking status: Never Smoker   • Smokeless tobacco: Never Used   Substance and Sexual Activity   • Alcohol use: No   • Drug use: No   • Sexual activity: Defer     Birth control/protection: Surgical     Comment: Hyst       ALLERGIES:  Patient has no known allergies.    MEDICATION:  Current Outpatient Medications   Medication Sig Dispense Refill   • ACCU-CHEK GUIDE test strip      • aspirin 81 MG tablet Take 81 mg by mouth Daily.     • atorvastatin (LIPITOR) 40 MG tablet Take 40 mg by mouth Daily.     • B Complex Vitamins (VITAMIN B COMPLEX) tablet Take 1 capsule by mouth Daily.     • cholecalciferol (VITAMIN D3) 10 MCG (400 UNIT) tablet Take 2,000 Units by mouth Daily.     • ezetimibe (ZETIA) 10 MG tablet Take 1 tablet by mouth daily.     • ferrous sulfate 325 (65 FE) MG tablet Take 325 mg by mouth 2 (Two) Times a Day.     • furosemide (LASIX) 40 MG tablet Take 40 mg by mouth Daily.     • Insulin Degludec (TRESIBA) 100 UNIT/ML solution injection Inject 10 Units under the skin into the appropriate area as directed every night at bedtime.     • [employer] PEN NEEDLES 32G X 4 MM misc      • levothyroxine (SYNTHROID, LEVOTHROID) 112 MCG tablet Take 112 mcg by mouth  Daily.     • Liraglutide (VICTOZA) 18 MG/3ML solution pen-injector Inject 1.8 mg under the skin into the appropriate area as directed Daily.     • MAGNESIUM PO Take 1 tablet by mouth Daily.     • metFORMIN ER (GLUCOPHAGE-XR) 500 MG 24 hr tablet Take 500 mg by mouth Daily.     • metoprolol succinate XL (TOPROL-XL) 25 MG 24 hr tablet TAKE ONE-HALF TABLET BY MOUTH DAILY 45 tablet 1   • Multiple Vitamin (MULTI-VITAMIN) tablet Take 1 tablet by mouth daily.     • omeprazole (PriLOSEC) 20 MG capsule Take 20 mg by mouth Daily.     • potassium chloride (K-DUR,KLOR-CON) 20 MEQ CR tablet Take 20 mEq by mouth Daily.     • valsartan (DIOVAN) 80 MG tablet Take 80 mg by mouth Daily.     • vitamin C (ASCORBIC ACID) 500 MG tablet Take 500 mg by mouth Daily.     • warfarin (COUMADIN) 5 MG tablet TAKE ONE TABLET BY MOUTH DAILY ON MONDAY, WEDNESDAY, AND FRIDAY, AND TAKE ONE AND ONE-HALF TABLETS BY MOUTH ON ALL OTHER DAYS OR AS DIRECTED 45 tablet 2     No current facility-administered medications for this visit.            PHYSICAL EXAM:    Current Vitals:  Temperature Blood Pressure Heart Rate Resp Rate SpO2 Temp: 96.6 °F (35.9 °C) BP: 101/68 Heart Rate: 91 Resp: 16        VITAL signs in the last 24 hours: [unfilled]       09/28/20  1041   Weight: 79.4 kg (175 lb)       Physical Exam     ECOG PERFORMANCE STATUS:2-3  Physical Exam:  General: No acute distress  Eyes: Sclera anicteric, EOMS-I  ENT: no mucositis  Neck: Supple, with full ROM  Lymph nodes: No cervical, lymphadenopathy  Pulmonary: no wheezes or stridor .  CV: Regular rate and rhythm.  GI: Soft, non-tender,   Vascular: no edema  Neurologic: Cranial nerves 2-12 grossly intact, no focal motor deficits  Psych/MS: Alert and oriented x3,  MSK: walks with cane     LABORATORY/DATA:  Ferritin   Date Value Ref Range Status   06/15/2020 464.50 (H) 13.00 - 150.00 ng/mL Final     Iron Saturation   Date Value Ref Range Status   06/15/2020 14 (L) 20 - 50 % Final     LDH   Date Value Ref  Range Status   12/04/2019 353 (H) 135 - 214 U/L Final        IMAGING:  No results found.    PROBLEM LIST:  Patient Active Problem List   Diagnosis   • Aortic stenosis   • Hyperlipidemia   • Hypertension   • Diabetes mellitus (CMS/HCC)   • Hypothyroidism   • Osteoarthritis   • S/P s/p reop sternotomy mechanical MVR (25 mm [employer] prosthesis) per Dr. Alvarado 5/9/2019   • S/P mechanical AVR (prior surgery per Dr. Alvarado)   • S/P tricuspid valve repair (28 mm Physio tricuspid ring) per Dr. Alvarado 5/9/2019   • S/P Maze operation for atrial fibrillation   • S/P CABG x 2 (reop sternotomy with lysis of adhesions) (LIMA to LAD, SVG to PDA) per Dr. Alvarado 5/9/2019   • History of aortic valve replacement with bioprosthetic valve [Z95.3]   • History of mitral valve replacement with bioprosthetic valve [Z95.3]   • Atrial fibrillation (CMS/HCC) [I48.91]   • Long term (current) use of anticoagulants [Z79.01]   • Anemia   • Atherosclerotic heart disease of native coronary artery without angina pectoris   • Chronic kidney disease, stage 3 (moderate) (CMS/HCC)   • Diabetes mellitus type 2, insulin dependent (CMS/HCC)   • Diabetic peripheral neuropathy (CMS/HCC)   • Dyspnea   • Edema   • Endocarditis   • Goiter, nontoxic, multinodular   • Chronic systolic heart failure (CMS/HCC)   • Hypokalemia   • Iron deficiency anemia   • Presence of prosthetic heart valve   • Sinusitis   • Thyroid nodule   • Weight gain   • Aortic valve stenosis   • Paroxysmal atrial fibrillation (CMS/HCC)   • Obesity (BMI 30-39.9)   • Anemia due to stage 3 chronic kidney disease (CMS/HCC)   • Malabsorption   • Iron deficiency anemia due to chronic blood loss       Assessment & Plan  65 y.o. F w/ iron deficiency  Anemia from small bowel AVMs while on anticoagulation for mechanical valves.      1) iron deficiency  Anemia:  She potentiallyt needs infusoons every 2 months.  Hgb was close to normal at last visit.  Will check CBC today and iron studies.    2) anemia of  CKD:  A complicating factor.  However, with Hgb 11 or higher, I would not recommend adding ALON to the regimen given near normal Hgb and h/o CAD.      Dwayne Monroe MD,  09/28/2020   11:14 EDT    Addendum:  Her ferritin is very high at > 1000.  Her saturation is still low at 12%.  Hgb is slightly improved at 11.2<-11.  Therefore, I do thik that overall she is iron replete.  The Hgb is close to normal.  I think what is now the primary contributor to her anemia is the CKD and not the iron deficiency.  Will continue to monitor.

## 2020-09-29 ENCOUNTER — TELEPHONE (OUTPATIENT)
Dept: ONCOLOGY | Facility: CLINIC | Age: 65
End: 2020-09-29

## 2020-09-29 NOTE — TELEPHONE ENCOUNTER
"Called pt to follow up on her visit after transition to Dr. Monroe.   Pt states that there was a lot of wait time prior to being roomed and seeing the Dr. Pt state that he was \"real good.\" He went over the notes and asked questions.   He did not do labs until after she had been seen which was different for her.   She states that he is new to her as she is to him.  "

## 2020-09-30 ENCOUNTER — TELEPHONE (OUTPATIENT)
Dept: ONCOLOGY | Facility: CLINIC | Age: 65
End: 2020-09-30

## 2020-09-30 NOTE — TELEPHONE ENCOUNTER
----- Message from Dwayne Monroe MD sent at 9/28/2020  2:50 PM EDT -----  Let her know that we will not repeat the injectafer.  Even though her saturation is a bit low, the ferritin is very high.  I think the Hgb is low at this point primarily from the CKD.   ----- Message -----  From: Lab, Background User  Sent: 9/28/2020  11:51 AM EDT  To: Dwayne Monroe MD

## 2020-09-30 NOTE — TELEPHONE ENCOUNTER
The patient was notified of her lab results and that she would not need Injectafer at this time.  The patient voiced understanding.

## 2020-10-12 RX ORDER — METOPROLOL SUCCINATE 25 MG/1
TABLET, EXTENDED RELEASE ORAL
Qty: 15 TABLET | Refills: 0 | Status: SHIPPED | OUTPATIENT
Start: 2020-10-12 | End: 2020-11-11

## 2020-10-30 ENCOUNTER — ANTICOAGULATION VISIT (OUTPATIENT)
Dept: CARDIOLOGY | Facility: CLINIC | Age: 65
End: 2020-10-30

## 2020-10-30 VITALS
DIASTOLIC BLOOD PRESSURE: 75 MMHG | SYSTOLIC BLOOD PRESSURE: 121 MMHG | WEIGHT: 182 LBS | BODY MASS INDEX: 31.24 KG/M2 | HEART RATE: 89 BPM

## 2020-10-30 DIAGNOSIS — Z95.3 HISTORY OF MITRAL VALVE REPLACEMENT WITH BIOPROSTHETIC VALVE: ICD-10-CM

## 2020-10-30 DIAGNOSIS — I48.91 ATRIAL FIBRILLATION, UNSPECIFIED TYPE (HCC): ICD-10-CM

## 2020-10-30 DIAGNOSIS — Z79.01 LONG TERM (CURRENT) USE OF ANTICOAGULANTS: ICD-10-CM

## 2020-10-30 DIAGNOSIS — Z95.3 HISTORY OF AORTIC VALVE REPLACEMENT WITH BIOPROSTHETIC VALVE: ICD-10-CM

## 2020-10-30 LAB — INR PPP: 2.9 (ref 0.9–1.1)

## 2020-10-30 PROCEDURE — 36416 COLLJ CAPILLARY BLOOD SPEC: CPT | Performed by: INTERNAL MEDICINE

## 2020-10-30 PROCEDURE — 85610 PROTHROMBIN TIME: CPT | Performed by: INTERNAL MEDICINE

## 2020-11-04 ENCOUNTER — TRANSCRIBE ORDERS (OUTPATIENT)
Dept: LAB | Facility: HOSPITAL | Age: 65
End: 2020-11-04

## 2020-11-04 ENCOUNTER — LAB (OUTPATIENT)
Dept: LAB | Facility: HOSPITAL | Age: 65
End: 2020-11-04

## 2020-11-04 DIAGNOSIS — N18.30 STAGE 3 CHRONIC KIDNEY DISEASE, UNSPECIFIED WHETHER STAGE 3A OR 3B CKD (HCC): Primary | ICD-10-CM

## 2020-11-04 DIAGNOSIS — N18.30 STAGE 3 CHRONIC KIDNEY DISEASE, UNSPECIFIED WHETHER STAGE 3A OR 3B CKD (HCC): ICD-10-CM

## 2020-11-04 LAB
ANION GAP SERPL CALCULATED.3IONS-SCNC: 11.1 MMOL/L (ref 5–15)
BACTERIA UR QL AUTO: NORMAL /HPF
BASOPHILS # BLD AUTO: 0.06 10*3/MM3 (ref 0–0.2)
BASOPHILS NFR BLD AUTO: 0.8 % (ref 0–1.5)
BILIRUB UR QL STRIP: NEGATIVE
BUN SERPL-MCNC: 57 MG/DL (ref 8–23)
BUN/CREAT SERPL: 35.2 (ref 7–25)
CALCIUM SPEC-SCNC: 9.9 MG/DL (ref 8.6–10.5)
CHLORIDE SERPL-SCNC: 103 MMOL/L (ref 98–107)
CLARITY UR: CLEAR
CO2 SERPL-SCNC: 26.9 MMOL/L (ref 22–29)
COLOR UR: YELLOW
CREAT SERPL-MCNC: 1.62 MG/DL (ref 0.57–1)
DEPRECATED RDW RBC AUTO: 47.6 FL (ref 37–54)
EOSINOPHIL # BLD AUTO: 0.47 10*3/MM3 (ref 0–0.4)
EOSINOPHIL NFR BLD AUTO: 5.9 % (ref 0.3–6.2)
ERYTHROCYTE [DISTWIDTH] IN BLOOD BY AUTOMATED COUNT: 14.5 % (ref 12.3–15.4)
GFR SERPL CREATININE-BSD FRML MDRD: 32 ML/MIN/1.73
GLUCOSE SERPL-MCNC: 124 MG/DL (ref 65–99)
GLUCOSE UR STRIP-MCNC: NEGATIVE MG/DL
HCT VFR BLD AUTO: 33.6 % (ref 34–46.6)
HGB BLD-MCNC: 11.2 G/DL (ref 12–15.9)
HGB UR QL STRIP.AUTO: NEGATIVE
HYALINE CASTS UR QL AUTO: NORMAL /LPF
IMM GRANULOCYTES # BLD AUTO: 0.03 10*3/MM3 (ref 0–0.05)
IMM GRANULOCYTES NFR BLD AUTO: 0.4 % (ref 0–0.5)
KETONES UR QL STRIP: NEGATIVE
LEUKOCYTE ESTERASE UR QL STRIP.AUTO: ABNORMAL
LYMPHOCYTES # BLD AUTO: 1.08 10*3/MM3 (ref 0.7–3.1)
LYMPHOCYTES NFR BLD AUTO: 13.5 % (ref 19.6–45.3)
MCH RBC QN AUTO: 29.9 PG (ref 26.6–33)
MCHC RBC AUTO-ENTMCNC: 33.3 G/DL (ref 31.5–35.7)
MCV RBC AUTO: 89.8 FL (ref 79–97)
MONOCYTES # BLD AUTO: 0.83 10*3/MM3 (ref 0.1–0.9)
MONOCYTES NFR BLD AUTO: 10.4 % (ref 5–12)
NEUTROPHILS NFR BLD AUTO: 5.53 10*3/MM3 (ref 1.7–7)
NEUTROPHILS NFR BLD AUTO: 69 % (ref 42.7–76)
NITRITE UR QL STRIP: NEGATIVE
NRBC BLD AUTO-RTO: 0 /100 WBC (ref 0–0.2)
PH UR STRIP.AUTO: 6.5 [PH] (ref 5–8)
PLATELET # BLD AUTO: 264 10*3/MM3 (ref 140–450)
PMV BLD AUTO: 10.2 FL (ref 6–12)
POTASSIUM SERPL-SCNC: 4.9 MMOL/L (ref 3.5–5.2)
PROT UR QL STRIP: NEGATIVE
RBC # BLD AUTO: 3.74 10*6/MM3 (ref 3.77–5.28)
RBC # UR: NORMAL /HPF
REF LAB TEST METHOD: NORMAL
SODIUM SERPL-SCNC: 141 MMOL/L (ref 136–145)
SP GR UR STRIP: 1.01 (ref 1–1.03)
SQUAMOUS #/AREA URNS HPF: NORMAL /HPF
UROBILINOGEN UR QL STRIP: ABNORMAL
WBC # BLD AUTO: 8 10*3/MM3 (ref 3.4–10.8)
WBC UR QL AUTO: NORMAL /HPF

## 2020-11-04 PROCEDURE — 85025 COMPLETE CBC W/AUTO DIFF WBC: CPT

## 2020-11-04 PROCEDURE — 80048 BASIC METABOLIC PNL TOTAL CA: CPT

## 2020-11-04 PROCEDURE — 81001 URINALYSIS AUTO W/SCOPE: CPT

## 2020-11-04 PROCEDURE — 36415 COLL VENOUS BLD VENIPUNCTURE: CPT

## 2020-11-09 ENCOUNTER — TELEPHONE (OUTPATIENT)
Dept: CARDIOLOGY | Facility: CLINIC | Age: 65
End: 2020-11-09

## 2020-11-10 RX ORDER — VALSARTAN 160 MG/1
160 TABLET ORAL DAILY
Qty: 90 TABLET | Refills: 0 | Status: SHIPPED | OUTPATIENT
Start: 2020-11-10 | End: 2021-01-18

## 2020-11-11 RX ORDER — METOPROLOL SUCCINATE 25 MG/1
TABLET, EXTENDED RELEASE ORAL
Qty: 45 TABLET | Refills: 1 | Status: SHIPPED | OUTPATIENT
Start: 2020-11-11 | End: 2021-04-19

## 2020-11-30 ENCOUNTER — LAB (OUTPATIENT)
Dept: LAB | Facility: HOSPITAL | Age: 65
End: 2020-11-30

## 2020-11-30 ENCOUNTER — OFFICE VISIT (OUTPATIENT)
Dept: ONCOLOGY | Facility: CLINIC | Age: 65
End: 2020-11-30

## 2020-11-30 VITALS
HEIGHT: 64 IN | SYSTOLIC BLOOD PRESSURE: 127 MMHG | RESPIRATION RATE: 18 BRPM | DIASTOLIC BLOOD PRESSURE: 67 MMHG | WEIGHT: 183.8 LBS | TEMPERATURE: 96.9 F | BODY MASS INDEX: 31.38 KG/M2 | HEART RATE: 87 BPM

## 2020-11-30 DIAGNOSIS — D63.1 ANEMIA DUE TO STAGE 3A CHRONIC KIDNEY DISEASE (HCC): ICD-10-CM

## 2020-11-30 DIAGNOSIS — N18.31 ANEMIA DUE TO STAGE 3A CHRONIC KIDNEY DISEASE (HCC): ICD-10-CM

## 2020-11-30 DIAGNOSIS — D50.0 IRON DEFICIENCY ANEMIA DUE TO CHRONIC BLOOD LOSS: Primary | ICD-10-CM

## 2020-11-30 DIAGNOSIS — D50.0 IRON DEFICIENCY ANEMIA DUE TO CHRONIC BLOOD LOSS: ICD-10-CM

## 2020-11-30 DIAGNOSIS — D50.9 IRON DEFICIENCY ANEMIA, UNSPECIFIED IRON DEFICIENCY ANEMIA TYPE: ICD-10-CM

## 2020-11-30 LAB
BASOPHILS # BLD AUTO: 0.04 10*3/MM3 (ref 0–0.2)
BASOPHILS NFR BLD AUTO: 0.5 % (ref 0–1.5)
DEPRECATED RDW RBC AUTO: 49.3 FL (ref 37–54)
EOSINOPHIL # BLD AUTO: 0.47 10*3/MM3 (ref 0–0.4)
EOSINOPHIL NFR BLD AUTO: 6 % (ref 0.3–6.2)
ERYTHROCYTE [DISTWIDTH] IN BLOOD BY AUTOMATED COUNT: 15.2 % (ref 12.3–15.4)
FERRITIN SERPL-MCNC: 969.1 NG/ML (ref 13–150)
HCT VFR BLD AUTO: 36.1 % (ref 34–46.6)
HGB BLD-MCNC: 11.6 G/DL (ref 12–15.9)
IRON 24H UR-MRATE: 47 MCG/DL (ref 37–145)
IRON SATN MFR SERPL: 14 % (ref 20–50)
LYMPHOCYTES # BLD AUTO: 0.89 10*3/MM3 (ref 0.7–3.1)
LYMPHOCYTES NFR BLD AUTO: 11.3 % (ref 19.6–45.3)
MCH RBC QN AUTO: 29.8 PG (ref 26.6–33)
MCHC RBC AUTO-ENTMCNC: 32.1 G/DL (ref 31.5–35.7)
MCV RBC AUTO: 92.8 FL (ref 79–97)
MONOCYTES # BLD AUTO: 0.7 10*3/MM3 (ref 0.1–0.9)
MONOCYTES NFR BLD AUTO: 8.9 % (ref 5–12)
NEUTROPHILS NFR BLD AUTO: 5.78 10*3/MM3 (ref 1.7–7)
NEUTROPHILS NFR BLD AUTO: 73.3 % (ref 42.7–76)
PLATELET # BLD AUTO: 235 10*3/MM3 (ref 140–450)
PMV BLD AUTO: 9.2 FL (ref 6–12)
RBC # BLD AUTO: 3.89 10*6/MM3 (ref 3.77–5.28)
TIBC SERPL-MCNC: 328 MCG/DL (ref 298–536)
TRANSFERRIN SERPL-MCNC: 220 MG/DL (ref 200–360)
WBC # BLD AUTO: 7.88 10*3/MM3 (ref 3.4–10.8)

## 2020-11-30 PROCEDURE — 83540 ASSAY OF IRON: CPT

## 2020-11-30 PROCEDURE — 82728 ASSAY OF FERRITIN: CPT

## 2020-11-30 PROCEDURE — 84466 ASSAY OF TRANSFERRIN: CPT

## 2020-11-30 PROCEDURE — 85025 COMPLETE CBC W/AUTO DIFF WBC: CPT

## 2020-11-30 PROCEDURE — 36415 COLL VENOUS BLD VENIPUNCTURE: CPT

## 2020-11-30 PROCEDURE — 99213 OFFICE O/P EST LOW 20 MIN: CPT | Performed by: INTERNAL MEDICINE

## 2020-11-30 NOTE — PROGRESS NOTES
ONCOLOGY/HEMATOLOGY    PATIENT: Jocelin Parra  YOB: 1955  MEDICAL RECORD NUMBER: [id number] OF SERVICE: 11/30/20      CHIEF COMPLAINT:   Chief Complaint:  Iron deficiency anemia  Chronic renal failure stage III  Arteriovenous malformation of small bowel  Mechanical cardiac valve  History of Present Illness:   · I initially saw Ms. De Los Santos in consultation at [facility] when she was admitted through the emergency room on 12/3/2019 with severe anemia.  Patient required blood transfusion.  Patient has chronic history of kidney disease.    · CBC in the ED showed WBC 3.5, hemoglobin 6.1, MCV 63.4, RDW 18.6, and platelets 339,000. She received 1 unit pRBC.  Creatinine was 1.57.   · Patient is on chronic Coumadin for mechanical cardiac valve INR was 2.74 on admission on 12/3/2019    · Posttransfusion iron studies showed iron 24 and ferritin 21.2.  Vitamin B12 was 947 and folate was 18.6.    · 12/4/2019 SPEP negative for monoclonal gammopathy.  Serum creatinine 1.7 with EGFR of 30  · 12/5/2019 patient underwent colonoscopy and EGD at [facility]- No evidence of any active source of bleeding noticed in the upper at the lower GI tract except diverticulosis and hemorrhoids.  Possibility of a small bowel AVM or lesion leading to blood loss anemia is in the differential however patient denies any overt GI bleeding.  May consider small bowel evaluation with a PillCam if needed.  · 12/23/2019 and 12/30/2019 patient received 2 Injectafer infusions.  · 1/21/2020 - Erythropoietin 37.4, iron 47, iron saturation 13, TIBC 371, ferritin 681.80.  · 2/30/2020-EGD with small bowel enteroscopy performed by Dr. Akhtar.  2 nonbleeding jejunal angiectasia's were cauterized  · 3/6/2020 and 3/13/2020 patient received 2 dose of Injectafer infusions.  · July 2020 received 2 additonal doses of injectafer for Hgb of 11    HISTORY OF PRESENT ILLNESS: she comes unaccompanied today.  Since her last visit, she is doing well.  She did not get  iron infusions in September given that her ferritin was > 1000.  Her Hgb today is actually improved compared to last time at 11.6 up from 11.2.  She has no new complaints. She started a new iron tablet about 3-4 months ago and has changed her diet to include more iron rich foods     Review of Systems - Oncology     Review of systems:  Constitutional: Denies fatigue, fever, night sweats or weight ois stable   HEENT: sinus issues - chronic  Lymph nodes: Denies lymphadenopathy  Heme: Denies bleeding or abnormal bruising  Respiratory: breathing is ok most of the time;   Cardiovascular: Denies chest pain, palpitations or lower extremity swelling  GI: Denies abdominal pain, nausea, vomiting, diarrhea, constipation, hematochezia or melena  : Denies dysuria or hematuria  Musculoskeletal: uses a cane for arthritic knees   Skin: Denies rash or skin lesions  Neurologic:neuropathy in the feet from DM  Endocrine: denies hot flashes  Psychologic: Denies depression or anxiety    Past Medical History:   Diagnosis Date   • A-fib (CMS/[facility]) Dx 2018    S/p Cardioversion by Dr. Spencer on 04/10/19   • Acute renal failure syndrome (CMS/[facility]) 4/2019-F IP   • Biatrial enlargement 04/10/2019    Noted on SHERIE   • Calcified granuloma of lung (CMS/[facility]) 01/04/2019    Noted on Chest XR   • Cardiomegaly 01/04/2019    Borderline--Noted on Chest XR   • CHF (congestive heart failure) (CMS/[facility])    • CKD (chronic kidney disease), stage III     Does Not See a Nephrologist   • Diabetes mellitus (CMS/[facility])    • DM (diabetes mellitus) (CMS/[facility])     T2   • Dysphagia 08/15-F IP    Due to Pharyngitis   • GERD (gastroesophageal reflux disease)    • History of chest x-ray 8/28/15-F    Normal   • History of chest x-ray 01/19/2019    Mild Pulmonary Edema w/Congestive Failure Noted   • History of chest x-ray 01/04/2019    Borderline Cardiomegaly    • History of EKG 2014/2018/2019-F   • Hx of diabetic neuropathy    • Hx of dizziness     w/Lightheadedness   • Hx  of echocardiogram 4/16/18-[facility]    EF 55-60%; Mild MVR; Mild TVR; Normal Root/No Effusion   • Hyperlipidemia     Controlled w/Meds   • Hypertension     Controlled w/Meds   • Hypokalemia 4/11/19-[facility] IP   • Hypothyroidism     Controlled w/Meds   • Iron deficiency anemia    • Left posterior fascicular block 04/2018 & 4/19    Noted on EKG   • Lower extremity edema 03/2019   • Mild mitral valve regurgitation 04/16/2018    Noted on Echo   • Mild tricuspid valve regurgitation 04/16/2018    Noted on Echo   • Moderate mitral valve regurgitation 2008    S/p MVR in 08'   • Moderate tricuspid insufficiency 04/10/2019    Noted on SHERIE   • Osteoarthritis     Knees w/Hx Knee Repl   • Pulmonary edema 01/19/2019    Mild--Noted on Chest XR   • Rapid palpitations 04/15/18 & 8/9/18-[facility] ER   • Renal dysfunction    • Right axis deviation 08/2018 & 4/19    Noted on EKG   • Severe aortic valve stenosis Since 2008    Hx AVR on 12/11/08 by Dr. Alvarado   • Severe mitral insufficiency 04/10/2019    Noted on SHERIE   • Sinus tachycardia 08/2018    Noted on EKG   • SOB (shortness of breath) chronic   • Torticollis Chronic   • Valvular heart disease     S/p AVR & MVR in 08'       Past Surgical History:   Procedure Laterality Date   • AORTIC VALVE REPAIR/REPLACEMENT  12/11/08-[facility]    Using a #20 Eight Yr Mechanical [employer] [place] Prosthesis--Dewey Alvarado MD   • CARDIOVERSION  4/10/19-[facility]    Dr. Spencer--For A-Fib   • COLONOSCOPY N/A 12/5/2019    Procedure: COLONOSCOPY;  Surgeon: Dustin Castellanos MD;  Location: [facility] ENDOSCOPY;  Service: Gastroenterology   • ENDOSCOPY N/A 12/5/2019    Procedure: ESOPHAGOGASTRODUODENOSCOPY;  Surgeon: Dustin Castellanos MD;  Location: [facility] ENDOSCOPY;  Service: Gastroenterology   • ENTEROSCOPY SMALL BOWEL N/A 2/3/2020    Procedure: ESOPHAGOGASTRODUODENOSCOPY WITH SMALL BOWEL ENTEROSCOPY and argonplasma coagulation of two small jejunal ateriovenous malformations;  Surgeon: Dexter Akhtar MD;  Location: [facility] ENDOSCOPY;  Service:  Gastroenterology;  Laterality: N/A;  jejunal ateriovenous malformation   • HYSTERECTOMY     • MITRAL VALVE REPLACEMENT  2008   • MITRAL VALVE REPLACEMENT  05/09/2019    Dr Alvarado   • SHERIE  4/10/19-[facility]    EF 40%; Moderate TVR; Severe Eccentric MI; Biatrial Enlargement   • TOTAL KNEE ARTHROPLASTY  2017   • TRICUSPID VALVE SURGERY  05/09/2019    Dr Alvarado       Family History   Problem Relation Age of Onset   • Heart disease Father    • Hypertension Father    • Stroke Father    • Diabetes Father    • Lung cancer Mother        Social History     Socioeconomic History   • Marital status: Single     Spouse name: Not on file   • Number of children: Not on file   • Years of education: Not on file   • Highest education level: Not on file   Tobacco Use   • Smoking status: Never Smoker   • Smokeless tobacco: Never Used   Substance and Sexual Activity   • Alcohol use: No   • Drug use: No   • Sexual activity: Defer     Birth control/protection: Surgical     Comment: Hyst       ALLERGIES:  Patient has no known allergies.    MEDICATION:  Current Outpatient Medications   Medication Sig Dispense Refill   • ACCU-CHEK GUIDE test strip      • aspirin 81 MG tablet Take 81 mg by mouth Daily.     • atorvastatin (LIPITOR) 40 MG tablet Take 40 mg by mouth Daily.     • B Complex Vitamins (VITAMIN B COMPLEX) tablet Take 1 capsule by mouth Daily.     • cholecalciferol (VITAMIN D3) 10 MCG (400 UNIT) tablet Take 2,000 Units by mouth Daily.     • ezetimibe (ZETIA) 10 MG tablet Take 1 tablet by mouth daily.     • ferrous sulfate 325 (65 FE) MG tablet Take 325 mg by mouth 2 (Two) Times a Day.     • furosemide (LASIX) 40 MG tablet Take 40 mg by mouth Daily.     • Insulin Degludec (TRESIBA) 100 UNIT/ML solution injection Inject 10 Units under the skin into the appropriate area as directed every night at bedtime.     • [employer] PEN NEEDLES 32G X 4 MM misc      • levothyroxine (SYNTHROID, LEVOTHROID) 112 MCG tablet Take 112 mcg by mouth Daily.     •  Liraglutide (VICTOZA) 18 MG/3ML solution pen-injector Inject 1.8 mg under the skin into the appropriate area as directed Daily.     • MAGNESIUM PO Take 1 tablet by mouth Daily.     • metFORMIN ER (GLUCOPHAGE-XR) 500 MG 24 hr tablet Take 500 mg by mouth Daily.     • metoprolol succinate XL (TOPROL-XL) 25 MG 24 hr tablet TAKE 1/2 TABLET BY MOUTH DAILY 45 tablet 1   • Multiple Vitamin (MULTI-VITAMIN) tablet Take 1 tablet by mouth daily.     • omeprazole (PriLOSEC) 20 MG capsule Take 20 mg by mouth Daily.     • potassium chloride (K-DUR,KLOR-CON) 20 MEQ CR tablet Take 20 mEq by mouth Daily.     • valsartan (DIOVAN) 160 MG tablet Take 1 tablet by mouth Daily. 90 tablet 0   • vitamin C (ASCORBIC ACID) 500 MG tablet Take 500 mg by mouth Daily.     • warfarin (COUMADIN) 5 MG tablet TAKE ONE TABLET BY MOUTH DAILY ON MONDAY, WEDNESDAY, AND FRIDAY, AND TAKE ONE AND ONE-HALF TABLETS BY MOUTH ON ALL OTHER DAYS OR AS DIRECTED 45 tablet 2     No current facility-administered medications for this visit.            PHYSICAL EXAM:    Current Vitals:  Temperature Blood Pressure Heart Rate Resp Rate SpO2 Temp: 96.9 °F (36.1 °C) BP: 127/67 Heart Rate: 87 Resp: 18        VITAL signs in the last 24 hours: [unfilled]       11/30/20  1048   Weight: 83.4 kg (183 lb 12.8 oz)       Physical Exam     ECOG PERFORMANCE STATUS:2-3  Physical Exam:  General: No acute distress  Eyes: Sclera anicteric, EOMS-I  ENT: no mucositis  Neck: Supple, with full ROM  Lymph nodes: No cervical, lymphadenopathy  Pulmonary: no wheezes or stridor .  CV: Regular rate and rhythm.  GI: Soft, non-tender,   Vascular: no edema  Neurologic: Cranial nerves 2-12 grossly intact, no focal motor deficits  Psych/MS: Alert and oriented x3,  MSK: walks with cane     LABORATORY/DATA:  Ferritin   Date Value Ref Range Status   09/28/2020 1,119.00 (H) 13.00 - 150.00 ng/mL Final     Iron Saturation   Date Value Ref Range Status   09/28/2020 12 (L) 20 - 50 % Final     LDH   Date Value  Ref Range Status   12/04/2019 353 (H) 135 - 214 U/L Final        IMAGING:  No results found.    PROBLEM LIST:  Patient Active Problem List   Diagnosis   • Aortic stenosis   • Hyperlipidemia   • Hypertension   • Diabetes mellitus (CMS/HCC)   • Hypothyroidism   • Osteoarthritis   • S/P s/p reop sternotomy mechanical MVR (25 mm [place] prosthesis) per Dr. Alvarado 5/9/2019   • S/P mechanical AVR (prior surgery per Dr. Alvarado)   • S/P tricuspid valve repair (28 mm Physio tricuspid ring) per Dr. Alvarado 5/9/2019   • S/P Maze operation for atrial fibrillation   • S/P CABG x 2 (reop sternotomy with lysis of adhesions) (LIMA to LAD, SVG to PDA) per Dr. Alvarado 5/9/2019   • History of aortic valve replacement with bioprosthetic valve [Z95.3]   • History of mitral valve replacement with bioprosthetic valve [Z95.3]   • Atrial fibrillation (CMS/HCC) [I48.91]   • Long term (current) use of anticoagulants [Z79.01]   • Anemia   • Atherosclerotic heart disease of native coronary artery without angina pectoris   • Stage 3 chronic kidney disease   • Diabetes mellitus type 2, insulin dependent (CMS/HCC)   • Diabetic peripheral neuropathy (CMS/HCC)   • Dyspnea   • Edema   • Endocarditis   • Goiter, nontoxic, multinodular   • Chronic systolic heart failure (CMS/HCC)   • Hypokalemia   • Iron deficiency anemia   • Presence of prosthetic heart valve   • Sinusitis   • Thyroid nodule   • Weight gain   • Aortic valve stenosis   • Paroxysmal atrial fibrillation (CMS/HCC)   • Obesity (BMI 30-39.9)   • Anemia due to stage 3 chronic kidney disease   • Malabsorption   • Iron deficiency anemia due to chronic blood loss       Assessment & Plan  65 y.o. F w/ iron deficiency  Anemia from small bowel AVMs while on anticoagulation for mechanical valves.      1) iron deficiency  Anemia:  She has now gone 4 months without iron infusoins and her Hgb is quite good and nearly normal.  Will await iron studies and if low or dropping, will administer a dose of IV iron  to prevent worsenig anemia.  It is possible that the dietary changes and the new iron pill could be benefiting her cosiderably.  Furthermore, if the iron studies are acceptable, she may need iron infusions only every 6 mponths. F/u in 2 months.   2) anemia of CKD:  A complicating factor.  However, with Hgb 11 or higher, I would not recommend adding ALON to the regimen given near normal Hgb and h/o CAD.

## 2020-12-04 ENCOUNTER — ANTICOAGULATION VISIT (OUTPATIENT)
Dept: CARDIOLOGY | Facility: CLINIC | Age: 65
End: 2020-12-04

## 2020-12-04 VITALS
SYSTOLIC BLOOD PRESSURE: 131 MMHG | TEMPERATURE: 97.8 F | BODY MASS INDEX: 31.24 KG/M2 | HEART RATE: 88 BPM | WEIGHT: 182 LBS | DIASTOLIC BLOOD PRESSURE: 63 MMHG

## 2020-12-04 DIAGNOSIS — Z95.3 HISTORY OF MITRAL VALVE REPLACEMENT WITH BIOPROSTHETIC VALVE: ICD-10-CM

## 2020-12-04 DIAGNOSIS — Z79.01 LONG TERM (CURRENT) USE OF ANTICOAGULANTS: ICD-10-CM

## 2020-12-04 DIAGNOSIS — I48.91 ATRIAL FIBRILLATION, UNSPECIFIED TYPE (HCC): ICD-10-CM

## 2020-12-04 DIAGNOSIS — Z95.3 HISTORY OF AORTIC VALVE REPLACEMENT WITH BIOPROSTHETIC VALVE: ICD-10-CM

## 2020-12-04 LAB — INR PPP: 2.4 (ref 0.9–1.1)

## 2020-12-04 PROCEDURE — 85610 PROTHROMBIN TIME: CPT | Performed by: INTERNAL MEDICINE

## 2020-12-04 PROCEDURE — 36416 COLLJ CAPILLARY BLOOD SPEC: CPT | Performed by: INTERNAL MEDICINE

## 2020-12-21 RX ORDER — WARFARIN SODIUM 5 MG/1
TABLET ORAL
Qty: 45 TABLET | Refills: 1 | Status: SHIPPED | OUTPATIENT
Start: 2020-12-21 | End: 2021-02-16

## 2020-12-30 ENCOUNTER — ANTICOAGULATION VISIT (OUTPATIENT)
Dept: CARDIOLOGY | Facility: CLINIC | Age: 65
End: 2020-12-30

## 2020-12-30 DIAGNOSIS — Z95.3 HISTORY OF MITRAL VALVE REPLACEMENT WITH BIOPROSTHETIC VALVE: ICD-10-CM

## 2020-12-30 DIAGNOSIS — Z79.01 LONG TERM (CURRENT) USE OF ANTICOAGULANTS: ICD-10-CM

## 2020-12-30 DIAGNOSIS — Z95.3 HISTORY OF AORTIC VALVE REPLACEMENT WITH BIOPROSTHETIC VALVE: ICD-10-CM

## 2020-12-30 DIAGNOSIS — I48.91 ATRIAL FIBRILLATION, UNSPECIFIED TYPE (HCC): ICD-10-CM

## 2020-12-30 LAB — INR PPP: 2.1

## 2021-01-06 ENCOUNTER — TELEPHONE (OUTPATIENT)
Dept: ONCOLOGY | Facility: CLINIC | Age: 66
End: 2021-01-06

## 2021-01-06 NOTE — TELEPHONE ENCOUNTER
I called the patient re: scheduling. I informed her that Nara DUMONT will no longer be seeing patients here and added her to Dr Delong schedule the same day, 1/25/21. The patient stated understanding.

## 2021-01-13 ENCOUNTER — ANTICOAGULATION VISIT (OUTPATIENT)
Dept: CARDIOLOGY | Facility: CLINIC | Age: 66
End: 2021-01-13

## 2021-01-13 DIAGNOSIS — Z95.3 HISTORY OF AORTIC VALVE REPLACEMENT WITH BIOPROSTHETIC VALVE: ICD-10-CM

## 2021-01-13 DIAGNOSIS — Z79.01 LONG TERM (CURRENT) USE OF ANTICOAGULANTS: ICD-10-CM

## 2021-01-13 DIAGNOSIS — Z95.3 HISTORY OF MITRAL VALVE REPLACEMENT WITH BIOPROSTHETIC VALVE: ICD-10-CM

## 2021-01-13 LAB — INR PPP: 1.8

## 2021-01-18 RX ORDER — VALSARTAN 160 MG/1
TABLET ORAL
Qty: 90 TABLET | Refills: 1 | Status: SHIPPED | OUTPATIENT
Start: 2021-01-18 | End: 2021-07-19

## 2021-01-19 NOTE — PROGRESS NOTES
ONCOLOGY/HEMATOLOGY    PATIENT: Jocelin Parra  YOB: 1955  MEDICAL RECORD NUMBER: [id number] OF SERVICE: 01/25/21      CHIEF COMPLAINT:   Chief Complaint:  Iron deficiency anemia  Chronic renal failure stage III  Arteriovenous malformation of small bowel  Mechanical cardiac valve  History of Present Illness:   · I initially saw Ms. De Los Santos in consultation at [facility] when she was admitted through the emergency room on 12/3/2019 with severe anemia.  Patient required blood transfusion.  Patient has chronic history of kidney disease.    · CBC in the ED showed WBC 3.5, hemoglobin 6.1, MCV 63.4, RDW 18.6, and platelets 339,000. She received 1 unit pRBC.  Creatinine was 1.57.   · Patient is on chronic Coumadin for mechanical cardiac valve INR was 2.74 on admission on 12/3/2019    · Posttransfusion iron studies showed iron 24 and ferritin 21.2.  Vitamin B12 was 947 and folate was 18.6.    · 12/4/2019 SPEP negative for monoclonal gammopathy.  Serum creatinine 1.7 with EGFR of 30  · 12/5/2019 patient underwent colonoscopy and EGD at [facility]- No evidence of any active source of bleeding noticed in the upper at the lower GI tract except diverticulosis and hemorrhoids.  Possibility of a small bowel AVM or lesion leading to blood loss anemia is in the differential however patient denies any overt GI bleeding.  May consider small bowel evaluation with a PillCam if needed.  · 12/23/2019 and 12/30/2019 patient received 2 Injectafer infusions.  · 1/21/2020 - Erythropoietin 37.4, iron 47, iron saturation 13, TIBC 371, ferritin 681.80.  · 2/30/2020-EGD with small bowel enteroscopy performed by Dr. Akhtar.  2 nonbleeding jejunal angiectasia's were cauterized  · 3/6/2020 and 3/13/2020 patient received 2 dose of Injectafer infusions.  · July 2020 received 2 additonal doses of injectafer for Hgb of 11  · 9/28/2020: Iron 39, iron saturation 12%, ferritin 1119  · 1/13/2021 INR 1.8  · 11/30/2020: Iron saturation 14%, TIBC  328,Hemoglobin 11.6, platelets 235  · 1/25/2021: WBC 7.5, hemoglobin 11.8, platelets 261    Subjective:     she comes unaccompanied today.  She is taking iron twice a day.  She is tolerating it well.  Continues on anticoagulation.  Her INRs are checked at the cardiology office.         Review of Systems - Oncology     Occasional leg swelling.      Past Medical History:   Diagnosis Date   • A-fib (CMS/[facility]) Dx 2018    S/p Cardioversion by Dr. Spencer on 04/10/19   • Acute renal failure syndrome (CMS/HCC) 4/2019-[facility] IP   • Biatrial enlargement 04/10/2019    Noted on SHERIE   • Calcified granuloma of lung (CMS/HCC) 01/04/2019    Noted on Chest XR   • Cardiomegaly 01/04/2019    Borderline--Noted on Chest XR   • CHF (congestive heart failure) (CMS/[facility])    • CKD (chronic kidney disease), stage III (CMS/[facility])     Does Not See a Nephrologist   • Diabetes mellitus (CMS/[facility])    • DM (diabetes mellitus) (CMS/[facility])     T2   • Dysphagia 08/15-[facility] IP    Due to Pharyngitis   • GERD (gastroesophageal reflux disease)    • History of chest x-ray 8/28/15-[facility]    Normal   • History of chest x-ray 01/19/2019    Mild Pulmonary Edema w/Congestive Failure Noted   • History of chest x-ray 01/04/2019    Borderline Cardiomegaly    • History of EKG 2014/2018/2019-[facility]   • Hx of diabetic neuropathy    • Hx of dizziness     w/Lightheadedness   • Hx of echocardiogram 4/16/18-[facility]    EF 55-60%; Mild MVR; Mild TVR; Normal Root/No Effusion   • Hyperlipidemia     Controlled w/Meds   • Hypertension     Controlled w/Meds   • Hypokalemia 4/11/19-[facility] IP   • Hypothyroidism     Controlled w/Meds   • Iron deficiency anemia    • Left posterior fascicular block 04/2018 & 4/19    Noted on EKG   • Lower extremity edema 03/2019   • Mild mitral valve regurgitation 04/16/2018    Noted on Echo   • Mild tricuspid valve regurgitation 04/16/2018    Noted on Echo   • Moderate mitral valve regurgitation 2008    S/p MVR in 08'   • Moderate tricuspid insufficiency 04/10/2019    Noted  on SHERIE   • Osteoarthritis     Knees w/Hx Knee Repl   • Pulmonary edema 01/19/2019    Mild--Noted on Chest XR   • Rapid palpitations 04/15/18 & 8/9/18-[facility] ER   • Renal dysfunction    • Right axis deviation 08/2018 & 4/19    Noted on EKG   • Severe aortic valve stenosis Since 2008    Hx AVR on 12/11/08 by Dr. Alvarado   • Severe mitral insufficiency 04/10/2019    Noted on SHERIE   • Sinus tachycardia 08/2018    Noted on EKG   • SOB (shortness of breath) chronic   • Torticollis Chronic   • Valvular heart disease     S/p AVR & MVR in 08'       Past Surgical History:   Procedure Laterality Date   • AORTIC VALVE REPAIR/REPLACEMENT  12/11/08-[facility]    Using a #20 Eight Yr Mechanical [employer] [place] Prosthesis--Dewey Alvarado MD   • CARDIOVERSION  4/10/19-[facility]    Dr. Spencer--For A-Fib   • COLONOSCOPY N/A 12/5/2019    Procedure: COLONOSCOPY;  Surgeon: Dustin Castellanos MD;  Location: [facility] ENDOSCOPY;  Service: Gastroenterology   • ENDOSCOPY N/A 12/5/2019    Procedure: ESOPHAGOGASTRODUODENOSCOPY;  Surgeon: Dustin Castellanos MD;  Location: [facility] ENDOSCOPY;  Service: Gastroenterology   • ENTEROSCOPY SMALL BOWEL N/A 2/3/2020    Procedure: ESOPHAGOGASTRODUODENOSCOPY WITH SMALL BOWEL ENTEROSCOPY and argonplasma coagulation of two small jejunal ateriovenous malformations;  Surgeon: Dexter Akhtar MD;  Location: [facility] ENDOSCOPY;  Service: Gastroenterology;  Laterality: N/A;  jejunal ateriovenous malformation   • HYSTERECTOMY     • MITRAL VALVE REPLACEMENT  2008   • MITRAL VALVE REPLACEMENT  05/09/2019    Dr Alvarado   • SHERIE  4/10/19-[facility]    EF 40%; Moderate TVR; Severe Eccentric MI; Biatrial Enlargement   • TOTAL KNEE ARTHROPLASTY  2017   • TRICUSPID VALVE SURGERY  05/09/2019    Dr Alvarado       Family History   Problem Relation Age of Onset   • Heart disease Father    • Hypertension Father    • Stroke Father    • Diabetes Father    • Lung cancer Mother        Social History     Socioeconomic History   • Marital status: Single     Spouse name: Not on file    • Number of children: Not on file   • Years of education: Not on file   • Highest education level: Not on file   Tobacco Use   • Smoking status: Never Smoker   • Smokeless tobacco: Never Used   Substance and Sexual Activity   • Alcohol use: No   • Drug use: No   • Sexual activity: Defer     Birth control/protection: Surgical     Comment: Hyst       ALLERGIES:  Patient has no known allergies.    MEDICATION:  Current Outpatient Medications   Medication Sig Dispense Refill   • ACCU-CHEK GUIDE test strip      • aspirin 81 MG tablet Take 81 mg by mouth Daily.     • atorvastatin (LIPITOR) 40 MG tablet Take 40 mg by mouth Daily.     • B Complex Vitamins (VITAMIN B COMPLEX) tablet Take 1 capsule by mouth Daily.     • cholecalciferol (VITAMIN D3) 10 MCG (400 UNIT) tablet Take 2,000 Units by mouth Daily.     • ezetimibe (ZETIA) 10 MG tablet Take 1 tablet by mouth daily.     • ferrous sulfate 325 (65 FE) MG tablet Take 325 mg by mouth 2 (Two) Times a Day.     • furosemide (LASIX) 40 MG tablet Take 40 mg by mouth Daily.     • Insulin Degludec (TRESIBA) 100 UNIT/ML solution injection Inject 10 Units under the skin into the appropriate area as directed every night at bedtime.     • [employer] PEN NEEDLES 32G X 4 MM misc      • levothyroxine (SYNTHROID, LEVOTHROID) 112 MCG tablet Take 112 mcg by mouth Daily.     • Liraglutide (VICTOZA) 18 MG/3ML solution pen-injector Inject 1.8 mg under the skin into the appropriate area as directed Daily.     • MAGNESIUM PO Take 1 tablet by mouth Daily.     • metFORMIN ER (GLUCOPHAGE-XR) 500 MG 24 hr tablet Take 500 mg by mouth Daily.     • metoprolol succinate XL (TOPROL-XL) 25 MG 24 hr tablet TAKE 1/2 TABLET BY MOUTH DAILY 45 tablet 1   • Multiple Vitamin (MULTI-VITAMIN) tablet Take 1 tablet by mouth daily.     • omeprazole (PriLOSEC) 20 MG capsule Take 20 mg by mouth Daily.     • potassium chloride (K-DUR,KLOR-CON) 20 MEQ CR tablet Take 20 mEq by mouth Daily.     • valsartan (DIOVAN) 160 MG  tablet TAKE ONE TABLET BY MOUTH DAILY 90 tablet 1   • vitamin C (ASCORBIC ACID) 500 MG tablet Take 500 mg by mouth Daily.     • warfarin (COUMADIN) 5 MG tablet TAKE ONE TABLET BY MOUTH ON MONDAY, WEDNESDAY , AND FRIDAY, AND TAKE ONE AND ONE-HALF TABLETS BY MOUTH ON ALL OTHER DAYS OR AS DIRECTED 45 tablet 1     No current facility-administered medications for this visit.      ROS:      PHYSICAL EXAM:    Current Vitals:  Temperature Blood Pressure Heart Rate Resp Rate SpO2 Temp: 97.1 °F (36.2 °C) BP: 105/68 Heart Rate: 82 Resp: 18        VITAL signs in the last 24 hours: [unfilled]       01/25/21  1124   Weight: 82.4 kg (181 lb 9.6 oz)       Physical Exam     ECOG PERFORMANCE STATUS:2-3  Physical Exam:  General: No acute distress  Eyes: Sclera anicteric, EOMS-I  ENT: no mucositis  Neck: Supple, with full ROM  Lymph nodes: No cervical, lymphadenopathy  Pulmonary: no wheezes or stridor .  CV: Regular rate and rhythm.,  Valvular heart sounds heard.  GI: Soft, non-tender,   Vascular: no edema  Neurologic: Cranial nerves 2-12 grossly intact, no focal motor deficits  Psych/MS: Alert and oriented x3,  MSK: walks with cane     LABORATORY/DATA:  Ferritin   Date Value Ref Range Status   11/30/2020 969.10 (H) 13.00 - 150.00 ng/mL Final     Iron Saturation   Date Value Ref Range Status   11/30/2020 14 (L) 20 - 50 % Final     LDH   Date Value Ref Range Status   12/04/2019 353 (H) 135 - 214 U/L Final        IMAGING:  No results found.    PROBLEM LIST:  Patient Active Problem List   Diagnosis   • Aortic stenosis   • Hyperlipidemia   • Hypertension   • Diabetes mellitus (CMS/HCC)   • Hypothyroidism   • Osteoarthritis   • S/P s/p reop sternotomy mechanical MVR (25 mm [employer] [place] prosthesis) per Dr. Alvarado 5/9/2019   • S/P mechanical AVR (prior surgery per Dr. Alvarado)   • S/P tricuspid valve repair (28 mm Physio tricuspid ring) per Dr. Alvarado 5/9/2019   • S/P Maze operation for atrial fibrillation   • S/P CABG x 2 (reop sternotomy with  lysis of adhesions) (LIMA to LAD, SVG to PDA) per Dr. Alvarado 5/9/2019   • History of aortic valve replacement with bioprosthetic valve [Z95.3]   • History of mitral valve replacement with bioprosthetic valve [Z95.3]   • Atrial fibrillation (CMS/HCC) [I48.91]   • Long term (current) use of anticoagulants [Z79.01]   • Anemia   • Atherosclerotic heart disease of native coronary artery without angina pectoris   • Stage 3 chronic kidney disease (CMS/HCC)   • Diabetes mellitus type 2, insulin dependent (CMS/HCC)   • Diabetic peripheral neuropathy (CMS/HCC)   • Dyspnea   • Edema   • Endocarditis   • Goiter, nontoxic, multinodular   • Chronic systolic heart failure (CMS/HCC)   • Hypokalemia   • Iron deficiency anemia   • Presence of prosthetic heart valve   • Sinusitis   • Thyroid nodule   • Weight gain   • Aortic valve stenosis   • Paroxysmal atrial fibrillation (CMS/HCC)   • Obesity (BMI 30-39.9)   • Anemia due to stage 3 chronic kidney disease (CMS/HCC)   • Malabsorption   • Iron deficiency anemia due to chronic blood loss       Lab Results - Last 18 Months   Lab Units 01/25/21  1108 11/30/20  1043 11/04/20  1240   WBC 10*3/mm3 7.59 7.88 8.00   HEMOGLOBIN g/dL 11.8* 11.6* 11.2*   HEMATOCRIT % 36.4 36.1 33.6*   PLATELETS 10*3/mm3 261 235 264   MCV fL 93.6 92.8 89.8     Lab Results - Last 18 Months   Lab Units 11/04/20  1240 08/26/20  1108 05/07/20  1126 02/02/20  1219  12/05/19  0343 12/04/19  0328   SODIUM mmol/L 141 141 139 138   < > 141 141  141   POTASSIUM mmol/L 4.9 4.8 4.7 4.5   < > 4.2 4.3  4.2   CHLORIDE mmol/L 103 102 99 102   < > 104 103  104   TOTAL CO2 mmol/L  --  25  --   --   --   --   --    CO2 mmol/L 26.9  --  26.0 24.0   < > 23.0 22.0  24.0   BUN mg/dL 57* 49* 50* 75*   < > 32* 40*  40*   CREATININE mg/dL 1.62* 1.6* 1.60* 1.57*   < > 1.71* 1.83*  1.76*   CALCIUM mg/dL 9.9 9.8 10.0 9.2   < > 9.1 9.0  9.3   BILIRUBIN mg/dL  --  0.8  --   --   --  1.5* 1.1  1.1   ALK PHOS U/L  --  149*  --   --   --   117 130*   ALT (SGPT) U/L  --  22  --   --   --  18 18   AST (SGOT) U/L  --  27  --   --   --  26 26   GLUCOSE mg/dL 124*  --  187* 197*   < > 136* 191*  188*    < > = values in this interval not displayed.       Lab Results   Component Value Date    GLUCOSE 124 (H) 11/04/2020    BUN 57 (H) 11/04/2020    CREATININE 1.62 (H) 11/04/2020    EGFRIFNONA 32 (L) 11/04/2020    BCR 35.2 (H) 11/04/2020    K 4.9 11/04/2020    CO2 26.9 11/04/2020    CALCIUM 9.9 11/04/2020    PROTENTOTREF 6.0 12/04/2019    ALBUMIN 4.2 08/26/2020    LABIL2 1.2 08/26/2020    AST 27 08/26/2020    ALT 22 08/26/2020     Lab Results   Component Value Date    IRON 47 11/30/2020    TIBC 328 11/30/2020    FERRITIN 969.10 (H) 11/30/2020     Lab Results   Component Value Date    FOLATE 18.90 06/15/2020     No results found for: OCCULTBLD  Lab Results   Component Value Date    RETICCTPCT 2.33 (H) 12/04/2019     Lab Results   Component Value Date    GNZMFJSX42 947 (H) 12/04/2019    TSH 2.110 08/26/2020     No results found for: SPEP, UPEP  LDH   Date Value Ref Range Status   12/04/2019 353 (H) 135 - 214 U/L Final     Lab Results   Component Value Date    SEDRATE 15 03/07/2019     Lab Results   Component Value Date    FIBRINOGEN 286 05/09/2019    HAPTOGLOBIN 70 12/03/2019     No results found for: DDIMER  Lab Results   Component Value Date    PTT 78.7 (H) 05/23/2019    INR 1.80 01/13/2021     No results found for:   No results found for: CEA  No results found for:   No results found for: PSA  No results found for: [id number]    Assessment     65 y.o. F w/ iron deficiency  Anemia from small bowel AVMs while on anticoagulation for mechanical valves.        1. History of iron deficiency anemia  2. Chronic kidney disease: Most recent creatinine was 1.62 on 11/4/2020.  Serum protein electrophoresis was negative on 12/4/2019:.    Plan    1. History of iron deficiency anemia: Patient has required IV iron infusions in the past.  She does have a history of  small bowel AVMs and is on anticoagulation due to mechanical heart valves.  2. CKD: She could have some anemia related to renal disease.  Hemoglobin has stayed about 10 therefore she does not require Procrit at this time.  3. Continue oral iron p.o. twice daily  4. Continue anticoagulation  5. Checking iron levels today.  6. Follow-up in 6 months with repeat ferritin, iron panel.           I have reviewed and confirmed the accuracy of the patient's history: Chief complaint, HPI, ROS, Subjective and Past Family Social History as entered by the MA/LPN/RN. Zen Guy MD 01/25/21       Electronically signed by Zen Guy MD, 01/25/21, 11:44 AM EST.

## 2021-01-25 ENCOUNTER — LAB (OUTPATIENT)
Dept: LAB | Facility: HOSPITAL | Age: 66
End: 2021-01-25

## 2021-01-25 ENCOUNTER — OFFICE VISIT (OUTPATIENT)
Dept: ONCOLOGY | Facility: CLINIC | Age: 66
End: 2021-01-25

## 2021-01-25 VITALS
DIASTOLIC BLOOD PRESSURE: 68 MMHG | HEIGHT: 64 IN | WEIGHT: 181.6 LBS | HEART RATE: 82 BPM | BODY MASS INDEX: 31 KG/M2 | RESPIRATION RATE: 18 BRPM | TEMPERATURE: 97.1 F | SYSTOLIC BLOOD PRESSURE: 105 MMHG

## 2021-01-25 DIAGNOSIS — D50.0 IRON DEFICIENCY ANEMIA DUE TO CHRONIC BLOOD LOSS: Primary | ICD-10-CM

## 2021-01-25 DIAGNOSIS — N18.30 STAGE 3 CHRONIC KIDNEY DISEASE, UNSPECIFIED WHETHER STAGE 3A OR 3B CKD (HCC): ICD-10-CM

## 2021-01-25 LAB
BASOPHILS # BLD AUTO: 0.03 10*3/MM3 (ref 0–0.2)
BASOPHILS NFR BLD AUTO: 0.4 % (ref 0–1.5)
DEPRECATED RDW RBC AUTO: 49.4 FL (ref 37–54)
EOSINOPHIL # BLD AUTO: 0.4 10*3/MM3 (ref 0–0.4)
EOSINOPHIL NFR BLD AUTO: 5.3 % (ref 0.3–6.2)
ERYTHROCYTE [DISTWIDTH] IN BLOOD BY AUTOMATED COUNT: 15.2 % (ref 12.3–15.4)
FERRITIN SERPL-MCNC: 888.4 NG/ML (ref 13–150)
HCT VFR BLD AUTO: 36.4 % (ref 34–46.6)
HGB BLD-MCNC: 11.8 G/DL (ref 12–15.9)
IRON 24H UR-MRATE: 50 MCG/DL (ref 37–145)
IRON SATN MFR SERPL: 14 % (ref 20–50)
LYMPHOCYTES # BLD AUTO: 0.93 10*3/MM3 (ref 0.7–3.1)
LYMPHOCYTES NFR BLD AUTO: 12.3 % (ref 19.6–45.3)
MCH RBC QN AUTO: 30.3 PG (ref 26.6–33)
MCHC RBC AUTO-ENTMCNC: 32.4 G/DL (ref 31.5–35.7)
MCV RBC AUTO: 93.6 FL (ref 79–97)
MONOCYTES # BLD AUTO: 0.72 10*3/MM3 (ref 0.1–0.9)
MONOCYTES NFR BLD AUTO: 9.5 % (ref 5–12)
NEUTROPHILS NFR BLD AUTO: 5.51 10*3/MM3 (ref 1.7–7)
NEUTROPHILS NFR BLD AUTO: 72.5 % (ref 42.7–76)
PLATELET # BLD AUTO: 261 10*3/MM3 (ref 140–450)
PMV BLD AUTO: 10.1 FL (ref 6–12)
RBC # BLD AUTO: 3.89 10*6/MM3 (ref 3.77–5.28)
TIBC SERPL-MCNC: 353 MCG/DL (ref 298–536)
TRANSFERRIN SERPL-MCNC: 237 MG/DL (ref 200–360)
WBC # BLD AUTO: 7.59 10*3/MM3 (ref 3.4–10.8)

## 2021-01-25 PROCEDURE — 85025 COMPLETE CBC W/AUTO DIFF WBC: CPT

## 2021-01-25 PROCEDURE — 84466 ASSAY OF TRANSFERRIN: CPT

## 2021-01-25 PROCEDURE — 99214 OFFICE O/P EST MOD 30 MIN: CPT | Performed by: INTERNAL MEDICINE

## 2021-01-25 PROCEDURE — 83540 ASSAY OF IRON: CPT

## 2021-01-25 PROCEDURE — 82728 ASSAY OF FERRITIN: CPT

## 2021-01-25 PROCEDURE — 36415 COLL VENOUS BLD VENIPUNCTURE: CPT

## 2021-01-27 ENCOUNTER — ANTICOAGULATION VISIT (OUTPATIENT)
Dept: CARDIOLOGY | Facility: CLINIC | Age: 66
End: 2021-01-27

## 2021-01-27 DIAGNOSIS — I48.91 ATRIAL FIBRILLATION, UNSPECIFIED TYPE (HCC): ICD-10-CM

## 2021-01-27 DIAGNOSIS — Z95.3 HISTORY OF AORTIC VALVE REPLACEMENT WITH BIOPROSTHETIC VALVE: ICD-10-CM

## 2021-01-27 DIAGNOSIS — Z95.3 HISTORY OF MITRAL VALVE REPLACEMENT WITH BIOPROSTHETIC VALVE: ICD-10-CM

## 2021-01-27 DIAGNOSIS — Z79.01 LONG TERM (CURRENT) USE OF ANTICOAGULANTS: ICD-10-CM

## 2021-01-27 LAB — INR PPP: 1.8

## 2021-02-08 ENCOUNTER — OFFICE VISIT (OUTPATIENT)
Dept: CARDIOLOGY | Facility: CLINIC | Age: 66
End: 2021-02-08

## 2021-02-08 VITALS
OXYGEN SATURATION: 99 % | HEIGHT: 64 IN | DIASTOLIC BLOOD PRESSURE: 69 MMHG | BODY MASS INDEX: 31.07 KG/M2 | HEART RATE: 78 BPM | WEIGHT: 182 LBS | TEMPERATURE: 97.1 F | SYSTOLIC BLOOD PRESSURE: 106 MMHG

## 2021-02-08 DIAGNOSIS — E78.5 DYSLIPIDEMIA: ICD-10-CM

## 2021-02-08 DIAGNOSIS — Z95.3 HISTORY OF AORTIC VALVE REPLACEMENT WITH BIOPROSTHETIC VALVE: Primary | ICD-10-CM

## 2021-02-08 DIAGNOSIS — I48.0 PAROXYSMAL ATRIAL FIBRILLATION (HCC): ICD-10-CM

## 2021-02-08 DIAGNOSIS — Z95.3 HISTORY OF MITRAL VALVE REPLACEMENT WITH BIOPROSTHETIC VALVE: ICD-10-CM

## 2021-02-08 DIAGNOSIS — N18.32 STAGE 3B CHRONIC KIDNEY DISEASE (HCC): ICD-10-CM

## 2021-02-08 DIAGNOSIS — Z79.01 LONG TERM (CURRENT) USE OF ANTICOAGULANTS: ICD-10-CM

## 2021-02-08 DIAGNOSIS — I25.810 CORONARY ARTERY DISEASE INVOLVING CORONARY BYPASS GRAFT OF NATIVE HEART WITHOUT ANGINA PECTORIS: ICD-10-CM

## 2021-02-08 DIAGNOSIS — I10 ESSENTIAL HYPERTENSION: ICD-10-CM

## 2021-02-08 PROCEDURE — 99214 OFFICE O/P EST MOD 30 MIN: CPT | Performed by: INTERNAL MEDICINE

## 2021-02-08 NOTE — PROGRESS NOTES
Subjective:     Encounter Date:02/08/2021      Patient ID: Jocelin Parra is a 66 y.o. female.    Chief Complaint : Here for follow-up for valvular heart disease history of AVR, MVR, fibrillation, long-term anticoagulation, CAD, CABG, hypertension, dyslipidemia, diabetes, CKD  History of Present Illness        This is 66-year-old with PMH of     # valvular heart disease, mechanical AVR on chronic Coumadin therapy, , severe mitral regurgitation, moderate TR and previous history of mechanical AVR, status post redo mitral valve replacement with #25 mechanical [place], tricuspid #28 physio ring,   Maze, CABG x2 on 05/09/2019  # CAD cardiac cath 4/13/2019 revealing severe 2 vessel disease, status post CABG x2 with LIMA to LAD and SVG to PDA 05/09/2019  # P.A.fib, long-term anticoagulation, cardioversion 8/9/18,Maze 05/09/2019  # diabetes,  hemoglobin A1c 04/17/2018 was 8.1  # hypertension  #. History of anemia requiring blood transfusion  # torticollis, history of neck abscess 2015  # mother and lung cancer, father had diabetes and CVA  # AVR, unilateral oophorectomy.     here for  follow-up.  Patient denies any chest pain.  Patient's arterial blood pressure is 106/69, heart rate 78, O2 sat of 99% on room air.   Patient is dealing with anemia and is seeing hematologist Dr. Mejía.  Getting iron transfusions   patient denies any fever chills. patient had labs done 12/6/2019 which showed creatinine of 1.48 and hemoglobin of 8 point 3 repeat was 8.7.  Labs from 2/6/2020 reveal cholesterol 111 triglycerides 102, HDL 43, LDL 37, hemoglobin A1c of 5.0.  Labs from 8/26/2020 revealed normal TSH at 2.11, CMP with a BUN of 49 creatinine 1.6 GFR of 32, lipid profile with total cholesterol 102, triglycerides 183, HDL low at 41, LDL 24.  CBC from 5/27/2020 reveals hemoglobin of 10.5.  Labs from 1/25/2021 reveal hemoglobin of 11.8.        ASSESSMENT:      # long term warfarin  # VHD,AVR/MVR  #  CAD, CABG  # a.fib, long-term  anticoagulation  #  hypertension, hyperlipidemia, diabetes, CKD  #  torticollis  #. Anemia  #. CKD    PLAN:  Patient's INR has been subtherapeutic with her having mechanical AVR/MVR and A. fib is higher risk of thromboembolic events  Reviewed the importance of regular follow-up with INRs and keeping her INR between 2.5 and 3.5.  Advised her to follow-up with hematology for anemia.  Reviewed cholesterol results patient's HDL is low counseled on walking exercise.  Follow-up with PMD for diabetes care.  Patient says her last A1c was 6.4.  We will continue medical management and risk factor modification.  Risk benefits alternatives explained.   reviewed valvular heart disease with patient  Discussed about flu vaccine and Covid vaccine with patient.  Patient is not very active due to her neck issues, counseled on walking exercise.        Assessment:         MDM     Diagnosis Plan   1. History of aortic valve replacement with bioprosthetic valve     2. Paroxysmal atrial fibrillation (CMS/HCC)     3. Long term (current) use of anticoagulants     4. Essential hypertension     5. Stage 3b chronic kidney disease (CMS/HCC)     6. Dyslipidemia     7. Coronary artery disease involving coronary bypass graft of native heart without angina pectoris     8. History of mitral valve replacement with bioprosthetic valve            Plan:         Past Medical History:  Past Medical History:   Diagnosis Date   • A-fib (CMS/HCC) Dx 2018    S/p Cardioversion by Dr. Spencer on 04/10/19   • Acute renal failure syndrome (CMS/HCC) 4/2019-[facility] IP   • Biatrial enlargement 04/10/2019    Noted on SHERIE   • Calcified granuloma of lung (CMS/HCC) 01/04/2019    Noted on Chest XR   • Cardiomegaly 01/04/2019    Borderline--Noted on Chest XR   • CHF (congestive heart failure) (CMS/HCC)    • CKD (chronic kidney disease), stage III (CMS/HCC)     Does Not See a Nephrologist   • Diabetes mellitus (CMS/HCC)    • DM (diabetes mellitus) (CMS/HCC)     T2   • Dysphagia  08/15-[facility] IP    Due to Pharyngitis   • GERD (gastroesophageal reflux disease)    • History of chest x-ray 8/28/15-[facility]    Normal   • History of chest x-ray 01/19/2019    Mild Pulmonary Edema w/Congestive Failure Noted   • History of chest x-ray 01/04/2019    Borderline Cardiomegaly    • History of EKG 2014/2018/2019-[facility]   • Hx of diabetic neuropathy    • Hx of dizziness     w/Lightheadedness   • Hx of echocardiogram 4/16/18-[facility]    EF 55-60%; Mild MVR; Mild TVR; Normal Root/No Effusion   • Hyperlipidemia     Controlled w/Meds   • Hypertension     Controlled w/Meds   • Hypokalemia 4/11/19-[facility] IP   • Hypothyroidism     Controlled w/Meds   • Iron deficiency anemia    • Left posterior fascicular block 04/2018 & 4/19    Noted on EKG   • Lower extremity edema 03/2019   • Mild mitral valve regurgitation 04/16/2018    Noted on Echo   • Mild tricuspid valve regurgitation 04/16/2018    Noted on Echo   • Moderate mitral valve regurgitation 2008    S/p MVR in 08'   • Moderate tricuspid insufficiency 04/10/2019    Noted on SHERIE   • Osteoarthritis     Knees w/Hx Knee Repl   • Pulmonary edema 01/19/2019    Mild--Noted on Chest XR   • Rapid palpitations 04/15/18 & 8/9/18-[facility] ER   • Renal dysfunction    • Right axis deviation 08/2018 & 4/19    Noted on EKG   • Severe aortic valve stenosis Since 2008    Hx AVR on 12/11/08 by Dr. Alvarado   • Severe mitral insufficiency 04/10/2019    Noted on SHERIE   • Sinus tachycardia 08/2018    Noted on EKG   • SOB (shortness of breath) chronic   • Torticollis Chronic   • Valvular heart disease     S/p AVR & MVR in 08'     Past Surgical History:  Past Surgical History:   Procedure Laterality Date   • AORTIC VALVE REPAIR/REPLACEMENT  12/11/08-[facility]    Using a #20 Eight Yr Mechanical [place] Prosthesis--Dewey Alvarado MD   • CARDIOVERSION  4/10/19-[facility]    Dr. Spencer--For A-Fib   • COLONOSCOPY N/A 12/5/2019    Procedure: COLONOSCOPY;  Surgeon: Dustin Castellanos MD;  Location: [facility] ENDOSCOPY;  Service:  Gastroenterology   • ENDOSCOPY N/A 12/5/2019    Procedure: ESOPHAGOGASTRODUODENOSCOPY;  Surgeon: Dustin Castellanos MD;  Location: [facility] ENDOSCOPY;  Service: Gastroenterology   • ENTEROSCOPY SMALL BOWEL N/A 2/3/2020    Procedure: ESOPHAGOGASTRODUODENOSCOPY WITH SMALL BOWEL ENTEROSCOPY and argonplasma coagulation of two small jejunal ateriovenous malformations;  Surgeon: Dexter Akhtar MD;  Location: [facility] ENDOSCOPY;  Service: Gastroenterology;  Laterality: N/A;  jejunal ateriovenous malformation   • HYSTERECTOMY     • MITRAL VALVE REPLACEMENT  2008   • MITRAL VALVE REPLACEMENT  05/09/2019    Dr Alvarado   • SHERIE  4/10/19-[facility]    EF 40%; Moderate TVR; Severe Eccentric MI; Biatrial Enlargement   • TOTAL KNEE ARTHROPLASTY  2017   • TRICUSPID VALVE SURGERY  05/09/2019    Dr Alvarado      Allergies:  No Known Allergies  Home Meds:  Current Meds:     Current Outpatient Medications:   •  aspirin 81 MG tablet, Take 81 mg by mouth Daily., Disp: , Rfl:   •  atorvastatin (LIPITOR) 40 MG tablet, Take 40 mg by mouth Daily., Disp: , Rfl:   •  B Complex Vitamins (VITAMIN B COMPLEX) tablet, Take 1 capsule by mouth Daily., Disp: , Rfl:   •  cholecalciferol (VITAMIN D3) 10 MCG (400 UNIT) tablet, Take 2,000 Units by mouth Daily., Disp: , Rfl:   •  ezetimibe (ZETIA) 10 MG tablet, Take 1 tablet by mouth daily., Disp: , Rfl:   •  ferrous sulfate 325 (65 FE) MG tablet, Take 325 mg by mouth 2 (Two) Times a Day., Disp: , Rfl:   •  furosemide (LASIX) 40 MG tablet, Take 40 mg by mouth Daily., Disp: , Rfl:   •  Insulin Degludec (TRESIBA) 100 UNIT/ML solution injection, Inject 10 Units under the skin into the appropriate area as directed every night at bedtime., Disp: , Rfl:   •  [employer] PEN NEEDLES 32G X 4 MM misc, , Disp: , Rfl:   •  levothyroxine (SYNTHROID, LEVOTHROID) 112 MCG tablet, Take 112 mcg by mouth Daily., Disp: , Rfl:   •  MAGNESIUM PO, Take 1 tablet by mouth Daily., Disp: , Rfl:   •  metFORMIN ER (GLUCOPHAGE-XR) 500 MG 24 hr tablet, Take  "500 mg by mouth Daily., Disp: , Rfl:   •  metoprolol succinate XL (TOPROL-XL) 25 MG 24 hr tablet, TAKE 1/2 TABLET BY MOUTH DAILY, Disp: 45 tablet, Rfl: 1  •  Multiple Vitamin (MULTI-VITAMIN) tablet, Take 1 tablet by mouth daily., Disp: , Rfl:   •  omeprazole (PriLOSEC) 20 MG capsule, Take 20 mg by mouth Daily., Disp: , Rfl:   •  potassium chloride (K-DUR,KLOR-CON) 20 MEQ CR tablet, Take 20 mEq by mouth Daily., Disp: , Rfl:   •  valsartan (DIOVAN) 160 MG tablet, TAKE ONE TABLET BY MOUTH DAILY, Disp: 90 tablet, Rfl: 1  •  ACCU-CHEK GUIDE test strip, , Disp: , Rfl:   •  vitamin C (ASCORBIC ACID) 500 MG tablet, Take 500 mg by mouth Daily., Disp: , Rfl:   •  warfarin (COUMADIN) 5 MG tablet, TAKE ONE TABLET BY MOUTH DAILY ON MONDAY AND FRIDAY AND TAKE 1 AND 1/2 TABLET BY MOUTH DAILY ON ALL OTHER DAYS OR AS DIRECTED, Disp: 45 tablet, Rfl: 0  Social History:   Social History     Tobacco Use   • Smoking status: Never Smoker   • Smokeless tobacco: Never Used   Substance Use Topics   • Alcohol use: No      Family History:  Family History   Problem Relation Age of Onset   • Heart disease Father    • Hypertension Father    • Stroke Father    • Diabetes Father    • Lung cancer Mother         The following portions of the patient's history were reviewed and updated as appropriate: allergies, current medications, past family history, past medical history, past social history, past surgical history and problem list.      Review of Systems   Constitution: Negative for malaise/fatigue.   Cardiovascular: Negative for chest pain, leg swelling and palpitations.   Respiratory: Negative for shortness of breath.    Skin: Negative for rash.   Neurological: Negative for dizziness, light-headedness and numbness.     All other systems are negative    Procedures EKG from 2/2/2020 reviewed by me shows sinus rhythm with rate of 99 bpm with PVCs       Objective:     Physical Exam  /69   Pulse 78   Temp 97.1 °F (36.2 °C)   Ht 162.6 cm (64\") "   Wt 82.6 kg (182 lb)   LMP  (LMP Unknown)   SpO2 99%   BMI 31.24 kg/m²   General:  Appears in no acute distress, walks with a walker  Eyes: Sclera is anicteric,  conjunctiva is clear   HEENT:  No JVD.  Torticollis present  Respiratory: Respirations regular and unlabored at rest.  Clear to auscultation  Cardiovascular: S1,S2 Regular rate and rhythm. No murmur, rub or gallop auscultated. No pretibial pitting edema  Gastrointestinal: Abdomen nondistended, soft  Musculoskeletal:  No abnormal movements  Extremities: No digital clubbing or cyanosis  Skin: Color pink. Skin warm and dry to touch. No rashes  No xanthoma  Neuro: Alert and awake, no lateralizing deficits appreciated    Lab Reviewed:

## 2021-02-10 ENCOUNTER — ANTICOAGULATION VISIT (OUTPATIENT)
Dept: CARDIOLOGY | Facility: CLINIC | Age: 66
End: 2021-02-10

## 2021-02-10 DIAGNOSIS — I48.91 ATRIAL FIBRILLATION, UNSPECIFIED TYPE (HCC): ICD-10-CM

## 2021-02-10 DIAGNOSIS — Z79.01 LONG TERM (CURRENT) USE OF ANTICOAGULANTS: ICD-10-CM

## 2021-02-10 DIAGNOSIS — Z95.3 HISTORY OF AORTIC VALVE REPLACEMENT WITH BIOPROSTHETIC VALVE: ICD-10-CM

## 2021-02-10 DIAGNOSIS — Z95.3 HISTORY OF MITRAL VALVE REPLACEMENT WITH BIOPROSTHETIC VALVE: ICD-10-CM

## 2021-02-10 LAB — INR PPP: 2.5

## 2021-02-16 RX ORDER — WARFARIN SODIUM 5 MG/1
TABLET ORAL
Qty: 45 TABLET | Refills: 0 | Status: SHIPPED | OUTPATIENT
Start: 2021-02-16 | End: 2021-03-18

## 2021-02-24 ENCOUNTER — ANTICOAGULATION VISIT (OUTPATIENT)
Dept: CARDIOLOGY | Facility: CLINIC | Age: 66
End: 2021-02-24

## 2021-02-24 DIAGNOSIS — Z95.3 HISTORY OF MITRAL VALVE REPLACEMENT WITH BIOPROSTHETIC VALVE: ICD-10-CM

## 2021-02-24 DIAGNOSIS — I48.91 ATRIAL FIBRILLATION, UNSPECIFIED TYPE (HCC): ICD-10-CM

## 2021-02-24 DIAGNOSIS — Z79.01 LONG TERM (CURRENT) USE OF ANTICOAGULANTS: ICD-10-CM

## 2021-02-24 DIAGNOSIS — Z95.3 HISTORY OF AORTIC VALVE REPLACEMENT WITH BIOPROSTHETIC VALVE: ICD-10-CM

## 2021-02-24 LAB — INR PPP: 2.1

## 2021-03-11 ENCOUNTER — ANTICOAGULATION VISIT (OUTPATIENT)
Dept: CARDIOLOGY | Facility: CLINIC | Age: 66
End: 2021-03-11

## 2021-03-11 DIAGNOSIS — Z79.01 LONG TERM (CURRENT) USE OF ANTICOAGULANTS: ICD-10-CM

## 2021-03-11 DIAGNOSIS — Z95.3 HISTORY OF AORTIC VALVE REPLACEMENT WITH BIOPROSTHETIC VALVE: ICD-10-CM

## 2021-03-11 DIAGNOSIS — Z95.3 HISTORY OF MITRAL VALVE REPLACEMENT WITH BIOPROSTHETIC VALVE: ICD-10-CM

## 2021-03-11 DIAGNOSIS — I48.91 ATRIAL FIBRILLATION, UNSPECIFIED TYPE (HCC): ICD-10-CM

## 2021-03-11 LAB — INR PPP: 2.3

## 2021-03-18 RX ORDER — WARFARIN SODIUM 5 MG/1
TABLET ORAL
Qty: 45 TABLET | Refills: 2 | Status: SHIPPED | OUTPATIENT
Start: 2021-03-18 | End: 2021-06-16

## 2021-03-24 ENCOUNTER — ANTICOAGULATION VISIT (OUTPATIENT)
Dept: CARDIOLOGY | Facility: CLINIC | Age: 66
End: 2021-03-24

## 2021-03-24 DIAGNOSIS — Z95.3 HISTORY OF MITRAL VALVE REPLACEMENT WITH BIOPROSTHETIC VALVE: ICD-10-CM

## 2021-03-24 DIAGNOSIS — Z79.01 LONG TERM (CURRENT) USE OF ANTICOAGULANTS: ICD-10-CM

## 2021-03-24 DIAGNOSIS — Z95.3 HISTORY OF AORTIC VALVE REPLACEMENT WITH BIOPROSTHETIC VALVE: ICD-10-CM

## 2021-03-24 DIAGNOSIS — I48.91 ATRIAL FIBRILLATION, UNSPECIFIED TYPE (HCC): ICD-10-CM

## 2021-03-24 LAB — INR PPP: 2.3

## 2021-04-07 ENCOUNTER — ANTICOAGULATION VISIT (OUTPATIENT)
Dept: CARDIOLOGY | Facility: CLINIC | Age: 66
End: 2021-04-07

## 2021-04-07 DIAGNOSIS — Z95.3 HISTORY OF MITRAL VALVE REPLACEMENT WITH BIOPROSTHETIC VALVE: ICD-10-CM

## 2021-04-07 DIAGNOSIS — Z95.3 HISTORY OF AORTIC VALVE REPLACEMENT WITH BIOPROSTHETIC VALVE: ICD-10-CM

## 2021-04-07 DIAGNOSIS — Z79.01 LONG TERM (CURRENT) USE OF ANTICOAGULANTS: ICD-10-CM

## 2021-04-07 LAB — INR PPP: 2.4

## 2021-04-20 RX ORDER — METOPROLOL SUCCINATE 25 MG/1
TABLET, EXTENDED RELEASE ORAL
Qty: 45 TABLET | Refills: 3 | Status: SHIPPED | OUTPATIENT
Start: 2021-04-20

## 2021-04-21 ENCOUNTER — ANTICOAGULATION VISIT (OUTPATIENT)
Dept: CARDIOLOGY | Facility: CLINIC | Age: 66
End: 2021-04-21

## 2021-04-21 DIAGNOSIS — Z95.3 HISTORY OF AORTIC VALVE REPLACEMENT WITH BIOPROSTHETIC VALVE: Primary | ICD-10-CM

## 2021-04-21 DIAGNOSIS — Z79.01 LONG TERM (CURRENT) USE OF ANTICOAGULANTS: ICD-10-CM

## 2021-04-21 DIAGNOSIS — Z95.3 HISTORY OF MITRAL VALVE REPLACEMENT WITH BIOPROSTHETIC VALVE: ICD-10-CM

## 2021-04-21 DIAGNOSIS — I48.91 ATRIAL FIBRILLATION, UNSPECIFIED TYPE (HCC): ICD-10-CM

## 2021-04-21 LAB — INR PPP: 2.5

## 2021-05-05 ENCOUNTER — TRANSCRIBE ORDERS (OUTPATIENT)
Dept: LAB | Facility: HOSPITAL | Age: 66
End: 2021-05-05

## 2021-05-05 ENCOUNTER — LAB (OUTPATIENT)
Dept: LAB | Facility: HOSPITAL | Age: 66
End: 2021-05-05

## 2021-05-05 ENCOUNTER — ANTICOAGULATION VISIT (OUTPATIENT)
Dept: CARDIOLOGY | Facility: CLINIC | Age: 66
End: 2021-05-05

## 2021-05-05 DIAGNOSIS — N18.32 CHRONIC KIDNEY DISEASE (CKD) STAGE G3B/A1, MODERATELY DECREASED GLOMERULAR FILTRATION RATE (GFR) BETWEEN 30-44 ML/MIN/1.73 SQUARE METER AND ALBUMINURIA CREATININE RATIO LESS THAN 30 MG/G (CMS/H* (HCC): Primary | ICD-10-CM

## 2021-05-05 DIAGNOSIS — Z95.3 HISTORY OF MITRAL VALVE REPLACEMENT WITH BIOPROSTHETIC VALVE: ICD-10-CM

## 2021-05-05 DIAGNOSIS — N18.32 CHRONIC KIDNEY DISEASE (CKD) STAGE G3B/A1, MODERATELY DECREASED GLOMERULAR FILTRATION RATE (GFR) BETWEEN 30-44 ML/MIN/1.73 SQUARE METER AND ALBUMINURIA CREATININE RATIO LESS THAN 30 MG/G (CMS/H* (HCC): ICD-10-CM

## 2021-05-05 DIAGNOSIS — Z95.3 HISTORY OF AORTIC VALVE REPLACEMENT WITH BIOPROSTHETIC VALVE: Primary | ICD-10-CM

## 2021-05-05 DIAGNOSIS — Z79.01 LONG TERM (CURRENT) USE OF ANTICOAGULANTS: ICD-10-CM

## 2021-05-05 LAB
ANION GAP SERPL CALCULATED.3IONS-SCNC: 11.3 MMOL/L (ref 5–15)
BACTERIA UR QL AUTO: ABNORMAL /HPF
BASOPHILS # BLD AUTO: 0.07 10*3/MM3 (ref 0–0.2)
BASOPHILS NFR BLD AUTO: 0.8 % (ref 0–1.5)
BILIRUB UR QL STRIP: NEGATIVE
BUN SERPL-MCNC: 61 MG/DL (ref 8–23)
BUN/CREAT SERPL: 31.9 (ref 7–25)
CALCIUM SPEC-SCNC: 9.8 MG/DL (ref 8.6–10.5)
CHLORIDE SERPL-SCNC: 102 MMOL/L (ref 98–107)
CLARITY UR: ABNORMAL
CO2 SERPL-SCNC: 27.7 MMOL/L (ref 22–29)
COLOR UR: YELLOW
CREAT SERPL-MCNC: 1.91 MG/DL (ref 0.57–1)
DEPRECATED RDW RBC AUTO: 46.8 FL (ref 37–54)
EOSINOPHIL # BLD AUTO: 0.44 10*3/MM3 (ref 0–0.4)
EOSINOPHIL NFR BLD AUTO: 5.3 % (ref 0.3–6.2)
ERYTHROCYTE [DISTWIDTH] IN BLOOD BY AUTOMATED COUNT: 14.3 % (ref 12.3–15.4)
GFR SERPL CREATININE-BSD FRML MDRD: 26 ML/MIN/1.73
GLUCOSE SERPL-MCNC: 119 MG/DL (ref 65–99)
GLUCOSE UR STRIP-MCNC: NEGATIVE MG/DL
HCT VFR BLD AUTO: 36.1 % (ref 34–46.6)
HGB BLD-MCNC: 11.8 G/DL (ref 12–15.9)
HGB UR QL STRIP.AUTO: NEGATIVE
HYALINE CASTS UR QL AUTO: ABNORMAL /LPF
IMM GRANULOCYTES # BLD AUTO: 0.03 10*3/MM3 (ref 0–0.05)
IMM GRANULOCYTES NFR BLD AUTO: 0.4 % (ref 0–0.5)
INR PPP: 2.8
KETONES UR QL STRIP: NEGATIVE
LEUKOCYTE ESTERASE UR QL STRIP.AUTO: ABNORMAL
LYMPHOCYTES # BLD AUTO: 0.83 10*3/MM3 (ref 0.7–3.1)
LYMPHOCYTES NFR BLD AUTO: 10 % (ref 19.6–45.3)
MCH RBC QN AUTO: 29.4 PG (ref 26.6–33)
MCHC RBC AUTO-ENTMCNC: 32.7 G/DL (ref 31.5–35.7)
MCV RBC AUTO: 90 FL (ref 79–97)
MONOCYTES # BLD AUTO: 0.86 10*3/MM3 (ref 0.1–0.9)
MONOCYTES NFR BLD AUTO: 10.4 % (ref 5–12)
NEUTROPHILS NFR BLD AUTO: 6.04 10*3/MM3 (ref 1.7–7)
NEUTROPHILS NFR BLD AUTO: 73.1 % (ref 42.7–76)
NITRITE UR QL STRIP: NEGATIVE
NRBC BLD AUTO-RTO: 0 /100 WBC (ref 0–0.2)
PH UR STRIP.AUTO: 6.5 [PH] (ref 5–8)
PHOSPHATE SERPL-MCNC: 4.2 MG/DL (ref 2.5–4.5)
PLATELET # BLD AUTO: 293 10*3/MM3 (ref 140–450)
PMV BLD AUTO: 10 FL (ref 6–12)
POTASSIUM SERPL-SCNC: 5 MMOL/L (ref 3.5–5.2)
PROT UR QL STRIP: NEGATIVE
PTH-INTACT SERPL-MCNC: 93.4 PG/ML (ref 15–65)
RBC # BLD AUTO: 4.01 10*6/MM3 (ref 3.77–5.28)
RBC # UR: ABNORMAL /HPF
REF LAB TEST METHOD: ABNORMAL
SODIUM SERPL-SCNC: 141 MMOL/L (ref 136–145)
SP GR UR STRIP: 1.01 (ref 1–1.03)
SQUAMOUS #/AREA URNS HPF: ABNORMAL /HPF
UROBILINOGEN UR QL STRIP: ABNORMAL
WBC # BLD AUTO: 8.27 10*3/MM3 (ref 3.4–10.8)
WBC UR QL AUTO: ABNORMAL /HPF

## 2021-05-05 PROCEDURE — 85025 COMPLETE CBC W/AUTO DIFF WBC: CPT

## 2021-05-05 PROCEDURE — 81001 URINALYSIS AUTO W/SCOPE: CPT

## 2021-05-05 PROCEDURE — 80048 BASIC METABOLIC PNL TOTAL CA: CPT

## 2021-05-05 PROCEDURE — 84100 ASSAY OF PHOSPHORUS: CPT

## 2021-05-05 PROCEDURE — 83970 ASSAY OF PARATHORMONE: CPT

## 2021-05-05 PROCEDURE — 36415 COLL VENOUS BLD VENIPUNCTURE: CPT

## 2021-05-19 ENCOUNTER — ANTICOAGULATION VISIT (OUTPATIENT)
Dept: CARDIOLOGY | Facility: CLINIC | Age: 66
End: 2021-05-19

## 2021-05-19 DIAGNOSIS — Z79.01 LONG TERM (CURRENT) USE OF ANTICOAGULANTS: ICD-10-CM

## 2021-05-19 DIAGNOSIS — Z95.3 HISTORY OF AORTIC VALVE REPLACEMENT WITH BIOPROSTHETIC VALVE: Primary | ICD-10-CM

## 2021-05-19 DIAGNOSIS — Z95.3 HISTORY OF MITRAL VALVE REPLACEMENT WITH BIOPROSTHETIC VALVE: ICD-10-CM

## 2021-05-19 LAB — INR PPP: 2.7

## 2021-06-02 ENCOUNTER — ANTICOAGULATION VISIT (OUTPATIENT)
Dept: CARDIOLOGY | Facility: CLINIC | Age: 66
End: 2021-06-02

## 2021-06-02 DIAGNOSIS — Z79.01 LONG TERM (CURRENT) USE OF ANTICOAGULANTS: ICD-10-CM

## 2021-06-02 DIAGNOSIS — Z95.3 HISTORY OF AORTIC VALVE REPLACEMENT WITH BIOPROSTHETIC VALVE: Primary | ICD-10-CM

## 2021-06-02 DIAGNOSIS — Z95.3 HISTORY OF MITRAL VALVE REPLACEMENT WITH BIOPROSTHETIC VALVE: ICD-10-CM

## 2021-06-02 LAB — INR PPP: 2.2

## 2021-06-16 ENCOUNTER — ANTICOAGULATION VISIT (OUTPATIENT)
Dept: CARDIOLOGY | Facility: CLINIC | Age: 66
End: 2021-06-16

## 2021-06-16 DIAGNOSIS — Z79.01 LONG TERM (CURRENT) USE OF ANTICOAGULANTS: ICD-10-CM

## 2021-06-16 DIAGNOSIS — Z95.3 HISTORY OF AORTIC VALVE REPLACEMENT WITH BIOPROSTHETIC VALVE: Primary | ICD-10-CM

## 2021-06-16 DIAGNOSIS — Z95.3 HISTORY OF MITRAL VALVE REPLACEMENT WITH BIOPROSTHETIC VALVE: ICD-10-CM

## 2021-06-16 LAB — INR PPP: 2.4

## 2021-06-17 RX ORDER — WARFARIN SODIUM 5 MG/1
TABLET ORAL
Qty: 45 TABLET | Refills: 1 | Status: SHIPPED | OUTPATIENT
Start: 2021-06-17 | End: 2021-06-22 | Stop reason: SDUPTHER

## 2021-06-22 DIAGNOSIS — Z79.01 LONG TERM (CURRENT) USE OF ANTICOAGULANTS: ICD-10-CM

## 2021-06-22 DIAGNOSIS — I48.91 ATRIAL FIBRILLATION, UNSPECIFIED TYPE (HCC): Primary | ICD-10-CM

## 2021-06-22 RX ORDER — WARFARIN SODIUM 5 MG/1
TABLET ORAL
Qty: 135 TABLET | Refills: 0 | Status: SHIPPED | OUTPATIENT
Start: 2021-06-22 | End: 2021-10-04

## 2021-06-30 ENCOUNTER — ANTICOAGULATION VISIT (OUTPATIENT)
Dept: CARDIOLOGY | Facility: CLINIC | Age: 66
End: 2021-06-30

## 2021-06-30 DIAGNOSIS — Z95.3 HISTORY OF AORTIC VALVE REPLACEMENT WITH BIOPROSTHETIC VALVE: Primary | ICD-10-CM

## 2021-06-30 DIAGNOSIS — Z79.01 LONG TERM (CURRENT) USE OF ANTICOAGULANTS: ICD-10-CM

## 2021-06-30 DIAGNOSIS — Z95.3 HISTORY OF MITRAL VALVE REPLACEMENT WITH BIOPROSTHETIC VALVE: ICD-10-CM

## 2021-06-30 LAB — INR PPP: 2.8

## 2021-07-13 LAB — INR PPP: 3.7

## 2021-07-14 ENCOUNTER — ANTICOAGULATION VISIT (OUTPATIENT)
Dept: CARDIOLOGY | Facility: CLINIC | Age: 66
End: 2021-07-14

## 2021-07-14 DIAGNOSIS — Z79.01 LONG TERM (CURRENT) USE OF ANTICOAGULANTS: ICD-10-CM

## 2021-07-14 DIAGNOSIS — I48.91 ATRIAL FIBRILLATION, UNSPECIFIED TYPE (HCC): ICD-10-CM

## 2021-07-14 DIAGNOSIS — Z95.3 HISTORY OF MITRAL VALVE REPLACEMENT WITH BIOPROSTHETIC VALVE: ICD-10-CM

## 2021-07-14 DIAGNOSIS — Z95.3 HISTORY OF AORTIC VALVE REPLACEMENT WITH BIOPROSTHETIC VALVE: Primary | ICD-10-CM

## 2021-07-19 RX ORDER — VALSARTAN 160 MG/1
TABLET ORAL
Qty: 90 TABLET | Refills: 0 | Status: SHIPPED | OUTPATIENT
Start: 2021-07-19 | End: 2021-10-19

## 2021-07-21 ENCOUNTER — LAB (OUTPATIENT)
Dept: LAB | Facility: HOSPITAL | Age: 66
End: 2021-07-21

## 2021-07-21 DIAGNOSIS — D50.0 IRON DEFICIENCY ANEMIA DUE TO CHRONIC BLOOD LOSS: ICD-10-CM

## 2021-07-21 LAB
BASOPHILS # BLD AUTO: 0.05 10*3/MM3 (ref 0–0.2)
BASOPHILS NFR BLD AUTO: 0.6 % (ref 0–1.5)
DEPRECATED RDW RBC AUTO: 52.1 FL (ref 37–54)
EOSINOPHIL # BLD AUTO: 0.46 10*3/MM3 (ref 0–0.4)
EOSINOPHIL NFR BLD AUTO: 6 % (ref 0.3–6.2)
ERYTHROCYTE [DISTWIDTH] IN BLOOD BY AUTOMATED COUNT: 16 % (ref 12.3–15.4)
FERRITIN SERPL-MCNC: 605.5 NG/ML (ref 13–150)
HCT VFR BLD AUTO: 36.8 % (ref 34–46.6)
HGB BLD-MCNC: 11.6 G/DL (ref 12–15.9)
IRON 24H UR-MRATE: 44 MCG/DL (ref 37–145)
IRON SATN MFR SERPL: 14 % (ref 20–50)
LYMPHOCYTES # BLD AUTO: 0.83 10*3/MM3 (ref 0.7–3.1)
LYMPHOCYTES NFR BLD AUTO: 10.7 % (ref 19.6–45.3)
MCH RBC QN AUTO: 28.7 PG (ref 26.6–33)
MCHC RBC AUTO-ENTMCNC: 31.5 G/DL (ref 31.5–35.7)
MCV RBC AUTO: 91.1 FL (ref 79–97)
MONOCYTES # BLD AUTO: 0.66 10*3/MM3 (ref 0.1–0.9)
MONOCYTES NFR BLD AUTO: 8.5 % (ref 5–12)
NEUTROPHILS NFR BLD AUTO: 5.73 10*3/MM3 (ref 1.7–7)
NEUTROPHILS NFR BLD AUTO: 74.2 % (ref 42.7–76)
PLATELET # BLD AUTO: 251 10*3/MM3 (ref 140–450)
PMV BLD AUTO: 9.3 FL (ref 6–12)
RBC # BLD AUTO: 4.04 10*6/MM3 (ref 3.77–5.28)
TIBC SERPL-MCNC: 310 MCG/DL (ref 298–536)
TRANSFERRIN SERPL-MCNC: 208 MG/DL (ref 200–360)
WBC # BLD AUTO: 7.73 10*3/MM3 (ref 3.4–10.8)

## 2021-07-21 PROCEDURE — 82728 ASSAY OF FERRITIN: CPT

## 2021-07-21 PROCEDURE — 83540 ASSAY OF IRON: CPT

## 2021-07-21 PROCEDURE — 36415 COLL VENOUS BLD VENIPUNCTURE: CPT

## 2021-07-21 PROCEDURE — 85025 COMPLETE CBC W/AUTO DIFF WBC: CPT

## 2021-07-21 PROCEDURE — 84466 ASSAY OF TRANSFERRIN: CPT

## 2021-07-23 NOTE — PROGRESS NOTES
ONCOLOGY/HEMATOLOGY    PATIENT: Jocelin Parra  YOB: 1955  MEDICAL RECORD NUMBER: [id number] OF SERVICE: 07/28/21      CHIEF COMPLAINT:   Chief Complaint:  Iron deficiency anemia  Chronic renal failure stage III  Arteriovenous malformation of small bowel  Mechanical cardiac valve  History of Present Illness:   · I initially saw Ms. De Los Santos in consultation at [facility] when she was admitted through the emergency room on 12/3/2019 with severe anemia.  Patient required blood transfusion.  Patient has chronic history of kidney disease.    · CBC in the ED showed WBC 3.5, hemoglobin 6.1, MCV 63.4, RDW 18.6, and platelets 339,000. She received 1 unit pRBC.  Creatinine was 1.57.   · Patient is on chronic Coumadin for mechanical cardiac valve INR was 2.74 on admission on 12/3/2019    · Posttransfusion iron studies showed iron 24 and ferritin 21.2.  Vitamin B12 was 947 and folate was 18.6.    · 12/4/2019 SPEP negative for monoclonal gammopathy.  Serum creatinine 1.7 with EGFR of 30  · 12/5/2019 patient underwent colonoscopy and EGD at [facility]- No evidence of any active source of bleeding noticed in the upper at the lower GI tract except diverticulosis and hemorrhoids.  Possibility of a small bowel AVM or lesion leading to blood loss anemia is in the differential however patient denies any overt GI bleeding.  May consider small bowel evaluation with a PillCam if needed.  · 12/23/2019 and 12/30/2019 patient received 2 Injectafer infusions.  · 1/21/2020 - Erythropoietin 37.4, iron 47, iron saturation 13, TIBC 371, ferritin 681.80.  · 2/30/2020-EGD with small bowel enteroscopy performed by Dr. Akhtar.  2 nonbleeding jejunal angiectasia's were cauterized  · 3/6/2020 and 3/13/2020 patient received 2 dose of Injectafer infusions.  · July 2020 received 2 additonal doses of injectafer for Hgb of 11  · 9/28/2020: Iron 39, iron saturation 12%, ferritin 1119  · 1/13/2021 INR 1.8  · 11/30/2020: Iron saturation 14%,  TIBC 328,Hemoglobin 11.6, platelets 235  · 1/25/2021: WBC 7.5, hemoglobin 11.8, platelets 261  · 7/21/2021: Ferritin 605, iron saturation 14% low, serum iron 44, TIBC 310, hemoglobin 11.6, MCV 91.1, platelets 251,    Subjective:     she comes unaccompanied today.  Taking iron 2 tablets daily.  Denies any bleeding per rectum.  Continues on anticoagulation.           Review of Systems   Constitutional: Positive for fatigue.   Respiratory: Negative for chest tightness and cough.    Genitourinary: Negative for dyspareunia and dysuria.         Occasional leg swelling.      Past Medical History:   Diagnosis Date   • A-fib (CMS/[facility]) Dx 2018    S/p Cardioversion by Dr. Spencer on 04/10/19   • Acute renal failure syndrome (CMS/[facility]) 4/2019-[facility] IP   • Biatrial enlargement 04/10/2019    Noted on SHERIE   • Calcified granuloma of lung (CMS/[facility]) 01/04/2019    Noted on Chest XR   • Cardiomegaly 01/04/2019    Borderline--Noted on Chest XR   • CHF (congestive heart failure) (CMS/[facility])    • CKD (chronic kidney disease), stage III (CMS/[facility])     Does Not See a Nephrologist   • Diabetes mellitus (CMS/[facility])    • DM (diabetes mellitus) (CMS/[facility])     T2   • Dysphagia 08/15-[facility] IP    Due to Pharyngitis   • GERD (gastroesophageal reflux disease)    • History of chest x-ray 8/28/15-[facility]    Normal   • History of chest x-ray 01/19/2019    Mild Pulmonary Edema w/Congestive Failure Noted   • History of chest x-ray 01/04/2019    Borderline Cardiomegaly    • History of EKG 2014/2018/2019-[facility]   • Hx of diabetic neuropathy    • Hx of dizziness     w/Lightheadedness   • Hx of echocardiogram 4/16/18-[facility]    EF 55-60%; Mild MVR; Mild TVR; Normal Root/No Effusion   • Hyperlipidemia     Controlled w/Meds   • Hypertension     Controlled w/Meds   • Hypokalemia 4/11/19-[facility] IP   • Hypothyroidism     Controlled w/Meds   • Iron deficiency anemia    • Left posterior fascicular block 04/2018 & 4/19    Noted on EKG   • Lower extremity edema 03/2019   • Mild mitral valve  regurgitation 04/16/2018    Noted on Echo   • Mild tricuspid valve regurgitation 04/16/2018    Noted on Echo   • Moderate mitral valve regurgitation 2008    S/p MVR in 08'   • Moderate tricuspid insufficiency 04/10/2019    Noted on SHERIE   • Osteoarthritis     Knees w/Hx Knee Repl   • Pulmonary edema 01/19/2019    Mild--Noted on Chest XR   • Rapid palpitations 04/15/18 & 8/9/18-[facility] ER   • Renal dysfunction    • Right axis deviation 08/2018 & 4/19    Noted on EKG   • Severe aortic valve stenosis Since 2008    Hx AVR on 12/11/08 by Dr. Alvarado   • Severe mitral insufficiency 04/10/2019    Noted on SHERIE   • Sinus tachycardia 08/2018    Noted on EKG   • SOB (shortness of breath) chronic   • Torticollis Chronic   • Valvular heart disease     S/p AVR & MVR in 08'       Past Surgical History:   Procedure Laterality Date   • AORTIC VALVE REPAIR/REPLACEMENT  12/11/08-[facility]    Using a #20 Eight Yr Mechanical [employer] [place] Prosthesis--Dewey Alvarado MD   • CARDIOVERSION  4/10/19-[facility]    Dr. Spencer--For A-Fib   • COLONOSCOPY N/A 12/5/2019    Procedure: COLONOSCOPY;  Surgeon: Dustin Castellanos MD;  Location: [facility] ENDOSCOPY;  Service: Gastroenterology   • ENDOSCOPY N/A 12/5/2019    Procedure: ESOPHAGOGASTRODUODENOSCOPY;  Surgeon: Dustin Castellanos MD;  Location: [facility] ENDOSCOPY;  Service: Gastroenterology   • ENTEROSCOPY SMALL BOWEL N/A 2/3/2020    Procedure: ESOPHAGOGASTRODUODENOSCOPY WITH SMALL BOWEL ENTEROSCOPY and argonplasma coagulation of two small jejunal ateriovenous malformations;  Surgeon: Dexter Akhtar MD;  Location: [facility] ENDOSCOPY;  Service: Gastroenterology;  Laterality: N/A;  jejunal ateriovenous malformation   • HYSTERECTOMY     • MITRAL VALVE REPLACEMENT  2008   • MITRAL VALVE REPLACEMENT  05/09/2019    Dr Alvarado   • SHERIE  4/10/19-[facility]    EF 40%; Moderate TVR; Severe Eccentric MI; Biatrial Enlargement   • TOTAL KNEE ARTHROPLASTY  2017   • TRICUSPID VALVE SURGERY  05/09/2019    Dr Alvarado       Family History   Problem Relation Age  of Onset   • Heart disease Father    • Hypertension Father    • Stroke Father    • Diabetes Father    • Lung cancer Mother        Social History     Socioeconomic History   • Marital status: Single     Spouse name: Not on file   • Number of children: Not on file   • Years of education: Not on file   • Highest education level: Not on file   Tobacco Use   • Smoking status: Never Smoker   • Smokeless tobacco: Never Used   Substance and Sexual Activity   • Alcohol use: No   • Drug use: No   • Sexual activity: Defer     Birth control/protection: Surgical     Comment: Hyst       ALLERGIES:  Patient has no known allergies.    MEDICATION:  Current Outpatient Medications   Medication Sig Dispense Refill   • ACCU-CHEK GUIDE test strip      • aspirin 81 MG tablet Take 81 mg by mouth Daily.     • atorvastatin (LIPITOR) 40 MG tablet Take 40 mg by mouth Daily.     • B Complex Vitamins (VITAMIN B COMPLEX) tablet Take 1 capsule by mouth Daily.     • cholecalciferol (VITAMIN D3) 10 MCG (400 UNIT) tablet Take 2,000 Units by mouth Daily.     • ezetimibe (ZETIA) 10 MG tablet Take 1 tablet by mouth daily.     • ferrous sulfate 325 (65 FE) MG tablet Take 325 mg by mouth 2 (Two) Times a Day.     • furosemide (LASIX) 40 MG tablet Take 40 mg by mouth Daily.     • Insulin Degludec (TRESIBA) 100 UNIT/ML solution injection Inject 10 Units under the skin into the appropriate area as directed every night at bedtime.     • KR[employer]ER PEN NEEDLES 32G X 4 MM misc      • levothyroxine (SYNTHROID, LEVOTHROID) 112 MCG tablet Take 112 mcg by mouth Daily.     • MAGNESIUM PO Take 1 tablet by mouth Daily.     • metoprolol succinate XL (TOPROL-XL) 25 MG 24 hr tablet TAKE 1/2 TABLET BY MOUTH DAILY 45 tablet 3   • Multiple Vitamin (MULTI-VITAMIN) tablet Take 1 tablet by mouth daily.     • omeprazole (PriLOSEC) 20 MG capsule Take 20 mg by mouth Daily.     • potassium chloride (K-DUR,KLOR-CON) 20 MEQ CR tablet Take 20 mEq by mouth Daily.     • valsartan (DIOVAN)  160 MG tablet TAKE ONE TABLET BY MOUTH DAILY (Patient taking differently: 80 mg.) 90 tablet 0   • vitamin C (ASCORBIC ACID) 500 MG tablet Take 500 mg by mouth Daily.     • warfarin (COUMADIN) 5 MG tablet TAKE 1 TABLET BY MOUTH ON MONDAY AND Friday TAKE 1 AND ONE-HALF TABLETS BY MOUTH DAILY ON ALL OTHER DAYS 135 tablet 0     No current facility-administered medications for this visit.     ROS:      PHYSICAL EXAM:    Current Vitals:  Temperature Blood Pressure Heart Rate Resp Rate SpO2 Temp: 97.3 °F (36.3 °C) BP: 130/79 Heart Rate: 76 Resp: 18        VITAL signs in the last 24 hours: [unfilled]       07/28/21  1123   Weight: 85 kg (187 lb 6.4 oz)       Physical Exam     ECOG PERFORMANCE STATUS:2-3  Physical Exam:  General: No acute distress  Eyes: Sclera anicteric, EOMS-I  ENT: no mucositis  Neck: Supple, with full ROM  Lymph nodes: No cervical, lymphadenopathy  Pulmonary: no wheezes or stridor .  CV: Regular rate and rhythm.,  Valvular heart sounds heard.  GI: Soft, non-tender,   Vascular: no edema  Neurologic: Cranial nerves 2-12 grossly intact, no focal motor deficits  Psych/MS: Alert and oriented x3,  MSK: walks with cane   I have reexamined the patient and the results are consistent with the previously documented exam. Zen Guy MD         LABORATORY/DATA:  Ferritin   Date Value Ref Range Status   07/21/2021 605.50 (H) 13.00 - 150.00 ng/mL Final     Iron Saturation   Date Value Ref Range Status   07/21/2021 14 (L) 20 - 50 % Final     LDH   Date Value Ref Range Status   12/04/2019 353 (H) 135 - 214 U/L Final        IMAGING:  No results found.    PROBLEM LIST:  Patient Active Problem List   Diagnosis   • Aortic stenosis   • Hyperlipidemia   • Hypertension   • Diabetes mellitus (CMS/HCC)   • Hypothyroidism   • Osteoarthritis   • S/P s/p reop sternotomy mechanical MVR (25 mm [place] prosthesis) per Dr. Alvarado 5/9/2019   • S/P mechanical AVR (prior surgery per Dr. Alvarado)   • S/P tricuspid valve repair (28 mm  Physio tricuspid ring) per Dr. Alvarado 5/9/2019   • S/P Maze operation for atrial fibrillation   • S/P CABG x 2 (reop sternotomy with lysis of adhesions) (LIMA to LAD, SVG to PDA) per Dr. Alvarado 5/9/2019   • History of aortic valve replacement with bioprosthetic valve [Z95.3]   • History of mitral valve replacement with bioprosthetic valve [Z95.3]   • Atrial fibrillation (CMS/HCC) [I48.91]   • Long term (current) use of anticoagulants [Z79.01]   • Anemia   • Atherosclerotic heart disease of native coronary artery without angina pectoris   • Stage 3 chronic kidney disease (CMS/HCC)   • Diabetes mellitus type 2, insulin dependent (CMS/HCC)   • Diabetic peripheral neuropathy (CMS/HCC)   • Dyspnea   • Edema   • Endocarditis   • Goiter, nontoxic, multinodular   • Chronic systolic heart failure (CMS/HCC)   • Hypokalemia   • Iron deficiency anemia   • Presence of prosthetic heart valve   • Sinusitis   • Thyroid nodule   • Weight gain   • Aortic valve stenosis   • Paroxysmal atrial fibrillation (CMS/HCC)   • Obesity (BMI 30-39.9)   • Anemia due to stage 3 chronic kidney disease (CMS/HCC)   • Malabsorption   • Iron deficiency anemia due to chronic blood loss       Lab Results - Last 18 Months   Lab Units 07/21/21  1043 05/05/21  1122 01/25/21  1108   WBC 10*3/mm3 7.73 8.27 7.59   HEMOGLOBIN g/dL 11.6* 11.8* 11.8*   HEMATOCRIT % 36.8 36.1 36.4   PLATELETS 10*3/mm3 251 293 261   MCV fL 91.1 90.0 93.6     Lab Results - Last 18 Months   Lab Units 05/05/21  1122 04/01/21  1059 11/04/20  1240 08/26/20  1108 05/07/20  1126 05/07/20  1126   SODIUM mmol/L 141 139 141 141   < > 139   POTASSIUM mmol/L 5.0 5.4* 4.9 4.8   < > 4.7   CHLORIDE mmol/L 102 101 103 102   < > 99   TOTAL CO2 mmol/L  --  24  --  25  --   --    CO2 mmol/L 27.7  --  26.9  --    < > 26.0   BUN mg/dL 61* 76* 57* 49*   < > 50*   CREATININE mg/dL 1.91* 1.9* 1.62* 1.6*   < > 1.60*   CALCIUM mg/dL 9.8 9.8 9.9 9.8   < > 10.0   BILIRUBIN mg/dL  --  0.5  --  0.8  --   --     ALK PHOS U/L  --  152*  --  149*  --   --    ALT (SGPT) U/L  --  42  --  22  --   --    AST (SGOT) U/L  --  43  --  27  --   --    GLUCOSE mg/dL 119*  --  124*  --   --  187*    < > = values in this interval not displayed.       Lab Results   Component Value Date    GLUCOSE 119 (H) 05/05/2021    BUN 61 (H) 05/05/2021    CREATININE 1.91 (H) 05/05/2021    EGFRIFNONA 26 (L) 05/05/2021    BCR 31.9 (H) 05/05/2021    K 5.0 05/05/2021    CO2 27.7 05/05/2021    CALCIUM 9.8 05/05/2021    PROTENTOTREF 6.0 12/04/2019    ALBUMIN 4.4 04/01/2021    LABIL2 1.3 04/01/2021    AST 43 04/01/2021    ALT 42 04/01/2021     Lab Results   Component Value Date    IRON 44 07/21/2021    TIBC 310 07/21/2021    FERRITIN 605.50 (H) 07/21/2021     Lab Results   Component Value Date    FOLATE 18.90 06/15/2020     No results found for: OCCULTBLD  Lab Results   Component Value Date    RETICCTPCT 2.33 (H) 12/04/2019     Lab Results   Component Value Date    LFFRSDAJ30 947 (H) 12/04/2019    TSH 2.110 08/26/2020     No results found for: SPEP, UPEP  LDH   Date Value Ref Range Status   12/04/2019 353 (H) 135 - 214 U/L Final     Lab Results   Component Value Date    SEDRATE 15 03/07/2019     Lab Results   Component Value Date    FIBRINOGEN 286 05/09/2019    HAPTOGLOBIN 70 12/03/2019     No results found for: DDIMER  Lab Results   Component Value Date    PTT 78.7 (H) 05/23/2019    INR 3.70 07/13/2021     No results found for:   No results found for: CEA  No results found for:   No results found for: PSA  No results found for: [id number]    Assessment     65 y.o. F w/ iron deficiency  Anemia from small bowel AVMs while on anticoagulation for mechanical valves.        1. History of iron deficiency anemia: History of occult GI bleeding with small bowel AVMs  2. Chronically high ferritin: High ferritin could be from inflammation.  Ferritin is actually improving over the last few months  3. Anemia: Due to CKD  4. Chronic kidney disease: Most recent  creatinine was 1.62 on 11/4/2020.  Serum protein electrophoresis was negative on 12/4/2019:.    Plan    1. History of iron deficiency anemia: Patient has required IV iron infusions in the past.  She does have a history of small bowel AVMs and is on anticoagulation due to mechanical heart valves.  Monitoring iron levels closely  2. High ferritin: We will check ESR and CRP to correlate for inflammation.  3. CKD: She could have some anemia related to renal disease.  Hemoglobin has stayed about 10 therefore she does not require Procrit at this time.  4. Continue iron tablets twice a day.  But monitor levels closely since ferritin is high.  We will check ESR and CRP to correlate for inflammation  5. Continue anticoagulation  6. Follow-up in 6 months with repeat CBC, ferritin, iron panel.           I have reviewed and confirmed the accuracy of the patient's history: Chief complaint, HPI, ROS, Subjective and Past Family Social History as entered by the MA/KOFIN/RN. Zen Guy MD 07/28/21       Electronically signed by Zen Guy MD, 01/25/21, 11:44 AM EST.

## 2021-07-28 ENCOUNTER — OFFICE VISIT (OUTPATIENT)
Dept: ONCOLOGY | Facility: CLINIC | Age: 66
End: 2021-07-28

## 2021-07-28 ENCOUNTER — ANTICOAGULATION VISIT (OUTPATIENT)
Dept: CARDIOLOGY | Facility: CLINIC | Age: 66
End: 2021-07-28

## 2021-07-28 ENCOUNTER — APPOINTMENT (OUTPATIENT)
Dept: LAB | Facility: HOSPITAL | Age: 66
End: 2021-07-28

## 2021-07-28 VITALS
BODY MASS INDEX: 31.99 KG/M2 | RESPIRATION RATE: 18 BRPM | WEIGHT: 187.4 LBS | TEMPERATURE: 97.3 F | SYSTOLIC BLOOD PRESSURE: 130 MMHG | HEIGHT: 64 IN | HEART RATE: 76 BPM | DIASTOLIC BLOOD PRESSURE: 79 MMHG

## 2021-07-28 DIAGNOSIS — Z95.3 HISTORY OF AORTIC VALVE REPLACEMENT WITH BIOPROSTHETIC VALVE: Primary | ICD-10-CM

## 2021-07-28 DIAGNOSIS — Z95.3 HISTORY OF MITRAL VALVE REPLACEMENT WITH BIOPROSTHETIC VALVE: ICD-10-CM

## 2021-07-28 DIAGNOSIS — Z79.01 LONG TERM (CURRENT) USE OF ANTICOAGULANTS: ICD-10-CM

## 2021-07-28 DIAGNOSIS — D50.0 IRON DEFICIENCY ANEMIA DUE TO CHRONIC BLOOD LOSS: Primary | ICD-10-CM

## 2021-07-28 LAB
CRP SERPL-MCNC: 1.27 MG/DL (ref 0–0.5)
ERYTHROCYTE [SEDIMENTATION RATE] IN BLOOD: 31 MM/HR (ref 0–30)
INR PPP: 2.4

## 2021-07-28 PROCEDURE — 36415 COLL VENOUS BLD VENIPUNCTURE: CPT | Performed by: INTERNAL MEDICINE

## 2021-07-28 PROCEDURE — 85652 RBC SED RATE AUTOMATED: CPT | Performed by: INTERNAL MEDICINE

## 2021-07-28 PROCEDURE — 86140 C-REACTIVE PROTEIN: CPT | Performed by: INTERNAL MEDICINE

## 2021-07-28 PROCEDURE — 99214 OFFICE O/P EST MOD 30 MIN: CPT | Performed by: INTERNAL MEDICINE

## 2021-07-28 NOTE — ADDENDUM NOTE
Addended by: BRIGETTE KIM on: 7/28/2021 12:18 PM     Modules accepted: Orders     Patient states has RW at home. Patient states has RW at home. Called son and explained DC recommendation for HPT.

## 2021-08-09 ENCOUNTER — OFFICE VISIT (OUTPATIENT)
Dept: CARDIOLOGY | Facility: CLINIC | Age: 66
End: 2021-08-09

## 2021-08-09 VITALS
HEART RATE: 68 BPM | DIASTOLIC BLOOD PRESSURE: 58 MMHG | SYSTOLIC BLOOD PRESSURE: 92 MMHG | HEIGHT: 64 IN | WEIGHT: 186 LBS | BODY MASS INDEX: 31.76 KG/M2 | OXYGEN SATURATION: 97 %

## 2021-08-09 DIAGNOSIS — N18.32 STAGE 3B CHRONIC KIDNEY DISEASE (HCC): ICD-10-CM

## 2021-08-09 DIAGNOSIS — Z79.4 TYPE 2 DIABETES MELLITUS WITHOUT COMPLICATION, WITH LONG-TERM CURRENT USE OF INSULIN (HCC): ICD-10-CM

## 2021-08-09 DIAGNOSIS — E78.5 DYSLIPIDEMIA: ICD-10-CM

## 2021-08-09 DIAGNOSIS — Z79.01 LONG TERM (CURRENT) USE OF ANTICOAGULANTS: ICD-10-CM

## 2021-08-09 DIAGNOSIS — E11.9 TYPE 2 DIABETES MELLITUS WITHOUT COMPLICATION, WITH LONG-TERM CURRENT USE OF INSULIN (HCC): ICD-10-CM

## 2021-08-09 DIAGNOSIS — I48.0 PAROXYSMAL ATRIAL FIBRILLATION (HCC): Primary | ICD-10-CM

## 2021-08-09 DIAGNOSIS — I10 ESSENTIAL HYPERTENSION: ICD-10-CM

## 2021-08-09 DIAGNOSIS — I25.810 CORONARY ARTERY DISEASE INVOLVING CORONARY BYPASS GRAFT OF NATIVE HEART WITHOUT ANGINA PECTORIS: ICD-10-CM

## 2021-08-09 PROCEDURE — 93000 ELECTROCARDIOGRAM COMPLETE: CPT | Performed by: INTERNAL MEDICINE

## 2021-08-09 PROCEDURE — 99214 OFFICE O/P EST MOD 30 MIN: CPT | Performed by: INTERNAL MEDICINE

## 2021-08-09 NOTE — PROGRESS NOTES
Subjective:     Encounter Date:08/09/2021      Patient ID: Jocelin Parra is a 66 y.o. female.    Chief Complaint : Follow-up for valvular heart disease, AVR, MVR, CAD, CABG, hypertension, dyslipidemia  History of Present Illness        This is 66-year-old with PMH of     # valvular heart disease, mechanical AVR on chronic Coumadin therapy, , severe mitral regurgitation, moderate TR and previous history of mechanical AVR, status post redo mitral valve replacement with #25 mechanical [place], tricuspid #28 physio ring,   Maze, CABG x2 on 05/09/2019  # CAD cardiac cath 4/13/2019 revealing severe 2 vessel disease, status post CABG x2 with LIMA to LAD and SVG to PDA 05/09/2019  # P.A.fib, long-term anticoagulation, cardioversion 8/9/18,Maze 05/09/2019  # diabetes,  hemoglobin A1c 04/17/2018 was 8.1  # hypertension  #. History of anemia requiring blood transfusion  # torticollis, history of neck abscess 2015  # mother and lung cancer, father had diabetes and CVA  # AVR, unilateral oophorectomy.     here for  follow-up.  Patient denies any chest pain.  Patient's arterial blood pressure is 92/58, heart rate 68, O2 sat of 97% on room air.  % on room air.   patient denies any fever chills. patient had labs done 12/6/2019 which showed creatinine of 1.48 and hemoglobin of 8 point 3 repeat was 8.7.  Labs from 2/6/2020 reveal cholesterol 111 triglycerides 102, HDL 43, LDL 37, hemoglobin A1c of 5.0.  Labs from 8/26/2020 revealed normal TSH at 2.11, CMP with a BUN of 49 creatinine 1.6 GFR of 32, lipid profile with total cholesterol 102, triglycerides 183, HDL low at 41, LDL 24.  CBC from 5/27/2020 reveals hemoglobin of 10.5.  Labs from 1/25/2021 reveal hemoglobin of 11.8.        ASSESSMENT:      # long term warfarin  # VHD,AVR/MVR  #  CAD, CABG  # a.fib, long-term anticoagulation  #  hypertension, hyperlipidemia, diabetes, CKD  #  torticollis  #. Anemia  #. CKD    PLAN:  Patient's last INR 7/28/2021 was 2.4.  We will  continue to follow-up in pro time clinic.  Reviewed the importance of regular follow-up with INRs and keeping her INR between 2.5 and 3.5.  Advised her to follow-up with hematology for anemia.  Reviewed cholesterol results patient's HDL is low counseled on walking exercise.  Follow-up with PMD for diabetes care.  Patient says her last A1c was 6.4.  We will continue medical management with aspirin, atorvastatin, Zetia, furosemide, KCl, metoprolol, valsartan, warfarin as tolerated to help with valvular heart disease, A. fib, hypertension, dyslipidemia as tolerated.  Discussed about flu vaccine and Covid vaccine with patient, patient took both the doses of vaccination.  Reviewed low HDL counseled on walking exercise..  Patient is not very active due to her neck issues, counseled on walking exercise.  Follow-up with PMD for insulin requiring diabetes.  Follow-up with nephrology Dr. Montalvo for CKD.  Patient's last labs from 5/5/2021 will reveal BUN/creatinine of 61/1.91 GFR of 26  Reviewed EKG results with patient          ECG 12 Lead    Date/Time: 8/9/2021 1:39 PM  Performed by: Roldan Solano MD  Authorized by: Roldan Solano MD   Comparison: compared with previous ECG from 2/2/2020  Comparison to previous ECG: EKG done today reviewed/interpreted by me reveals sinus rhythm with rate of 69 bpm with nonspecific ST-T changes, no new change compared to EKG from 2/2/2020              The following portions of the patient's history were reviewed and updated as appropriate: allergies, current medications, past family history, past medical history, past social history, past surgical history and problem list.    Assessment:         [facility]     Diagnosis Plan   1. Paroxysmal atrial fibrillation (CMS/HCC)     2. Long term (current) use of anticoagulants     3. Essential hypertension     4. Dyslipidemia     5. Coronary artery disease involving coronary bypass graft of native heart without angina pectoris     6.  Stage 3b chronic kidney disease (CMS/[facility])     7. Type 2 diabetes mellitus without complication, with long-term current use of insulin (CMS/[facility])            Plan:               Past Medical History:  Past Medical History:   Diagnosis Date   • A-fib (CMS/[facility]) Dx 2018    S/p Cardioversion by Dr. Spencer on 04/10/19   • Acute renal failure syndrome (CMS/[facility]) 4/2019-F IP   • Biatrial enlargement 04/10/2019    Noted on SHERIE   • Calcified granuloma of lung (CMS/[facility]) 01/04/2019    Noted on Chest XR   • Cardiomegaly 01/04/2019    Borderline--Noted on Chest XR   • CHF (congestive heart failure) (CMS/[facility])    • CKD (chronic kidney disease), stage III (CMS/[facility])     Does Not See a Nephrologist   • Diabetes mellitus (CMS/[facility])    • DM (diabetes mellitus) (CMS/[facility])     T2   • Dysphagia 08/15-[facility] IP    Due to Pharyngitis   • GERD (gastroesophageal reflux disease)    • History of chest x-ray 8/28/15-[facility]    Normal   • History of chest x-ray 01/19/2019    Mild Pulmonary Edema w/Congestive Failure Noted   • History of chest x-ray 01/04/2019    Borderline Cardiomegaly    • History of EKG 2014/2018/2019-F   • Hx of diabetic neuropathy    • Hx of dizziness     w/Lightheadedness   • Hx of echocardiogram 4/16/18-[facility]    EF 55-60%; Mild MVR; Mild TVR; Normal Root/No Effusion   • Hyperlipidemia     Controlled w/Meds   • Hypertension     Controlled w/Meds   • Hypokalemia 4/11/19-F IP   • Hypothyroidism     Controlled w/Meds   • Iron deficiency anemia    • Left posterior fascicular block 04/2018 & 4/19    Noted on EKG   • Lower extremity edema 03/2019   • Mild mitral valve regurgitation 04/16/2018    Noted on Echo   • Mild tricuspid valve regurgitation 04/16/2018    Noted on Echo   • Moderate mitral valve regurgitation 2008    S/p MVR in 08'   • Moderate tricuspid insufficiency 04/10/2019    Noted on SHERIE   • Osteoarthritis     Knees w/Hx Knee Repl   • Pulmonary edema 01/19/2019    Mild--Noted on Chest XR   • Rapid palpitations 04/15/18 &  8/9/18-[facility] ER   • Renal dysfunction    • Right axis deviation 08/2018 & 4/19    Noted on EKG   • Severe aortic valve stenosis Since 2008    Hx AVR on 12/11/08 by Dr. Alvarado   • Severe mitral insufficiency 04/10/2019    Noted on SHERIE   • Sinus tachycardia 08/2018    Noted on EKG   • SOB (shortness of breath) chronic   • Torticollis Chronic   • Valvular heart disease     S/p AVR & MVR in 08'     Past Surgical History:  Past Surgical History:   Procedure Laterality Date   • AORTIC VALVE REPAIR/REPLACEMENT  12/11/08-[facility]    Using a #20 Eight Yr Mechanical [facility] Prosthesis--Dewey Alvarado MD   • CARDIOVERSION  4/10/19-[facility]    Dr. Spencer--For A-Fib   • COLONOSCOPY N/A 12/5/2019    Procedure: COLONOSCOPY;  Surgeon: Dustin Castellanos MD;  Location: [facility] ENDOSCOPY;  Service: Gastroenterology   • ENDOSCOPY N/A 12/5/2019    Procedure: ESOPHAGOGASTRODUODENOSCOPY;  Surgeon: Dustin Castellanos MD;  Location: [facility] ENDOSCOPY;  Service: Gastroenterology   • ENTEROSCOPY SMALL BOWEL N/A 2/3/2020    Procedure: ESOPHAGOGASTRODUODENOSCOPY WITH SMALL BOWEL ENTEROSCOPY and argonplasma coagulation of two small jejunal ateriovenous malformations;  Surgeon: Dexter Akhtar MD;  Location: [facility] ENDOSCOPY;  Service: Gastroenterology;  Laterality: N/A;  jejunal ateriovenous malformation   • HYSTERECTOMY     • MITRAL VALVE REPLACEMENT  2008   • MITRAL VALVE REPLACEMENT  05/09/2019    Dr Alvarado   • SHERIE  4/10/19-[facility]    EF 40%; Moderate TVR; Severe Eccentric MI; Biatrial Enlargement   • TOTAL KNEE ARTHROPLASTY  2017   • TRICUSPID VALVE SURGERY  05/09/2019    Dr Alvarado      Allergies:  No Known Allergies  Home Meds:  Current Meds:     Current Outpatient Medications:   •  ACCU-CHEK GUIDE test strip, , Disp: , Rfl:   •  aspirin 81 MG tablet, Take 81 mg by mouth Daily., Disp: , Rfl:   •  atorvastatin (LIPITOR) 40 MG tablet, Take 40 mg by mouth Daily., Disp: , Rfl:   •  B Complex Vitamins (VITAMIN B COMPLEX) tablet, Take 1 capsule by mouth Daily., Disp: ,  Rfl:   •  cholecalciferol (VITAMIN D3) 10 MCG (400 UNIT) tablet, Take 2,000 Units by mouth Daily., Disp: , Rfl:   •  ezetimibe (ZETIA) 10 MG tablet, Take 1 tablet by mouth daily., Disp: , Rfl:   •  ferrous sulfate 325 (65 FE) MG tablet, Take 325 mg by mouth 2 (Two) Times a Day., Disp: , Rfl:   •  furosemide (LASIX) 40 MG tablet, Take 40 mg by mouth Daily., Disp: , Rfl:   •  Insulin Degludec (TRESIBA) 100 UNIT/ML solution injection, Inject 10 Units under the skin into the appropriate area as directed every night at bedtime., Disp: , Rfl:   •  [employer] PEN NEEDLES 32G X 4 MM misc, , Disp: , Rfl:   •  levothyroxine (SYNTHROID, LEVOTHROID) 112 MCG tablet, Take 112 mcg by mouth Daily., Disp: , Rfl:   •  MAGNESIUM PO, Take 1 tablet by mouth Daily., Disp: , Rfl:   •  metoprolol succinate XL (TOPROL-XL) 25 MG 24 hr tablet, TAKE 1/2 TABLET BY MOUTH DAILY, Disp: 45 tablet, Rfl: 3  •  Multiple Vitamin (MULTI-VITAMIN) tablet, Take 1 tablet by mouth daily., Disp: , Rfl:   •  omeprazole (PriLOSEC) 20 MG capsule, Take 20 mg by mouth Daily., Disp: , Rfl:   •  potassium chloride (K-DUR,KLOR-CON) 20 MEQ CR tablet, Take 20 mEq by mouth Daily., Disp: , Rfl:   •  valsartan (DIOVAN) 160 MG tablet, TAKE ONE TABLET BY MOUTH DAILY (Patient taking differently: 80 mg.), Disp: 90 tablet, Rfl: 0  •  vitamin C (ASCORBIC ACID) 500 MG tablet, Take 500 mg by mouth Daily., Disp: , Rfl:   •  warfarin (COUMADIN) 5 MG tablet, TAKE 1 TABLET BY MOUTH ON MONDAY AND Friday TAKE 1 AND ONE-HALF TABLETS BY MOUTH DAILY ON ALL OTHER DAYS, Disp: 135 tablet, Rfl: 0  Social History:   Social History     Tobacco Use   • Smoking status: Never Smoker   • Smokeless tobacco: Never Used   Substance Use Topics   • Alcohol use: No      Family History:  Family History   Problem Relation Age of Onset   • Heart disease Father    • Hypertension Father    • Stroke Father    • Diabetes Father    • Lung cancer Mother               Review of Systems   Constitutional: Negative for  "malaise/fatigue.   Cardiovascular: Negative for chest pain, leg swelling and palpitations.   Respiratory: Negative for shortness of breath.    Skin: Negative for rash.   Neurological: Negative for dizziness, light-headedness and numbness.     All other systems are negative         Objective:     Physical Exam  BP 92/58   Pulse 68   Ht 162.6 cm (64\")   Wt 84.4 kg (186 lb)   LMP  (LMP Unknown)   SpO2 97%   BMI 31.93 kg/m²   General:  Appears in no acute distress  Eyes: Sclera is anicteric,  conjunctiva is clear   HEENT:  No JVD.  Torticollis present  Respiratory: Respirations regular and unlabored at rest.  Clear to auscultation  Cardiovascular: S1,S2 Regular rate and rhythm. No murmur, rub or gallop auscultated.   Gastrointestinal: Abdomen nondistended, soft  Extremities: No digital clubbing or cyanosis, no edema  Skin: Color pink. Skin warm and dry to touch. No rashes  No xanthoma  Neuro: Alert and awake, no lateralizing deficits appreciated    Lab Reviewed:                  "

## 2021-08-11 ENCOUNTER — ANTICOAGULATION VISIT (OUTPATIENT)
Dept: CARDIOLOGY | Facility: CLINIC | Age: 66
End: 2021-08-11

## 2021-08-11 DIAGNOSIS — Z95.3 HISTORY OF MITRAL VALVE REPLACEMENT WITH BIOPROSTHETIC VALVE: ICD-10-CM

## 2021-08-11 DIAGNOSIS — Z95.3 HISTORY OF AORTIC VALVE REPLACEMENT WITH BIOPROSTHETIC VALVE: Primary | ICD-10-CM

## 2021-08-11 DIAGNOSIS — Z79.01 LONG TERM (CURRENT) USE OF ANTICOAGULANTS: ICD-10-CM

## 2021-08-11 LAB — INR PPP: 3.9

## 2021-08-25 ENCOUNTER — ANTICOAGULATION VISIT (OUTPATIENT)
Dept: CARDIOLOGY | Facility: CLINIC | Age: 66
End: 2021-08-25

## 2021-08-25 DIAGNOSIS — Z95.3 HISTORY OF MITRAL VALVE REPLACEMENT WITH BIOPROSTHETIC VALVE: ICD-10-CM

## 2021-08-25 DIAGNOSIS — Z79.01 LONG TERM (CURRENT) USE OF ANTICOAGULANTS: ICD-10-CM

## 2021-08-25 DIAGNOSIS — Z95.3 HISTORY OF AORTIC VALVE REPLACEMENT WITH BIOPROSTHETIC VALVE: Primary | ICD-10-CM

## 2021-08-25 LAB — INR PPP: 3.2

## 2021-09-09 ENCOUNTER — ANTICOAGULATION VISIT (OUTPATIENT)
Dept: CARDIOLOGY | Facility: CLINIC | Age: 66
End: 2021-09-09

## 2021-09-09 DIAGNOSIS — Z79.01 LONG TERM (CURRENT) USE OF ANTICOAGULANTS: ICD-10-CM

## 2021-09-09 DIAGNOSIS — Z95.3 HISTORY OF MITRAL VALVE REPLACEMENT WITH BIOPROSTHETIC VALVE: ICD-10-CM

## 2021-09-09 DIAGNOSIS — Z95.3 HISTORY OF AORTIC VALVE REPLACEMENT WITH BIOPROSTHETIC VALVE: Primary | ICD-10-CM

## 2021-09-09 LAB — INR PPP: 4.8

## 2021-09-15 LAB — INR PPP: 2.7

## 2021-09-16 ENCOUNTER — ANTICOAGULATION VISIT (OUTPATIENT)
Dept: CARDIOLOGY | Facility: CLINIC | Age: 66
End: 2021-09-16

## 2021-09-16 DIAGNOSIS — I48.91 ATRIAL FIBRILLATION, UNSPECIFIED TYPE (HCC): ICD-10-CM

## 2021-09-16 DIAGNOSIS — Z95.3 HISTORY OF AORTIC VALVE REPLACEMENT WITH BIOPROSTHETIC VALVE: Primary | ICD-10-CM

## 2021-09-16 DIAGNOSIS — Z95.3 HISTORY OF MITRAL VALVE REPLACEMENT WITH BIOPROSTHETIC VALVE: ICD-10-CM

## 2021-09-16 DIAGNOSIS — Z79.01 LONG TERM (CURRENT) USE OF ANTICOAGULANTS: ICD-10-CM

## 2021-09-30 ENCOUNTER — ANTICOAGULATION VISIT (OUTPATIENT)
Dept: CARDIOLOGY | Facility: CLINIC | Age: 66
End: 2021-09-30

## 2021-09-30 DIAGNOSIS — Z95.3 HISTORY OF AORTIC VALVE REPLACEMENT WITH BIOPROSTHETIC VALVE: Primary | ICD-10-CM

## 2021-09-30 DIAGNOSIS — Z79.01 LONG TERM (CURRENT) USE OF ANTICOAGULANTS: ICD-10-CM

## 2021-09-30 DIAGNOSIS — Z95.3 HISTORY OF MITRAL VALVE REPLACEMENT WITH BIOPROSTHETIC VALVE: ICD-10-CM

## 2021-09-30 LAB — INR PPP: 3.4

## 2021-10-03 DIAGNOSIS — I48.91 ATRIAL FIBRILLATION, UNSPECIFIED TYPE (HCC): ICD-10-CM

## 2021-10-03 DIAGNOSIS — Z79.01 LONG TERM (CURRENT) USE OF ANTICOAGULANTS: ICD-10-CM

## 2021-10-04 RX ORDER — WARFARIN SODIUM 5 MG/1
TABLET ORAL
Qty: 114 TABLET | Refills: 0 | Status: SHIPPED | OUTPATIENT
Start: 2021-10-04 | End: 2021-12-27

## 2021-10-04 NOTE — TELEPHONE ENCOUNTER
Rx Refill Note  Requested Prescriptions     Pending Prescriptions Disp Refills   • warfarin (COUMADIN) 5 MG tablet [Pharmacy Med Name: WARFARIN SODIUM 5 MG TABLET] 114 tablet      Sig: TAKE ONE TABLET BY MOUTH ON MONDAY AND FRIDAY TAKE ONE AND ONE-HALF TABLETS BY MOUTH DAILY ON ALL OTHER DAYS      Last office visit with prescribing clinician: 8/9/2021      Next office visit with prescribing clinician: 8/9/2022   Anticoagulation Visit 9/30/21 INR 3.4           Sweetie Workman RN  10/04/21, 12:02 EDT

## 2021-10-13 ENCOUNTER — ANTICOAGULATION VISIT (OUTPATIENT)
Dept: CARDIOLOGY | Facility: CLINIC | Age: 66
End: 2021-10-13

## 2021-10-13 DIAGNOSIS — Z79.01 LONG TERM (CURRENT) USE OF ANTICOAGULANTS: ICD-10-CM

## 2021-10-13 DIAGNOSIS — Z95.3 HISTORY OF MITRAL VALVE REPLACEMENT WITH BIOPROSTHETIC VALVE: ICD-10-CM

## 2021-10-13 DIAGNOSIS — Z95.3 HISTORY OF AORTIC VALVE REPLACEMENT WITH BIOPROSTHETIC VALVE: Primary | ICD-10-CM

## 2021-10-13 LAB — INR PPP: 2.9

## 2021-10-19 RX ORDER — VALSARTAN 160 MG/1
80 TABLET ORAL DAILY
Qty: 90 TABLET | Refills: 3 | Status: SHIPPED | OUTPATIENT
Start: 2021-10-19

## 2021-10-19 NOTE — TELEPHONE ENCOUNTER
Rx Refill Note  Requested Prescriptions     Pending Prescriptions Disp Refills   • valsartan (DIOVAN) 160 MG tablet [Pharmacy Med Name: VALSARTAN 160 MG TABLET] 90 tablet 0     Sig: TAKE ONE TABLET BY MOUTH DAILY      Last office visit with prescribing clinician: 8/9/2021      Next office visit with prescribing clinician: 8/9/2022            Hue Cruz MA  10/19/21, 11:25 EDT

## 2021-10-21 ENCOUNTER — TRANSCRIBE ORDERS (OUTPATIENT)
Dept: ADMINISTRATIVE | Facility: HOSPITAL | Age: 66
End: 2021-10-21

## 2021-10-21 ENCOUNTER — LAB (OUTPATIENT)
Dept: LAB | Facility: HOSPITAL | Age: 66
End: 2021-10-21

## 2021-10-21 DIAGNOSIS — N18.32 CHRONIC KIDNEY DISEASE (CKD) STAGE G3B/A1, MODERATELY DECREASED GLOMERULAR FILTRATION RATE (GFR) BETWEEN 30-44 ML/MIN/1.73 SQUARE METER AND ALBUMINURIA CREATININE RATIO LESS THAN 30 MG/G (CMS/H* (HCC): Primary | ICD-10-CM

## 2021-10-21 DIAGNOSIS — N18.32 CHRONIC KIDNEY DISEASE (CKD) STAGE G3B/A1, MODERATELY DECREASED GLOMERULAR FILTRATION RATE (GFR) BETWEEN 30-44 ML/MIN/1.73 SQUARE METER AND ALBUMINURIA CREATININE RATIO LESS THAN 30 MG/G (CMS/H* (HCC): ICD-10-CM

## 2021-10-21 LAB
25(OH)D3 SERPL-MCNC: 69.5 NG/ML
ANION GAP SERPL CALCULATED.3IONS-SCNC: 10.3 MMOL/L (ref 5–15)
BACTERIA UR QL AUTO: ABNORMAL /HPF
BASOPHILS # BLD AUTO: 0.06 10*3/MM3 (ref 0–0.2)
BASOPHILS NFR BLD AUTO: 0.7 % (ref 0–1.5)
BILIRUB UR QL STRIP: NEGATIVE
BUN SERPL-MCNC: 66 MG/DL (ref 8–23)
BUN/CREAT SERPL: 35.5 (ref 7–25)
CALCIUM SPEC-SCNC: 9.9 MG/DL (ref 8.6–10.5)
CHLORIDE SERPL-SCNC: 101 MMOL/L (ref 98–107)
CLARITY UR: CLEAR
CO2 SERPL-SCNC: 26.7 MMOL/L (ref 22–29)
COLOR UR: YELLOW
CREAT SERPL-MCNC: 1.86 MG/DL (ref 0.57–1)
DEPRECATED RDW RBC AUTO: 47.8 FL (ref 37–54)
EOSINOPHIL # BLD AUTO: 0.43 10*3/MM3 (ref 0–0.4)
EOSINOPHIL NFR BLD AUTO: 5.1 % (ref 0.3–6.2)
ERYTHROCYTE [DISTWIDTH] IN BLOOD BY AUTOMATED COUNT: 15.7 % (ref 12.3–15.4)
GFR SERPL CREATININE-BSD FRML MDRD: 27 ML/MIN/1.73
GLUCOSE SERPL-MCNC: 117 MG/DL (ref 65–99)
GLUCOSE UR STRIP-MCNC: NEGATIVE MG/DL
HCT VFR BLD AUTO: 34.4 % (ref 34–46.6)
HGB BLD-MCNC: 11.3 G/DL (ref 12–15.9)
HGB UR QL STRIP.AUTO: NEGATIVE
HYALINE CASTS UR QL AUTO: ABNORMAL /LPF
IMM GRANULOCYTES # BLD AUTO: 0.04 10*3/MM3 (ref 0–0.05)
IMM GRANULOCYTES NFR BLD AUTO: 0.5 % (ref 0–0.5)
KETONES UR QL STRIP: NEGATIVE
LEUKOCYTE ESTERASE UR QL STRIP.AUTO: ABNORMAL
LYMPHOCYTES # BLD AUTO: 0.8 10*3/MM3 (ref 0.7–3.1)
LYMPHOCYTES NFR BLD AUTO: 9.6 % (ref 19.6–45.3)
MCH RBC QN AUTO: 27.9 PG (ref 26.6–33)
MCHC RBC AUTO-ENTMCNC: 32.8 G/DL (ref 31.5–35.7)
MCV RBC AUTO: 84.9 FL (ref 79–97)
MONOCYTES # BLD AUTO: 0.69 10*3/MM3 (ref 0.1–0.9)
MONOCYTES NFR BLD AUTO: 8.3 % (ref 5–12)
NEUTROPHILS NFR BLD AUTO: 6.33 10*3/MM3 (ref 1.7–7)
NEUTROPHILS NFR BLD AUTO: 75.8 % (ref 42.7–76)
NITRITE UR QL STRIP: NEGATIVE
NRBC BLD AUTO-RTO: 0 /100 WBC (ref 0–0.2)
PH UR STRIP.AUTO: 6 [PH] (ref 5–8)
PHOSPHATE SERPL-MCNC: 4 MG/DL (ref 2.5–4.5)
PLATELET # BLD AUTO: 264 10*3/MM3 (ref 140–450)
PMV BLD AUTO: 10.2 FL (ref 6–12)
POTASSIUM SERPL-SCNC: 4.7 MMOL/L (ref 3.5–5.2)
PROT UR QL STRIP: NEGATIVE
PTH-INTACT SERPL-MCNC: 71 PG/ML (ref 15–65)
RBC # BLD AUTO: 4.05 10*6/MM3 (ref 3.77–5.28)
RBC # UR: ABNORMAL /HPF
REF LAB TEST METHOD: ABNORMAL
SODIUM SERPL-SCNC: 138 MMOL/L (ref 136–145)
SP GR UR STRIP: 1.01 (ref 1–1.03)
SQUAMOUS #/AREA URNS HPF: ABNORMAL /HPF
UROBILINOGEN UR QL STRIP: ABNORMAL
WBC # BLD AUTO: 8.35 10*3/MM3 (ref 3.4–10.8)
WBC UR QL AUTO: ABNORMAL /HPF

## 2021-10-21 PROCEDURE — 82306 VITAMIN D 25 HYDROXY: CPT

## 2021-10-21 PROCEDURE — 36415 COLL VENOUS BLD VENIPUNCTURE: CPT

## 2021-10-21 PROCEDURE — 80048 BASIC METABOLIC PNL TOTAL CA: CPT

## 2021-10-21 PROCEDURE — 85025 COMPLETE CBC W/AUTO DIFF WBC: CPT

## 2021-10-21 PROCEDURE — 84100 ASSAY OF PHOSPHORUS: CPT

## 2021-10-21 PROCEDURE — 81001 URINALYSIS AUTO W/SCOPE: CPT

## 2021-10-21 PROCEDURE — 83970 ASSAY OF PARATHORMONE: CPT

## 2021-10-27 ENCOUNTER — ANTICOAGULATION VISIT (OUTPATIENT)
Dept: CARDIOLOGY | Facility: CLINIC | Age: 66
End: 2021-10-27

## 2021-10-27 DIAGNOSIS — Z95.3 HISTORY OF MITRAL VALVE REPLACEMENT WITH BIOPROSTHETIC VALVE: ICD-10-CM

## 2021-10-27 DIAGNOSIS — I48.91 ATRIAL FIBRILLATION, UNSPECIFIED TYPE (HCC): ICD-10-CM

## 2021-10-27 DIAGNOSIS — Z79.01 LONG TERM (CURRENT) USE OF ANTICOAGULANTS: ICD-10-CM

## 2021-10-27 DIAGNOSIS — Z95.3 HISTORY OF AORTIC VALVE REPLACEMENT WITH BIOPROSTHETIC VALVE: Primary | ICD-10-CM

## 2021-10-27 LAB — INR PPP: 3.4

## 2021-11-01 ENCOUNTER — TELEPHONE (OUTPATIENT)
Dept: ONCOLOGY | Facility: CLINIC | Age: 66
End: 2021-11-01

## 2021-11-01 NOTE — TELEPHONE ENCOUNTER
Had a v/m from Pt asking to conf appt in January w/Petty. Called and left appt info on v/m # given to do so for Pt.

## 2021-11-10 ENCOUNTER — ANTICOAGULATION VISIT (OUTPATIENT)
Dept: CARDIOLOGY | Facility: CLINIC | Age: 66
End: 2021-11-10

## 2021-11-10 DIAGNOSIS — Z79.01 LONG TERM (CURRENT) USE OF ANTICOAGULANTS: ICD-10-CM

## 2021-11-10 DIAGNOSIS — Z95.3 HISTORY OF MITRAL VALVE REPLACEMENT WITH BIOPROSTHETIC VALVE: ICD-10-CM

## 2021-11-10 DIAGNOSIS — Z95.3 HISTORY OF AORTIC VALVE REPLACEMENT WITH BIOPROSTHETIC VALVE: Primary | ICD-10-CM

## 2021-11-10 DIAGNOSIS — I48.11 LONGSTANDING PERSISTENT ATRIAL FIBRILLATION (HCC): ICD-10-CM

## 2021-11-10 LAB — INR PPP: 4.2

## 2021-11-17 ENCOUNTER — ANTICOAGULATION VISIT (OUTPATIENT)
Dept: CARDIOLOGY | Facility: CLINIC | Age: 66
End: 2021-11-17

## 2021-11-17 DIAGNOSIS — Z95.3 HISTORY OF AORTIC VALVE REPLACEMENT WITH BIOPROSTHETIC VALVE: Primary | ICD-10-CM

## 2021-11-17 DIAGNOSIS — Z79.01 LONG TERM (CURRENT) USE OF ANTICOAGULANTS: ICD-10-CM

## 2021-11-17 DIAGNOSIS — I48.11 LONGSTANDING PERSISTENT ATRIAL FIBRILLATION (HCC): ICD-10-CM

## 2021-11-17 DIAGNOSIS — Z95.3 HISTORY OF MITRAL VALVE REPLACEMENT WITH BIOPROSTHETIC VALVE: ICD-10-CM

## 2021-11-17 LAB — INR PPP: 2.1

## 2021-12-01 ENCOUNTER — ANTICOAGULATION VISIT (OUTPATIENT)
Dept: CARDIOLOGY | Facility: CLINIC | Age: 66
End: 2021-12-01

## 2021-12-01 DIAGNOSIS — Z95.3 HISTORY OF AORTIC VALVE REPLACEMENT WITH BIOPROSTHETIC VALVE: Primary | ICD-10-CM

## 2021-12-01 DIAGNOSIS — Z79.01 LONG TERM (CURRENT) USE OF ANTICOAGULANTS: ICD-10-CM

## 2021-12-01 DIAGNOSIS — Z95.3 HISTORY OF MITRAL VALVE REPLACEMENT WITH BIOPROSTHETIC VALVE: ICD-10-CM

## 2021-12-01 LAB — INR PPP: 3.4

## 2021-12-15 ENCOUNTER — ANTICOAGULATION VISIT (OUTPATIENT)
Dept: CARDIOLOGY | Facility: CLINIC | Age: 66
End: 2021-12-15

## 2021-12-15 DIAGNOSIS — I48.91 ATRIAL FIBRILLATION, UNSPECIFIED TYPE (HCC): ICD-10-CM

## 2021-12-15 DIAGNOSIS — Z95.3 HISTORY OF AORTIC VALVE REPLACEMENT WITH BIOPROSTHETIC VALVE: Primary | ICD-10-CM

## 2021-12-15 DIAGNOSIS — Z95.3 HISTORY OF MITRAL VALVE REPLACEMENT WITH BIOPROSTHETIC VALVE: ICD-10-CM

## 2021-12-15 DIAGNOSIS — Z79.01 LONG TERM (CURRENT) USE OF ANTICOAGULANTS: ICD-10-CM

## 2021-12-15 LAB — INR PPP: 3.9

## 2021-12-15 NOTE — PROGRESS NOTES
Spoke to pt. Will decrease dosage to 5 mgs on MWF with 7.5 mgs all other days and recheck in 2 weeks. Flako

## 2021-12-26 DIAGNOSIS — Z79.01 LONG TERM (CURRENT) USE OF ANTICOAGULANTS: ICD-10-CM

## 2021-12-26 DIAGNOSIS — I48.91 ATRIAL FIBRILLATION, UNSPECIFIED TYPE (HCC): ICD-10-CM

## 2021-12-27 RX ORDER — WARFARIN SODIUM 5 MG/1
TABLET ORAL
Qty: 114 TABLET | Refills: 0 | Status: SHIPPED | OUTPATIENT
Start: 2021-12-27 | End: 2022-03-28

## 2021-12-27 NOTE — TELEPHONE ENCOUNTER
Rx Refill Note  Requested Prescriptions     Pending Prescriptions Disp Refills   • warfarin (COUMADIN) 5 MG tablet [Pharmacy Med Name: WARFARIN SODIUM 5 MG TABLET] 114 tablet 0     Sig: TAKE ONE TABLET BY MOUTH ON MONDAY AND FRIDAY TAKE ONE AND ONE-HALF TABLETS BY MOUTH DAILY ON ALL OTHER DAYS      Last office visit with prescribing clinician: 8/9/2021      Next office visit with prescribing clinician: 8/9/2022   Anticoagulation Visit 12/15/21 INR 3.9           Sweetie Workman RN  12/27/21, 10:39 EST

## 2021-12-29 ENCOUNTER — ANTICOAGULATION VISIT (OUTPATIENT)
Dept: CARDIOLOGY | Facility: CLINIC | Age: 66
End: 2021-12-29

## 2021-12-29 DIAGNOSIS — Z95.3 HISTORY OF AORTIC VALVE REPLACEMENT WITH BIOPROSTHETIC VALVE: Primary | ICD-10-CM

## 2021-12-29 DIAGNOSIS — Z95.3 HISTORY OF MITRAL VALVE REPLACEMENT WITH BIOPROSTHETIC VALVE: ICD-10-CM

## 2021-12-29 DIAGNOSIS — Z79.01 LONG TERM (CURRENT) USE OF ANTICOAGULANTS: ICD-10-CM

## 2021-12-29 LAB — INR PPP: 4.3

## 2021-12-29 NOTE — PROGRESS NOTES
Spoke to patient, no changes in medications, may not be eating as much vegetables. Advised her to hol warfarin today and reduce to 7.5 mg 3 x a week and 5 mg 4 x a week. Check INR 2 weeks. If bleeding or bruising, check early.

## 2022-01-12 ENCOUNTER — ANTICOAGULATION VISIT (OUTPATIENT)
Dept: CARDIOLOGY | Facility: CLINIC | Age: 67
End: 2022-01-12

## 2022-01-12 DIAGNOSIS — Z79.01 LONG TERM (CURRENT) USE OF ANTICOAGULANTS: ICD-10-CM

## 2022-01-12 DIAGNOSIS — I48.11 LONGSTANDING PERSISTENT ATRIAL FIBRILLATION: ICD-10-CM

## 2022-01-12 DIAGNOSIS — Z95.3 HISTORY OF AORTIC VALVE REPLACEMENT WITH BIOPROSTHETIC VALVE: Primary | ICD-10-CM

## 2022-01-12 DIAGNOSIS — Z95.3 HISTORY OF MITRAL VALVE REPLACEMENT WITH BIOPROSTHETIC VALVE: ICD-10-CM

## 2022-01-12 LAB — INR PPP: 4.3

## 2022-01-19 ENCOUNTER — LAB (OUTPATIENT)
Dept: LAB | Facility: HOSPITAL | Age: 67
End: 2022-01-19

## 2022-01-19 DIAGNOSIS — D50.0 IRON DEFICIENCY ANEMIA DUE TO CHRONIC BLOOD LOSS: ICD-10-CM

## 2022-01-19 LAB
BASOPHILS # BLD AUTO: 0.05 10*3/MM3 (ref 0–0.2)
BASOPHILS NFR BLD AUTO: 0.6 % (ref 0–1.5)
DEPRECATED RDW RBC AUTO: 51.4 FL (ref 37–54)
EOSINOPHIL # BLD AUTO: 0.4 10*3/MM3 (ref 0–0.4)
EOSINOPHIL NFR BLD AUTO: 5.2 % (ref 0.3–6.2)
ERYTHROCYTE [DISTWIDTH] IN BLOOD BY AUTOMATED COUNT: 15.7 % (ref 12.3–15.4)
FERRITIN SERPL-MCNC: 568.6 NG/ML (ref 13–150)
HCT VFR BLD AUTO: 36 % (ref 34–46.6)
HGB BLD-MCNC: 11.4 G/DL (ref 12–15.9)
IRON 24H UR-MRATE: 36 MCG/DL (ref 37–145)
IRON SATN MFR SERPL: 11 % (ref 20–50)
LYMPHOCYTES # BLD AUTO: 0.9 10*3/MM3 (ref 0.7–3.1)
LYMPHOCYTES NFR BLD AUTO: 11.7 % (ref 19.6–45.3)
MCH RBC QN AUTO: 29.2 PG (ref 26.6–33)
MCHC RBC AUTO-ENTMCNC: 31.7 G/DL (ref 31.5–35.7)
MCV RBC AUTO: 92.1 FL (ref 79–97)
MONOCYTES # BLD AUTO: 0.71 10*3/MM3 (ref 0.1–0.9)
MONOCYTES NFR BLD AUTO: 9.2 % (ref 5–12)
NEUTROPHILS NFR BLD AUTO: 5.65 10*3/MM3 (ref 1.7–7)
NEUTROPHILS NFR BLD AUTO: 73.3 % (ref 42.7–76)
PLATELET # BLD AUTO: 263 10*3/MM3 (ref 140–450)
PMV BLD AUTO: 9.7 FL (ref 6–12)
RBC # BLD AUTO: 3.91 10*6/MM3 (ref 3.77–5.28)
TIBC SERPL-MCNC: 322 MCG/DL (ref 298–536)
TRANSFERRIN SERPL-MCNC: 216 MG/DL (ref 200–360)
WBC NRBC COR # BLD: 7.71 10*3/MM3 (ref 3.4–10.8)

## 2022-01-19 PROCEDURE — 84466 ASSAY OF TRANSFERRIN: CPT

## 2022-01-19 PROCEDURE — 83540 ASSAY OF IRON: CPT

## 2022-01-19 PROCEDURE — 85025 COMPLETE CBC W/AUTO DIFF WBC: CPT

## 2022-01-19 PROCEDURE — 82728 ASSAY OF FERRITIN: CPT

## 2022-01-19 PROCEDURE — 36415 COLL VENOUS BLD VENIPUNCTURE: CPT

## 2022-01-25 NOTE — PROGRESS NOTES
ONCOLOGY/HEMATOLOGY    PATIENT: Jocelin Parra  YOB: 1955  MEDICAL RECORD NUMBER: [id number] OF SERVICE: 01/26/22      CHIEF COMPLAINT:   Chief Complaint:  Iron deficiency anemia  Chronic renal failure stage III  Arteriovenous malformation of small bowel  Mechanical cardiac valve  History of Present Illness:   · I initially saw Ms. De Los Santos in consultation at [facility] when she was admitted through the emergency room on 12/3/2019 with severe anemia.  Patient required blood transfusion.  Patient has chronic history of kidney disease.    · CBC in the ED showed WBC 3.5, hemoglobin 6.1, MCV 63.4, RDW 18.6, and platelets 339,000. She received 1 unit pRBC.  Creatinine was 1.57.   · Patient is on chronic Coumadin for mechanical cardiac valve INR was 2.74 on admission on 12/3/2019    · Posttransfusion iron studies showed iron 24 and ferritin 21.2.  Vitamin B12 was 947 and folate was 18.6.    · 12/4/2019 SPEP negative for monoclonal gammopathy.  Serum creatinine 1.7 with EGFR of 30  · 12/5/2019 patient underwent colonoscopy and EGD at [facility]- No evidence of any active source of bleeding noticed in the upper at the lower GI tract except diverticulosis and hemorrhoids.  Possibility of a small bowel AVM or lesion leading to blood loss anemia is in the differential however patient denies any overt GI bleeding.  May consider small bowel evaluation with a PillCam if needed.  · 12/23/2019 and 12/30/2019 patient received 2 Injectafer infusions.  · 1/21/2020 - Erythropoietin 37.4, iron 47, iron saturation 13, TIBC 371, ferritin 681.80.  · 2/30/2020-EGD with small bowel enteroscopy performed by Dr. Akhtar.  2 nonbleeding jejunal angiectasia's were cauterized  · 3/6/2020 and 3/13/2020 patient received 2 dose of Injectafer infusions.  · July 2020 received 2 additonal doses of injectafer for Hgb of 11  · 9/28/2020: Iron 39, iron saturation 12%, ferritin 1119  · 1/13/2021 INR 1.8  · 11/30/2020: Iron saturation 14%,  TIBC 328,Hemoglobin 11.6, platelets 235  · 1/25/2021: WBC 7.5, hemoglobin 11.8, platelets 261  · 7/21/2021: Ferritin 605, iron saturation 14% low, serum iron 44, TIBC 310, hemoglobin 11.6, MCV 91.1, platelets 251,  · 1/19/202 : Hb 11.4, plt 263, iron sat 11, ferritin 568    Subjective:     Continues to be on oral iron 2 tabs per day.      Review of Systems   Constitutional: Positive for fatigue.   Respiratory: Negative for chest tightness and cough.         Occasional leg swelling.      Past Medical History:   Diagnosis Date   • A-fib (HCC) Dx 2018    S/p Cardioversion by Dr. Spencer on 04/10/19   • Acute renal failure syndrome ([facility]) 4/2019-[facility] IP   • Biatrial enlargement 04/10/2019    Noted on SHERIE   • Calcified granuloma of lung ([facility]) 01/04/2019    Noted on Chest XR   • Cardiomegaly 01/04/2019    Borderline--Noted on Chest XR   • CHF (congestive heart failure) ([facility])    • CKD (chronic kidney disease), stage III ([facility])     Does Not See a Nephrologist   • Diabetes mellitus ([facility])    • DM (diabetes mellitus) ([facility])     T2   • Dysphagia 08/15-[facility] IP    Due to Pharyngitis   • GERD (gastroesophageal reflux disease)    • History of chest x-ray 8/28/15-[facility]    Normal   • History of chest x-ray 01/19/2019    Mild Pulmonary Edema w/Congestive Failure Noted   • History of chest x-ray 01/04/2019    Borderline Cardiomegaly    • History of EKG 2014/2018/2019-[facility]   • Hx of diabetic neuropathy    • Hx of dizziness     w/Lightheadedness   • Hx of echocardiogram 4/16/18-[facility]    EF 55-60%; Mild MVR; Mild TVR; Normal Root/No Effusion   • Hyperlipidemia     Controlled w/Meds   • Hypertension     Controlled w/Meds   • Hypokalemia 4/11/19-[facility] IP   • Hypothyroidism     Controlled w/Meds   • Iron deficiency anemia    • Left posterior fascicular block 04/2018 & 4/19    Noted on EKG   • Lower extremity edema 03/2019   • Mild mitral valve regurgitation 04/16/2018    Noted on Echo   • Mild tricuspid valve regurgitation 04/16/2018    Noted on Echo   •  Moderate mitral valve regurgitation 2008    S/p MVR in 08'   • Moderate tricuspid insufficiency 04/10/2019    Noted on SHERIE   • Osteoarthritis     Knees w/Hx Knee Repl   • Pulmonary edema 01/19/2019    Mild--Noted on Chest XR   • Rapid palpitations 04/15/18 & 8/9/18-[facility] ER   • Renal dysfunction    • Right axis deviation 08/2018 & 4/19    Noted on EKG   • Severe aortic valve stenosis Since 2008    Hx AVR on 12/11/08 by Dr. Alvarado   • Severe mitral insufficiency 04/10/2019    Noted on SHERIE   • Sinus tachycardia 08/2018    Noted on EKG   • SOB (shortness of breath) chronic   • Torticollis Chronic   • Valvular heart disease     S/p AVR & MVR in 08'       Past Surgical History:   Procedure Laterality Date   • AORTIC VALVE REPAIR/REPLACEMENT  12/11/08-[facility]    Using a #20 Eight Yr Mechanical [employer] [place] Prosthesis--Dewey Alvarado MD   • CARDIOVERSION  4/10/19-[facility]    Dr. Spencer--For A-Fib   • COLONOSCOPY N/A 12/5/2019    Procedure: COLONOSCOPY;  Surgeon: Dustin Castellanos MD;  Location: [facility] ENDOSCOPY;  Service: Gastroenterology   • ENDOSCOPY N/A 12/5/2019    Procedure: ESOPHAGOGASTRODUODENOSCOPY;  Surgeon: Dustin Castellanos MD;  Location: [facility] ENDOSCOPY;  Service: Gastroenterology   • ENTEROSCOPY SMALL BOWEL N/A 2/3/2020    Procedure: ESOPHAGOGASTRODUODENOSCOPY WITH SMALL BOWEL ENTEROSCOPY and argonplasma coagulation of two small jejunal ateriovenous malformations;  Surgeon: Dexter Akhtar MD;  Location: [facility] ENDOSCOPY;  Service: Gastroenterology;  Laterality: N/A;  jejunal ateriovenous malformation   • HYSTERECTOMY     • MITRAL VALVE REPLACEMENT  2008   • MITRAL VALVE REPLACEMENT  05/09/2019    Dr Alvarado   • SHERIE  4/10/19-[facility]    EF 40%; Moderate TVR; Severe Eccentric MI; Biatrial Enlargement   • TOTAL KNEE ARTHROPLASTY  2017   • TRICUSPID VALVE SURGERY  05/09/2019    Dr Alvarado       Family History   Problem Relation Age of Onset   • Heart disease Father    • Hypertension Father    • Stroke Father    • Diabetes Father    • Lung  cancer Mother        Social History     Socioeconomic History   • Marital status: Single   Tobacco Use   • Smoking status: Never Smoker   • Smokeless tobacco: Never Used   Substance and Sexual Activity   • Alcohol use: No   • Drug use: No   • Sexual activity: Defer     Birth control/protection: Surgical     Comment: Hyst       ALLERGIES:  Patient has no known allergies.    MEDICATION:  Current Outpatient Medications   Medication Sig Dispense Refill   • ACCU-CHEK GUIDE test strip      • aspirin 81 MG tablet Take 81 mg by mouth Daily.     • atorvastatin (LIPITOR) 40 MG tablet Take 40 mg by mouth Daily.     • B Complex Vitamins (VITAMIN B COMPLEX) tablet Take 1 capsule by mouth Daily.     • cholecalciferol (VITAMIN D3) 10 MCG (400 UNIT) tablet Take 2,000 Units by mouth Daily.     • ezetimibe (ZETIA) 10 MG tablet Take 1 tablet by mouth daily.     • ferrous sulfate 325 (65 FE) MG tablet Take 325 mg by mouth 2 (Two) Times a Day.     • furosemide (LASIX) 40 MG tablet Take 40 mg by mouth Daily.     • Insulin Degludec (TRESIBA) 100 UNIT/ML solution injection Inject 10 Units under the skin into the appropriate area as directed every night at bedtime.     • [employer] PEN NEEDLES 32G X 4 MM misc      • levothyroxine (SYNTHROID, LEVOTHROID) 112 MCG tablet Take 112 mcg by mouth Daily.     • MAGNESIUM PO Take 1 tablet by mouth Daily.     • metoprolol succinate XL (TOPROL-XL) 25 MG 24 hr tablet TAKE 1/2 TABLET BY MOUTH DAILY 45 tablet 3   • Multiple Vitamin (MULTI-VITAMIN) tablet Take 1 tablet by mouth daily.     • omeprazole (PriLOSEC) 20 MG capsule Take 20 mg by mouth Daily.     • potassium chloride (K-DUR,KLOR-CON) 20 MEQ CR tablet Take 20 mEq by mouth Daily.     • valsartan (DIOVAN) 160 MG tablet Take 0.5 tablets by mouth Daily. 90 tablet 3   • vitamin C (ASCORBIC ACID) 500 MG tablet Take 500 mg by mouth Daily.     • warfarin (COUMADIN) 5 MG tablet TAKE ONE TABLET BY MOUTH ON Monday, WEDNESDAY AND FRIDAY TAKE ONE AND ONE-HALF  TABLETS BY MOUTH DAILY ON ALL OTHER DAYS 114 tablet 0     No current facility-administered medications for this visit.     ROS:      PHYSICAL EXAM:    Current Vitals:  Temperature Blood Pressure Heart Rate Resp Rate SpO2 Temp: 96.8 °F (36 °C) BP: 106/69 Heart Rate: 73 Resp: 18 SpO2: 95 %      VITAL signs in the last 24 hours: [unfilled]       01/26/22  1047   Weight: 88 kg (194 lb)       Physical Exam  Constitutional:       Appearance: Normal appearance.   HENT:      Head: Normocephalic and atraumatic.   Eyes:      Pupils: Pupils are equal, round, and reactive to light.   Cardiovascular:      Rate and Rhythm: Normal rate and regular rhythm.      Pulses: Normal pulses.      Heart sounds: No murmur heard.      Pulmonary:      Effort: Pulmonary effort is normal.      Breath sounds: Normal breath sounds.   Abdominal:      General: There is no distension.      Palpations: Abdomen is soft. There is no mass.      Tenderness: There is no abdominal tenderness.   Musculoskeletal:         General: Normal range of motion.      Cervical back: Normal range of motion and neck supple.   Skin:     General: Skin is warm.   Neurological:      General: No focal deficit present.      Mental Status: She is alert.   Psychiatric:         Mood and Affect: Mood normal.          I have reexamined the patient and the results are consistent with the previously documented exam. Dex Dove MD         LABORATORY/DATA:  Ferritin   Date Value Ref Range Status   01/19/2022 568.60 (H) 13.00 - 150.00 ng/mL Final     Iron Saturation   Date Value Ref Range Status   01/19/2022 11 (L) 20 - 50 % Final     LDH   Date Value Ref Range Status   12/04/2019 353 (H) 135 - 214 U/L Final        IMAGING:  No results found.    PROBLEM LIST:  Patient Active Problem List   Diagnosis   • Aortic stenosis   • Hyperlipidemia   • Hypertension   • Diabetes mellitus (HCC)   • Hypothyroidism   • Osteoarthritis   • S/P s/p reop sternotomy mechanical MVR (25 mm [employer]  prosthesis) per Dr. Alvarado 5/9/2019   • S/P mechanical AVR (prior surgery per Dr. Alvarado)   • S/P tricuspid valve repair (28 mm Physio tricuspid ring) per Dr. Alvarado 5/9/2019   • S/P Maze operation for atrial fibrillation   • S/P CABG x 2 (reop sternotomy with lysis of adhesions) (LIMA to LAD, SVG to PDA) per Dr. Alvardao 5/9/2019   • History of aortic valve replacement with bioprosthetic valve [Z95.3]   • History of mitral valve replacement with bioprosthetic valve [Z95.3]   • Atrial fibrillation (CMS/HCC) [I48.91]   • Long term (current) use of anticoagulants [Z79.01]   • Anemia   • Atherosclerotic heart disease of native coronary artery without angina pectoris   • Stage 3 chronic kidney disease (HCC)   • Diabetes mellitus type 2, insulin dependent (HCC)   • Diabetic peripheral neuropathy (HCC)   • Dyspnea   • Edema   • Endocarditis   • Goiter, nontoxic, multinodular   • Chronic systolic heart failure (HCC)   • Hypokalemia   • Iron deficiency anemia   • Presence of prosthetic heart valve   • Sinusitis   • Thyroid nodule   • Weight gain   • Aortic valve stenosis   • Paroxysmal atrial fibrillation (HCC)   • Obesity (BMI 30-39.9)   • Anemia due to stage 3 chronic kidney disease (HCC)   • Malabsorption   • Iron deficiency anemia due to chronic blood loss       Lab Results - Last 18 Months   Lab Units 01/19/22  1036 10/21/21  1106 07/21/21  1043   WBC 10*3/mm3 7.71 8.35 7.73   HEMOGLOBIN g/dL 11.4* 11.3* 11.6*   HEMATOCRIT % 36.0 34.4 36.8   PLATELETS 10*3/mm3 263 264 251   MCV fL 92.1 84.9 91.1     Lab Results - Last 18 Months   Lab Units 10/21/21  1106 10/12/21  1203 05/05/21  1122 04/01/21  1059 11/04/20  1240 11/04/20  1240 08/26/20  1108 08/26/20  1108   SODIUM mmol/L 138 138 141 139   < > 141   < > 141   POTASSIUM mmol/L 4.7 4.9 5.0 5.4*   < > 4.9   < > 4.8   CHLORIDE mmol/L 101 98 102 101   < > 103   < > 102   TOTAL CO2 mmol/L  --  27  --  24  --   --    < > 25   CO2 mmol/L 26.7  --  27.7  --    < > 26.9  --   --     BUN mg/dL 66* 68* 61* 76*   < > 57*   < > 49*   CREATININE mg/dL 1.86* 2.0* 1.91* 1.9*   < > 1.62*   < > 1.6*   CALCIUM mg/dL 9.9 10.0 9.8 9.8   < > 9.9   < > 9.8   BILIRUBIN mg/dL  --   --   --  0.5  --   --   --  0.8   ALK PHOS U/L  --   --   --  152*  --   --   --  149*   ALT (SGPT) U/L  --   --   --  42  --   --   --  22   AST (SGOT) U/L  --   --   --  43  --   --   --  27   GLUCOSE mg/dL 117*  --  119*  --   --  124*  --   --     < > = values in this interval not displayed.       Lab Results   Component Value Date    GLUCOSE 117 (H) 10/21/2021    BUN 66 (H) 10/21/2021    CREATININE 1.86 (H) 10/21/2021    EGFRIFNONA 27 (L) 10/21/2021    BCR 35.5 (H) 10/21/2021    K 4.7 10/21/2021    CO2 26.7 10/21/2021    CALCIUM 9.9 10/21/2021    PROTENTOTREF 6.0 12/04/2019    ALBUMIN 4.4 04/01/2021    LABIL2 1.3 04/01/2021    AST 43 04/01/2021    ALT 42 04/01/2021     Lab Results   Component Value Date    IRON 36 (L) 01/19/2022    TIBC 322 01/19/2022    FERRITIN 568.60 (H) 01/19/2022     Lab Results   Component Value Date    FOLATE 18.90 06/15/2020     No results found for: OCCULTBLD  Lab Results   Component Value Date    RETICCTPCT 2.33 (H) 12/04/2019     Lab Results   Component Value Date    DXYUYGYI38 947 (H) 12/04/2019    TSH 1.260 10/12/2021     No results found for: SPEP, UPEP  LDH   Date Value Ref Range Status   12/04/2019 353 (H) 135 - 214 U/L Final     Lab Results   Component Value Date    SEDRATE 31 (H) 07/28/2021     Lab Results   Component Value Date    FIBRINOGEN 286 05/09/2019    HAPTOGLOBIN 70 12/03/2019     No results found for: DDIMER  Lab Results   Component Value Date    PTT 78.7 (H) 05/23/2019    INR 4.30 01/12/2022     No results found for:   No results found for: CEA  No results found for:   No results found for: PSA  No results found for: [id number]    Assessment     65 y.o. F w/ iron deficiency  Anemia from small bowel AVMs while on anticoagulation for mechanical valves.      1. History of iron  deficiency anemia: History of occult GI bleeding with small bowel AVMs, still has low hb 11.3, iron sat is low at 11 from 14, will benefit from iv iron. She has oral iron malabsorption.  2. Chronically high ferritin: High ferritin with concomitant anemia of chronic kidney disease  3. Anemia: Due to CKD  4. Chronic kidney disease: Most recent creatinine was 1.8 on 11/4/2020.  Serum protein electrophoresis was negative on 12/4/2019:    Plan    1. History of iron deficiency anemia: Patient has required IV iron infusions in the past.  She does have a history of small bowel AVMs and is on anticoagulation due to mechanical heart valves.  Monitoring iron levels closely now with decreasing levels, recommend iv iron, she is on oral iron as well hence malabsorption.  2. High ferritin: CKD  3. CKD: She could have some anemia related to renal disease.  Hemoglobin has stayed above 10 therefore she does not require Procrit at this time.  4. Continue anticoagulation  Patient is moving to [place], she will establish with PCP/hematologist to monitor iron.      I have reviewed and confirmed the accuracy of the patient's history: Chief complaint, HPI, ROS, Subjective and Past Family Social History as entered by the MA/LPN/RN.   Dex Dove MD 01/26/22

## 2022-01-26 ENCOUNTER — APPOINTMENT (OUTPATIENT)
Dept: LAB | Facility: HOSPITAL | Age: 67
End: 2022-01-26

## 2022-01-26 ENCOUNTER — OFFICE VISIT (OUTPATIENT)
Dept: ONCOLOGY | Facility: CLINIC | Age: 67
End: 2022-01-26

## 2022-01-26 ENCOUNTER — ANTICOAGULATION VISIT (OUTPATIENT)
Dept: CARDIOLOGY | Facility: CLINIC | Age: 67
End: 2022-01-26

## 2022-01-26 VITALS
WEIGHT: 194 LBS | OXYGEN SATURATION: 95 % | TEMPERATURE: 96.8 F | HEART RATE: 73 BPM | RESPIRATION RATE: 18 BRPM | DIASTOLIC BLOOD PRESSURE: 69 MMHG | BODY MASS INDEX: 33.12 KG/M2 | SYSTOLIC BLOOD PRESSURE: 106 MMHG | HEIGHT: 64 IN

## 2022-01-26 DIAGNOSIS — Z79.01 LONG TERM (CURRENT) USE OF ANTICOAGULANTS: ICD-10-CM

## 2022-01-26 DIAGNOSIS — Z95.3 HISTORY OF AORTIC VALVE REPLACEMENT WITH BIOPROSTHETIC VALVE: Primary | ICD-10-CM

## 2022-01-26 DIAGNOSIS — D50.0 IRON DEFICIENCY ANEMIA DUE TO CHRONIC BLOOD LOSS: Primary | ICD-10-CM

## 2022-01-26 DIAGNOSIS — I48.91 ATRIAL FIBRILLATION, UNSPECIFIED TYPE: ICD-10-CM

## 2022-01-26 DIAGNOSIS — Z95.3 HISTORY OF MITRAL VALVE REPLACEMENT WITH BIOPROSTHETIC VALVE: ICD-10-CM

## 2022-01-26 LAB — INR PPP: 3.4

## 2022-01-26 PROCEDURE — 99214 OFFICE O/P EST MOD 30 MIN: CPT | Performed by: INTERNAL MEDICINE

## 2022-02-01 ENCOUNTER — HOSPITAL ENCOUNTER (OUTPATIENT)
Dept: ONCOLOGY | Facility: HOSPITAL | Age: 67
Setting detail: INFUSION SERIES
Discharge: HOME | End: 2022-02-01

## 2022-02-01 VITALS
WEIGHT: 180.2 LBS | SYSTOLIC BLOOD PRESSURE: 116 MMHG | BODY MASS INDEX: 30.93 KG/M2 | HEART RATE: 68 BPM | DIASTOLIC BLOOD PRESSURE: 75 MMHG | TEMPERATURE: 97.1 F

## 2022-02-01 DIAGNOSIS — D50.0 IRON DEFICIENCY ANEMIA DUE TO CHRONIC BLOOD LOSS: Primary | ICD-10-CM

## 2022-02-01 DIAGNOSIS — K90.9 INTESTINAL MALABSORPTION, UNSPECIFIED TYPE: ICD-10-CM

## 2022-02-01 PROCEDURE — 96365 THER/PROPH/DIAG IV INF INIT: CPT

## 2022-02-01 PROCEDURE — 25010000002 FERRIC CARBOXYMALTOSE 750 MG/15ML SOLUTION 15 ML VIAL: Performed by: INTERNAL MEDICINE

## 2022-02-01 PROCEDURE — 36415 COLL VENOUS BLD VENIPUNCTURE: CPT

## 2022-02-01 RX ORDER — SODIUM CHLORIDE 9 MG/ML
250 INJECTION, SOLUTION INTRAVENOUS ONCE
Status: COMPLETED | OUTPATIENT
Start: 2022-02-01 | End: 2022-02-01

## 2022-02-01 RX ADMIN — SODIUM CHLORIDE 750 MG: 900 INJECTION, SOLUTION INTRAVENOUS at 14:02

## 2022-02-01 RX ADMIN — SODIUM CHLORIDE 250 ML: 9 INJECTION, SOLUTION INTRAVENOUS at 14:02

## 2022-02-01 NOTE — PROGRESS NOTES
Pt here for injectafer with fatigue as her only complaint she received her injectafer with no issues and was discharged home with next appt.

## 2022-02-08 ENCOUNTER — HOSPITAL ENCOUNTER (OUTPATIENT)
Dept: ONCOLOGY | Facility: HOSPITAL | Age: 67
Setting detail: INFUSION SERIES
Discharge: HOME | End: 2022-02-08

## 2022-02-08 VITALS
HEART RATE: 76 BPM | DIASTOLIC BLOOD PRESSURE: 70 MMHG | TEMPERATURE: 97.2 F | WEIGHT: 180 LBS | SYSTOLIC BLOOD PRESSURE: 106 MMHG | RESPIRATION RATE: 18 BRPM | BODY MASS INDEX: 30.73 KG/M2 | HEIGHT: 64 IN

## 2022-02-08 DIAGNOSIS — K90.9 INTESTINAL MALABSORPTION, UNSPECIFIED TYPE: ICD-10-CM

## 2022-02-08 DIAGNOSIS — D50.0 IRON DEFICIENCY ANEMIA DUE TO CHRONIC BLOOD LOSS: Primary | ICD-10-CM

## 2022-02-08 PROCEDURE — 25010000002 FERRIC CARBOXYMALTOSE 750 MG/15ML SOLUTION 15 ML VIAL: Performed by: INTERNAL MEDICINE

## 2022-02-08 PROCEDURE — 96365 THER/PROPH/DIAG IV INF INIT: CPT

## 2022-02-08 RX ORDER — SODIUM CHLORIDE 9 MG/ML
250 INJECTION, SOLUTION INTRAVENOUS ONCE
Status: COMPLETED | OUTPATIENT
Start: 2022-02-08 | End: 2022-02-08

## 2022-02-08 RX ADMIN — SODIUM CHLORIDE 250 ML: 9 INJECTION, SOLUTION INTRAVENOUS at 14:52

## 2022-02-08 RX ADMIN — SODIUM CHLORIDE 750 MG: 900 INJECTION, SOLUTION INTRAVENOUS at 14:53

## 2022-02-08 NOTE — PROGRESS NOTES
Pt here for injectafer infusion. She denies having any complaints. Injectafer administered without difficulty and pt tolerated it well. Pt states she is moving out of the area at the beginning of March.

## 2022-02-09 ENCOUNTER — ANTICOAGULATION VISIT (OUTPATIENT)
Dept: CARDIOLOGY | Facility: CLINIC | Age: 67
End: 2022-02-09

## 2022-02-09 DIAGNOSIS — Z95.3 HISTORY OF MITRAL VALVE REPLACEMENT WITH BIOPROSTHETIC VALVE: ICD-10-CM

## 2022-02-09 DIAGNOSIS — Z95.3 HISTORY OF AORTIC VALVE REPLACEMENT WITH BIOPROSTHETIC VALVE: Primary | ICD-10-CM

## 2022-02-09 DIAGNOSIS — Z79.01 LONG TERM (CURRENT) USE OF ANTICOAGULANTS: ICD-10-CM

## 2022-02-09 LAB — INR PPP: 2.9

## 2022-02-23 ENCOUNTER — ANTICOAGULATION VISIT (OUTPATIENT)
Dept: CARDIOLOGY | Facility: CLINIC | Age: 67
End: 2022-02-23

## 2022-02-23 DIAGNOSIS — Z95.3 HISTORY OF MITRAL VALVE REPLACEMENT WITH BIOPROSTHETIC VALVE: ICD-10-CM

## 2022-02-23 DIAGNOSIS — I48.11 LONGSTANDING PERSISTENT ATRIAL FIBRILLATION: ICD-10-CM

## 2022-02-23 DIAGNOSIS — Z79.01 LONG TERM (CURRENT) USE OF ANTICOAGULANTS: ICD-10-CM

## 2022-02-23 DIAGNOSIS — Z95.3 HISTORY OF AORTIC VALVE REPLACEMENT WITH BIOPROSTHETIC VALVE: Primary | ICD-10-CM

## 2022-02-23 LAB — INR PPP: 2.5

## 2022-03-09 ENCOUNTER — ANTICOAGULATION VISIT (OUTPATIENT)
Dept: CARDIOLOGY | Facility: CLINIC | Age: 67
End: 2022-03-09

## 2022-03-09 DIAGNOSIS — I48.11 LONGSTANDING PERSISTENT ATRIAL FIBRILLATION: ICD-10-CM

## 2022-03-09 DIAGNOSIS — Z95.3 HISTORY OF MITRAL VALVE REPLACEMENT WITH BIOPROSTHETIC VALVE: ICD-10-CM

## 2022-03-09 DIAGNOSIS — Z95.3 HISTORY OF AORTIC VALVE REPLACEMENT WITH BIOPROSTHETIC VALVE: Primary | ICD-10-CM

## 2022-03-09 DIAGNOSIS — Z79.01 LONG TERM (CURRENT) USE OF ANTICOAGULANTS: ICD-10-CM

## 2022-03-09 LAB — INR PPP: 2.1

## 2022-03-23 ENCOUNTER — ANTICOAGULATION VISIT (OUTPATIENT)
Dept: CARDIOLOGY | Facility: CLINIC | Age: 67
End: 2022-03-23

## 2022-03-23 DIAGNOSIS — Z79.01 LONG TERM (CURRENT) USE OF ANTICOAGULANTS: ICD-10-CM

## 2022-03-23 DIAGNOSIS — Z95.3 HISTORY OF AORTIC VALVE REPLACEMENT WITH BIOPROSTHETIC VALVE: Primary | ICD-10-CM

## 2022-03-23 DIAGNOSIS — Z95.3 HISTORY OF MITRAL VALVE REPLACEMENT WITH BIOPROSTHETIC VALVE: ICD-10-CM

## 2022-03-23 LAB — INR PPP: 2

## 2022-03-23 NOTE — PROGRESS NOTES
Called patient, LMOM to increase warfarin to 5 mg Sun, Mon, Wed, and Fri and 7.5 mg on Tues, Thurs, and Sat. Check INR in 2 weeks.

## 2022-03-27 DIAGNOSIS — Z79.01 LONG TERM (CURRENT) USE OF ANTICOAGULANTS: ICD-10-CM

## 2022-03-27 DIAGNOSIS — I48.91 ATRIAL FIBRILLATION, UNSPECIFIED TYPE: ICD-10-CM

## 2022-03-28 RX ORDER — WARFARIN SODIUM 5 MG/1
TABLET ORAL
Qty: 114 TABLET | Refills: 0 | Status: SHIPPED | OUTPATIENT
Start: 2022-03-28

## 2022-03-28 NOTE — TELEPHONE ENCOUNTER
Rx Refill Note  Requested Prescriptions     Pending Prescriptions Disp Refills   • warfarin (COUMADIN) 5 MG tablet [Pharmacy Med Name: WARFARIN SODIUM 5 MG TABLET] 114 tablet 0     Sig: TAKE ONE TABLET BY MOUTH ON MONDAY, WEDNESDAY AND FRIDAY AND TAKE TAKE 1 AND 1/2 TABLET BY MOUTH ON ALL OTHER DAYS      Last office visit with prescribing clinician: 8/9/2021      Next office visit with prescribing clinician: 8/9/2022   Anticoagulation Visit 3/23/22 INR 2.0           Sweetie Workman RN  03/28/22, 12:05 EDT

## 2022-04-06 ENCOUNTER — ANTICOAGULATION VISIT (OUTPATIENT)
Dept: CARDIOLOGY | Facility: CLINIC | Age: 67
End: 2022-04-06

## 2022-04-06 DIAGNOSIS — Z95.3 HISTORY OF MITRAL VALVE REPLACEMENT WITH BIOPROSTHETIC VALVE: ICD-10-CM

## 2022-04-06 DIAGNOSIS — Z79.01 LONG TERM (CURRENT) USE OF ANTICOAGULANTS: ICD-10-CM

## 2022-04-06 DIAGNOSIS — Z95.3 HISTORY OF AORTIC VALVE REPLACEMENT WITH BIOPROSTHETIC VALVE: Primary | ICD-10-CM

## 2022-04-06 LAB — INR PPP: 2.8

## 2022-04-12 NOTE — PROGRESS NOTES
Subjective:     Encounter Date:07/24/2020      Patient ID: Jocelin Parra is a 65 y.o. female.    Chief Complaint : Stress test follow-up.  History of AVR, CABG, A. fib, long-term anticoagulation  History of Present Illness      This is 65-year-old with PMH of     # valvular heart disease, mechanical AVR on chronic Coumadin therapy, , severe mitral regurgitation, moderate TR and previous history of mechanical AVR, status post redo mitral valve replacement with #25 mechanical [place], tricuspid #28 physio ring,   Maze, CABG x2 on 05/09/2019  # CAD cardiac cath 4/13/2019 revealing severe 2 vessel disease, status post CABG x2 with LIMA to LAD and SVG to PDA 05/09/2019  # P.A.fib, long-term anticoagulation, cardioversion 8/9/18,Maze 05/09/2019  # diabetes,  hemoglobin A1c 04/17/2018 was 8.1  # hypertension  # torticollis, history of neck abscess 2015  # mother and lung cancer, father had diabetes and CVA  # AVR, unilateral oophorectomy.     here for  follow-up. patient was complaining of intermittent left-sided chest pain which feels like a pinching sensation which is on and off but last for few hours at a time.  Sometimes happens with exertion sometimes with rest..  And denies any shortness of breath  palpitations.   Patient's arterial blood pressure is 120/78, heart rate 83 bpm, weight of 185 pounds.   Patient is dealing with anemia and is seeing hematologist Dr. Mejía.  Getting iron transfusions   patient denies any fever chills. patient had labs done 12/6/2019 which showed creatinine of 1.48 and hemoglobin of 8 point 3 repeat was 8.7.  Labs from 2/6/2020 reveal cholesterol 111 triglycerides 102, HDL 43, LDL 37, hemoglobin A1c of 5.0.  CBC from 5/27/2020 reveals hemoglobin of 10.5      ASSESSMENT:    #Chest pain, low risk stress test    # VHD,AVR/MVR  #  CAD, CABG  # a.fib, long-term anticoagulation  #  hypertension, hyperlipidemia, diabetes, CKD  #  torticollis  #Anemia  #CKD    PLAN:  Reviewed stress test  Conjuntivae and eyelids appear normal, Sclerae : White without injection results with patient  We will continue medical management and risk factor modification.  Risk benefits alternatives explained.   reviewed valvular heart disease with patient   follow-up with hematology for anemia  Reviewed labs with patient          Assessment:          Diagnosis Plan   1. History of aortic valve replacement with bioprosthetic valve     2. Paroxysmal atrial fibrillation (CMS/[facility])     3. Long term (current) use of anticoagulants     4. Essential hypertension     5. CKD (chronic kidney disease) stage 3, GFR 30-59 ml/min (CMS/[facility])            Plan:         Past Medical History:  Past Medical History:   Diagnosis Date   • A-fib (CMS/[facility]) Dx 2018    S/p Cardioversion by Dr. Spencer on 04/10/19   • Acute renal failure syndrome (CMS/[facility]) 4/2019-[facility] IP   • Biatrial enlargement 04/10/2019    Noted on SHERIE   • Calcified granuloma of lung (CMS/[facility]) 01/04/2019    Noted on Chest XR   • Cardiomegaly 01/04/2019    Borderline--Noted on Chest XR   • CHF (congestive heart failure) (CMS/[facility])    • CKD (chronic kidney disease), stage III (CMS/[facility])     Does Not See a Nephrologist   • Diabetes mellitus (CMS/[facility])    • DM (diabetes mellitus) (CMS/[facility])     T2   • Dysphagia 08/15-[facility] IP    Due to Pharyngitis   • GERD (gastroesophageal reflux disease)    • History of chest x-ray 8/28/15-[facility]    Normal   • History of chest x-ray 01/19/2019    Mild Pulmonary Edema w/Congestive Failure Noted   • History of chest x-ray 01/04/2019    Borderline Cardiomegaly    • History of EKG 2014/2018/2019-[facility]   • Hx of diabetic neuropathy    • Hx of dizziness     w/Lightheadedness   • Hx of echocardiogram 4/16/18-[facility]    EF 55-60%; Mild MVR; Mild TVR; Normal Root/No Effusion   • Hyperlipidemia     Controlled w/Meds   • Hypertension     Controlled w/Meds   • Hypokalemia 4/11/19-[facility] IP   • Hypothyroidism     Controlled w/Meds   • Iron deficiency anemia    • Left posterior fascicular block 04/2018 & 4/19    Noted on EKG   • Lower extremity edema 03/2019    • Mild mitral valve regurgitation 04/16/2018    Noted on Echo   • Mild tricuspid valve regurgitation 04/16/2018    Noted on Echo   • Moderate mitral valve regurgitation 2008    S/p MVR in 08'   • Moderate tricuspid insufficiency 04/10/2019    Noted on SHERIE   • Osteoarthritis     Knees w/Hx Knee Repl   • Pulmonary edema 01/19/2019    Mild--Noted on Chest XR   • Rapid palpitations 04/15/18 & 8/9/18-[facility] ER   • Renal dysfunction    • Right axis deviation 08/2018 & 4/19    Noted on EKG   • Severe aortic valve stenosis Since 2008    Hx AVR on 12/11/08 by Dr. Alvarado   • Severe mitral insufficiency 04/10/2019    Noted on SHERIE   • Sinus tachycardia 08/2018    Noted on EKG   • SOB (shortness of breath) chronic   • Torticollis Chronic   • Valvular heart disease     S/p AVR & MVR in 08'     Past Surgical History:  Past Surgical History:   Procedure Laterality Date   • AORTIC VALVE REPAIR/REPLACEMENT  12/11/08-[facility]    Using a #20 Eight Yr Mechanical [place] Prosthesis--Dewey Alvarado MD   • CARDIOVERSION  4/10/19-[facility]    Dr. Spencer--For A-Fib   • COLONOSCOPY N/A 12/5/2019    Procedure: COLONOSCOPY;  Surgeon: Dustin Castellanos MD;  Location: [facility] ENDOSCOPY;  Service: Gastroenterology   • ENDOSCOPY N/A 12/5/2019    Procedure: ESOPHAGOGASTRODUODENOSCOPY;  Surgeon: Dustin Castellanos MD;  Location: [facility] ENDOSCOPY;  Service: Gastroenterology   • ENTEROSCOPY SMALL BOWEL N/A 2/3/2020    Procedure: ESOPHAGOGASTRODUODENOSCOPY WITH SMALL BOWEL ENTEROSCOPY and argonplasma coagulation of two small jejunal ateriovenous malformations;  Surgeon: Dexter Akhtar MD;  Location: [facility] ENDOSCOPY;  Service: Gastroenterology;  Laterality: N/A;  jejunal ateriovenous malformation   • HYSTERECTOMY     • MITRAL VALVE REPLACEMENT  2008   • MITRAL VALVE REPLACEMENT  05/09/2019    Dr Alvarado   • SHERIE  4/10/19-[facility]    EF 40%; Moderate TVR; Severe Eccentric MI; Biatrial Enlargement   • TOTAL KNEE ARTHROPLASTY  2017   • TRICUSPID VALVE SURGERY  05/09/2019    Dr Alvarado       Allergies:  No Known Allergies  Home Meds:  Current Meds:     Current Outpatient Medications:   •  ACCU-CHEK GUIDE test strip, , Disp: , Rfl:   •  aspirin 81 MG tablet, Take 81 mg by mouth Daily., Disp: , Rfl:   •  atorvastatin (LIPITOR) 40 MG tablet, Take 40 mg by mouth Daily., Disp: , Rfl:   •  B Complex Vitamins (VITAMIN B COMPLEX) tablet, Take 1 capsule by mouth Daily., Disp: , Rfl:   •  cholecalciferol (VITAMIN D3) 10 MCG (400 UNIT) tablet, Take 2,000 Units by mouth Daily., Disp: , Rfl:   •  ezetimibe (ZETIA) 10 MG tablet, Take 1 tablet by mouth daily., Disp: , Rfl:   •  ferrous sulfate 325 (65 FE) MG tablet, Take 325 mg by mouth 2 (Two) Times a Day., Disp: , Rfl:   •  furosemide (LASIX) 40 MG tablet, Take 40 mg by mouth Daily., Disp: , Rfl:   •  Insulin Degludec (TRESIBA) 100 UNIT/ML solution injection, Inject 10 Units under the skin into the appropriate area as directed every night at bedtime., Disp: , Rfl:   •  [employer] PEN NEEDLES 32G X 4 MM misc, , Disp: , Rfl:   •  levothyroxine (SYNTHROID, LEVOTHROID) 112 MCG tablet, Take 112 mcg by mouth Daily., Disp: , Rfl:   •  Liraglutide (VICTOZA) 18 MG/3ML solution pen-injector, Inject 1.8 mg under the skin into the appropriate area as directed Daily., Disp: , Rfl:   •  MAGNESIUM PO, Take 1 tablet by mouth Daily., Disp: , Rfl:   •  metFORMIN ER (GLUCOPHAGE-XR) 500 MG 24 hr tablet, Take 500 mg by mouth Daily., Disp: , Rfl:   •  metoprolol succinate XL (TOPROL-XL) 25 MG 24 hr tablet, TAKE ONE-HALF TABLET BY MOUTH DAILY, Disp: 45 tablet, Rfl: 1  •  Multiple Vitamin (MULTI-VITAMIN) tablet, Take 1 tablet by mouth daily., Disp: , Rfl:   •  omeprazole (PriLOSEC) 20 MG capsule, Take 20 mg by mouth Daily., Disp: , Rfl:   •  potassium chloride (K-DUR,KLOR-CON) 20 MEQ CR tablet, Take 20 mEq by mouth Daily., Disp: , Rfl:   •  valsartan (DIOVAN) 80 MG tablet, Take 80 mg by mouth Daily., Disp: , Rfl:   •  vitamin C (ASCORBIC ACID) 500 MG tablet, Take 500 mg by mouth Daily.,  "Disp: , Rfl:   •  warfarin (COUMADIN) 5 MG tablet, TAKE ONE TABLET BY MOUTH DAILY ON MONDAY AND FRIDAY AND TAKE 1 AND ONE-HALF TABLETS BY MOUTH ON ALL OTHER DAYS OR AS DIRECTED, Disp: 45 tablet, Rfl: 0  Social History:   Social History     Tobacco Use   • Smoking status: Never Smoker   • Smokeless tobacco: Never Used   Substance Use Topics   • Alcohol use: No      Family History:  Family History   Problem Relation Age of Onset   • Heart disease Father    • Hypertension Father    • Stroke Father    • Diabetes Father    • Lung cancer Mother         The following portions of the patient's history were reviewed and updated as appropriate: allergies, current medications, past family history, past medical history, past social history, past surgical history and problem list.      Review of Systems   Constitution: Negative for chills, fever, malaise/fatigue and weight gain.   HENT: Negative for nosebleeds.    Eyes: Negative for visual disturbance.   Cardiovascular: Negative for chest pain, dyspnea on exertion, leg swelling, near-syncope, palpitations and syncope.   Respiratory: Negative for shortness of breath.    Endocrine: Negative for cold intolerance.   Hematologic/Lymphatic: Negative for bleeding problem.   Skin: Negative for rash.   Musculoskeletal: Negative for back pain.   Gastrointestinal: Negative for nausea and vomiting.   Genitourinary: Negative for nocturia.   Neurological: Negative for dizziness, light-headedness, numbness and weakness.   Psychiatric/Behavioral: Negative for altered mental status.   Allergic/Immunologic: Negative for hives.     Comprehensive review of systems were reviewed and all others review of systems were found to be negative other than HPI    Procedures       Objective:     Physical Exam  /78   Pulse 83   Ht 162.6 cm (64\")   Wt 83.9 kg (185 lb)   LMP  (LMP Unknown)   BMI 31.76 kg/m²   General:  Appears in no acute distress  Eyes: Sclera is anicteric,  conjunctiva is clear "   HEENT:  No JVD.  Torticollis present  Respiratory: Respirations regular and unlabored at rest.  Bilaterally good breath sounds, with good air entry in all fields. No crackles, rubs or wheezes auscultated  Cardiovascular: S1,S2 Regular rate and rhythm. No murmur, rub or gallop auscultated. No pretibial pitting edema  Gastrointestinal: Abdomen soft, flat, non tender. Bowel sounds present.   Musculoskeletal:  No abnormal movements  Extremities: No digital clubbing or cyanosis  Skin: Color pink. Skin warm and dry to touch. No rashes  No xanthoma  Neuro: Alert and awake, no lateralizing deficits appreciated    Lab Reviewed:

## 2022-05-17 ENCOUNTER — TELEPHONE (OUTPATIENT)
Dept: CARDIOLOGY | Facility: CLINIC | Age: 67
End: 2022-05-17

## 2022-05-23 ENCOUNTER — ANTICOAGULATION VISIT (OUTPATIENT)
Dept: CARDIOLOGY | Facility: CLINIC | Age: 67
End: 2022-05-23

## 2022-05-23 DIAGNOSIS — Z79.01 LONG TERM (CURRENT) USE OF ANTICOAGULANTS: ICD-10-CM

## 2022-05-23 DIAGNOSIS — Z95.3 HISTORY OF MITRAL VALVE REPLACEMENT WITH BIOPROSTHETIC VALVE: ICD-10-CM

## 2022-05-23 DIAGNOSIS — I48.91 ATRIAL FIBRILLATION, UNSPECIFIED TYPE: ICD-10-CM

## 2022-05-23 DIAGNOSIS — Z95.3 HISTORY OF AORTIC VALVE REPLACEMENT WITH BIOPROSTHETIC VALVE: Primary | ICD-10-CM

## 2023-03-16 NOTE — PLAN OF CARE
Problem: Patient Care Overview  Goal: Interprofessional Rounds/Family Conf  Outcome: Ongoing (interventions implemented as appropriate)         [FreeTextEntry1] : Documented by Shabana Whipple acting as a scribe for Dr. Nathanael Wren on 03/15/2023.

## 2024-03-27 NOTE — NURSING NOTE
NP called due to hemoglobin of 6.6, 1 unit PRBC ordered   Show Aperture Variable?: Yes Render Note In Bullet Format When Appropriate: No Post-Care Instructions: I reviewed with the patient in detail post-care instructions. Patient is to wear sunprotection, and avoid picking at any of the treated lesions. Pt may apply Vaseline to crusted or scabbing areas. Detail Level: Detailed Consent: The patient's consent was obtained including but not limited to risks of crusting, scabbing, blistering, scarring, darker or lighter pigmentary change, recurrence, incomplete removal and infection. Number Of Freeze-Thaw Cycles: 1 freeze-thaw cycle Duration Of Freeze Thaw-Cycle (Seconds): 0

## (undated) DEVICE — PAPR PRNT PK SONY W RIBN UPC55

## (undated) DEVICE — PK ENDO GI 50

## (undated) DEVICE — 3M™ PATIENT PLATE, CORDED, SPLIT, LARGE, 40 PER CASE, 1179: Brand: 3M™

## (undated) DEVICE — BITEBLOCK ENDO W/STRAP 60F A/ LF DISP

## (undated) DEVICE — FIAPC® PROBE W/ FILTER 2200 A OD 2.3MM/6.9FR; L 2.2M/7.2FT: Brand: ERBE